# Patient Record
Sex: MALE | URBAN - METROPOLITAN AREA
[De-identification: names, ages, dates, MRNs, and addresses within clinical notes are randomized per-mention and may not be internally consistent; named-entity substitution may affect disease eponyms.]

---

## 2021-05-14 DIAGNOSIS — Z00.00 ROUTINE GENERAL MEDICAL EXAMINATION AT A HEALTH CARE FACILITY: Primary | ICD-10-CM

## 2021-05-17 ENCOUNTER — OFFICE VISIT (OUTPATIENT)
Dept: SPORTS MEDICINE | Facility: CLINIC | Age: 26
End: 2021-05-17
Payer: COMMERCIAL

## 2021-05-17 DIAGNOSIS — Z00.00 ROUTINE GENERAL MEDICAL EXAMINATION AT A HEALTH CARE FACILITY: ICD-10-CM

## 2021-05-17 PROCEDURE — 99499 NO LOS: ICD-10-PCS | Mod: ,,, | Performed by: ORTHOPAEDIC SURGERY

## 2021-05-17 PROCEDURE — 93000 ELECTROCARDIOGRAM COMPLETE: CPT | Mod: ,,, | Performed by: INTERNAL MEDICINE

## 2021-05-17 PROCEDURE — 93000 EKG 12-LEAD: ICD-10-PCS | Mod: ,,, | Performed by: INTERNAL MEDICINE

## 2021-05-17 PROCEDURE — 99499 UNLISTED E&M SERVICE: CPT | Mod: ,,, | Performed by: ORTHOPAEDIC SURGERY

## 2021-05-18 ENCOUNTER — HOSPITAL ENCOUNTER (OUTPATIENT)
Dept: RADIOLOGY | Facility: HOSPITAL | Age: 26
Discharge: HOME | End: 2021-05-18
Attending: ORTHOPAEDIC SURGERY
Payer: COMMERCIAL

## 2021-05-18 ENCOUNTER — TELEPHONE (OUTPATIENT)
Dept: SPORTS MEDICINE | Facility: CLINIC | Age: 26
End: 2021-05-18

## 2021-05-18 DIAGNOSIS — Z02.5 SPORTS PHYSICAL: ICD-10-CM

## 2021-05-18 DIAGNOSIS — Z02.5 SPORTS PHYSICAL: Primary | ICD-10-CM

## 2021-05-18 PROCEDURE — 72110 X-RAY EXAM L-2 SPINE 4/>VWS: CPT | Mod: TC

## 2021-05-18 PROCEDURE — 72148 MRI LUMBAR SPINE W/O DYE: CPT | Mod: TC

## 2021-05-18 PROCEDURE — 72148 MRI LUMBAR SPINE W/O DYE: CPT | Mod: 26,,, | Performed by: RADIOLOGY

## 2021-05-18 PROCEDURE — 72110 X-RAY EXAM L-2 SPINE 4/>VWS: CPT | Mod: 26,,, | Performed by: RADIOLOGY

## 2021-05-18 PROCEDURE — 72148 MRI LUMBAR SPINE WITHOUT CONTRAST: ICD-10-PCS | Mod: 26,,, | Performed by: RADIOLOGY

## 2021-05-18 PROCEDURE — 72110 XR LUMBAR SPINE 5 VIEW WITH FLEX AND EXT: ICD-10-PCS | Mod: 26,,, | Performed by: RADIOLOGY

## 2022-05-11 DIAGNOSIS — Z00.00 ANNUAL PHYSICAL EXAM: Primary | ICD-10-CM

## 2022-06-13 ENCOUNTER — CLINICAL SUPPORT (OUTPATIENT)
Dept: ADMINISTRATIVE | Facility: CLINIC | Age: 27
End: 2022-06-13
Payer: COMMERCIAL

## 2022-06-13 DIAGNOSIS — Z00.00 ROUTINE GENERAL MEDICAL EXAMINATION AT A HEALTH CARE FACILITY: Primary | ICD-10-CM

## 2022-06-13 DIAGNOSIS — Z00.00 ANNUAL PHYSICAL EXAM: ICD-10-CM

## 2022-06-13 LAB
25(OH)D3+25(OH)D2 SERPL-MCNC: 26 NG/ML (ref 30–96)
ALBUMIN SERPL BCP-MCNC: 3.8 G/DL (ref 3.5–5.2)
ALP SERPL-CCNC: 67 U/L (ref 55–135)
ALT SERPL W/O P-5'-P-CCNC: 25 U/L (ref 10–44)
ANION GAP SERPL CALC-SCNC: 11 MMOL/L (ref 8–16)
AST SERPL-CCNC: 33 U/L (ref 10–40)
BASOPHILS # BLD AUTO: 0.01 K/UL (ref 0–0.2)
BASOPHILS NFR BLD: 0.3 % (ref 0–1.9)
BILIRUB SERPL-MCNC: 0.6 MG/DL (ref 0.1–1)
BILIRUB UR QL STRIP: NEGATIVE
BILIRUB UR QL STRIP: NEGATIVE
BUN SERPL-MCNC: 11 MG/DL (ref 6–20)
CALCIUM SERPL-MCNC: 9.6 MG/DL (ref 8.7–10.5)
CHLORIDE SERPL-SCNC: 103 MMOL/L (ref 95–110)
CHOLEST SERPL-MCNC: 135 MG/DL (ref 120–199)
CHOLEST/HDLC SERPL: 2.8 {RATIO} (ref 2–5)
CLARITY UR REFRACT.AUTO: CLEAR
CLARITY UR REFRACT.AUTO: CLEAR
CO2 SERPL-SCNC: 25 MMOL/L (ref 23–29)
COLOR UR AUTO: YELLOW
COLOR UR AUTO: YELLOW
CREAT SERPL-MCNC: 1.1 MG/DL (ref 0.5–1.4)
DIFFERENTIAL METHOD: ABNORMAL
EOSINOPHIL # BLD AUTO: 0.1 K/UL (ref 0–0.5)
EOSINOPHIL NFR BLD: 4.1 % (ref 0–8)
ERYTHROCYTE [DISTWIDTH] IN BLOOD BY AUTOMATED COUNT: 12.1 % (ref 11.5–14.5)
EST. GFR  (AFRICAN AMERICAN): >60 ML/MIN/1.73 M^2
EST. GFR  (NON AFRICAN AMERICAN): >60 ML/MIN/1.73 M^2
ESTIMATED AVG GLUCOSE: 108 MG/DL (ref 68–131)
GLUCOSE SERPL-MCNC: 86 MG/DL (ref 70–110)
GLUCOSE UR QL STRIP: NEGATIVE
GLUCOSE UR QL STRIP: NEGATIVE
HBA1C MFR BLD: 5.4 % (ref 4–5.6)
HCT VFR BLD AUTO: 43.9 % (ref 40–54)
HDLC SERPL-MCNC: 49 MG/DL (ref 40–75)
HDLC SERPL: 36.3 % (ref 20–50)
HGB BLD-MCNC: 14.9 G/DL (ref 14–18)
HGB S BLD QL SOLY: NEGATIVE
HGB UR QL STRIP: NEGATIVE
HGB UR QL STRIP: NEGATIVE
IMM GRANULOCYTES # BLD AUTO: 0.01 K/UL (ref 0–0.04)
IMM GRANULOCYTES NFR BLD AUTO: 0.3 % (ref 0–0.5)
KETONES UR QL STRIP: NEGATIVE
KETONES UR QL STRIP: NEGATIVE
LDLC SERPL CALC-MCNC: 75 MG/DL (ref 63–159)
LEUKOCYTE ESTERASE UR QL STRIP: NEGATIVE
LEUKOCYTE ESTERASE UR QL STRIP: NEGATIVE
LYMPHOCYTES # BLD AUTO: 1.2 K/UL (ref 1–4.8)
LYMPHOCYTES NFR BLD: 36 % (ref 18–48)
MCH RBC QN AUTO: 30.2 PG (ref 27–31)
MCHC RBC AUTO-ENTMCNC: 33.9 G/DL (ref 32–36)
MCV RBC AUTO: 89 FL (ref 82–98)
MONOCYTES # BLD AUTO: 0.3 K/UL (ref 0.3–1)
MONOCYTES NFR BLD: 9.6 % (ref 4–15)
NEUTROPHILS # BLD AUTO: 1.7 K/UL (ref 1.8–7.7)
NEUTROPHILS NFR BLD: 49.7 % (ref 38–73)
NITRITE UR QL STRIP: NEGATIVE
NITRITE UR QL STRIP: NEGATIVE
NONHDLC SERPL-MCNC: 86 MG/DL
NRBC BLD-RTO: 0 /100 WBC
PH UR STRIP: 6 [PH] (ref 5–8)
PH UR STRIP: 6 [PH] (ref 5–8)
PLATELET # BLD AUTO: 222 K/UL (ref 150–450)
PMV BLD AUTO: 9.5 FL (ref 9.2–12.9)
POTASSIUM SERPL-SCNC: 4.1 MMOL/L (ref 3.5–5.1)
PROT SERPL-MCNC: 6.9 G/DL (ref 6–8.4)
PROT UR QL STRIP: NEGATIVE
PROT UR QL STRIP: NEGATIVE
RBC # BLD AUTO: 4.94 M/UL (ref 4.6–6.2)
SARS-COV-2 IGG SERPL IA-ACNC: NORMAL AU/ML
SARS-COV-2 IGG SERPL QL IA: POSITIVE
SODIUM SERPL-SCNC: 139 MMOL/L (ref 136–145)
SP GR UR STRIP: 1.02 (ref 1–1.03)
SP GR UR STRIP: 1.02 (ref 1–1.03)
TRIGL SERPL-MCNC: 55 MG/DL (ref 30–150)
TSH SERPL DL<=0.005 MIU/L-ACNC: 1.48 UIU/ML (ref 0.4–4)
URN SPEC COLLECT METH UR: NORMAL
URN SPEC COLLECT METH UR: NORMAL
WBC # BLD AUTO: 3.44 K/UL (ref 3.9–12.7)

## 2022-06-13 PROCEDURE — 86769 SARS-COV-2 COVID-19 ANTIBODY: CPT | Performed by: INTERNAL MEDICINE

## 2022-06-13 PROCEDURE — 83036 HEMOGLOBIN GLYCOSYLATED A1C: CPT | Performed by: INTERNAL MEDICINE

## 2022-06-13 PROCEDURE — 82955 ASSAY OF G6PD ENZYME: CPT | Performed by: INTERNAL MEDICINE

## 2022-06-13 PROCEDURE — 80053 COMPREHEN METABOLIC PANEL: CPT | Performed by: INTERNAL MEDICINE

## 2022-06-13 PROCEDURE — 85025 COMPLETE CBC W/AUTO DIFF WBC: CPT | Performed by: INTERNAL MEDICINE

## 2022-06-13 PROCEDURE — 84443 ASSAY THYROID STIM HORMONE: CPT | Performed by: INTERNAL MEDICINE

## 2022-06-13 PROCEDURE — 80061 LIPID PANEL: CPT | Performed by: INTERNAL MEDICINE

## 2022-06-13 PROCEDURE — 85660 RBC SICKLE CELL TEST: CPT | Performed by: INTERNAL MEDICINE

## 2022-06-13 PROCEDURE — 81003 URINALYSIS AUTO W/O SCOPE: CPT | Performed by: INTERNAL MEDICINE

## 2022-06-13 PROCEDURE — 93010 EKG 12-LEAD: ICD-10-PCS | Mod: S$GLB,,, | Performed by: INTERNAL MEDICINE

## 2022-06-13 PROCEDURE — 93010 ELECTROCARDIOGRAM REPORT: CPT | Mod: S$GLB,,, | Performed by: INTERNAL MEDICINE

## 2022-06-13 PROCEDURE — 82306 VITAMIN D 25 HYDROXY: CPT | Performed by: INTERNAL MEDICINE

## 2022-06-14 LAB — G6PD RBC-CCNT: 10 U/G HB (ref 8–11.9)

## 2022-08-26 ENCOUNTER — TELEPHONE (OUTPATIENT)
Dept: SPORTS MEDICINE | Facility: CLINIC | Age: 27
End: 2022-08-26
Payer: COMMERCIAL

## 2022-08-26 DIAGNOSIS — M25.562 ACUTE PAIN OF LEFT KNEE: Primary | ICD-10-CM

## 2022-08-27 ENCOUNTER — HOSPITAL ENCOUNTER (OUTPATIENT)
Dept: RADIOLOGY | Facility: HOSPITAL | Age: 27
Discharge: HOME | End: 2022-08-27
Attending: ORTHOPAEDIC SURGERY
Payer: COMMERCIAL

## 2022-08-27 DIAGNOSIS — M25.562 ACUTE PAIN OF LEFT KNEE: ICD-10-CM

## 2022-08-27 PROCEDURE — 73721 MRI JNT OF LWR EXTRE W/O DYE: CPT | Mod: TC,LT

## 2022-08-27 PROCEDURE — 73721 MRI JNT OF LWR EXTRE W/O DYE: CPT | Mod: 26,LT,, | Performed by: RADIOLOGY

## 2022-08-27 PROCEDURE — 73721 MRI KNEE WITHOUT CONTRAST LEFT: ICD-10-PCS | Mod: 26,LT,, | Performed by: RADIOLOGY

## 2022-08-27 RX ORDER — HYDROCODONE BITARTRATE AND ACETAMINOPHEN 7.5; 325 MG/1; MG/1
TABLET ORAL
Qty: 20 TABLET | Refills: 0 | Status: SHIPPED | OUTPATIENT
Start: 2022-08-27 | End: 2022-09-16

## 2022-09-06 ENCOUNTER — TELEPHONE (OUTPATIENT)
Dept: SPORTS MEDICINE | Facility: CLINIC | Age: 27
End: 2022-09-06
Payer: COMMERCIAL

## 2022-09-06 DIAGNOSIS — M25.562 ACUTE PAIN OF LEFT KNEE: Primary | ICD-10-CM

## 2022-09-07 ENCOUNTER — CLINICAL SUPPORT (OUTPATIENT)
Dept: REHABILITATION | Facility: HOSPITAL | Age: 27
End: 2022-09-07
Attending: ORTHOPAEDIC SURGERY
Payer: COMMERCIAL

## 2022-09-07 DIAGNOSIS — M62.81 QUADRICEPS WEAKNESS: ICD-10-CM

## 2022-09-07 DIAGNOSIS — M25.662 DECREASED RANGE OF MOTION OF LEFT KNEE: ICD-10-CM

## 2022-09-07 DIAGNOSIS — M25.562 ACUTE PAIN OF LEFT KNEE: ICD-10-CM

## 2022-09-07 DIAGNOSIS — R26.2 DIFFICULTY WALKING: ICD-10-CM

## 2022-09-07 PROCEDURE — 97161 PT EVAL LOW COMPLEX 20 MIN: CPT

## 2022-09-07 PROCEDURE — 97140 MANUAL THERAPY 1/> REGIONS: CPT

## 2022-09-07 PROCEDURE — 97110 THERAPEUTIC EXERCISES: CPT

## 2022-09-07 PROCEDURE — 97112 NEUROMUSCULAR REEDUCATION: CPT

## 2022-09-08 ENCOUNTER — CLINICAL SUPPORT (OUTPATIENT)
Dept: REHABILITATION | Facility: HOSPITAL | Age: 27
End: 2022-09-08
Attending: ORTHOPAEDIC SURGERY
Payer: COMMERCIAL

## 2022-09-08 DIAGNOSIS — M25.562 ACUTE PAIN OF LEFT KNEE: Primary | ICD-10-CM

## 2022-09-08 DIAGNOSIS — R26.2 DIFFICULTY WALKING: ICD-10-CM

## 2022-09-08 DIAGNOSIS — M25.662 DECREASED RANGE OF MOTION OF LEFT KNEE: ICD-10-CM

## 2022-09-08 DIAGNOSIS — M62.81 QUADRICEPS WEAKNESS: ICD-10-CM

## 2022-09-08 PROCEDURE — 97140 MANUAL THERAPY 1/> REGIONS: CPT

## 2022-09-08 PROCEDURE — 97110 THERAPEUTIC EXERCISES: CPT

## 2022-09-08 PROCEDURE — 97112 NEUROMUSCULAR REEDUCATION: CPT

## 2022-09-08 NOTE — PROGRESS NOTES
[facility]   Physical Therapy Treatment Note     Name: Billy Holbrook  [facility] Number: [id number]    Therapy Diagnosis:   Encounter Diagnoses   Name Primary?    Acute pain of left knee Yes    Decreased range of motion of left knee     Quadriceps weakness     Difficulty walking      Physician: PEACE Dempsey MD    Visit Date: 9/8/2022    Physician Orders: PT Eval and Treat   Medical Diagnosis from Referral: M25.562 (ICD-10-CM) - Acute pain of left knee  Evaluation Date: 9/7/2022  Authorization Period Expiration: 9/6/2023  Plan of Care Expiration: 12/31/2022  Visit # / Visits authorized: 2/ 20      Precautions: Standard     Time In: 10:55 am  Time Out: 11:58  Total Billable Time: 60 minutes    SUBJECTIVE     Pt reports: his knee is feeling better after last session. Does not like the brace or crutches, but is using them.     He was compliant with home exercise program.  Response to previous treatment: less pain and stiffness  Functional change: better gait on crutches    Pain: 0/10  Location: left knee      OBJECTIVE     Observation: no obvious effusion noted     Range of Motion (extension/neutral/flexion):     Right Left   Knee PROM: 2-0-130 deg 1-0-128* deg      Strength:     Right Left Comment   Knee Extension: 5/5 3-/5*        Knee Flexion: 5/5 3-/5*        Hip Abduction: 4+/5 4-/5       Functional tests(*=pain):   Gait: on linus crutches with hinged unlocked knee brace: reciprocal  Quad activation: fair     Joint Mobility: hypomobile PFJ and tibiofemoral    Treatment     Billy received the treatments listed below:      therapeutic exercises to develop strength, endurance, ROM, flexibility, posture, and core stabilization for 40 minutes including:  Heel slides x 5 min 2 bouts  SL hip abd and extension in prone next session  After being screened for any contraindications, assessed for precautions, and educated in the benefits/risks associated with the use of blood flow restriction  training. The BFR device calculated a personalized pressure of 250 mmHg. Exercises performed at an 60% occlusion pressure of 150 mmHg via autoregulation feature for the following exercises:   -LAQ 5 lbs  -TKE standing black sport band   Education/assessment    manual therapy techniques: Joint mobilizations and Soft tissue Mobilization were applied for 8 minutes, including:  PFJ gr 3 glides all planes  Knee hyperextension gr 3  Tibial AP gr 3    neuromuscular re-education activities to improve: Coordination, Kinesthetic, Sense, and Proprioception for 10 minutes. The following activities were included:  Quad sets with biofeedback comparison to uninvolved with channel 2  SLR from quad set 3 x 10 in supine    therapeutic activities to improve functional performance for 5  minutes, including:  DL squat 0-60 deg 3 x 10: cuing on weight shift/mechanics    gait training to improve functional mobility and safety for 0  minutes, including:  NP    Patient Education and Home Exercises     Home Exercises Provided and Patient Education Provided     Education provided:   - HEP update, use of crutches/brace- re fit with new velcro, compression sleeve, timeframes, expectations, prognosis    Written Home Exercises Provided: yes. Exercises were reviewed and Billy was able to demonstrate them prior to the end of the session.  Billy demonstrated good  understanding of the education provided. See EMR under Patient Instructions for exercises provided during therapy sessions    ASSESSMENT     Billy demonstrates less irritability, better ROM, joint mobility improvements, and improved quad activation. Good tolerance to session without complaints other than quad fatigue as expected. He moves well off crutches/brace in clinic short distances. Pt reported/demonstrated no adverse response or exacerbation of symptoms during today's session.      Billy Is progressing well towards his goals.   Pt prognosis is Good.     Pt will continue to  benefit from skilled outpatient physical therapy to address the deficits listed in the problem list box on initial evaluation, provide pt/family education and to maximize pt's level of independence in the home and community environment.     Pt's spiritual, cultural and educational needs considered and pt agreeable to plan of care and goals.     Anticipated barriers to physical therapy: none    GOALS: Short Term Goals:  4 weeks (progressing, not met)   1.Report decreased L knee pain  < / =  2/10  to increase tolerance for ambulating on crutches. MET  2. Increase knee ROM to 2-0-130 deg in order to go into surgery with full ROM.   3. Increase strength by 1/3 MMT grade in L quad to improve activation prior to surgery.   4. Pt to tolerate HEP to improve ROM and improve quad activation for immediate post-op.   5. Pt will have minimal to none joint effusion noted prior to surgery.     PLAN     Progress ROM to full as able. Maintain good quad activation and joint mobility.     Manjit Lazcano, PT

## 2022-09-08 NOTE — PLAN OF CARE
[facility]   Physical Therapy Initial Evaluation     Date: 9/7/2022   Name: Billy Holbrook  [facility] Number: [id number]    Therapy Diagnosis:   Encounter Diagnoses   Name Primary?    Acute pain of left knee     Decreased range of motion of left knee     Quadriceps weakness     Difficulty walking      Physician: PEACE Dempsey MD    Physician Orders: PT Eval and Treat   Medical Diagnosis from Referral: M25.562 (ICD-10-CM) - Acute pain of left knee  Evaluation Date: 9/7/2022  Authorization Period Expiration: 9/6/2023  Plan of Care Expiration: 12/31/2022  Visit # / Visits authorized: 1/ 20     Precautions: Standard     Time In: 9:00 am  Time Out: 10:05  Total Appointment Time (timed & untimed codes): 65 minutes      SUBJECTIVE     Date of onset: 2-3 wks ago    History of current condition - Billy reports: he was injured in practice when someone rolled up on his leg. He reports no prior injures other than some disc issues that have been asymptomatic for some time. He feels his swelling is down since injury. He is going to have Dr. Lopez take a look as his knee for 2nd option prior to likely surgery in next 2-3 wks with Dr. Castaneda. He does not enjoy the brace/crutches, but is using them for most part. Wearing compression sleeve as well. Does note instability/not feeling right when loading his knee/with testing.     Falls: none    Imaging, MRI studies:   1. Mild signal heterogeneity of the proximal fibers of the ACL suggesting a low-grade sprain.  No high-grade partial or full-thickness tear.  Clinical correlation recommended.  2. High-grade partial-thickness tear of the proximal superficial MCL superimposed on a chronic sprain.  3. Complete tear of the meniscofemoral component of the deep MCL.  4. Small complex tear inner 1/3 of the posterior horn of the lateral meniscus, favored to be chronic.  5. Chondral fissuring at the central and posterior weight-bearing lateral tibial plateau with  mild associated subchondral edema.  6. Fragmented tibial tubercle in keeping with sequela chronic Osgood-Schlatter.  7. Small joint effusion.    Dr. Castaneda feels knee is unstable with testing and per pt report.     Prior Therapy: yes with [facility]  Social History:  lives with their family  Occupation: [employer]  Prior Level of Function: unrestricted function  Current Level of Function: unable to fully bend knee, walk without limp.     Pain:  Current 0/10, worst 6/10, best 0/10   Location: left knee  medial and posterior  Description: Aching, Dull, and Tight  Aggravating Factors: Walking, Extension, Flexing, and Getting out of bed/chair  Easing Factors: ice, lying down, rest, and elevation    Patients goals: get back to football activities     Medical History:   No past medical history on file.    Surgical History:   Billy Holbrook  has no past surgical history on file.    Medications:   Billy has a current medication list which includes the following prescription(s): hydrocodone-acetaminophen.    Allergies:   Review of patient's allergies indicates:   Allergen Reactions    Citrus and derivatives     Peanut     Wasp venom     Yellow jacket venom       OBJECTIVE     Observation: no obvious effusion noted     Range of Motion (extension/neutral/flexion):     Right Left   Knee PROM: 2-0-130 deg 0-124* deg      Strength:     Right Left Comment   Knee Extension: 5/5 3-/5*        Knee Flexion: 5/5 3-/5*        Hip Abduction: 4+/5 4-/5        Special Tests: NT      Palpation: TTP along MCL    Neurovascularly intact    Functional tests(*=pain):   Gait: on linus crutches with hinged unlocked knee brace: reciprocal  Quad activation: fair    Joint Mobility: hypomobile PFJ and tibiofemoral     Proximal/distal screen: decreased active/passive ankle mobility    Flexibility:        Generally not flexible    No girth differences noted.     TREATMENT     Total Treatment time (time-based codes) separate from Evaluation: 46 minutes       Billy received the treatments listed below:      therapeutic exercises to develop strength, endurance, ROM, and flexibility for 25 minutes including:  Heel slides 2 x 5 min  After being screened for any contraindications, assessed for precautions, and educated in the benefits/risks associated with the use of blood flow restriction training. The BFR device calculated a personalized pressure of 227 mmHg. Exercises performed at an 60% occlusion pressure of 136 mmHg via autoregulation feature for the following exercises:   -LAQ 7.5 lbs  -standing TKE black sport band   Education and fit of brace    manual therapy techniques: Joint mobilizations and Soft tissue Mobilization were applied for 8 minutes, including:  PFJ gr 2-3 all planes  Tibial gr 3 AP  Knee hyperextension gr 3    neuromuscular re-education activities to improve: Coordination, Kinesthetic, Sense, and Proprioception for 8 minutes. The following activities were included:  Quad sets with biofeedback device; compared linus with iPad visual feedback    therapeutic activities to improve functional performance for 3 minutes, including:  DL squat 0-60 deg 3 x 15; cuing and feedback    gait training to improve functional mobility and safety for 2  minutes, including:  Use of crutches and mechanics    cold pack for 8 minutes to L knee.    PATIENT EDUCATION AND HOME EXERCISES     Education provided:   - Pt was educated and demonstrated good understanding of post-op precautions, timeframe for recovery, prognosis, objective findings and progress/goals, Tx rationale/options, HEP review/discussion on modification and or progression/regression, expectations for rehab, normal and expected soreness, activity modification/avoidance/pacing, use of ICE/elevation of extremity, activity pacing, anatomy/physiology of pathology, benefits of exercise and physical therapy, use/fit of brace and crutches, wound care, s/s infection and DVT.      Written Home Exercises Provided: yes.  Exercises were reviewed and Billy was able to demonstrate them prior to the end of the session.  Billy demonstrated good  understanding of the education provided. See EMR under Patient Instructions for exercises provided during therapy sessions.    ASSESSMENT     Billy is a 27 y.o. male referred to outpatient Physical Therapy with a medical diagnosis of L knee pain and weakness secondary to MCL and ACL pathology. Patient presents with pain, instability, swelling, quad inhibition, ROM deficit, joint stiffness, and overall functional mobility deficits and intolerance. Plan is for him to have surgery in next 2-3 wks.     Patient prognosis is Good.   Patient will benefit from skilled outpatient Physical Therapy to address the deficits stated above and in the chart below, provide patient /family education, and to maximize patientt's level of independence.     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    Personal Factors:   coping style  lifestyle  character     low   Examination  Body Structures and Functions, activity limitations and participation restrictions that may impact the plan of care Body Regions:   back  lower extremities    Body Systems:    gross symmetry  ROM  strength  gross coordinated movement  balance  gait  transfers  transitions  motor control  motor learning  edema    Participation Restrictions:   Football activities    Activity limitations:   Learning and applying knowledge  no deficits    General Tasks and Commands  no deficits    Communication  no deficits    Mobility  lifting and carrying objects  walking  Squatting, running, lunging, jumping,  cutting    Self care  no deficits    Domestic Life  assisting others    Interactions/Relationships  no deficits    Life Areas  no deficits    Community and Social Life  community life  recreation and leisure         low   Clinical Presentation stable and uncomplicated low   Decision Making/ Complexity Score: low     GOALS: Short Term Goals:  4 weeks  1.Report decreased L knee pain  < / =  2/10  to increase tolerance for ambulating on crutches.   2. Increase knee ROM to 2-0-130 deg in order to go into surgery with full ROM.   3. Increase strength by 1/3 MMT grade in L quad to improve activation prior to surgery.   4. Pt to tolerate HEP to improve ROM and improve quad activation for immediate post-op.   5. Pt will have minimal to none joint effusion noted prior to surgery.     PLAN   Plan of care Certification: 9/7/2022 to 12/31/2022.    Outpatient Physical Therapy 5 times weekly for 4 weeks to include the following interventions: Electrical Stimulation TENs/IFC/NMES, Manual Therapy, Moist Heat/ Ice, Neuromuscular Re-ed, Patient Education, Self Care, Therapeutic Activities, Therapeutic Exercise, Ultrasound and IASTM, dry needling, taping, BFR, and gr 5 mobilization as needed.      Manjit Lazcano, PT

## 2022-09-09 ENCOUNTER — CLINICAL SUPPORT (OUTPATIENT)
Dept: REHABILITATION | Facility: HOSPITAL | Age: 27
End: 2022-09-09
Attending: ORTHOPAEDIC SURGERY
Payer: COMMERCIAL

## 2022-09-09 DIAGNOSIS — M25.562 ACUTE PAIN OF LEFT KNEE: Primary | ICD-10-CM

## 2022-09-09 DIAGNOSIS — M62.81 QUADRICEPS WEAKNESS: ICD-10-CM

## 2022-09-09 DIAGNOSIS — R26.2 DIFFICULTY WALKING: ICD-10-CM

## 2022-09-09 DIAGNOSIS — M25.662 DECREASED RANGE OF MOTION OF LEFT KNEE: ICD-10-CM

## 2022-09-09 PROCEDURE — 97140 MANUAL THERAPY 1/> REGIONS: CPT

## 2022-09-09 PROCEDURE — 97110 THERAPEUTIC EXERCISES: CPT

## 2022-09-09 PROCEDURE — 97112 NEUROMUSCULAR REEDUCATION: CPT

## 2022-09-11 NOTE — PROGRESS NOTES
[facility]   Physical Therapy Treatment Note     Name: Billy Holbrook  [facility] Number: [id number]    Therapy Diagnosis:   Encounter Diagnoses   Name Primary?    Acute pain of left knee Yes    Decreased range of motion of left knee     Quadriceps weakness     Difficulty walking      Physician: PEACE Dempsey MD    Visit Date: 9/9/2022    Physician Orders: PT Eval and Treat   Medical Diagnosis from Referral: M25.562 (ICD-10-CM) - Acute pain of left knee  Evaluation Date: 9/7/2022  Authorization Period Expiration: 9/6/2023  Plan of Care Expiration: 12/31/2022  Visit # / Visits authorized: 3/ 20      Precautions: Standard     Time In: 10:58 am  Time Out: 11:58  Total Billable Time: 60 minutes    SUBJECTIVE     Pt reports: he feels good. Shone took him of crutches yesterday. Pain is minimal to none. Notes no swelling. Ready to have surgery. Going to gun range this weekend he thinks.     He was compliant with home exercise program.  Response to previous treatment: less pain and stiffness  Functional change: better gait on crutches    Pain: 0/10  Location: left knee      OBJECTIVE     Observation: no obvious effusion noted     Range of Motion (extension/neutral/flexion):     Right Left   Knee PROM: 2-0-130 deg 2-0-130* deg      Strength:     Right Left Comment   Knee Extension: 5/5 3-/5*        Knee Flexion: 5/5 3-/5*        Hip Abduction: 4+/5 4-/5       Functional tests(*=pain):   Gait: on linus crutches with hinged unlocked knee brace: reciprocal  Quad activation: fair     Joint Mobility: hypomobile PFJ and tibiofemoral    Treatment     Billy received the treatments listed below:      therapeutic exercises to develop strength, endurance, ROM, flexibility, posture, and core stabilization for 37 minutes including:  Heel slides x 5 min 2 bouts  SL hip abd 3 x 10  Prone hip ext with quad set 3 x 10  After being screened for any contraindications, assessed for precautions, and educated in the  benefits/risks associated with the use of blood flow restriction training. The BFR device calculated a personalized pressure of 250 mmHg. Exercises performed at an 60% occlusion pressure of 150 mmHg via autoregulation feature for the following exercises:   -LAQ 5 lbs  -SL press on shuttle 25 lbs  Education/assessment    manual therapy techniques: Joint mobilizations and Soft tissue Mobilization were applied for 8 minutes, including:  PFJ gr 3 glides all planes  Knee hyperextension gr 3  Tibial AP gr 3    neuromuscular re-education activities to improve: Coordination, Kinesthetic, Sense, and Proprioception for 10 minutes. The following activities were included:  Quad sets with biofeedback comparison to uninvolved with channel 2  SLR from quad set 3 x 10 in supine    therapeutic activities to improve functional performance for 5 minutes, including:  DL squat 0-60 deg 3 x 10: cuing on weight shift/mechanics    gait training to improve functional mobility and safety for 0  minutes, including:  NP    Patient Education and Home Exercises     Home Exercises Provided and Patient Education Provided     Education provided:   - HEP update, use of crutches/brace- re fit with new velcro, compression sleeve, timeframes, expectations, prognosis    Written Home Exercises Provided: yes. Exercises were reviewed and Billy was able to demonstrate them prior to the end of the session.  Billy demonstrated good  understanding of the education provided. See EMR under Patient Instructions for exercises provided during therapy sessions    ASSESSMENT     Billy is doing well at this time. His ROM is much improved with less MCL irritation reported. Quad activation is much better. Walking well without external support short distances. Dr. Castaneda spoke to him in clinic today about next weeks plans and check in wed/Thursday with surgery possible that day v Tuesday the week after.  Pt reported/demonstrated no adverse response or exacerbation of  symptoms during today's session.      Billy Is progressing well towards his goals.   Pt prognosis is Good.     Pt will continue to benefit from skilled outpatient physical therapy to address the deficits listed in the problem list box on initial evaluation, provide pt/family education and to maximize pt's level of independence in the home and community environment.     Pt's spiritual, cultural and educational needs considered and pt agreeable to plan of care and goals.     Anticipated barriers to physical therapy: none    GOALS: Short Term Goals:  4 weeks (progressing, not met)   1.Report decreased L knee pain  < / =  2/10  to increase tolerance for ambulating on crutches. MET  2. Increase knee ROM to 2-0-130 deg in order to go into surgery with full ROM. MET  3. Increase strength by 1/3 MMT grade in L quad to improve activation prior to surgery. MET  4. Pt to tolerate HEP to improve ROM and improve quad activation for immediate post-op.   5. Pt will have minimal to none joint effusion noted prior to surgery. MET    PLAN     Progress ROM to full as able. Maintain good quad activation, low irritability, minimal to no effusion, and joint mobility.     Seeing 5x/wk pre and post-op.     Manjit Lazcano, PT

## 2022-09-12 ENCOUNTER — CLINICAL SUPPORT (OUTPATIENT)
Dept: REHABILITATION | Facility: HOSPITAL | Age: 27
End: 2022-09-12
Attending: ORTHOPAEDIC SURGERY
Payer: COMMERCIAL

## 2022-09-12 DIAGNOSIS — R26.2 DIFFICULTY WALKING: ICD-10-CM

## 2022-09-12 DIAGNOSIS — M62.81 QUADRICEPS WEAKNESS: ICD-10-CM

## 2022-09-12 DIAGNOSIS — M25.562 ACUTE PAIN OF LEFT KNEE: Primary | ICD-10-CM

## 2022-09-12 DIAGNOSIS — M25.662 DECREASED RANGE OF MOTION OF LEFT KNEE: ICD-10-CM

## 2022-09-12 PROCEDURE — 97140 MANUAL THERAPY 1/> REGIONS: CPT

## 2022-09-12 PROCEDURE — 97112 NEUROMUSCULAR REEDUCATION: CPT

## 2022-09-12 PROCEDURE — 97110 THERAPEUTIC EXERCISES: CPT

## 2022-09-12 NOTE — PROGRESS NOTES
[facility]   Physical Therapy Treatment Note     Name: Billy Holbrook  [facility] Number: [id number]    Therapy Diagnosis:   Encounter Diagnoses   Name Primary?    Acute pain of left knee Yes    Decreased range of motion of left knee     Quadriceps weakness     Difficulty walking        Physician: PEACE Dempsey MD    Visit Date: 9/12/2022    Physician Orders: PT Eval and Treat   Medical Diagnosis from Referral: M25.562 (ICD-10-CM) - Acute pain of left knee  Evaluation Date: 9/7/2022  Authorization Period Expiration: 9/6/2023  Plan of Care Expiration: 12/31/2022  Visit # / Visits authorized: 3/ 20      Precautions: Standard     Time In: 11:00 am  Time Out: 12:06  Total Billable Time: 66 minutes    SUBJECTIVE     Pt reports: his knee is feeling good. No complaints of pain or instability. Remains mildly tender along MCL. Using brace with compression sleeve, good off crutches.     He was compliant with home exercise program.  Response to previous treatment: less pain and stiffness  Functional change: better gait on crutches    Pain: 0/10  Location: left knee      OBJECTIVE     Observation: no obvious effusion noted     Range of Motion (extension/neutral/flexion):     Right Left   Knee PROM: 2-0-130 deg 2-0-130 deg      Strength:     Right Left Comment   Knee Extension: 5/5 3-/5*        Knee Flexion: 5/5 3-/5*        Hip Abduction: 4+/5 4-/5       Functional tests(*=pain):   Gait/stairs: normal  Dl squat 0-60 deg good  Quad activation: good     Joint Mobility: hypomobile PFJ and tibiofemoral    TTP along MCL- mild    Treatment     Billy received the treatments listed below:      therapeutic exercises to develop strength, endurance, ROM, flexibility, posture, and core stabilization for 47 minutes including:  Heel slides x 5 min 2 bouts  SL hip abd 3 x 15 3 lbs  Prone hip ext with quad set 3 x 15 3 lbs  Up DL down SL calf raises standing 3 x 10  After being screened for any contraindications,  assessed for precautions, and educated in the benefits/risks associated with the use of blood flow restriction training. The BFR device calculated a personalized pressure of 210 mmHg. Exercises performed at an 60% occlusion pressure of 132 mmHg via autoregulation feature for the following exercises:   -LAQ 10->5 lbs  -step up 6 inch with black band TKE  -standing HS curls 5 lbs  Education/assessment    manual therapy techniques: Joint mobilizations and Soft tissue Mobilization were applied for 8 minutes, including:  PFJ gr 3 glides all planes  Knee hyperextension gr 3  Tibial AP gr 3    neuromuscular re-education activities to improve: Coordination, Kinesthetic, Sense, and Proprioception for 8 minutes. The following activities were included:  Quad sets x 10 x 5 sec  SLR from quad set 3 x 15 in supine with 3 lbs    therapeutic activities to improve functional performance for 3 minutes, including:  DL squat 0-60 deg 3 x 10: cuing on weight shift/mechanics    gait training to improve functional mobility and safety for 0  minutes, including:  NP    Patient Education and Home Exercises     Home Exercises Provided and Patient Education Provided     Education provided:   - HEP update, use of crutches/brace- re fit with new velcro, compression sleeve, timeframes, expectations, prognosis    Written Home Exercises Provided: yes. Exercises were reviewed and Billy was able to demonstrate them prior to the end of the session.  Billy demonstrated good  understanding of the education provided. See EMR under Patient Instructions for exercises provided during therapy sessions    ASSESSMENT     Billy arrived with some stiffness limiting flexion and extension, but able to gain back full motion. Quad activation is going well with fair tolerance to BFR- verbalizes how difficulty/uncomfortable compression is; knee feels fine. No MCL pain with ROM reported today, mild tenderness ongoing. Squat, step up, and gait mechanics look great  out of brace in clinic with no signs of instability.  Pt reported/demonstrated no adverse response or exacerbation of symptoms during today's session.      Billy Is progressing well towards his goals.   Pt prognosis is Good.     Pt will continue to benefit from skilled outpatient physical therapy to address the deficits listed in the problem list box on initial evaluation, provide pt/family education and to maximize pt's level of independence in the home and community environment.     Pt's spiritual, cultural and educational needs considered and pt agreeable to plan of care and goals.     Anticipated barriers to physical therapy: none    GOALS: Short Term Goals:  4 weeks (progressing, not met)   1.Report decreased L knee pain  < / =  2/10  to increase tolerance for ambulating on crutches. MET  2. Increase knee ROM to 2-0-130 deg in order to go into surgery with full ROM. MET  3. Increase strength by 1/3 MMT grade in L quad to improve activation prior to surgery. MET  4. Pt to tolerate HEP to improve ROM and improve quad activation for immediate post-op.   5. Pt will have minimal to none joint effusion noted prior to surgery. MET    PLAN     Progress ROM to full as able. Maintain good quad activation, low irritability, minimal to no effusion, and joint mobility.     Seeing 5x/wk pre and post-op.     Manjit Lazcano, PT

## 2022-09-13 ENCOUNTER — CLINICAL SUPPORT (OUTPATIENT)
Dept: REHABILITATION | Facility: HOSPITAL | Age: 27
End: 2022-09-13
Attending: ORTHOPAEDIC SURGERY
Payer: COMMERCIAL

## 2022-09-13 DIAGNOSIS — M62.81 QUADRICEPS WEAKNESS: ICD-10-CM

## 2022-09-13 DIAGNOSIS — R26.2 DIFFICULTY WALKING: ICD-10-CM

## 2022-09-13 DIAGNOSIS — M25.662 DECREASED RANGE OF MOTION OF LEFT KNEE: ICD-10-CM

## 2022-09-13 DIAGNOSIS — M25.562 ACUTE PAIN OF LEFT KNEE: Primary | ICD-10-CM

## 2022-09-13 PROCEDURE — 97140 MANUAL THERAPY 1/> REGIONS: CPT

## 2022-09-13 PROCEDURE — 97110 THERAPEUTIC EXERCISES: CPT

## 2022-09-13 PROCEDURE — 97112 NEUROMUSCULAR REEDUCATION: CPT

## 2022-09-13 NOTE — PROGRESS NOTES
[facility]   Physical Therapy Treatment Note     Name: Billy Holbrook  [facility] Number: [id number]    Therapy Diagnosis:   Encounter Diagnoses   Name Primary?    Acute pain of left knee Yes    Decreased range of motion of left knee     Quadriceps weakness     Difficulty walking        Physician: PEACE Dempsey MD    Visit Date: 9/13/2022    Physician Orders: PT Eval and Treat   Medical Diagnosis from Referral: M25.562 (ICD-10-CM) - Acute pain of left knee  Evaluation Date: 9/7/2022  Authorization Period Expiration: 9/6/2023  Plan of Care Expiration: 12/31/2022  Visit # / Visits authorized: 5/ 20      Precautions: Standard     Time In: 11:00 am  Time Out: 12:10  Total Billable Time: 60 minutes    SUBJECTIVE     Pt reports: he is feeling good, ready for surgery. MCL remains mildly tender but does not bother him through ROM.     He was compliant with home exercise program.  Response to previous treatment: less pain and stiffness  Functional change: better gait no crutches    Pain: 0/10  Location: left knee      OBJECTIVE     Observation: no obvious effusion noted     Range of Motion (extension/neutral/flexion):     Right Left   Knee PROM: 2-0-130 deg 2-0-130 deg      Strength:     Right Left Comment   Knee Extension: 5/5 4-/5*        Knee Flexion: 5/5 4-/5*        Hip Abduction: 4+/5 4-/5       Functional tests(*=pain):   Gait/stairs: normal  DL squat 0-60 deg good  Quad activation: good     Joint Mobility: hypomobile PFJ and tibiofemoral    TTP along MCL- mild    Treatment     Billy received the treatments listed below:      therapeutic exercises to develop strength, endurance, ROM, flexibility, posture, and core stabilization for 50 minutes including:  Heel slides x 5 min 2 bouts  SL hip abd 4 x 10 5 lbs  Prone hip ext with quad set 4 x 10 5 lbs  DL calf raise on slantboard 3 x 20  Upright bike 10 min lv 3-5-light  After being screened for any contraindications, assessed for precautions,  and educated in the benefits/risks associated with the use of blood flow restriction training. The BFR device calculated a personalized pressure of 210 mmHg. Exercises performed at an 60% occlusion pressure of 132 mmHg via autoregulation feature for the following exercises:   -DL squat to 20 inch box  -standing HS curls 5 lbs  Manual and self stretching guidance stretch to L hip  Education/assessment    manual therapy techniques: Joint mobilizations and Soft tissue Mobilization were applied for 10 minutes, including:  PFJ gr 3 glides all planes  Knee hyperextension gr 3  Tibial AP gr 3    neuromuscular re-education activities to improve: Coordination, Kinesthetic, Sense, and Proprioception for 10 minutes. The following activities were included:  Quad sets into hyperextension 2x 10 x 5 sec  SLR from quad set 4 x 10 in supine with 5 lbs    therapeutic activities to improve functional performance for 0 minutes, including:  NP    gait training to improve functional mobility and safety for 0  minutes, including:  NP    Patient Education and Home Exercises     Home Exercises Provided and Patient Education Provided     Education provided:   - HEP update, use of brace, compression sleeve, timeframes, expectations, prognosis    Written Home Exercises Provided: yes. Exercises were reviewed and Billy was able to demonstrate them prior to the end of the session.  Billy demonstrated good  understanding of the education provided. See EMR under Patient Instructions for exercises provided during therapy sessions    ASSESSMENT     Billy was able to obtain full ROM much easier today. Effusion is managed. MCL remains slightly tender to touch, but no pain through ROM or in session with exercises. Pt reported/demonstrated no adverse response or exacerbation of symptoms during today's session.      Billy Is progressing well towards his goals.   Pt prognosis is Good.     Pt will continue to benefit from skilled outpatient physical  therapy to address the deficits listed in the problem list box on initial evaluation, provide pt/family education and to maximize pt's level of independence in the home and community environment.     Pt's spiritual, cultural and educational needs considered and pt agreeable to plan of care and goals.     Anticipated barriers to physical therapy: none    GOALS: Short Term Goals:  4 weeks (progressing, not met)   1.Report decreased L knee pain  < / =  2/10  to increase tolerance for ambulating on crutches. MET  2. Increase knee ROM to 2-0-130 deg in order to go into surgery with full ROM. MET  3. Increase strength by 1/3 MMT grade in L quad to improve activation prior to surgery. MET  4. Pt to tolerate HEP to improve ROM and improve quad activation for immediate post-op.   5. Pt will have minimal to none joint effusion noted prior to surgery. MET    PLAN     Progress ROM to full as able. Maintain good quad activation, low irritability, minimal to no effusion, and joint mobility.     Seeing 5x/wk pre and post-op.     Manjit Lazcano, PT

## 2022-09-14 ENCOUNTER — CLINICAL SUPPORT (OUTPATIENT)
Dept: REHABILITATION | Facility: HOSPITAL | Age: 27
End: 2022-09-14
Payer: COMMERCIAL

## 2022-09-14 DIAGNOSIS — R26.2 DIFFICULTY WALKING: ICD-10-CM

## 2022-09-14 DIAGNOSIS — M25.662 DECREASED RANGE OF MOTION OF LEFT KNEE: ICD-10-CM

## 2022-09-14 DIAGNOSIS — M25.562 ACUTE PAIN OF LEFT KNEE: Primary | ICD-10-CM

## 2022-09-14 DIAGNOSIS — M62.81 QUADRICEPS WEAKNESS: ICD-10-CM

## 2022-09-14 PROCEDURE — 97140 MANUAL THERAPY 1/> REGIONS: CPT

## 2022-09-14 PROCEDURE — 97110 THERAPEUTIC EXERCISES: CPT

## 2022-09-14 PROCEDURE — 97112 NEUROMUSCULAR REEDUCATION: CPT

## 2022-09-14 NOTE — PROGRESS NOTES
[facility]   Physical Therapy Treatment Note     Name: Billy Holbrook  [facility] Number: [id number]    Therapy Diagnosis:   Encounter Diagnoses   Name Primary?    Acute pain of left knee Yes    Decreased range of motion of left knee     Quadriceps weakness     Difficulty walking        Physician: PEACE Dempsey MD    Visit Date: 9/14/2022    Physician Orders: PT Eval and Treat   Medical Diagnosis from Referral: M25.562 (ICD-10-CM) - Acute pain of left knee  Evaluation Date: 9/7/2022  Authorization Period Expiration: 9/6/2023  Plan of Care Expiration: 12/31/2022  Visit # / Visits authorized: 6/ 20      Precautions: Standard     Time In: 11:00 am  Time Out: 12:15  Total Billable Time: 65 minutes    SUBJECTIVE     Pt reports: he is feeling good. Ready for surgery. Mild tenderness along MCL, but no irritation with functional tasks or thorough full ROM.      He was compliant with home exercise program.  Response to previous treatment: less pain and stiffness  Functional change: better gait no crutches    Pain: 0/10  Location: left knee      OBJECTIVE     Observation: no obvious effusion noted     Range of Motion (extension/neutral/flexion):     Right Left   Knee PROM: 2-0-130 deg 2-0-130 deg      Strength:     Right Left Comment   Knee Extension: 5/5 4-/5*        Knee Flexion: 5/5 4-/5*        Hip Abduction: 4+/5 4-/5       Functional tests(*=pain):   Gait/stairs: normal  DL squat 0-60 deg good  Quad activation: good     Joint Mobility: hypomobile PFJ and tibiofemoral    TTP along MCL- mild    Treatment     Billy received the treatments listed below:      therapeutic exercises to develop strength, endurance, ROM, flexibility, posture, and core stabilization for 55 minutes including:  Heel slides x 5 min 2 bouts  SL hip abd 4 x 10 5 lbs  Prone hip ext with quad set 4 x 10 5 lbs  DL calf raise on slantboard 3 x 20  Upright bike 10 min lv 3-7-light  After being screened for any  contraindications, assessed for precautions, and educated in the benefits/risks associated with the use of blood flow restriction training. The BFR device calculated a personalized pressure of 210 mmHg. Exercises performed at an 60% occlusion pressure of 132 mmHg via autoregulation feature for the following exercises:   -DL squat to 20 inch box  -prone HS curls 5 lbs  -LAQ 5 lbs  Education/assessment    manual therapy techniques: Joint mobilizations and Soft tissue Mobilization were applied for 10 minutes, including:  PFJ gr 3 glides all planes  Knee hyperextension gr 3  Tibial AP gr 3    neuromuscular re-education activities to improve: Coordination, Kinesthetic, Sense, and Proprioception for 10 minutes. The following activities were included:  Quad sets into hyperextension 2x 10 x 5 sec  SLR from quad set 4 x 10 in supine with 5 lbs    therapeutic activities to improve functional performance for 0 minutes, including:  NP    gait training to improve functional mobility and safety for 0  minutes, including:  NP    Patient Education and Home Exercises     Home Exercises Provided and Patient Education Provided     Education provided:   - HEP update, use of brace, compression sleeve, timeframes, expectations, prognosis    Written Home Exercises Provided: yes. Exercises were reviewed and Billy was able to demonstrate them prior to the end of the session.  Billy demonstrated good  understanding of the education provided. See EMR under Patient Instructions for exercises provided during therapy sessions    ASSESSMENT     Billy is doing well. Has full ROM, good quad, quiet knee other than mild tenderness along MCL. No irritation other than expected muscle fatigue/discomfort from BFR. Pt reported/demonstrated no adverse response or exacerbation of symptoms during today's session.      Billy Is progressing well towards his goals.   Pt prognosis is Good.     Pt will continue to benefit from skilled outpatient physical  therapy to address the deficits listed in the problem list box on initial evaluation, provide pt/family education and to maximize pt's level of independence in the home and community environment.     Pt's spiritual, cultural and educational needs considered and pt agreeable to plan of care and goals.     Anticipated barriers to physical therapy: none    GOALS: Short Term Goals:  4 weeks (progressing, not met)   1.Report decreased L knee pain  < / =  2/10  to increase tolerance for ambulating on crutches. MET  2. Increase knee ROM to 2-0-130 deg in order to go into surgery with full ROM. MET  3. Increase strength by 1/3 MMT grade in L quad to improve activation prior to surgery. MET  4. Pt to tolerate HEP to improve ROM and improve quad activation for immediate post-op.   5. Pt will have minimal to none joint effusion noted prior to surgery. MET    PLAN     Progress ROM to full as able. Maintain good quad activation, low irritability, minimal to no effusion, and joint mobility.     Seeing 5x/wk pre and post-op.     Manjit Lazcano, PT

## 2022-09-15 ENCOUNTER — OFFICE VISIT (OUTPATIENT)
Dept: SPORTS MEDICINE | Facility: CLINIC | Age: 27
End: 2022-09-15
Payer: COMMERCIAL

## 2022-09-15 ENCOUNTER — CLINICAL SUPPORT (OUTPATIENT)
Dept: REHABILITATION | Facility: HOSPITAL | Age: 27
End: 2022-09-15
Attending: ORTHOPAEDIC SURGERY
Payer: COMMERCIAL

## 2022-09-15 DIAGNOSIS — S83.207A TEARS OF MENISCUS AND ACL OF LEFT KNEE, INITIAL ENCOUNTER: Primary | ICD-10-CM

## 2022-09-15 DIAGNOSIS — M25.562 ACUTE PAIN OF LEFT KNEE: Primary | ICD-10-CM

## 2022-09-15 DIAGNOSIS — S83.512A TEARS OF MENISCUS AND ACL OF LEFT KNEE, INITIAL ENCOUNTER: Primary | ICD-10-CM

## 2022-09-15 DIAGNOSIS — M25.662 DECREASED RANGE OF MOTION OF LEFT KNEE: ICD-10-CM

## 2022-09-15 DIAGNOSIS — R26.2 DIFFICULTY WALKING: ICD-10-CM

## 2022-09-15 DIAGNOSIS — S83.412A: ICD-10-CM

## 2022-09-15 DIAGNOSIS — M62.81 QUADRICEPS WEAKNESS: ICD-10-CM

## 2022-09-15 PROCEDURE — 99204 OFFICE O/P NEW MOD 45 MIN: CPT | Mod: S$GLB,,, | Performed by: ORTHOPAEDIC SURGERY

## 2022-09-15 PROCEDURE — 97140 MANUAL THERAPY 1/> REGIONS: CPT

## 2022-09-15 PROCEDURE — 99204 PR OFFICE/OUTPT VISIT, NEW, LEVL IV, 45-59 MIN: ICD-10-PCS | Mod: S$GLB,,, | Performed by: ORTHOPAEDIC SURGERY

## 2022-09-15 PROCEDURE — 97112 NEUROMUSCULAR REEDUCATION: CPT

## 2022-09-15 PROCEDURE — 97110 THERAPEUTIC EXERCISES: CPT

## 2022-09-15 NOTE — PROGRESS NOTES
NEW PATIENT    HISTORY OF PRESENT ILLNESS   27 y.o. Male with a history of left knee pain who sustained an injury to his left knee during the last preseason game.  He was evaluated by me on the sideline.  Follow-up imaging was consistent with what appeared to be a partial ACL tear in addition to a grade 2 MCL injury with a possible lateral meniscus tear that was possibly acute versus chronic.  He has been in physical therapy at this time but still feels symptoms of instability and wants to proceed with surgery.        - mechanical symptoms, + instability    PAST MEDICAL HISTORY    No past medical history on file.    PAST SURGICAL HISTORY     No past surgical history on file.    FAMILY HISTORY    No family history on file.    SOCIAL HISTORY    Social History     Socioeconomic History    Marital status: Unknown       MEDICATIONS      Current Outpatient Medications:     HYDROcodone-acetaminophen (NORCO) 7.5-325 mg per tablet, Take 1-2 tabs every 4-6 hours prn pain, Disp: 20 tablet, Rfl: 0    ALLERGIES     Review of patient's allergies indicates:   Allergen Reactions    Citrus and derivatives     Peanut     Wasp venom     Yellow jacket venom          REVIEW OF SYSTEMS   Constitution: Negative. Negative for chills, fever and night sweats.   HENT: Negative for congestion and headaches.    Eyes: Negative for blurred vision, left vision loss and right vision loss.   Cardiovascular: Negative for chest pain and syncope.   Respiratory: Negative for cough and shortness of breath.    Endocrine: Negative for polydipsia, polyphagia and polyuria.   Hematologic/Lymphatic: Negative for bleeding problem. Does not bruise/bleed easily.   Skin: Negative for dry skin, itching and rash.   Musculoskeletal: Negative for falls. Positive for left knee pain and instability  Gastrointestinal: Negative for abdominal pain and bowel incontinence.   Genitourinary: Negative for bladder incontinence and nocturia.   Neurological: Negative for  disturbances in coordination, loss of balance and seizures.   Psychiatric/Behavioral: Negative for depression. The patient does not have insomnia.    Allergic/Immunologic: Negative for hives and persistent infections.     PHYSICAL EXAMINATION    Vitals: There were no vitals taken for this visit.    General: The patient appears active and healthy with no apparent physical problems.  No disturbance of mood or affect is demonstrated. Alert and Oriented.    HEENT: Eyes normal, pupils equally round, nose normal.    Resp: Equal and symmetrical chest rises. No wheezing    CV: Regular rate    Neck: Supple; nonpainful range of motion.    Extremities: no cyanosis, clubbing, edema, or diffuse swelling.  Palpable pulses, good capillary refill of the digits.  No coolness, discoloration, edema or obvious varicosities.    Skin: no lesions noted.    Lymphatic: no detected adenopathy in the upper or lower extremities.    Neurologic: normal mental status, normal reflexes, normal gait and balance.  Patient is alert and oriented to person, place and time.  No flaccidity or spasticity is noted.  No motor or sensory deficits are noted.  Light touch is intact    Orthopaedic:     KNEE EXAM - LEFT    Inspection:   Normal skin color and appearance with no scars, no ecchymosis and no effusion.      ROM:   0° - 125°.      Palpation:   There is no tenderness along medial joint line, medial plica, medial collateral ligament, pes bursa, lateral joint line, iliotibial band, lateral collateral ligament, popliteal fossa, patellar tendon, or quadriceps tendon.    Stability: - anterior drawer, +Lachman, 1+ pivot shift and - posterior drawer.      Mild valgus instability at 30°.  None at 0°.  No varus instability at 0 or at 30°.  Negative dial  test at 30° and 90°.    Tests:   - Davs test.  - patellar compression - grind test, - crepitus.  There is no patellar apprehension.     - J Sign. - Loraine's. - patellar tilt. - Steve. Lateral patella  translation  2 quadrants. Medial patella translation 2 quadrants    Motor:   Quadriceps strength is 5/5 and hamstrings strength is 5/5. Hamstrings show no tightness.      Neuro:   Distal neurovascular status is normal    Vascular: Negative Homans and no palpable popliteal cords. 2+ pedal pulse with brisk cap refill    Gait Normal      IMAGING    MRI Knee Without Contrast Left  Narrative: EXAMINATION:  MRI KNEE WITHOUT CONTRAST LEFT    CLINICAL HISTORY:  Hx. ACL tear suspected;    TECHNIQUE:  Routine MRI evaluation of the left knee without contrast using the following sequences: Coronal PDFS, PD; sagittal T2FS, T1; axial PDFS.    COMPARISON:  Same date radiographs.    FINDINGS:  Menisci: There is irregular morphology and signal intensity at the undersurface of the inner 1/3 of the posterior horn of the lateral meniscus.  Medial meniscus is intact.  Anterior and posterior root ligaments are normal.    Ligaments: There is irregular morphology and edema of the proximal 1/3 of the superficial MCL consistent with a high-grade partial-thickness tear.  Mid to distal superficial MCL demonstrates thickening in keeping with sequela of a remote sprain.  There is a complete tear of the meniscofemoral component of the deep MCL.  ACL, PCL and posterolateral corner structures are intact.    Extensor Mechanism: Mild linear increased signal of the distal quadriceps tendon.  Patellar tendon is intact.  Patellofemoral alignment is preserved.  MPFL and medial and lateral retinacula are intact.    Cartilage:    *Patellofemoral: Intact without partial or full-thickness defects.  No subchondral edema.  *Medial tibiofemoral: Intact without partial or full-thickness defects.  No subchondral edema.  *Lateral tibiofemoral: Partial to full-thickness chondral fissuring at the central and posterior weight-bearing tibial plateau with mild associated subchondral edema.  Femoral cartilage is preserved.  Bones: There is a fragmented tibial tubercle.   No fracture.  No avascular necrosis.  No marrow infiltrative process.    Miscellaneous: There is a small joint effusion.  There is trace fluid in between the medial gastrocnemius and distal semimembranosus.  Medial gastrocnemius, lateral gastrocnemius, distal semimembranosus and visualized pes anserine tendons are intact.  Distal iliotibial band is intact.  Impression: 1. Mild signal heterogeneity of the proximal fibers of the ACL suggesting a low-grade sprain.  No high-grade partial or full-thickness tear.  Clinical correlation recommended.  2. High-grade partial-thickness tear of the proximal superficial MCL superimposed on a chronic sprain.  3. Complete tear of the meniscofemoral component of the deep MCL.  4. Small complex tear inner 1/3 of the posterior horn of the lateral meniscus, favored to be chronic.  5. Chondral fissuring at the central and posterior weight-bearing lateral tibial plateau with mild associated subchondral edema.  6. Fragmented tibial tubercle in keeping with sequela chronic Osgood-Schlatter.  7. Small joint effusion.    Electronically signed by: Benji Montalvo MD  Date:    08/27/2022  Time:    09:20    IMPRESSION       ICD-10-CM ICD-9-CM   1. Rupture of anterior cruciate ligament of right knee, initial encounter  S83.511A 844.2   2. Tear of medial collateral ligament of right knee, initial encounter  S83.411A 844.1       MEDICATIONS PRESCRIBED      None    RECOMMENDATIONS     Left knee ACL reconstruction with bone-patellar tendon-bone autograft, lateral meniscal repair versus debridement and PRP injection including any other necessary indicated procedure.  The patient elected to proceed with operative intervention after all risks, benefits, and alternatives were discussed with the patient.  The risks of surgery include bleeding, scarring, injuries to nerves, arteries and veins, need for additional surgeries, blood clots, infections, and the risk of anesthesia.  I personally obtained  informed consent from the patient and documented full history and physical, which has been placed on the chart.      All questions were answered, pt will contact us for questions or concerns in the interim.

## 2022-09-15 NOTE — PROGRESS NOTES
[facility]   Physical Therapy Treatment Note     Name: Billy Holbrook  [facility] Number: [id number]    Therapy Diagnosis:   Encounter Diagnoses   Name Primary?    Acute pain of left knee Yes    Decreased range of motion of left knee     Quadriceps weakness     Difficulty walking        Physician: PEACE Dempsey MD    Visit Date: 9/15/2022    Physician Orders: PT Eval and Treat   Medical Diagnosis from Referral: M25.562 (ICD-10-CM) - Acute pain of left knee  Evaluation Date: 9/7/2022  Authorization Period Expiration: 9/6/2023  Plan of Care Expiration: 12/31/2022  Visit # / Visits authorized: 7/ 20      Precautions: Standard     Time In: 10:45 am (11:40-11:55 with Dr. Lopez/Leonel for assessment)  Time Out: 12:15  Total Billable Time: 75 minutes    SUBJECTIVE     Pt reports: he is a little sore in calf, otherwise he feels good. No pain or swelling noted. Mild tenderness along MCL.     He was compliant with home exercise program.  Response to previous treatment: less pain and stiffness  Functional change: better gait no crutches    Pain: 0/10  Location: left knee      OBJECTIVE     Observation: no obvious effusion noted     Range of Motion (extension/neutral/flexion):     Right Left   Knee PROM: 2-0-130 deg 2-0-130 deg      Strength:     Right Left Comment   Knee Extension: 5/5 4-/5*        Knee Flexion: 5/5 4-/5*        Hip Abduction: 4+/5 4-/5       Functional tests(*=pain):   Gait/stairs: normal  DL squat 0-60 deg good  Quad activation: good     Joint Mobility: hypomobile PFJ and tibiofemoral    TTP along MCL- mild    Treatment     Billy received the treatments listed below:      therapeutic exercises to develop strength, endurance, ROM, flexibility, posture, and core stabilization for 55 minutes including:  Heel slides x 5 min 2 bouts  SL hip abd 4 x 10 5 lbs  Prone hip ext with quad set 4 x 10 5 lbs  DL calf raise on slantboard 3 x 20  Upright bike 10 min lv 3-7-light  After being  screened for any contraindications, assessed for precautions, and educated in the benefits/risks associated with the use of blood flow restriction training. The BFR device calculated a personalized pressure of 210 mmHg. Exercises performed at an 60% occlusion pressure of 132 mmHg via autoregulation feature for the following exercises:   -DL squat to 20 inch box  -standing HS curls 5 lbs  -LAQ 5 lbs  Education/assessment    manual therapy techniques: Joint mobilizations and Soft tissue Mobilization were applied for 10 minutes, including:  PFJ gr 3 glides all planes  Knee hyperextension gr 3  Tibial AP gr 3    neuromuscular re-education activities to improve: Coordination, Kinesthetic, Sense, and Proprioception for 10 minutes. The following activities were included:  Quad sets into hyperextension 2x 10 x 5 sec  SLR from quad set 4 x 10 in supine with 5 lbs    therapeutic activities to improve functional performance for 0 minutes, including:  NP    gait training to improve functional mobility and safety for 0  minutes, including:  NP    Patient Education and Home Exercises     Home Exercises Provided and Patient Education Provided     Education provided:   - HEP update, use of brace, compression sleeve, timeframes, expectations, prognosis    Written Home Exercises Provided: yes. Exercises were reviewed and Billy was able to demonstrate them prior to the end of the session.  Billy demonstrated good  understanding of the education provided. See EMR under Patient Instructions for exercises provided during therapy sessions    ASSESSMENT     Billy is ready for surgery. After consult with MD today, decided surgery for 9/20 and will start post-op rehab on 9/22. Full ROM, quiet knee, good quad activation noted at this time. Pt reported/demonstrated no adverse response or exacerbation of symptoms during today's session.      Billy Is progressing well towards his goals.   Pt prognosis is Good.     Pt will continue to benefit  from skilled outpatient physical therapy to address the deficits listed in the problem list box on initial evaluation, provide pt/family education and to maximize pt's level of independence in the home and community environment.     Pt's spiritual, cultural and educational needs considered and pt agreeable to plan of care and goals.     Anticipated barriers to physical therapy: none    GOALS: Short Term Goals:  4 weeks   1.Report decreased L knee pain  < / =  2/10  to increase tolerance for ambulating on crutches. MET  2. Increase knee ROM to 2-0-130 deg in order to go into surgery with full ROM. MET  3. Increase strength by 1/3 MMT grade in L quad to improve activation prior to surgery. MET  4. Pt to tolerate HEP to improve ROM and improve quad activation for immediate post-op. MET  5. Pt will have minimal to none joint effusion noted prior to surgery. MET    PLAN     Surgery 9/20; start post-op rehab 9/22 on POD2.     See 5x/wk post-op.     Manjit Lazcano, PT

## 2022-09-16 ENCOUNTER — CLINICAL SUPPORT (OUTPATIENT)
Dept: REHABILITATION | Facility: HOSPITAL | Age: 27
End: 2022-09-16
Attending: ORTHOPAEDIC SURGERY
Payer: COMMERCIAL

## 2022-09-16 DIAGNOSIS — M25.562 ACUTE PAIN OF LEFT KNEE: Primary | ICD-10-CM

## 2022-09-16 DIAGNOSIS — S83.512A TEARS OF MENISCUS AND ACL OF LEFT KNEE, INITIAL ENCOUNTER: Primary | ICD-10-CM

## 2022-09-16 DIAGNOSIS — S83.412A: ICD-10-CM

## 2022-09-16 DIAGNOSIS — R26.2 DIFFICULTY WALKING: ICD-10-CM

## 2022-09-16 DIAGNOSIS — M25.662 DECREASED RANGE OF MOTION OF LEFT KNEE: ICD-10-CM

## 2022-09-16 DIAGNOSIS — M62.81 QUADRICEPS WEAKNESS: ICD-10-CM

## 2022-09-16 DIAGNOSIS — S83.207A TEARS OF MENISCUS AND ACL OF LEFT KNEE, INITIAL ENCOUNTER: Primary | ICD-10-CM

## 2022-09-16 PROCEDURE — 97110 THERAPEUTIC EXERCISES: CPT

## 2022-09-16 PROCEDURE — 97112 NEUROMUSCULAR REEDUCATION: CPT

## 2022-09-16 PROCEDURE — 97140 MANUAL THERAPY 1/> REGIONS: CPT

## 2022-09-16 RX ORDER — HYDROCODONE BITARTRATE AND ACETAMINOPHEN 7.5; 325 MG/1; MG/1
1 TABLET ORAL EVERY 4 HOURS PRN
Qty: 20 TABLET | Refills: 0 | Status: SHIPPED | OUTPATIENT
Start: 2022-09-16

## 2022-09-16 RX ORDER — ASPIRIN 81 MG/1
81 TABLET ORAL DAILY
Qty: 28 TABLET | Refills: 0 | Status: SHIPPED | OUTPATIENT
Start: 2022-09-16

## 2022-09-16 RX ORDER — CEFAZOLIN SODIUM 2 G/50ML
2 SOLUTION INTRAVENOUS
Status: CANCELLED | OUTPATIENT
Start: 2022-09-16

## 2022-09-16 RX ORDER — MUPIROCIN 20 MG/G
OINTMENT TOPICAL
Status: CANCELLED | OUTPATIENT
Start: 2022-09-16

## 2022-09-19 ENCOUNTER — CLINICAL SUPPORT (OUTPATIENT)
Dept: REHABILITATION | Facility: HOSPITAL | Age: 27
End: 2022-09-19
Payer: COMMERCIAL

## 2022-09-19 ENCOUNTER — ANESTHESIA EVENT (OUTPATIENT)
Dept: SURGERY | Facility: HOSPITAL | Age: 27
End: 2022-09-19
Payer: COMMERCIAL

## 2022-09-19 DIAGNOSIS — M25.562 ACUTE PAIN OF LEFT KNEE: Primary | ICD-10-CM

## 2022-09-19 DIAGNOSIS — M25.662 DECREASED RANGE OF MOTION OF LEFT KNEE: ICD-10-CM

## 2022-09-19 DIAGNOSIS — M62.81 QUADRICEPS WEAKNESS: ICD-10-CM

## 2022-09-19 DIAGNOSIS — R26.2 DIFFICULTY WALKING: ICD-10-CM

## 2022-09-19 PROCEDURE — 97110 THERAPEUTIC EXERCISES: CPT

## 2022-09-19 PROCEDURE — 97140 MANUAL THERAPY 1/> REGIONS: CPT

## 2022-09-19 PROCEDURE — 97112 NEUROMUSCULAR REEDUCATION: CPT

## 2022-09-19 NOTE — PROGRESS NOTES
[facility]   Physical Therapy Treatment Note     Name: Billy Holbrook  [facility] Number: [id number]    Therapy Diagnosis:   Encounter Diagnoses   Name Primary?    Acute pain of left knee Yes    Decreased range of motion of left knee     Quadriceps weakness     Difficulty walking      Physician: PEACE Dempsey MD    Visit Date: 9/16/2022    Physician Orders: PT Eval and Treat   Medical Diagnosis from Referral: M25.562 (ICD-10-CM) - Acute pain of left knee  Evaluation Date: 9/7/2022  Authorization Period Expiration: 9/6/2023  Plan of Care Expiration: 12/31/2022  Visit # / Visits authorized: 8/ 20      Precautions: Standard     Time In: 11:00 am   Time Out: 12:15  Total Billable Time: 75 minutes    SUBJECTIVE     Pt reports: he is feeling good. Ready for surgery next week. Mother is in town to help him out for a couple weeks.      He was compliant with home exercise program.  Response to previous treatment: less pain and stiffness  Functional change: better gait no crutches    Pain: 0/10  Location: left knee      OBJECTIVE     Observation: no obvious effusion noted     Range of Motion (extension/neutral/flexion):     Right Left   Knee PROM: 2-0-130 deg 2-0-130 deg      Strength:     Right Left Comment   Knee Extension: 5/5 4+/5        Knee Flexion: 5/5 4+/5        Hip Abduction: 4+/5 4/5       Functional tests(*=pain):   Gait/stairs: normal  DL squat 0-75 deg good  Quad activation: good     Joint Mobility: hypomobile PFJ and tibiofemoral    TTP along MCL- mild    Treatment     Billy received the treatments listed below:      therapeutic exercises to develop strength, endurance, ROM, flexibility, posture, and core stabilization for 55 minutes including:  Heel slides x 5 min 2 bouts  SL hip abd 4 x 10 5 lbs  Prone hip ext with quad set 4 x 10 5 lbs  DL calf raise on slantboard 3 x 20  Upright bike 10 min lv 3-7-light  After being screened for any contraindications, assessed for precautions,  and educated in the benefits/risks associated with the use of blood flow restriction training. The BFR device calculated a personalized pressure of 268 mmHg. Exercises performed at an 60% occlusion pressure of 161 mmHg via autoregulation feature for the following exercises:   -DL squat to 20 inch box  -standing HS curls 5 lbs  -LAQ 5 lbs  Education/assessment    manual therapy techniques: Joint mobilizations and Soft tissue Mobilization were applied for 10 minutes, including:  PFJ gr 3 glides all planes  Knee hyperextension gr 3  Tibial AP gr 3    neuromuscular re-education activities to improve: Coordination, Kinesthetic, Sense, and Proprioception for 10 minutes. The following activities were included:  Quad sets into hyperextension 2x 10 x 5 sec  SLR from quad set 4 x 10 in supine with 5 lbs    therapeutic activities to improve functional performance for 0 minutes, including:  NP    gait training to improve functional mobility and safety for 0  minutes, including:  NP    Patient Education and Home Exercises     Home Exercises Provided and Patient Education Provided     Education provided:   - HEP update, use of brace, compression sleeve, timeframes, expectations, prognosis    Written Home Exercises Provided: yes. Exercises were reviewed and Billy was able to demonstrate them prior to the end of the session.  Billy demonstrated good  understanding of the education provided. See EMR under Patient Instructions for exercises provided during therapy sessions    ASSESSMENT     Billy continues to do very well. Mild MCL symptoms to tenderness and end range flx OP, otherwise he has a quiet knee.  Tolerance to session is good with no in session or residual symptoms reported. Pt reported/demonstrated no adverse response or exacerbation of symptoms during today's session.      Billy Is progressing well towards his goals.   Pt prognosis is Good.     Pt will continue to benefit from skilled outpatient physical therapy to  address the deficits listed in the problem list box on initial evaluation, provide pt/family education and to maximize pt's level of independence in the home and community environment.     Pt's spiritual, cultural and educational needs considered and pt agreeable to plan of care and goals.     Anticipated barriers to physical therapy: none    GOALS: Short Term Goals:  4 weeks   1.Report decreased L knee pain  < / =  2/10  to increase tolerance for ambulating on crutches. MET  2. Increase knee ROM to 2-0-130 deg in order to go into surgery with full ROM. MET  3. Increase strength by 1/3 MMT grade in L quad to improve activation prior to surgery. MET  4. Pt to tolerate HEP to improve ROM and improve quad activation for immediate post-op. MET  5. Pt will have minimal to none joint effusion noted prior to surgery. MET    PLAN     Surgery 9/20; start post-op rehab 9/22 on POD2.     See 5x/wk post-op.     Manjit Lazcano, PT

## 2022-09-19 NOTE — PROGRESS NOTES
[facility]   Physical Therapy Treatment Note     Name: Billy Holbrook  [facility] Number: [id number]    Therapy Diagnosis:   Encounter Diagnoses   Name Primary?    Acute pain of left knee Yes    Decreased range of motion of left knee     Quadriceps weakness     Difficulty walking        Physician: PEACE Dempsey MD    Visit Date: 9/19/2022    Physician Orders: PT Eval and Treat   Medical Diagnosis from Referral: M25.562 (ICD-10-CM) - Acute pain of left knee  Evaluation Date: 9/7/2022  Authorization Period Expiration: 9/6/2023  Plan of Care Expiration: 12/31/2022  Visit # / Visits authorized: 9/ 20      Precautions: Standard     Time In: 11:00 am   Time Out: 12:05  Total Billable Time: 65 minutes    SUBJECTIVE     Pt reports: he feels good. Not happy about early time for surgery or fasting, but verbalized understanding. Will have mother stay to help him for 1-2 wks post-op    He was compliant with home exercise program.  Response to previous treatment: no issues  Functional change: normal gait    Pain: 0/10  Location: left knee      OBJECTIVE     Observation: no obvious effusion noted     Range of Motion (extension/neutral/flexion):     Right Left   Knee PROM: 2-0-130 deg 2-0-130 deg      Strength:     Right Left Comment   Knee Extension: 5/5 4+/5        Knee Flexion: 5/5 4+/5        Hip Abduction: 4+/5 4/5       Functional tests(*=pain):   Gait/stairs: normal  DL squat 0-75 deg good  Quad activation: good     Joint Mobility: hypomobile PFJ and tibiofemoral    TTP along MCL- mild    Treatment     Billy received the treatments listed below:      therapeutic exercises to develop strength, endurance, ROM, flexibility, posture, and core stabilization for 49 minutes including:  Heel slides x 5 min 2 bouts  Heel prop x 3 min  Ankle pumps with band x 2 min  AAROM knee flx seated x 10 reps  SL hip abd 4 x 10 5 lbs  Prone hip ext with quad set 4 x 10 5 lbs  DL calf raise on slantboard 3 x  20  Upright bike 10 min lv 3-7-light  Education/assessment    manual therapy techniques: Joint mobilizations and Soft tissue Mobilization were applied for 8 minutes, including:  PFJ gr 3 glides all planes  Knee hyperextension gr 3  Tibial AP gr 3    neuromuscular re-education activities to improve: Coordination, Kinesthetic, Sense, and Proprioception for 8 minutes. The following activities were included:  Quad sets into hyperextension 2x 10 x 5 sec  SLR from quad set 4 x 10 in supine with 5 lbs    therapeutic activities to improve functional performance for 0 minutes, including:  NP    gait training to improve functional mobility and safety for 0  minutes, including:  NP    Patient Education and Home Exercises     Home Exercises Provided and Patient Education Provided     Education provided:   - HEP update, use of brace, compression sleeve, timeframes, expectations, prognosis    Written Home Exercises Provided: yes. Exercises were reviewed and Billy was able to demonstrate them prior to the end of the session.  Billy demonstrated good  understanding of the education provided. See EMR under Patient Instructions for exercises provided during therapy sessions    ASSESSMENT     Billy is doing well and is prepared for surgery tomorrow early morning. Discussed fasting and arrival time as well as expectations post-op and immediate rehab components. Given written handout of HEP post-op. Pt reported/demonstrated no adverse response or exacerbation of symptoms during today's session.      Billy Is progressing well towards his goals.   Pt prognosis is Good.     Pt will continue to benefit from skilled outpatient physical therapy to address the deficits listed in the problem list box on initial evaluation, provide pt/family education and to maximize pt's level of independence in the home and community environment.     Pt's spiritual, cultural and educational needs considered and pt agreeable to plan of care and goals.      Anticipated barriers to physical therapy: none    GOALS: Short Term Goals:  4 weeks   1.Report decreased L knee pain  < / =  2/10  to increase tolerance for ambulating on crutches. MET  2. Increase knee ROM to 2-0-130 deg in order to go into surgery with full ROM. MET  3. Increase strength by 1/3 MMT grade in L quad to improve activation prior to surgery. MET  4. Pt to tolerate HEP to improve ROM and improve quad activation for immediate post-op. MET  5. Pt will have minimal to none joint effusion noted prior to surgery. MET    PLAN     Surgery 9/20; start post-op rehab 9/22 on POD2.     See 5x/wk post-op.     Manjit Lazcano, PT

## 2022-09-20 ENCOUNTER — HOSPITAL ENCOUNTER (OUTPATIENT)
Facility: HOSPITAL | Age: 27
Discharge: HOME | End: 2022-09-20
Attending: ORTHOPAEDIC SURGERY | Admitting: ORTHOPAEDIC SURGERY
Payer: COMMERCIAL

## 2022-09-20 ENCOUNTER — ANESTHESIA (OUTPATIENT)
Dept: SURGERY | Facility: HOSPITAL | Age: 27
End: 2022-09-20
Payer: COMMERCIAL

## 2022-09-20 VITALS
OXYGEN SATURATION: 97 % | TEMPERATURE: 97 F | DIASTOLIC BLOOD PRESSURE: 65 MMHG | BODY MASS INDEX: 37.92 KG/M2 | HEIGHT: 75 IN | RESPIRATION RATE: 18 BRPM | HEART RATE: 83 BPM | WEIGHT: 305 LBS | SYSTOLIC BLOOD PRESSURE: 129 MMHG

## 2022-09-20 DIAGNOSIS — S83.412A: ICD-10-CM

## 2022-09-20 DIAGNOSIS — S83.207A TEARS OF MENISCUS AND ACL OF LEFT KNEE, INITIAL ENCOUNTER: Primary | ICD-10-CM

## 2022-09-20 DIAGNOSIS — S83.512A TEARS OF MENISCUS AND ACL OF LEFT KNEE, INITIAL ENCOUNTER: Primary | ICD-10-CM

## 2022-09-20 PROCEDURE — 29881 ARTHRS KNE SRG MNISECTMY M/L: CPT | Mod: LT,,, | Performed by: ORTHOPAEDIC SURGERY

## 2022-09-20 PROCEDURE — C1769 GUIDE WIRE: HCPCS | Performed by: ORTHOPAEDIC SURGERY

## 2022-09-20 PROCEDURE — 76942 ECHO GUIDE FOR BIOPSY: CPT | Performed by: ANESTHESIOLOGY

## 2022-09-20 PROCEDURE — 25000003 PHARM REV CODE 250: Performed by: STUDENT IN AN ORGANIZED HEALTH CARE EDUCATION/TRAINING PROGRAM

## 2022-09-20 PROCEDURE — 76942 PR U/S GUIDANCE FOR NEEDLE GUIDANCE: ICD-10-PCS | Mod: 26,,, | Performed by: ANESTHESIOLOGY

## 2022-09-20 PROCEDURE — 25000003 PHARM REV CODE 250: Performed by: ORTHOPAEDIC SURGERY

## 2022-09-20 PROCEDURE — 64999 UNLISTED PX NERVOUS SYSTEM: CPT | Mod: ,,, | Performed by: ANESTHESIOLOGY

## 2022-09-20 PROCEDURE — 37000009 HC ANESTHESIA EA ADD 15 MINS: Performed by: ORTHOPAEDIC SURGERY

## 2022-09-20 PROCEDURE — C1713 ANCHOR/SCREW BN/BN,TIS/BN: HCPCS | Performed by: ORTHOPAEDIC SURGERY

## 2022-09-20 PROCEDURE — 71000015 HC POSTOP RECOV 1ST HR: Performed by: ORTHOPAEDIC SURGERY

## 2022-09-20 PROCEDURE — 36000711: Performed by: ORTHOPAEDIC SURGERY

## 2022-09-20 PROCEDURE — 64999 PR BLOCK, IPACK: ICD-10-PCS | Mod: ,,, | Performed by: ANESTHESIOLOGY

## 2022-09-20 PROCEDURE — 29881 PR KNEE SCOPE SINGLE MENISECECTOMY: ICD-10-PCS | Mod: LT,,, | Performed by: ORTHOPAEDIC SURGERY

## 2022-09-20 PROCEDURE — 63600175 PHARM REV CODE 636 W HCPCS: Performed by: ORTHOPAEDIC SURGERY

## 2022-09-20 PROCEDURE — 63600175 PHARM REV CODE 636 W HCPCS: Performed by: ANESTHESIOLOGY

## 2022-09-20 PROCEDURE — 63600175 PHARM REV CODE 636 W HCPCS: Performed by: NURSE ANESTHETIST, CERTIFIED REGISTERED

## 2022-09-20 PROCEDURE — 27100025 HC TUBING, SET FLUID WARMER: Performed by: ANESTHESIOLOGY

## 2022-09-20 PROCEDURE — D9220A PRA ANESTHESIA: ICD-10-PCS | Mod: CRNA,,, | Performed by: NURSE ANESTHETIST, CERTIFIED REGISTERED

## 2022-09-20 PROCEDURE — 29888 PR KNEE SCOPE,AID ANT CRUCIATE REPAIR: ICD-10-PCS | Mod: LT,,, | Performed by: ORTHOPAEDIC SURGERY

## 2022-09-20 PROCEDURE — 76942 ECHO GUIDE FOR BIOPSY: CPT | Mod: 26,,, | Performed by: ANESTHESIOLOGY

## 2022-09-20 PROCEDURE — 25000003 PHARM REV CODE 250: Performed by: NURSE ANESTHETIST, CERTIFIED REGISTERED

## 2022-09-20 PROCEDURE — 64448 NJX AA&/STRD FEM NRV NFS IMG: CPT | Mod: 59,LT,, | Performed by: ANESTHESIOLOGY

## 2022-09-20 PROCEDURE — 71000033 HC RECOVERY, INTIAL HOUR: Performed by: ORTHOPAEDIC SURGERY

## 2022-09-20 PROCEDURE — 71000039 HC RECOVERY, EACH ADD'L HOUR: Performed by: ORTHOPAEDIC SURGERY

## 2022-09-20 PROCEDURE — 99499 UNLISTED E&M SERVICE: CPT | Mod: AS,LT,, | Performed by: PHYSICIAN ASSISTANT

## 2022-09-20 PROCEDURE — 64448 PR NERVE BLOCK INJ, ANES/STEROID, FEMORAL, CONT INFUSION, INCL IMAG GUIDANCE: ICD-10-PCS | Mod: 59,LT,, | Performed by: ANESTHESIOLOGY

## 2022-09-20 PROCEDURE — 63600175 PHARM REV CODE 636 W HCPCS: Performed by: STUDENT IN AN ORGANIZED HEALTH CARE EDUCATION/TRAINING PROGRAM

## 2022-09-20 PROCEDURE — 29888 ARTHRS AID ACL RPR/AGMNTJ: CPT | Mod: LT,,, | Performed by: ORTHOPAEDIC SURGERY

## 2022-09-20 PROCEDURE — D9220A PRA ANESTHESIA: Mod: ANES,,, | Performed by: ANESTHESIOLOGY

## 2022-09-20 PROCEDURE — D9220A PRA ANESTHESIA: ICD-10-PCS | Mod: ANES,,, | Performed by: ANESTHESIOLOGY

## 2022-09-20 PROCEDURE — 37000008 HC ANESTHESIA 1ST 15 MINUTES: Performed by: ORTHOPAEDIC SURGERY

## 2022-09-20 PROCEDURE — 36000710: Performed by: ORTHOPAEDIC SURGERY

## 2022-09-20 PROCEDURE — 94761 N-INVAS EAR/PLS OXIMETRY MLT: CPT

## 2022-09-20 PROCEDURE — 99900035 HC TECH TIME PER 15 MIN (STAT)

## 2022-09-20 PROCEDURE — 27201423 OPTIME MED/SURG SUP & DEVICES STERILE SUPPLY: Performed by: ORTHOPAEDIC SURGERY

## 2022-09-20 PROCEDURE — 25000003 PHARM REV CODE 250: Performed by: ANESTHESIOLOGY

## 2022-09-20 PROCEDURE — 99499 NO LOS: ICD-10-PCS | Mod: AS,LT,, | Performed by: PHYSICIAN ASSISTANT

## 2022-09-20 PROCEDURE — C1889 IMPLANT/INSERT DEVICE, NOC: HCPCS | Performed by: ORTHOPAEDIC SURGERY

## 2022-09-20 PROCEDURE — D9220A PRA ANESTHESIA: Mod: CRNA,,, | Performed by: NURSE ANESTHETIST, CERTIFIED REGISTERED

## 2022-09-20 DEVICE — IMPLANTABLE DEVICE: Type: IMPLANTABLE DEVICE | Site: KNEE | Status: FUNCTIONAL

## 2022-09-20 RX ORDER — LIDOCAINE HCL/PF 100 MG/5ML
SYRINGE (ML) INTRAVENOUS
Status: DISCONTINUED | OUTPATIENT
Start: 2022-09-20 | End: 2022-09-20

## 2022-09-20 RX ORDER — CEFAZOLIN SODIUM 1 G/3ML
2 INJECTION, POWDER, FOR SOLUTION INTRAMUSCULAR; INTRAVENOUS
Status: COMPLETED | OUTPATIENT
Start: 2022-09-20 | End: 2022-09-20

## 2022-09-20 RX ORDER — CELECOXIB 200 MG/1
400 CAPSULE ORAL ONCE
Status: COMPLETED | OUTPATIENT
Start: 2022-09-20 | End: 2022-09-20

## 2022-09-20 RX ORDER — DEXAMETHASONE SODIUM PHOSPHATE 4 MG/ML
INJECTION, SOLUTION INTRA-ARTICULAR; INTRALESIONAL; INTRAMUSCULAR; INTRAVENOUS; SOFT TISSUE
Status: DISCONTINUED | OUTPATIENT
Start: 2022-09-20 | End: 2022-09-20

## 2022-09-20 RX ORDER — SODIUM CHLORIDE 0.9 % (FLUSH) 0.9 %
10 SYRINGE (ML) INJECTION
Status: DISCONTINUED | OUTPATIENT
Start: 2022-09-20 | End: 2022-09-20 | Stop reason: HOSPADM

## 2022-09-20 RX ORDER — ACETAMINOPHEN 500 MG
1000 TABLET ORAL
Status: COMPLETED | OUTPATIENT
Start: 2022-09-20 | End: 2022-09-20

## 2022-09-20 RX ORDER — BACITRACIN ZINC 500 UNIT/G
OINTMENT (GRAM) TOPICAL
Status: DISCONTINUED | OUTPATIENT
Start: 2022-09-20 | End: 2022-09-20 | Stop reason: HOSPADM

## 2022-09-20 RX ORDER — ROCURONIUM BROMIDE 10 MG/ML
INJECTION, SOLUTION INTRAVENOUS
Status: DISCONTINUED | OUTPATIENT
Start: 2022-09-20 | End: 2022-09-20

## 2022-09-20 RX ORDER — KETAMINE HCL IN 0.9 % NACL 50 MG/5 ML
SYRINGE (ML) INTRAVENOUS
Status: DISCONTINUED | OUTPATIENT
Start: 2022-09-20 | End: 2022-09-20

## 2022-09-20 RX ORDER — PROPOFOL 10 MG/ML
VIAL (ML) INTRAVENOUS
Status: DISCONTINUED | OUTPATIENT
Start: 2022-09-20 | End: 2022-09-20

## 2022-09-20 RX ORDER — FAMOTIDINE 10 MG/ML
INJECTION INTRAVENOUS
Status: DISCONTINUED | OUTPATIENT
Start: 2022-09-20 | End: 2022-09-20

## 2022-09-20 RX ORDER — ROPIVACAINE HYDROCHLORIDE 5 MG/ML
INJECTION, SOLUTION EPIDURAL; INFILTRATION; PERINEURAL
Status: COMPLETED | OUTPATIENT
Start: 2022-09-20 | End: 2022-09-20

## 2022-09-20 RX ORDER — NEOSTIGMINE METHYLSULFATE 1 MG/ML
INJECTION, SOLUTION INTRAVENOUS
Status: DISCONTINUED | OUTPATIENT
Start: 2022-09-20 | End: 2022-09-20

## 2022-09-20 RX ORDER — ROPIVACAINE HYDROCHLORIDE 2 MG/ML
INJECTION, SOLUTION EPIDURAL; INFILTRATION; PERINEURAL CONTINUOUS
Status: DISCONTINUED | OUTPATIENT
Start: 2022-09-20 | End: 2022-09-20 | Stop reason: HOSPADM

## 2022-09-20 RX ORDER — HALOPERIDOL 5 MG/ML
0.5 INJECTION INTRAMUSCULAR EVERY 10 MIN PRN
Status: DISCONTINUED | OUTPATIENT
Start: 2022-09-20 | End: 2022-09-20 | Stop reason: HOSPADM

## 2022-09-20 RX ORDER — EPINEPHRINE 1 MG/ML
INJECTION, SOLUTION INTRACARDIAC; INTRAMUSCULAR; INTRAVENOUS; SUBCUTANEOUS
Status: DISCONTINUED | OUTPATIENT
Start: 2022-09-20 | End: 2022-09-20 | Stop reason: HOSPADM

## 2022-09-20 RX ORDER — ONDANSETRON 2 MG/ML
4 INJECTION INTRAMUSCULAR; INTRAVENOUS DAILY PRN
Status: DISCONTINUED | OUTPATIENT
Start: 2022-09-20 | End: 2022-09-20 | Stop reason: HOSPADM

## 2022-09-20 RX ORDER — FENTANYL CITRATE 50 UG/ML
25-200 INJECTION, SOLUTION INTRAMUSCULAR; INTRAVENOUS
Status: DISCONTINUED | OUTPATIENT
Start: 2022-09-20 | End: 2022-09-20 | Stop reason: HOSPADM

## 2022-09-20 RX ORDER — METHOCARBAMOL 500 MG/1
1000 TABLET, FILM COATED ORAL ONCE
Status: COMPLETED | OUTPATIENT
Start: 2022-09-20 | End: 2022-09-20

## 2022-09-20 RX ORDER — LABETALOL HYDROCHLORIDE 5 MG/ML
INJECTION, SOLUTION INTRAVENOUS
Status: DISCONTINUED | OUTPATIENT
Start: 2022-09-20 | End: 2022-09-20

## 2022-09-20 RX ORDER — OXYCODONE HYDROCHLORIDE 5 MG/1
5 TABLET ORAL
Status: DISCONTINUED | OUTPATIENT
Start: 2022-09-20 | End: 2022-09-20 | Stop reason: HOSPADM

## 2022-09-20 RX ORDER — FENTANYL CITRATE 50 UG/ML
25 INJECTION, SOLUTION INTRAMUSCULAR; INTRAVENOUS EVERY 5 MIN PRN
Status: DISCONTINUED | OUTPATIENT
Start: 2022-09-20 | End: 2022-09-20 | Stop reason: HOSPADM

## 2022-09-20 RX ORDER — CARBOXYMETHYLCELLULOSE SODIUM 10 MG/ML
GEL OPHTHALMIC
Status: DISCONTINUED | OUTPATIENT
Start: 2022-09-20 | End: 2022-09-20

## 2022-09-20 RX ORDER — MIDAZOLAM HYDROCHLORIDE 1 MG/ML
.5-4 INJECTION INTRAMUSCULAR; INTRAVENOUS
Status: DISCONTINUED | OUTPATIENT
Start: 2022-09-20 | End: 2022-09-20 | Stop reason: HOSPADM

## 2022-09-20 RX ORDER — MUPIROCIN 20 MG/G
OINTMENT TOPICAL
Status: DISCONTINUED | OUTPATIENT
Start: 2022-09-20 | End: 2022-09-20 | Stop reason: HOSPADM

## 2022-09-20 RX ORDER — FENTANYL CITRATE 50 UG/ML
INJECTION, SOLUTION INTRAMUSCULAR; INTRAVENOUS
Status: DISCONTINUED | OUTPATIENT
Start: 2022-09-20 | End: 2022-09-20

## 2022-09-20 RX ORDER — DEXMEDETOMIDINE HYDROCHLORIDE 100 UG/ML
INJECTION, SOLUTION INTRAVENOUS
Status: DISCONTINUED | OUTPATIENT
Start: 2022-09-20 | End: 2022-09-20

## 2022-09-20 RX ORDER — ONDANSETRON HYDROCHLORIDE 2 MG/ML
INJECTION, SOLUTION INTRAMUSCULAR; INTRAVENOUS
Status: DISCONTINUED | OUTPATIENT
Start: 2022-09-20 | End: 2022-09-20

## 2022-09-20 RX ADMIN — FAMOTIDINE 20 MG: 10 INJECTION, SOLUTION INTRAVENOUS at 07:09

## 2022-09-20 RX ADMIN — PROPOFOL 50 MG: 10 INJECTION, EMULSION INTRAVENOUS at 07:09

## 2022-09-20 RX ADMIN — OXYCODONE 5 MG: 5 TABLET ORAL at 11:09

## 2022-09-20 RX ADMIN — ROPIVACAINE HYDROCHLORIDE 10 ML: 5 INJECTION EPIDURAL; INFILTRATION; PERINEURAL at 06:09

## 2022-09-20 RX ADMIN — LIDOCAINE HYDROCHLORIDE 80 MG: 20 INJECTION, SOLUTION INTRAVENOUS at 07:09

## 2022-09-20 RX ADMIN — CARBOXYMETHYLCELLULOSE SODIUM 4 DROP: 10 GEL OPHTHALMIC at 07:09

## 2022-09-20 RX ADMIN — MIDAZOLAM 2 MG: 1 INJECTION INTRAMUSCULAR; INTRAVENOUS at 06:09

## 2022-09-20 RX ADMIN — CELECOXIB 400 MG: 200 CAPSULE ORAL at 06:09

## 2022-09-20 RX ADMIN — Medication 30 MG: at 07:09

## 2022-09-20 RX ADMIN — LABETALOL HYDROCHLORIDE 10 MG: 5 INJECTION, SOLUTION INTRAVENOUS at 07:09

## 2022-09-20 RX ADMIN — OXYCODONE 5 MG: 5 TABLET ORAL at 12:09

## 2022-09-20 RX ADMIN — NEOSTIGMINE METHYLSULFATE 5 MG: 1 INJECTION INTRAVENOUS at 10:09

## 2022-09-20 RX ADMIN — FENTANYL CITRATE 50 MCG: 50 INJECTION, SOLUTION INTRAMUSCULAR; INTRAVENOUS at 07:09

## 2022-09-20 RX ADMIN — ACETAMINOPHEN 1000 MG: 500 TABLET ORAL at 06:09

## 2022-09-20 RX ADMIN — DEXAMETHASONE SODIUM PHOSPHATE 12 MG: 4 INJECTION, SOLUTION INTRAMUSCULAR; INTRAVENOUS at 07:09

## 2022-09-20 RX ADMIN — CEFAZOLIN 3 G: 330 INJECTION, POWDER, FOR SOLUTION INTRAMUSCULAR; INTRAVENOUS at 07:09

## 2022-09-20 RX ADMIN — ROCURONIUM BROMIDE 50 MG: 10 INJECTION INTRAVENOUS at 07:09

## 2022-09-20 RX ADMIN — GLYCOPYRROLATE 0.6 MG: 0.2 INJECTION, SOLUTION INTRAMUSCULAR; INTRAVITREAL at 10:09

## 2022-09-20 RX ADMIN — FENTANYL CITRATE 100 MCG: 50 INJECTION, SOLUTION INTRAMUSCULAR; INTRAVENOUS at 06:09

## 2022-09-20 RX ADMIN — Medication: at 11:09

## 2022-09-20 RX ADMIN — METHOCARBAMOL 1000 MG: 500 TABLET ORAL at 12:09

## 2022-09-20 RX ADMIN — FENTANYL CITRATE 50 MCG: 50 INJECTION, SOLUTION INTRAMUSCULAR; INTRAVENOUS at 09:09

## 2022-09-20 RX ADMIN — SODIUM CHLORIDE, SODIUM GLUCONATE, SODIUM ACETATE, POTASSIUM CHLORIDE, MAGNESIUM CHLORIDE, SODIUM PHOSPHATE, DIBASIC, AND POTASSIUM PHOSPHATE: .53; .5; .37; .037; .03; .012; .00082 INJECTION, SOLUTION INTRAVENOUS at 09:09

## 2022-09-20 RX ADMIN — FENTANYL CITRATE 100 MCG: 50 INJECTION, SOLUTION INTRAMUSCULAR; INTRAVENOUS at 07:09

## 2022-09-20 RX ADMIN — SODIUM CHLORIDE, SODIUM GLUCONATE, SODIUM ACETATE, POTASSIUM CHLORIDE, MAGNESIUM CHLORIDE, SODIUM PHOSPHATE, DIBASIC, AND POTASSIUM PHOSPHATE: .53; .5; .37; .037; .03; .012; .00082 INJECTION, SOLUTION INTRAVENOUS at 07:09

## 2022-09-20 RX ADMIN — Medication 20 MG: at 07:09

## 2022-09-20 RX ADMIN — MIDAZOLAM 1 MG: 1 INJECTION INTRAMUSCULAR; INTRAVENOUS at 07:09

## 2022-09-20 RX ADMIN — SODIUM CHLORIDE: 0.9 INJECTION, SOLUTION INTRAVENOUS at 06:09

## 2022-09-20 RX ADMIN — ROPIVACAINE HYDROCHLORIDE 7.5 ML: 5 INJECTION, SOLUTION EPIDURAL; INFILTRATION; PERINEURAL at 06:09

## 2022-09-20 RX ADMIN — DEXMEDETOMIDINE HYDROCHLORIDE 20 MCG: 100 INJECTION, SOLUTION INTRAVENOUS at 07:09

## 2022-09-20 RX ADMIN — PROPOFOL 300 MG: 10 INJECTION, EMULSION INTRAVENOUS at 07:09

## 2022-09-20 RX ADMIN — MUPIROCIN: 20 OINTMENT TOPICAL at 06:09

## 2022-09-20 RX ADMIN — ONDANSETRON 8 MG: 2 INJECTION INTRAMUSCULAR; INTRAVENOUS at 09:09

## 2022-09-20 NOTE — ANESTHESIA PROCEDURE NOTES
Left IPACK    Patient location during procedure: pre-op   Block not for primary anesthetic.  Reason for block: at surgeon's request and post-op pain management   Post-op Pain Location: Left knee pain   Start time: 9/20/2022 6:42 AM  Timeout: 9/20/2022 6:41 AM   End time: 9/20/2022 6:55 AM    Staffing  Authorizing Provider: Rashmi Kim MD  Performing Provider: Rashmi Kim MD    Preanesthetic Checklist  Completed: patient identified, IV checked, site marked, risks and benefits discussed, surgical consent, monitors and equipment checked, pre-op evaluation and timeout performed  Peripheral Block  Patient position: supine  Prep: ChloraPrep  Patient monitoring: heart rate, cardiac monitor, continuous pulse ox, continuous capnometry and frequent blood pressure checks  Block type: I PACK  Laterality: left  Injection technique: single shot  Needle  Needle type: Stimuplex   Needle gauge: 21 G  Needle length: 4 in  Needle localization: anatomical landmarks and ultrasound guidance   -ultrasound image captured on disc.  Assessment  Injection assessment: negative aspiration, negative parasthesia and local visualized surrounding nerve  Paresthesia pain: none  Heart rate change: no  Slow fractionated injection: yes    Medications:    Medications: ropivacaine (NAROPIN) injection 0.5% - Perineural   7.5 mL - 9/20/2022 6:50:00 AM    Additional Notes  VSS.  [facility] RN monitoring vitals throughout procedure.  Patient tolerated procedure well.     With 7.5mL normal saline, 1:200,000 epi, 1mg PF decadron, 50mcg clonidine

## 2022-09-20 NOTE — ANESTHESIA PROCEDURE NOTES
Intubation    Date/Time: 9/20/2022 7:17 AM  Performed by: Eunice Chowdhury CRNA  Authorized by: Rashmi Kim MD     Intubation:     Induction:  Intravenous    Intubated:  Postinduction    Mask Ventilation:  Easy with oral airway    Attempts:  1    Attempted By:  CRNA    Method of Intubation:  Video laryngoscopy    Blade:  Cason 4    Laryngeal View Grade: Grade I - full view of cords      Difficult Airway Encountered?: No      Complications:  None    Airway Device:  Oral endotracheal tube    Airway Device Size:  8.0    Style/Cuff Inflation:  Cuffed    Inflation Amount (mL):  5    Tube secured:  24    Secured at:  The lips    Placement Verified By:  Capnometry    Complicating Factors:  None    Findings Post-Intubation:  BS equal bilateral

## 2022-09-20 NOTE — OP NOTE
[facility])  Surgery Department  Operative Note    SUMMARY     Date of Procedure: 9/20/2022     Pre-Operative Diagnosis:   Left Knee Anterior Cruciate Ligament Tear S83.510  Left Knee  grade 2 medial collateral ligament tear  Left Knee Tear, Lateral meniscus, old M23.202  Left Knee Chondromalacia, (excludes patella) M94.29    Post-Operative Diagnosis:   Left Knee Anterior Cruciate Ligament Tear S83.510  Left Knee grade 2 medial collateral ligament tear  Left Knee Tear, Lateral meniscus, old M23.202  Left Knee Chondromalacia, (excludes patella) M94.29      Procedure:  1. Left Knee Arthroscopy, anterior cruciate ligament reconstruction 78659  2.  Left Knee Arthroscopy, with meniscectomy (medial OR lateral) 16240  3.  Left Knee Arthroscopy, debridement/shaving of articular cartilage (chondroplasty) 34205  4.  Left Knee  platelet rich plasma injection to the bone-patellar tendon-bone harvest site      Surgeon(s) and Role:     * Priyanka Castaneda MD - Primary    Assistant:   Cesar Polanco PA-C    No resident or fellow was available throughout the entire procedure as a result it was medically necessary for Cesar Polanco PA-C to perform first assist duties.    Anesthesia: General/Regional    Complications: None    Tourniquet: None    Implants:   Implant Name Type Inv. Item Serial No.  Lot No. LRB No. Used Action   ENDOBUTTON DRILLTIP [id number] - [id number]  ENDOBUTTON DRILLTIP [id number]  [employer] [id number] Left 1 Implanted and Explanted   GUIDEWIRE 1.2MM X 18 STER - [id number]  GUIDEWIRE 1.2MM X 18 STER  [employer] [id number] Left 2 Implanted and Explanted   BIORCI-HA SCREW, 7MM X 20MM    [employer] [id number] Left 1 Implanted   BIORCI-HA SCREW, 9MM X 25MM    [employer] [id number] Left 1 Implanted       Arthroscopic Findings:   Patellofemoral Compartment  Medial Patella Cartilage: Intact  Central Patella Cartilage:Intact  Lateral Patella Cartilage: Intact  Trochlea  Cartilage:Intact  Plica: None  Medial Gutter: Clear of loose bodies or debris  Lateral Gutter:Clear of loose bodies or debris    Ligaments  ACL:  High-grade full-thickness tear involving the anterior medial bundle off of the femur with a bulbous free-floating proximal attachment  PCL:Intact    Medial Compartment:  Femoral Cartilage: Intact  Tibial Cartilage:Intact   Meniscus:Intact    Lateral Compartment:  Femoral Cartilage:  Intact  Tibial Cartilage:Area of grade 3 chondral fissure at the posterior horn of the lateral meniscus with only minimal unstable flaps along the fissures borders  Meniscus:  Chronic degenerative posterior horn lateral meniscus tear involving the white-white zone    Exam Under Anesthesia:  The patient had a 2A Lachman's on exam with only a very mild pivot glide that was only appreciable considering the contralateral extremity exam under anesthesia.  1+ anterior drawer as compared to the contralateral extremity.    The medial collateral ligament had only a mild opening at 30° with no opening at 0.  Stable to varus stress at 0 and at 30°.    Indication for Procedure: 27 y.o. Male with a history of left knee pain who sustained an injury to his left knee during the last preseason game.  He was evaluated by me on the sideline.  My initial concern was an ACL and MCL injury.  Follow-up imaging was consistent with what appeared to be a partial ACL tear in addition to a grade 2 MCL injury with a possible lateral meniscus tear that was possibly acute versus chronic.  He has been in physical therapy at this time but still feels symptoms of instability and wants to proceed with surgery.  For the player in the agents request a 2nd opinion was facilitated with Dr. Lopez, who agreed that the player needed an ACL reconstruction with a bone-patellar tendon-bone autograft and that conservative treatment for his MCL would be appropriate.  Risks, benefits and alternatives were discussed with the player.  He  elected to proceed with surgical intervention with a left knee arthroscopic assisted ACL reconstruction with a bone-patellar tendon-bone autograft, lateral meniscal debridement versus repair and a platelet rich plasma injection for his bone-patellar tendon-bone autograft harvest site.    Surgery in Detail:  The patient was marked identified in the holding room.  He was brought back to the operating room and placed in the supine position.  After general endotracheal anesthesia was administered the left lower extremity was prepped and draped in usual sterile fashion for a knee arthroscopy. The contralateral leg was secured and well padded. Preoperative antibiotics were given within 1 hour the procedure.  A time-out was taken prior starting. We used 0.25% Marcaine and epinephrine for local periportal anesthetic.  We created an anterior lateral portal and under direct visualization an anterior medial portal was created.  A diagnostic arthroscopy was performed to evaluate the ACL prior to proceeding with a bone-patellar tendon-bone harvest secondary to his physical exam findings.  There appeared to be some laxity involving the anterior medial bundle.  The posterior lateral bundle appeared to be intact.  As we peeled some of the tissue on the anterior aspect of the ACL around the synovial area near the femoral notch we did encounter the bulbous end of the proximal femoral attachment of the ACL.  This appeared to be completely loose and did not have what appeared to be any intrinsic ability to heal.  This was clearly the acute injury site. At this time I elected to proceed with our ACL reconstruction.    Using a midline incision from the inferior pole of the patella down to tibial tubercle incision was carried down through the subcutaneous tissue.  The paratenon was then split and the mid 3rd of the patellar tendon was then harvested with a 11 mm graft knife.  The graft was taken back table and prepared in usual fashion.   We took approximately 20 mm from the patella and approximately 20 mm bone block from the tibia.  Excess bone graft from our bone plugs was used to backfill the patellar defect.  We closed the patellar tendon with 0 Vicryl sutures.  The paratenon was closed with 2-0 Vicryl sutures.  We then made an anteromedial periosteal window for our tibial tunnel.   We then brought in our Amy and debrided the remnant of the ACL.  A notchplasty was completed in usual fashion.  We moved to the lateral compartment and identified the lateral meniscal tear as well as a small chondral fissure in the posterior lateral aspect of the tibia.  This was debrided with our shaver as well as our straight biters to a vertical border.  Gentle chondroplasty was then done of just the loose chondral flap on the edge of the posterior lateral tibia.    We then placed our tibial guide and reamed our tibial tunnel with a 12 mm Reamer.  We then freehanded femoral tunnel with a 11 mm Reamer in a curved fashion.  The graft was then passed with assistance of a Beath pin.  It was then secured on the femoral side with a 7 x 20 mm BioComposite interference screw.  At this point we had a good tug test with great stability of the graft.  The knee was cycled multiple times to make sure there was no impingement on the lateral wall or the roof.  The knee was able to be brought to full passive extension without any impingement of the graft.  We then secured the graft on the tibial side with an 9 x 25 mm BioComposite interference screw with reverse Lachman.  This was done in approximately 20-30 degrees of knee flexion.  The deep layers were then closed with 0 Vicryl suture.  The subcutaneous layer was closed with 2-0 Vicryl suture and the skin was closed with a running 3-0 Prolene suture with a rescue stitch.  Prior to the final closure of the Prolene and Angiocath was then placed along the patellar tendon harvest site and approximately 4 mL of leukocyte poor  platelet rich plasma was injected.  Adaptic bacitracin 4 x 4 Steri-Strips were then placed to dress the wound.  He was then placed in a knee brace locked in extension and extubated taken recovery.    Post-Op Plan:  ACL reconstruction with a bone-patellar tendon-bone autograft    Attestation: I was present and scrubbed for the entire procedure.

## 2022-09-20 NOTE — PLAN OF CARE
VSS.  Patient tolerating oral liquids without difficulty.   No apparent s&s of distress noted at this time, no complaints voiced at this time.   Discharge instructions reviewed with patient/mom with good verbal feedback received.   Post op medications given at BS, crutches in car. Nimbus instructions given  Patient ready for discharge.

## 2022-09-20 NOTE — PLAN OF CARE
Patient has a permanent bracelet on right hand. Per Dr. Castaneda, will discuss with surgical team to see what can be done to protect his arm, or will remove in surgery. Patient agreed.

## 2022-09-20 NOTE — PATIENT INSTRUCTIONS
POST-OPERATIVE DISCHARGE INSTRUCTIONS    Diet: Ice chips, clear liquids, and then diet as tolerated.  Drink plenty of liquids after surgery.  Ice the area at least three times a day (20 minutes per session) if possible.    Elevate the extremity above the level of the heart to help reduce swelling.  Pain medication can be taken every four to six hours as needed.  It is helpful to take pain medication prior to physical therapy. If pain is not controlled, please take 800 mg ibuprofen every 8 hours as needed unless contraindicated (NSAID allergy, kidney disease, heart disease, currently taking blood thinners, etc.).  Any activity that requires precise thinking or accuracy should be avoided for a minimum of 72 hours after surgery and while on narcotic pain medication.  This includes operating machinery and/or driving a vehicle.  All sutures will be removed approximately 10-14 days from the time of surgery. Leave steri-strips (skin tapes) in place until sutures are removed.  If skin glue is used instead of stitches, do not apply any ointments or solutions to the incision.  Keep the incision dry.  The skin glue will peel off in 3-4 weeks.  Dressing change directions, unless otherwise instructed:   ?  Anterior Cruciate Ligament: Change dressing on post op day #3 and then daily thereafter. Use gauze with an ACE wrap, and then the brace.      All casts, splints, braces, slings, crutches, abduction pillows, etc. are to be worn as instructed.  Gonzalo Hose or Sequential Compression Devices are often used following surgery to help with swelling and prevent blood clots.  They can be removed for 2-3 hours daily as needed.  If the hose becomes uncomfortable after a few days, switch to an Ace Wrap if desired.  Keep the incision dry for 10-14 days.  A waterproof dressing ([facility] or [facility]) can be used to shower.  No bath, pool, or hot tub until instructed.  You should notify our office if you notice any of the following:  A temperature  greater than 101 F  Severe or increasing pain  Excessive drainage or redness of the incision  Calf swelling and pain  Chest pain or shortness of breath

## 2022-09-20 NOTE — ANESTHESIA PREPROCEDURE EVALUATION
09/20/2022  Billy Holbrook is a 27 y.o., male.    Procedure Summary    Case: [id number] Date/Time: 09/20/22 0700   Procedures:        RECONSTRUCTION, KNEE, ACL, USING GRAFT (Left)       REPAIR, MENISCUS, KNEE (Left: Knee)       ARTHROSCOPY, KNEE (Left: Knee)   Anesthesia type: Gen ETT, Regional   Diagnosis:        Tears of meniscus and ACL of left knee, initial encounter [S83.207A, S83.512A]       Traumatic rupture of medial collateral ligament of left knee, initial encounter [S83.412A]     Patient Active Problem List   Diagnosis    Acute pain of left knee    Decreased range of motion of left knee    Quadriceps weakness    Difficulty walking     History reviewed. No pertinent surgical history.  Current Discharge Medication List      CONTINUE these medications which have NOT CHANGED    Details   aspirin (ECOTRIN) 81 MG EC tablet Take 1 tablet (81 mg total) by mouth once daily.  Qty: 28 tablet, Refills: 0      HYDROcodone-acetaminophen (NORCO) 7.5-325 mg per tablet Take 1 tablet by mouth every 4 (four) hours as needed for Pain.  Qty: 20 tablet, Refills: 0    Comments: Quantity prescribed more than 7 day supply? No             Pre-op Assessment    I have reviewed the Patient Summary Reports.     I have reviewed the Nursing Notes. I have reviewed the NPO Status.   I have reviewed the Medications.     Review of Systems  Social:  Non-Smoker    Cardiovascular:  Cardiovascular Normal Exercise tolerance: good     Pulmonary:  Pulmonary Normal        Physical Exam    Airway:  Mallampati: II   Mouth Opening: Normal  TM Distance: Normal  Tongue: Normal  Neck ROM: Normal ROM    Dental:  Intact        Anesthesia Plan  Type of Anesthesia, risks & benefits discussed:    Anesthesia Type: Gen ETT, Regional  Intra-op Monitoring Plan: Standard ASA Monitors  Post Op Pain Control Plan: multimodal analgesia, peripheral nerve block  and IV/PO Opioids PRN  Induction:  IV  Airway Plan: Direct  Informed Consent: Informed consent signed with the Patient and all parties understand the risks and agree with anesthesia plan.  All questions answered.   ASA Score: 1    Ready For Surgery From Anesthesia Perspective.     .

## 2022-09-20 NOTE — ANESTHESIA PROCEDURE NOTES
Left knee pain    Patient location during procedure: pre-op   Block not for primary anesthetic.  Reason for block: at surgeon's request and post-op pain management   Post-op Pain Location: Left adductor canal catheter   Start time: 9/20/2022 6:41 AM  Timeout: 9/20/2022 6:40 AM   End time: 9/20/2022 6:50 AM    Staffing  Authorizing Provider: Rashmi Kim MD  Performing Provider: Rashmi Kim MD    Preanesthetic Checklist  Completed: patient identified, IV checked, site marked, risks and benefits discussed, surgical consent, monitors and equipment checked, pre-op evaluation and timeout performed  Peripheral Block  Patient position: supine  Prep: ChloraPrep and site prepped and draped  Patient monitoring: heart rate, cardiac monitor, continuous pulse ox, continuous capnometry and frequent blood pressure checks  Block type: adductor canal  Laterality: left  Injection technique: continuous  Needle  Needle type: Tuohy   Needle gauge: 17 G  Needle length: 3.5 in  Needle localization: anatomical landmarks and ultrasound guidance  Catheter type: spring wound  Catheter size: 19 G  Test dose: lidocaine 1.5% with Epi 1-to-200,000 and negative   -ultrasound image captured on disc.  Assessment  Injection assessment: negative aspiration, negative parasthesia and local visualized surrounding nerve  Paresthesia pain: none  Heart rate change: no  Slow fractionated injection: yes  Pain Tolerance: comfortable throughout block and no complaints  Medications:    Medications: ropivacaine (NAROPIN) injection 0.5% - Perineural   10 mL - 9/20/2022 6:45:00 AM    Additional Notes  VSS.  [facility] RN monitoring vitals throughout procedure.  Patient tolerated procedure well.     With 10mL normal saline, 1:200,000 epi

## 2022-09-20 NOTE — BRIEF OP NOTE
[facility]  Brief Operative Note    Surgery Date: 9/20/2022     Surgeon(s) and Role:     * Priyanka Castaneda MD - Primary    Pre-Operative Diagnosis: Tears of meniscus and ACL of left knee, initial encounter [S83.207A, S83.512A]  Traumatic rupture of medial collateral ligament of left knee, initial encounter [S83.412A]    Post-Operative Diagnosis: Post-Op Diagnosis Codes:     * Tears of meniscus and ACL of left knee, initial encounter [S83.207A, S83.512A]     * Traumatic rupture of medial collateral ligament of left knee, initial encounter [S83.412A]    Procedure(s) (LRB):  RECONSTRUCTION, KNEE, ACL, USING GRAFT (Left)  ARTHROSCOPY, KNEE (Left)  INJECTION,PLATELET RICH PLASMA (Left)  ARTHROSCOPY, KNEE, WITH MENISCECTOMY (Left)    Anesthesia: General    Operative Findings: See Op note.    Estimated Blood Loss: * No values recorded between 9/20/2022  7:46 AM and 9/20/2022 10:34 AM *         Specimens:   Specimen (24h ago, onward)      None              Discharge Note    OUTCOME: Patient tolerated treatment/procedure well without complication and is now ready for discharge.    DISPOSITION: Home or Self Care    FINAL DIAGNOSIS:  Post-Op Diagnosis Codes:     * Tears of meniscus and ACL of left knee, initial encounter [S83.207A, S83.512A]     * Traumatic rupture of medial collateral ligament of left knee, initial encounter [S83.412A]    FOLLOWUP: In clinic    DISCHARGE INSTRUCTIONS: See attached.

## 2022-09-20 NOTE — TRANSFER OF CARE
"Anesthesia Transfer of Care Note    Patient: Billy Holbrook    Procedure(s) Performed: Procedure(s) (LRB):  RECONSTRUCTION, KNEE, ACL, USING GRAFT (Left)  ARTHROSCOPY, KNEE (Left)  INJECTION,PLATELET RICH PLASMA (Left)  ARTHROSCOPY, KNEE, WITH MENISCECTOMY (Left)    Patient location: PACU    Anesthesia Type: general    Transport from OR: Transported from OR on 6-10 L/min O2 by face mask with adequate spontaneous ventilation    Post pain: adequate analgesia    Post assessment: no apparent anesthetic complications    Post vital signs: stable    Level of consciousness: awake    Nausea/Vomiting: no nausea/vomiting    Complications: none    Transfer of care protocol was followed      Last vitals:   Visit Vitals  /68   Pulse 84   Temp 36.6 °C (97.9 °F) (Skin)   Resp 19   Ht 6' 3" (1.905 m)   Wt (!) 138.3 kg (305 lb)   SpO2 95%   BMI 38.12 kg/m²     "

## 2022-09-20 NOTE — PLAN OF CARE
Pre-procedure complete. Need site jose maria. Pt's mother at bedside; she will hold on to pt's belongings. Pt resting comfortably; all questions addressed.

## 2022-09-21 NOTE — ANESTHESIA POST-OP PAIN MANAGEMENT
Attempted to call Billy Holbrook at 2:45 PM on 09/21/2022 regarding the PNC and Nimbus pump.  No one answered and a voicemail messaging was not setup. Team will follow up.    Liban Alfaro MD   Fellow  Regional Anesthesiology and Acute Pain Medicine  [facility]

## 2022-09-22 ENCOUNTER — CLINICAL SUPPORT (OUTPATIENT)
Dept: REHABILITATION | Facility: HOSPITAL | Age: 27
End: 2022-09-22
Payer: COMMERCIAL

## 2022-09-22 DIAGNOSIS — M25.662 DECREASED RANGE OF MOTION OF LEFT KNEE: ICD-10-CM

## 2022-09-22 DIAGNOSIS — M62.81 QUADRICEPS WEAKNESS: ICD-10-CM

## 2022-09-22 DIAGNOSIS — R26.2 DIFFICULTY WALKING: ICD-10-CM

## 2022-09-22 DIAGNOSIS — M25.562 ACUTE PAIN OF LEFT KNEE: Primary | ICD-10-CM

## 2022-09-22 PROCEDURE — 97161 PT EVAL LOW COMPLEX 20 MIN: CPT

## 2022-09-22 NOTE — PROGRESS NOTES
[facility]  Physical Therapy Initial Evaluation    Name: Billy Holrbook  Clinic Number: [id number]    Therapy Diagnosis: No diagnosis found.  Physician: PEACE Dempsey MD    Physician Orders: PT Eval and Treat  Medical Diagnosis from Referral:     Diagnosis   S83.207A,S83.512A (ICD-10-CM) - Tears of meniscus and ACL of left knee, initial encounter   S83.412A (ICD-10-CM) - Traumatic rupture of medial collateral ligament of left knee, initial encounter     Evaluation Date: 9/22/2022  Authorization Period Expiration: 9/6/2022 - 9/6/2023  Plan of Care Expiration: 9/6/23  Progress Note Due: 11/3/22  Visit # / Visits authorized: 1/ 1 (9/20 pre-op)     Time In: 1515  Time Out: 1615  Total Appointment Time (timed & untimed codes): 60 minutes    DOS: 9/20/2022  Post-Op Precautions: Standard ACLR Precautions     Precautions: Standard    Subjective     Date of onset: 9/20/22  History of current condition - Billy reports: pain in L knee after an opponent fell on him. Was diagnosed with a L ACL tear, grade 2 MCL tear, and torn lateral meniscus. Patient with 2/10 pain from surgery, has been icing at home with game ready and keeping th mark gaytan s instructed. He has some difficulty with getting around on the crutches.  He can do HEP as instructed and has mother at home to help.       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    Surgical History:   Billy Holbrook  has a past surgical history that includes Reconstruction of anterior cruciate ligament  using graft (Left, 9/20/2022); Arthroscopy of knee (Left, 9/20/2022); and Knee arthroscopy w/ meniscectomy (Left, 9/20/2022).    Medications:   Billy has a current medication list which includes the following prescription(s): aspirin and hydrocodone-acetaminophen.    Allergies:   Review of patient's allergies indicates:   Allergen Reactions    Citrus and derivatives     Wasp venom     Yellow jacket venom         Objective     Observation: Pt presents on B/L crutches with knee in post-op dressing and brace.     Gait: B/L Crutches    Knee Active Range of Motion:   Right  Left    Flexion NT 80   Extension NT 0     Knee Passive Range of Motion:   Right  Left    Flexion NT 80   Extension NT 0       Ankle Active Range of Motion: WNL      Quad Set: 3+/10, limited quad activity       Joint Mobility: Limited mobility       Sensation: Intact       Pulses: Present       Edema: NT   Girth Measurement Joint line 15 cm below 10 cm above   Right NT cm NT cm NT cm   Left 49 cm 46.5 cm 61 cm       Special Tests: Not performed today due to post-op status   Strength: Not performed today due to post-op status.        CMS Impairment/Limitation/Restriction for FOTO Survey    Therapist reviewed FOTO scores for Billy Holbrook on 9/22/2022.   FOTO documents entered into [employer] - see Media section.       Treatment     Treatment Time In: 1530  Treatment Time Out: 1616  Total Treatment time separate from Evaluation: 45 minutes     Billy received the treatments listed below:      Neuromuscular Re-Education to develop strength, endurance, ROM, flexibility, posture, and core stabilization for 27 minutes including:  NMES with quad sets :10 on/:10 off x 20:00  Passive flexion to 80 degrees w/therapist assist/support  Gait training with crutches and crutch and brace adjustment     Manual therapy techniques: Joint mobilizations, Manual traction, Myofacial release, and Soft tissue Mobilization were applied to the: L Leg for 18 minutes,  including:  Manual knee extension mob   Patellar mobs in all directions   Light manual distraction       Home Exercises and Patient Education Provided     Education provided:   - WB precautions, heel propping for ROM, quad sets for quad activation   - Prognosis, activity modification, goals for therapy, role of therapy for care, exercises/HEP    Written Home Exercises Provided: Quad set, heel propping, heel slides.  Exercises were reviewed and Billy was able to demonstrate them prior to the end of the session.   Pt received a written copy of exercises to perform at home. Billy demonstrated good  understanding of the education provided.     See EMR under patient instructions for exercises given.     Assessment     Billy is a 27 y.o. male referred to outpatient Physical Therapy 2 days s/p ACLR, lateral menisectomy with Grade 2 MCL sprain. He presents today with an adductor nerve block.       Pt will benefit from skilled outpatient Physical Therapy to address the deficits stated above and in the chart below, provide pt/family education, and to maximize pt's level of independence. Pt prognosis is Excellent.     Plan of care discussed with patient: Yes  Pt's spiritual, cultural and educational needs considered and patient is agreeable to the plan of care and goals as stated below:       Anticipated Barriers for therapy: None      Medical Necessity is demonstrated by the following  History  Co-morbidities and personal factors that may impact the plan of care Co-morbidities:   None    Personal Factors:   no deficits     low   Examination  Body Structures and Functions, activity limitations and participation restrictions that may impact the plan of care Body Regions:   lower extremities    Body Systems:    ROM    Participation Restrictions:   WBAT    Activity limitations:   Learning and applying knowledge  no deficits    General Tasks and Commands  no deficits    Communication  no deficits    Mobility  no  deficits    Self care  no deficits    Domestic Life  no deficits    Interactions/Relationships  no deficits    Life Areas  no deficits    Community and Social Life  no deficits       low   Clinical Presentation stable and uncomplicated low   Decision Making/ Complexity Score: low     Goals:  Short Term Goals: 8-10 weeks  1. Pt will be compliant with HEP 50% of prescribed amount.   2. The pt to demo improvement in left knee ROM to equal right knee ROM pain free   3.  The pt to demo good quad set with proper hyperextension moment of the Left knee   4. The pt to demo ambualting with elast restricitve AD witout major compensatory pattern left knee for at least 20 feet      Long Term Goals: 56 weeks   Pt will be compliant with % of prescribed amount.   The pt to demo pain free and uncompensated running mechanics x10 min on an indoor treadmill  The pt to demo tolerance to a squat at or bellow parallel with uncompensated mechanics x10   The pt to demo strength of L LE within 10% of R LE as demo'd on the biodex machine   The pt to demo a deficit of 10% or less on a triple hop, single leg broad jump and crossover hop compared to non operative LE.   The pt will report full participation in ADLs and IADLs without restrictions related to L  knee.       Plan   Plan of care Certification: 9/22/2022 to 9/6/23.    Outpatient Physical Therapy 3 times weekly for 56 weeks to include the following interventions: Electrical Stimulation NMES, Gait Training, Manual Therapy, Neuromuscular Re-ed, Patient Education, Self Care, Therapeutic Activities, and Therapeutic Exercise.     Nishant Quach, PT , DPT

## 2022-09-22 NOTE — ANESTHESIA POSTPROCEDURE EVALUATION
Anesthesia Post Evaluation    Patient: Billy Holbrook    Procedure(s) Performed: Procedure(s) (LRB):  RECONSTRUCTION, KNEE, ACL, USING GRAFT (Left)  ARTHROSCOPY, KNEE (Left)  INJECTION,PLATELET RICH PLASMA (Left)  ARTHROSCOPY, KNEE, WITH MENISCECTOMY (Left)    Final Anesthesia Type: general      Patient location during evaluation: PACU  Patient participation: Yes- Able to Participate  Level of consciousness: awake and alert and oriented  Post-procedure vital signs: reviewed and stable  Pain management: adequate  Airway patency: patent    PONV status at discharge: No PONV  Anesthetic complications: no      Cardiovascular status: hemodynamically stable  Respiratory status: nasal cannula  Hydration status: euvolemic  Follow-up not needed.          Vitals Value Taken Time   /65 09/20/22 1217   Temp 36.2 °C (97.1 °F) 09/20/22 1059   Pulse 93 09/20/22 1219   Resp 18 09/20/22 1249   SpO2 96 % 09/20/22 1219   Vitals shown include unvalidated device data.      Event Time   Out of Recovery 13:42:00         Pain/Bhupinder Score: No data recorded

## 2022-09-23 ENCOUNTER — CLINICAL SUPPORT (OUTPATIENT)
Dept: REHABILITATION | Facility: HOSPITAL | Age: 27
End: 2022-09-23
Payer: COMMERCIAL

## 2022-09-23 DIAGNOSIS — M62.81 QUADRICEPS WEAKNESS: ICD-10-CM

## 2022-09-23 DIAGNOSIS — R26.2 DIFFICULTY WALKING: ICD-10-CM

## 2022-09-23 DIAGNOSIS — M25.662 DECREASED RANGE OF MOTION OF LEFT KNEE: ICD-10-CM

## 2022-09-23 DIAGNOSIS — M25.562 ACUTE PAIN OF LEFT KNEE: Primary | ICD-10-CM

## 2022-09-23 PROCEDURE — 97140 MANUAL THERAPY 1/> REGIONS: CPT

## 2022-09-23 PROCEDURE — 97112 NEUROMUSCULAR REEDUCATION: CPT

## 2022-09-23 NOTE — PROGRESS NOTES
Subjective: Patient doing much better today and he is feeling like th eknee moves better and the quad works more.    OBJ - 0 - 90 degrees flexion    TX: -     Patella mobs, man ROM 0-90    Quad set with Biphasic 20 min 10 sec on/10 sec off   - towel under knee, 1/2 roll under ankle, 1/2 under knee  Heel slides with strap  Assisted SLR  Gait training  Wound care    Assessment - patient progressing well.  Wound is much better today and no bleeding present.  Covered with sterile bandages and wrap and brace put back on.  Patient is progressing as expected and will do HEP of quad sets and heel slides this weekend.    Plan - continue per protocol.      [facility]  Physical Therapy Initial Evaluation    Name: Billy [facility] Number: [id number]    Therapy Diagnosis:   Encounter Diagnoses   Name Primary?    Acute pain of left knee Yes    Decreased range of motion of left knee     Quadriceps weakness     Difficulty walking      Physician: PEACE Dempsey MD    Physician Orders: PT Eval and Treat  Medical Diagnosis from Referral:     Diagnosis   S83.207A,S83.512A (ICD-10-CM) - Tears of meniscus and ACL of left knee, initial encounter   S83.412A (ICD-10-CM) - Traumatic rupture of medial collateral ligament of left knee, initial encounter     Evaluation Date: 9/23/2022  Authorization Period Expiration: 9/6/2022 - 9/6/2023  Plan of Care Expiration: 9/6/23  Progress Note Due: 11/3/22  Visit # / Visits authorized: 1/ 1 (9/20 pre-op)     Time In: 1500  Time Out: 1630  Total Appointment Time (timed & untimed codes): 60 minutes    DOS: 9/20/2022  Post-Op Precautions: Standard ACLR Precautions     Precautions: Standard    Subjective     Date of onset: 9/20/22  History of current condition -Subjective: Patient doing much better today and he is feeling like the knee moves better and the quad works more.       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     Gait: B/L Crutches    Knee Active Range of Motion:   Right  Left    Flexion NT 90   Extension NT 0     Knee Passive Range of Motion:   Right  Left    Flexion NT 90   Extension NT 0       Ankle Active Range of Motion: WNL      Quad Set: 3+/10, limited quad activity       Joint Mobility: Limited mobility       Sensation: Intact       Pulses: Present       Edema: NT   Girth Measurement Joint line 15 cm below 10 cm above   Right NT cm NT cm NT cm   Left 49 cm 46.5 cm 61 cm       Special Tests: Not performed today due to post-op status   Strength: Not performed today due to post-op status.        CMS Impairment/Limitation/Restriction for FOTO Survey    Therapist reviewed FOTO scores for Billy Holbrook on 9/23/2022.   FOTO documents entered into [employer] - see Media section.       Treatment      Billy received the treatments listed below:      Neuromuscular Re-Education to develop strength, endurance, ROM, flexibility, posture, and core stabilization for 30 minutes including:  NMES with quad sets :10 on/:10 off x 20:00  - towel under knee, 1/2 roll under ankle, 1/2 under knee  Passive flexion to 80 degrees w/therapist assist/support  Gait training with crutches and crutch and brace adjustment   Heel slides with strap  Assisted SLR  Gait training, 2 crutches, brace locked out, heel to toe pattern      Manual therapy techniques: Joint mobilizations, Manual traction, Myofacial release, and Soft tissue Mobilization were applied to the: L Leg for 30 minutes, including:  Manual knee  extension mob  Patellar mobs in all directions   Light manual distraction   Wound care dressing change      Home Exercises and Patient Education Provided     Education provided:   - WB precautions, heel propping for ROM, quad sets for quad activation   - Prognosis, activity modification, goals for therapy, role of therapy for care, exercises/HEP    Written Home Exercises Provided: Quad set, heel propping, heel slides.  Exercises were reviewed and Billy was able to demonstrate them prior to the end of the session.   Pt received a written copy of exercises to perform at home. Billy demonstrated good  understanding of the education provided.     See EMR under patient instructions for exercises given.     Assessment     Patient progressing well.  Wound is much better today and no bleeding present.  Covered with sterile bandages and wrap and brace put back on.  Patient is progressing as expected and will do HEP of quad sets and heel slides this weekend.    Goals:  Short Term Goals: 8-10 weeks  1. Pt will be compliant with HEP 50% of prescribed amount.   2. The pt to demo improvement in left knee ROM to equal right knee ROM pain free   3.  The pt to demo good quad set with proper hyperextension moment of the Left knee   4. The pt to demo ambualting with elast restricitve AD witout major compensatory pattern left knee for at least 20 feet      Long Term Goals: 56 weeks   Pt will be compliant with % of prescribed amount.   The pt to demo pain free and uncompensated running mechanics x10 min on an indoor treadmill  The pt to demo tolerance to a squat at or bellow parallel with uncompensated mechanics x10   The pt to demo strength of L LE within 10% of R LE as demo'd on the biodex machine   The pt to demo a deficit of 10% or less on a triple hop, single leg broad jump and crossover hop compared to non operative LE.   The pt will report full participation in ADLs and IADLs without restrictions related to L  knee.        Plan   Plan of care Certification: 9/23/2022 to 9/6/23.    Continue per protocol    Outpatient Physical Therapy 3 times weekly for 56 weeks to include the following interventions: Electrical Stimulation NMES, Gait Training, Manual Therapy, Neuromuscular Re-ed, Patient Education, Self Care, Therapeutic Activities, and Therapeutic Exercise.     Andres Jeffrey, PT

## 2022-09-25 NOTE — ANESTHESIA POST-OP PAIN MANAGEMENT
Attempted to call Billy Holbrook at 3:51 PM on 09/25/2022 regarding the PNC and Nimbus pump and the remind him that the PNC should be removed today.  The number went to the [facility] ticket office and I was unable to leave a message.      Liban Alfaro MD   Fellow  Regional Anesthesiology and Acute Pain Medicine  [facility]

## 2022-09-26 ENCOUNTER — CLINICAL SUPPORT (OUTPATIENT)
Dept: REHABILITATION | Facility: HOSPITAL | Age: 27
End: 2022-09-26
Payer: COMMERCIAL

## 2022-09-26 DIAGNOSIS — M25.562 ACUTE PAIN OF LEFT KNEE: Primary | ICD-10-CM

## 2022-09-26 DIAGNOSIS — M62.81 QUADRICEPS WEAKNESS: ICD-10-CM

## 2022-09-26 DIAGNOSIS — R26.2 DIFFICULTY WALKING: ICD-10-CM

## 2022-09-26 DIAGNOSIS — M25.662 DECREASED RANGE OF MOTION OF LEFT KNEE: ICD-10-CM

## 2022-09-26 PROCEDURE — 97140 MANUAL THERAPY 1/> REGIONS: CPT

## 2022-09-26 PROCEDURE — 97112 NEUROMUSCULAR REEDUCATION: CPT

## 2022-09-26 PROCEDURE — 97110 THERAPEUTIC EXERCISES: CPT

## 2022-09-26 NOTE — PROGRESS NOTES
[facility]   Physical Therapy Treatment Note     Name: Billy Holbrook  [facility] Number: [id number]    Therapy Diagnosis:   Encounter Diagnoses   Name Primary?    Acute pain of left knee Yes    Decreased range of motion of left knee     Quadriceps weakness     Difficulty walking        Physician: PEACE Dempsey MD    Visit Date: 9/26/2022    Physician Orders: PT Eval and Treat   Medical Diagnosis from Referral: M25.562 (ICD-10-CM) - Acute pain of left knee  Evaluation Date: 9/7/2022  Authorization Period Expiration: 9/6/2023  Plan of Care Expiration: 12/31/2022  Visit # / Visits authorized: 12/ 20      Precautions: Standard     Time In: 9:00 am   Time Out: 10:30  Total Billable Time: 45 minutes    Procedure: 9/20/2022  1. Left Knee Arthroscopy, anterior cruciate ligament reconstruction 27616  2.  Left Knee Arthroscopy, with meniscectomy (medial OR lateral) 44399  3.  Left Knee Arthroscopy, debridement/shaving of articular cartilage (chondroplasty) 96106  4.  Left Knee  platelet rich plasma injection to the bone-patellar tendon-bone harvest site    Post-Op Plan:  ACL reconstruction with a bone-patellar tendon-bone autograft    SUBJECTIVE     Pt reports: he feels really good. Driving today for first time without issue. Knee just feel tight. Taking non-narcotics for pain management. Moving on crutches in locked brace well.     He was compliant with home exercise program.  Response to previous treatment: no issues  Functional change: WBAT crutches in locked brace    Pain: 3/10  Location: left knee      OBJECTIVE     6 days post-op    Observation: effusion noted L knee     Range of Motion (extension/neutral/flexion):     Right Left   Knee PROM: 2-0-130 deg 2-0-95* deg      Strength:     Right Left Comment   Knee Extension: 5/5 2+/5        Knee Flexion: 5/5 3-/5        Hip Abduction: 4+/5 3/5       Functional tests(*=pain):   Gait/stairs: WBAT linus crutches in locked brace  Quad activation: fait  to good     Joint Mobility: hypomobile PFJ and tibiofemoral    TTP along MCL- mild    Treatment     Billy received the treatments listed below:      therapeutic exercises to develop strength, endurance, ROM, flexibility, posture, and core stabilization for 50 minutes including:  Heel slides x 5 min 2 bouts  Heel prop x 5 min  Ankle pumps with black sport band x 5 min  AAROM knee flx seated EOT 2 x 10 x 10 sec with self OP  Education/assessment/fit brace/wound care-dressing    manual therapy techniques: Joint mobilizations and Soft tissue Mobilization were applied for 15 minutes, including:  PFJ gr 3 glides all planes  Knee hyperextension gr 3    neuromuscular re-education activities to improve: Coordination, Kinesthetic, Sense, and Proprioception for 25 minutes. The following activities were included:  NMES 15 min with quad set over towel x 5 min; SAQ 5 min; heel prop quad set 5 min  LAQ 2 x 8 x 5 sec hold    therapeutic activities to improve functional performance for 0 minutes, including:  NP    gait training to improve functional mobility and safety for 0  minutes, including:  NP    Patient Education and Home Exercises     Home Exercises Provided and Patient Education Provided     Education provided:   - HEP update, use of brace, compression sleeve applied; wound care, precautions, activity pacing, goals    Written Home Exercises Provided: yes. Exercises were reviewed and Billy was able to demonstrate them prior to the end of the session.  Billy demonstrated good  understanding of the education provided. See EMR under Patient Instructions for exercises provided during therapy sessions    ASSESSMENT     Billy is doing very well. Progressing ROM and quad activation without issue. His incisions and protal sites are closed and healing well. Pain seems to be well controled at this time. He is using game ready often to help with pain and swelling. Pt reported/demonstrated no adverse response or exacerbation of  symptoms during today's session.      Billy Is progressing well towards his goals.   Pt prognosis is Good.     Pt will continue to benefit from skilled outpatient physical therapy to address the deficits listed in the problem list box on initial evaluation, provide pt/family education and to maximize pt's level of independence in the home and community environment.     Pt's spiritual, cultural and educational needs considered and pt agreeable to plan of care and goals.     Anticipated barriers to physical therapy: none    Goals:  Short Term Goals: 8-10 weeks (progressing, not met)   1. Pt will be compliant with HEP 50% of prescribed amount.   2. The pt to demo improvement in left knee ROM to equal right knee ROM pain free   3.  The pt to demo good quad set with proper hyperextension moment of the Left knee   4. The pt to demo ambualting with elast restricitve AD witout major compensatory pattern left knee for at least 20 feet      Long Term Goals: 56 weeks (progressing, not met)   Pt will be compliant with % of prescribed amount.   The pt to demo pain free and uncompensated running mechanics x10 min on an indoor treadmill  The pt to demo tolerance to a squat at or bellow parallel with uncompensated mechanics x10   The pt to demo strength of L LE within 10% of R LE as demo'd on the biodex machine   The pt to demo a deficit of 10% or less on a triple hop, single leg broad jump and crossover hop compared to non operative LE.   The pt will report full participation in ADLs and IADLs without restrictions related to L  knee.     PLAN     See 5x/week for post-op rehab. Focus quad activation and knee ROM/joint mobility. Manage swelling and provide wound care.     Manjit Lazcano, PT

## 2022-09-27 ENCOUNTER — CLINICAL SUPPORT (OUTPATIENT)
Dept: REHABILITATION | Facility: HOSPITAL | Age: 27
End: 2022-09-27
Attending: ORTHOPAEDIC SURGERY
Payer: COMMERCIAL

## 2022-09-27 DIAGNOSIS — R26.2 DIFFICULTY WALKING: ICD-10-CM

## 2022-09-27 DIAGNOSIS — M25.662 DECREASED RANGE OF MOTION OF LEFT KNEE: ICD-10-CM

## 2022-09-27 DIAGNOSIS — M62.81 QUADRICEPS WEAKNESS: ICD-10-CM

## 2022-09-27 DIAGNOSIS — M25.562 ACUTE PAIN OF LEFT KNEE: Primary | ICD-10-CM

## 2022-09-27 PROCEDURE — 97112 NEUROMUSCULAR REEDUCATION: CPT

## 2022-09-27 PROCEDURE — 97140 MANUAL THERAPY 1/> REGIONS: CPT

## 2022-09-27 PROCEDURE — 97110 THERAPEUTIC EXERCISES: CPT

## 2022-09-27 PROCEDURE — 97116 GAIT TRAINING THERAPY: CPT

## 2022-09-27 NOTE — PROGRESS NOTES
[facility]   Physical Therapy Treatment Note     Name: Billy Holbrook  [facility] Number: [id number]    Therapy Diagnosis:   Encounter Diagnoses   Name Primary?    Acute pain of left knee Yes    Decreased range of motion of left knee     Quadriceps weakness     Difficulty walking        Physician: PEACE Dempsey MD    Visit Date: 9/27/2022    Physician Orders: PT Eval and Treat   Medical Diagnosis from Referral: M25.562 (ICD-10-CM) - Acute pain of left knee  Evaluation Date: 9/7/2022  Authorization Period Expiration: 9/6/2023  Plan of Care Expiration: 12/31/2022  Visit # / Visits authorized: 13/ 20      Precautions: Standard     Time In: 11:00 am   Time Out: 12:10  Total Billable Time: 60 minutes    Procedure: 9/20/2022  1. Left Knee Arthroscopy, anterior cruciate ligament reconstruction 18032  2.  Left Knee Arthroscopy, with meniscectomy (medial OR lateral) 69095  3.  Left Knee Arthroscopy, debridement/shaving of articular cartilage (chondroplasty) 68416  4.  Left Knee  platelet rich plasma injection to the bone-patellar tendon-bone harvest site    Post-Op Plan:  ACL reconstruction with a bone-patellar tendon-bone autograft    SUBJECTIVE     Pt reports: he feels great. Seeing good progress in ROM. Knee feels tight/full with bending. Walking well in brace and with crutches. Tried sleeping no brace, but was woken with discomfort so he put back on with relief.     He was compliant with home exercise program.  Response to previous treatment: no issues  Functional change: WBAT crutches in locked brace    Pain: 3/10  Location: left knee      OBJECTIVE     7 days post-op    Observation: effusion noted L knee     Range of Motion (extension/neutral/flexion):     Right Left   Knee PROM: 6-0-130 deg 3-0-100* deg      Strength:     Right Left Comment   Knee Extension: 5/5 2+/5        Knee Flexion: 5/5 3-/5        Hip Abduction: 4+/5 3/5       Functional tests(*=pain):   Gait/stairs: WBAT linus crutches  in locked brace  Quad activation: fait to good     Joint Mobility: hypomobile PFJ and tibiofemoral    TTP along MCL- mild    Treatment     Billy received the treatments listed below:      therapeutic exercises to develop strength, endurance, ROM, flexibility, posture, and core stabilization for 32 minutes including:  Heel slides x 5 min 2 bouts  Heel prop x 5 min  Ankle pumps with black sport band x 5 min  Standing calf raise unlocked brace DL 3 x 10  knee flx seated EOT with manual OP  SL hip abd/prone hip ext from TKE in locked brace next session  Education/assessment/fit brace/wound care-dressing    manual therapy techniques: Joint mobilizations and Soft tissue Mobilization were applied for 10 minutes, including:  PFJ gr 3 glides all planes  Knee hyperextension gr 3  Fat pad mobility    neuromuscular re-education activities to improve: Coordination, Kinesthetic, Sense, and Proprioception for 20 minutes. The following activities were included:  NMES 15 min with quad set over towel x 5 min; heel prop QS 5 min; SLR 5 min  LAQ 2 x 10 x 5 sec hold    therapeutic activities to improve functional performance for 0 minutes, including:  NP    gait training to improve functional mobility and safety for 8 minutes, including:  In locked and unlocked brace with linus crutches PWB; working quad activation, TKE, and knee flx at toe off    Patient Education and Home Exercises     Home Exercises Provided and Patient Education Provided     Education provided:   - HEP update, use of brace, compression sleeve applied; wound care, precautions, activity pacing, goals    Written Home Exercises Provided: yes. Exercises were reviewed and Billy was able to demonstrate them prior to the end of the session.  Billy demonstrated good  understanding of the education provided. See EMR under Patient Instructions for exercises provided during therapy sessions    ASSESSMENT     Billy continues to do very well. Gaining some hyperextension and  quad activation. Knee flexion is progressing. Quad activation at end range extension remains limited. Gait progressed well today in unlocked brace; advised to only practice this short distances at home, keep brace locked in community and always use crutches. Pt reported/demonstrated no adverse response or exacerbation of symptoms during today's session.      Billy Is progressing well towards his goals.   Pt prognosis is Good.     Pt will continue to benefit from skilled outpatient physical therapy to address the deficits listed in the problem list box on initial evaluation, provide pt/family education and to maximize pt's level of independence in the home and community environment.     Pt's spiritual, cultural and educational needs considered and pt agreeable to plan of care and goals.     Anticipated barriers to physical therapy: none    Goals:  Short Term Goals: 8-10 weeks (progressing, not met)   1. Pt will be compliant with HEP 50% of prescribed amount.   2. The pt to demo improvement in left knee ROM to equal right knee ROM pain free   3.  The pt to demo good quad set with proper hyperextension moment of the Left knee   4. The pt to demo ambualting with elast restricitve AD witout major compensatory pattern left knee for at least 20 feet      Long Term Goals: 56 weeks (progressing, not met)   Pt will be compliant with % of prescribed amount.   The pt to demo pain free and uncompensated running mechanics x10 min on an indoor treadmill  The pt to demo tolerance to a squat at or bellow parallel with uncompensated mechanics x10   The pt to demo strength of L LE within 10% of R LE as demo'd on the biodex machine   The pt to demo a deficit of 10% or less on a triple hop, single leg broad jump and crossover hop compared to non operative LE.   The pt will report full participation in ADLs and IADLs without restrictions related to L  knee.     PLAN     See 5x/week for post-op rehab. Focus quad activation and  knee ROM/joint mobility. Manage swelling and provide wound care.     Manjit Lazcano, PT

## 2022-09-28 ENCOUNTER — CLINICAL SUPPORT (OUTPATIENT)
Dept: REHABILITATION | Facility: HOSPITAL | Age: 27
End: 2022-09-28
Payer: COMMERCIAL

## 2022-09-28 DIAGNOSIS — M25.562 ACUTE PAIN OF LEFT KNEE: Primary | ICD-10-CM

## 2022-09-28 DIAGNOSIS — R26.2 DIFFICULTY WALKING: ICD-10-CM

## 2022-09-28 DIAGNOSIS — M62.81 QUADRICEPS WEAKNESS: ICD-10-CM

## 2022-09-28 DIAGNOSIS — M25.662 DECREASED RANGE OF MOTION OF LEFT KNEE: ICD-10-CM

## 2022-09-28 PROCEDURE — 97112 NEUROMUSCULAR REEDUCATION: CPT

## 2022-09-28 PROCEDURE — 97140 MANUAL THERAPY 1/> REGIONS: CPT

## 2022-09-28 PROCEDURE — 97110 THERAPEUTIC EXERCISES: CPT

## 2022-09-28 NOTE — PROGRESS NOTES
[facility]   Physical Therapy Treatment Note     Name: Billy Holbrook  [facility] Number: [id number]    Therapy Diagnosis:   Encounter Diagnoses   Name Primary?    Acute pain of left knee Yes    Decreased range of motion of left knee     Quadriceps weakness     Difficulty walking      Physician: PEACE Dempsey MD    Visit Date: 9/28/2022    Physician Orders: PT Eval and Treat   Medical Diagnosis from Referral: M25.562 (ICD-10-CM) - Acute pain of left knee  Evaluation Date: 9/7/2022  Authorization Period Expiration: 9/6/2023  Plan of Care Expiration: 12/31/2022  Visit # / Visits authorized: 14/ 20      Precautions: Standard     Time In: 11:00 am   Time Out: 12:08  Total Billable Time: 68 minutes    Procedure: 9/20/2022  1. Left Knee Arthroscopy, anterior cruciate ligament reconstruction 05467  2.  Left Knee Arthroscopy, with meniscectomy (medial OR lateral) 62435  3.  Left Knee Arthroscopy, debridement/shaving of articular cartilage (chondroplasty) 66788  4.  Left Knee  platelet rich plasma injection to the bone-patellar tendon-bone harvest site    Post-Op Plan:  ACL reconstruction with a bone-patellar tendon-bone autograft    SUBJECTIVE     Pt reports: he continues to feel good and impressed at how he is doing. Walking around home in locked brace off crutches.     He was compliant with home exercise program.  Response to previous treatment: no issues  Functional change: WBAT crutches in locked brace    Pain: 2/10  Location: left knee      OBJECTIVE     8 days post-op    Observation: effusion noted L knee     Range of Motion (extension/neutral/flexion):     Right Left   Knee PROM: 6-0-130 deg 3-0-100* deg      Strength:     Right Left Comment   Knee Extension: 5/5 2+/5        Knee Flexion: 5/5 3-/5        Hip Abduction: 4+/5 3/5       Functional tests(*=pain):   Gait/stairs: WBAT linus crutches in locked brace  Quad activation: fait to good     Joint Mobility: hypomobile PFJ and  tibiofemoral    TTP along MCL- mild    Treatment     Billy received the treatments listed below:      therapeutic exercises to develop strength, endurance, ROM, flexibility, posture, and core stabilization for 42 minutes including:  Heel slides x 5 min 2 bouts  Heel prop x 5 min x 2 bouts  Standing calf raise unlocked brace DL 2 x 20  knee flx seated EOT with manual OP  Hip 3 way with quad set in locked brace 2 x 10 each  Education/assessment/fit brace/wound care-dressing    manual therapy techniques: Joint mobilizations and Soft tissue Mobilization were applied for 10 minutes, including:  PFJ gr 3 glides all planes  Knee hyperextension gr 3  Fat pad mobility    neuromuscular re-education activities to improve: Coordination, Kinesthetic, Sense, and Proprioception for 18 minutes. The following activities were included:  NMES 15 min with quad set over towel x 5 min; heel prop QS 5 min; SLR 5 min  LAQ 2 x 10 x 5 sec hold    therapeutic activities to improve functional performance for 0 minutes, including:  NP    gait training to improve functional mobility and safety for 8 minutes, including:  unlocked brace with linus crutches PWB; working quad activation, TKE, and knee flx at toe off  Locked brace no crutches    Patient Education and Home Exercises     Home Exercises Provided and Patient Education Provided     Education provided:   - HEP update, use of brace, compression sleeve applied; wound care, precautions, activity pacing, goals    Written Home Exercises Provided: yes. Exercises were reviewed and Billy was able to demonstrate them prior to the end of the session.  Billy demonstrated good  understanding of the education provided. See EMR under Patient Instructions for exercises provided during therapy sessions    ASSESSMENT     Billy continues to do very well. He is advancing himself off crutches a bit quick at home, but doing well with it. Quad activation progressing; AROM limited to lacking 2-3 deg from 0.  PROM is doing well into hyperextension. Flexion progressing steadily. Pain seems well controlled, incision looking good.  Pt reported/demonstrated no adverse response or exacerbation of symptoms during today's session.      Billy Is progressing well towards his goals.   Pt prognosis is Good.     Pt will continue to benefit from skilled outpatient physical therapy to address the deficits listed in the problem list box on initial evaluation, provide pt/family education and to maximize pt's level of independence in the home and community environment.     Pt's spiritual, cultural and educational needs considered and pt agreeable to plan of care and goals.     Anticipated barriers to physical therapy: none    Goals:  Short Term Goals: 8-10 weeks (progressing, not met)   1. Pt will be compliant with HEP 50% of prescribed amount.   2. The pt to demo improvement in left knee ROM to equal right knee ROM pain free   3.  The pt to demo good quad set with proper hyperextension moment of the Left knee   4. The pt to demo ambualting with elast restricitve AD witout major compensatory pattern left knee for at least 20 feet      Long Term Goals: 56 weeks (progressing, not met)   Pt will be compliant with % of prescribed amount.   The pt to demo pain free and uncompensated running mechanics x10 min on an indoor treadmill  The pt to demo tolerance to a squat at or bellow parallel with uncompensated mechanics x10   The pt to demo strength of L LE within 10% of R LE as demo'd on the biodex machine   The pt to demo a deficit of 10% or less on a triple hop, single leg broad jump and crossover hop compared to non operative LE.   The pt will report full participation in ADLs and IADLs without restrictions related to L  knee.     PLAN     See 5x/week for post-op rehab. Focus quad activation and knee ROM/joint mobility. Manage swelling and provide wound care.     Manjit Lazcano, PT

## 2022-09-29 ENCOUNTER — CLINICAL SUPPORT (OUTPATIENT)
Dept: REHABILITATION | Facility: HOSPITAL | Age: 27
End: 2022-09-29
Payer: COMMERCIAL

## 2022-09-29 DIAGNOSIS — M25.562 ACUTE PAIN OF LEFT KNEE: Primary | ICD-10-CM

## 2022-09-29 DIAGNOSIS — M62.81 QUADRICEPS WEAKNESS: ICD-10-CM

## 2022-09-29 DIAGNOSIS — M25.662 DECREASED RANGE OF MOTION OF LEFT KNEE: ICD-10-CM

## 2022-09-29 DIAGNOSIS — R26.2 DIFFICULTY WALKING: ICD-10-CM

## 2022-09-29 PROCEDURE — 97140 MANUAL THERAPY 1/> REGIONS: CPT

## 2022-09-29 PROCEDURE — 97112 NEUROMUSCULAR REEDUCATION: CPT

## 2022-09-29 PROCEDURE — 97110 THERAPEUTIC EXERCISES: CPT

## 2022-09-29 NOTE — PROGRESS NOTES
[facility]   Physical Therapy Treatment Note     Name: Billy Holbrook  [facility] Number: [id number]    Therapy Diagnosis:   Encounter Diagnoses   Name Primary?    Acute pain of left knee Yes    Decreased range of motion of left knee     Quadriceps weakness     Difficulty walking      Physician: PEACE Dempsey MD    Visit Date: 9/29/2022    Physician Orders: PT Eval and Treat   Medical Diagnosis from Referral: M25.562 (ICD-10-CM) - Acute pain of left knee  Evaluation Date: 9/7/2022  Authorization Period Expiration: 9/6/2023  Plan of Care Expiration: 12/31/2022  Visit # / Visits authorized: 15/ 20      Precautions: Standard     Time In: 11:00 am   Time Out: 12:00p  Total Billable Time: 56 minutes    Procedure: 9/20/2022  1. Left Knee Arthroscopy, anterior cruciate ligament reconstruction [id number]  2.  Left Knee Arthroscopy, with meniscectomy (medial OR lateral) 24680  3.  Left Knee Arthroscopy, debridement/shaving of articular cartilage (chondroplasty) 64109  4.  Left Knee  platelet rich plasma injection to the bone-patellar tendon-bone harvest site    Post-Op Plan:  ACL reconstruction with a bone-patellar tendon-bone autograft    SUBJECTIVE     Pt reports: no new complaints.     He was compliant with home exercise program.  Response to previous treatment: no issues  Functional change: WBAT crutches in locked brace    Pain: 2/10  Location: left knee      OBJECTIVE     8 days post-op    Observation: effusion noted L knee     9/29/22  Range of Motion (extension/neutral/flexion):     Right Left   Knee PROM: 6-0-130 deg 3-0-100* deg      Strength:     Right Left Comment   Knee Extension: 5/5 2+/5        Knee Flexion: 5/5 3-/5        Hip Abduction: 4+/5 3/5       Functional tests(*=pain):   Gait/stairs: WBAT linus crutches in locked brace  Quad activation: fait to good     Joint Mobility: hypomobile PFJ and tibiofemoral    TTP along MCL- mild    Treatment     Billy received the treatments listed  below:      therapeutic exercises to develop strength, endurance, ROM, flexibility, posture, and core stabilization for 17 minutes including:  Heel slides x 5 min 2 bouts  Heel prop x 5 min x 2 bouts  knee flx seated EOT with manual OP    Not today  Standing calf raise unlocked brace DL 2 x 20  Hip 3 way with quad set in locked brace 2 x 10 each  Education/assessment/fit brace/wound care-dressing    manual therapy techniques: Joint mobilizations and Soft tissue Mobilization were applied for 11 minutes, including:  PFJ gr 3 glides all planes  Tibial lateral /ER glide  Knee hyperextension gr 3  Fat pad mobility    neuromuscular re-education activities to improve: Coordination, Kinesthetic, Sense, and Proprioception for 28 minutes. The following activities were included:  NMES 15 min with quad set over towel x 5 min; heel prop QS 5 min; SLR 5 min  LAQ 3 x 10 x 5 sec hold    therapeutic activities to improve functional performance for 00 minutes, including:  NP    gait training to improve functional mobility and safety for 00 minutes, including:    Not today  unlocked brace with linus crutches PWB; working quad activation, TKE, and knee flx at toe off  Locked brace no crutches    Patient Education and Home Exercises     Home Exercises Provided and Patient Education Provided     Education provided:   - HEP update, use of brace, compression sleeve applied; wound care, precautions, activity pacing, goals    Written Home Exercises Provided: yes. Exercises were reviewed and Billy was able to demonstrate them prior to the end of the session.  Billy demonstrated good  understanding of the education provided. See EMR under Patient Instructions for exercises provided during therapy sessions    ASSESSMENT   Pt continues with lacking 2-3 deg from 0 AROM and has 0 deg PROM at knee ext. 100 deg of flexion today with strap and some over-pressure. Pt demonstrated great quad function carryover after NMES during LAQ. Continue to  progress as tolerated.      Billy Is progressing well towards his goals.   Pt prognosis is Good.     Pt will continue to benefit from skilled outpatient physical therapy to address the deficits listed in the problem list box on initial evaluation, provide pt/family education and to maximize pt's level of independence in the home and community environment.     Pt's spiritual, cultural and educational needs considered and pt agreeable to plan of care and goals.     Anticipated barriers to physical therapy: none    Goals:  Short Term Goals: 8-10 weeks (progressing, not met)   1. Pt will be compliant with HEP 50% of prescribed amount.   2. The pt to demo improvement in left knee ROM to equal right knee ROM pain free   3.  The pt to demo good quad set with proper hyperextension moment of the Left knee   4. The pt to demo ambualting with elast restricitve AD witout major compensatory pattern left knee for at least 20 feet      Long Term Goals: 56 weeks (progressing, not met)   Pt will be compliant with % of prescribed amount.   The pt to demo pain free and uncompensated running mechanics x10 min on an indoor treadmill  The pt to demo tolerance to a squat at or bellow parallel with uncompensated mechanics x10   The pt to demo strength of L LE within 10% of R LE as demo'd on the biodex machine   The pt to demo a deficit of 10% or less on a triple hop, single leg broad jump and crossover hop compared to non operative LE.   The pt will report full participation in ADLs and IADLs without restrictions related to L  knee.     PLAN     See 5x/week for post-op rehab. Focus quad activation and knee ROM/joint mobility. Manage swelling and provide wound care.     Olive Montes De Oca, PT

## 2022-09-30 ENCOUNTER — CLINICAL SUPPORT (OUTPATIENT)
Dept: REHABILITATION | Facility: HOSPITAL | Age: 27
End: 2022-09-30
Payer: COMMERCIAL

## 2022-09-30 DIAGNOSIS — M62.81 QUADRICEPS WEAKNESS: ICD-10-CM

## 2022-09-30 DIAGNOSIS — M25.662 DECREASED RANGE OF MOTION OF LEFT KNEE: ICD-10-CM

## 2022-09-30 DIAGNOSIS — M25.562 ACUTE PAIN OF LEFT KNEE: Primary | ICD-10-CM

## 2022-09-30 DIAGNOSIS — R26.2 DIFFICULTY WALKING: ICD-10-CM

## 2022-09-30 PROCEDURE — 97140 MANUAL THERAPY 1/> REGIONS: CPT

## 2022-09-30 PROCEDURE — 97112 NEUROMUSCULAR REEDUCATION: CPT

## 2022-09-30 PROCEDURE — 97110 THERAPEUTIC EXERCISES: CPT

## 2022-09-30 NOTE — PROGRESS NOTES
[facility]   Physical Therapy Treatment Note     Name: Billy Holbrook  [facility] Number: [id number]    Therapy Diagnosis:   Encounter Diagnoses   Name Primary?    Acute pain of left knee Yes    Decreased range of motion of left knee     Quadriceps weakness     Difficulty walking      Physician: PEACE Dempsey MD    Visit Date: 9/30/2022    Physician Orders: PT Eval and Treat   Medical Diagnosis from Referral: M25.562 (ICD-10-CM) - Acute pain of left knee  Evaluation Date: 9/7/2022  Authorization Period Expiration: 9/6/2023  Plan of Care Expiration: 12/31/2022  Visit # / Visits authorized: 16/ 20      Precautions: Standard     Time In: 11:00 am   Time Out: 12:05  Total Billable Time: 65 minutes    Procedure: 9/20/2022  1. Left Knee Arthroscopy, anterior cruciate ligament reconstruction 03518  2.  Left Knee Arthroscopy, with meniscectomy (medial OR lateral) 63615  3.  Left Knee Arthroscopy, debridement/shaving of articular cartilage (chondroplasty) 94745  4.  Left Knee  platelet rich plasma injection to the bone-patellar tendon-bone harvest site    Post-Op Plan:  ACL reconstruction with a bone-patellar tendon-bone autograft    SUBJECTIVE     Pt reports: knee is feeling good.     He was compliant with home exercise program.  Response to previous treatment: no issues  Functional change: WBAT crutches in locked brace    Pain: 0->2/10  Location: left knee      OBJECTIVE     10 days post-op    Observation: effusion noted L knee     Range of Motion (extension/neutral/flexion):     Right Left   Knee PROM: 6-0-130 deg P:5-0-107* deg  A:0-2-85 deg      Strength:     Right Left Comment   Knee Extension: 5/5 2+/5        Knee Flexion: 5/5 3-/5        Hip Abduction: 4+/5 3/5       Functional tests(*=pain):   Gait/stairs: WBAT linus crutches in locked brace  Quad activation: good     Joint Mobility: hypomobile PFJ and tibiofemoral    TTP along MCL- mild    Treatment     Billy received the treatments  SUBJECTIVE:                                                      Sapphire Mckinney is a 11 year old female, here for a routine health maintenance visit.    Patient was roomed by: Theo Sorto    Well Child     Social History  Forms to complete? No  Child lives with::  Mother, father, sister and paternal grandfather  Who takes care of your child?:  Home with family member, school and [facility]  Languages spoken in the home:  English  Recent family changes/ special stressors?:  Death in the family    Safety / Health Risk  Is your child around anyone who smokes?  No    TB Exposure:     No TB exposure    Child always wear seatbelt?  Yes  Helmet worn for bicycle/roller blades/skateboard?  Yes    Home Safety Survey:      Firearms in the home?: YES          Are trigger locks present?  Yes        Is ammunition stored separately? Yes     Child ever home alone?  No     Parents monitor screen use?  Yes    Daily Activities      Diet and Exercise     Child gets at least 4 servings fruit or vegetables daily: NO    Consumes beverages other than lowfat white milk or water: No    Dairy/calcium sources: 1% milk, yogurt and other calcium source    Calcium servings per day: 2    Child gets at least 60 minutes per day of active play: Yes    TV in child's room: YES    Sleep       Sleep concerns: no concerns- sleeps well through night     Bedtime: 20:00     Wake time on school day: 07:00     Sleep duration (hours): 10    Elimination  Normal urination and normal bowel movements    Media     Types of media used: computer and video/dvd/tv    Daily use of media (hours): 1.5    Activities    Activities: age appropriate activities, playground, rides bike (helmet advised) and other    Organized/ Team sports: other    School    Name of school: [employer]    Grade level: 3rd    School performance: above grade level    Grades: at grade level    Schooling concerns? no    Days missed current/ last year: 4    Academic problems: no problems  in reading, no problems in mathematics, no problems in writing and no learning disabilities     Behavior concerns: no current behavioral concerns in school and no current behavioral concerns with adults or other children    Dental     Water source:  City water and bottled water    Dental provider: patient has a dental home    Dental exam in last 6 months: Yes     No dental risks    Sports physical needed: No  Sports Physical Questionnaire      Dental visit recommended: Yes  Has had dental varnish applied in past 30 days    Cardiac risk assessment:     Family history (males <55, females <65) of angina (chest pain), heart attack, heart surgery for clogged arteries, or stroke: YES, paternal grandfather 55    Biological parent(s) with a total cholesterol over 240:  Family history not known       VISION    Corrective lenses: No corrective lenses (H Plus Lens Screening required)  Tool used: Pelaez  Right eye: 10/10 (20/20)  Left eye: 10/12.5 (20/25)  Two Line Difference: No  Visual Acuity: Pass  H Plus Lens Screening: Pass    Vision Assessment: normal      HEARING   Right Ear:      1000 Hz RESPONSE- on Level: 40 db (Conditioning sound)   1000 Hz: RESPONSE- on Level:   20 db    2000 Hz: RESPONSE- on Level:   20 db    4000 Hz: RESPONSE- on Level:   20 db     Left Ear:      4000 Hz: RESPONSE- on Level:   20 db    2000 Hz: RESPONSE- on Level:   20 db    1000 Hz: RESPONSE- on Level:   20 db     500 Hz: RESPONSE- on Level: 25 db    Right Ear:    500 Hz: RESPONSE- on Level: 25 db    Hearing Acuity: Pass    Hearing Assessment: normal    MENTAL HEALTH  Screening:    Electronic PSC   PSC SCORES 2/6/2019   Inattentive / Hyperactive Symptoms Subtotal 3   Externalizing Symptoms Subtotal 3   Internalizing Symptoms Subtotal 1   PSC - 17 Total Score 7      no followup necessary  No concerns        PROBLEM LIST  Patient Active Problem List   Diagnosis     NO ACTIVE PROBLEMS     Chronic streptococcal tonsillitis     Tonsillar and adenoid  listed below:      therapeutic exercises to develop strength, endurance, ROM, flexibility, posture, and core stabilization for 23 minutes including:  Heel slides x 5 min 2 bouts  Heel prop x 5 min x 2 bouts: manual OP  knee flx seated EOT with manual OP  Standing calf raise unlocked brace DL 2 x 20  Hip abd, ext way with quad set no brace 2 x 10 each  Education/assessment/fit brace/wound care-dressing    manual therapy techniques: Joint mobilizations and Soft tissue Mobilization were applied for 12 minutes, including:  PFJ gr 3 glides all planes  Tibial lateral /ER glide  Knee hyperextension gr 3  Fat pad mobility    neuromuscular re-education activities to improve: Coordination, Kinesthetic, Sense, and Proprioception for 25 minutes. The following activities were included:  Quad set over towel x 30 x 5sec; heel prop QS 30 x 3 sec; SLR 3 x 10  LAQ 3 x 10 x 5 sec hold  Knee ext isometric at 80->90 deg flx 10 x 30 sec  SLR from quad set 2 x 10 no brace    therapeutic activities to improve functional performance for 00 minutes, including:  NP    gait training to improve functional mobility and safety for 5 minutes, including:  Locked brace no crutches to unlocked with some instability noted    Patient Education and Home Exercises     Home Exercises Provided and Patient Education Provided     Education provided:   - HEP update, use of brace, compression sleeve applied; wound care, precautions, activity pacing, goals    Written Home Exercises Provided: yes. Exercises were reviewed and Billy was able to demonstrate them prior to the end of the session.  Billy demonstrated good  understanding of the education provided. See EMR under Patient Instructions for exercises provided during therapy sessions    ASSESSMENT     Pt lacking knee extension AROM compared to PROM due to reduced quad strength. Flexion progressing well. No NMES today as quad activation was good, but did falter after many reps; likely return to [facility] next  "hypertrophy     MEDICATIONS  No current outpatient medications on file.      ALLERGY  Allergies   Allergen Reactions     No Known Allergies        IMMUNIZATIONS  Immunization History   Administered Date(s) Administered     DTAP (<7y) 02/25/2011     DTAP-IPV, <7Y 01/21/2014     DTAP-IPV/HIB (PENTACEL) 01/19/2010, 03/22/2010, 05/21/2010     HEPA 12/01/2010, 10/10/2011     HepB 2009, 01/19/2010, 05/21/2010     Hib (PRP-T) 02/25/2011     Influenza (IIV3) PF 09/24/2010, 10/22/2010, 10/10/2011, 09/11/2012     Influenza Intranasal Vaccine 4 valent 11/24/2014     Influenza Vaccine IM 3yrs+ 4 Valent IIV4 10/25/2013, 11/30/2015, 11/10/2017     MMR 12/01/2010, 01/21/2014     Pneumo Conj 13-V (2010&after) 05/21/2010, 02/25/2011     Pneumococcal (PCV 7) 01/19/2010, 03/22/2010     Rotavirus, pentavalent 01/19/2010, 03/22/2010, 05/21/2010     Varicella 12/01/2010, 01/21/2014       HEALTH HISTORY SINCE LAST VISIT  No surgery, major illness or injury since last physical exam    ROS  Constitutional, eye, ENT, skin, respiratory, cardiac, GI, MSK, neuro, and allergy are normal except as otherwise noted.    OBJECTIVE:   EXAM  /75   Pulse 121   Temp 97.9  F (36.6  C) (Oral)   Resp 20   Ht 1.416 m (4' 7.75\")   Wt 32.4 kg (71 lb 6.4 oz)   SpO2 99%   BMI 16.15 kg/m    88 %ile based on CDC (Girls, 2-20 Years) Stature-for-age data based on Stature recorded on 2/6/2019.  67 %ile based on CDC (Girls, 2-20 Years) weight-for-age data based on Weight recorded on 2/6/2019.  45 %ile based on CDC (Girls, 2-20 Years) BMI-for-age based on body measurements available as of 2/6/2019.  Blood pressure percentiles are 96 % systolic and 92 % diastolic based on the August 2017 AAP Clinical Practice Guideline. This reading is in the Stage 1 hypertension range (BP >= 95th percentile).  GENERAL: Active, alert, in no acute distress.  SKIN: Clear. No significant rash, abnormal pigmentation or lesions  HEAD: Normocephalic  EYES: Pupils equal, " week. Gait looks good with no AD in locked brace and short distances unlocked when quad not fatigued. Small buckling moments noted with unlocked brace, advised to keep brace locked in community and can unlock at home when not fatigued. Pt reported/demonstrated no adverse response or exacerbation of symptoms during today's session.      Billy Is progressing well towards his goals.   Pt prognosis is Good.     Pt will continue to benefit from skilled outpatient physical therapy to address the deficits listed in the problem list box on initial evaluation, provide pt/family education and to maximize pt's level of independence in the home and community environment.     Pt's spiritual, cultural and educational needs considered and pt agreeable to plan of care and goals.     Anticipated barriers to physical therapy: none    Goals:  Short Term Goals: 8-10 weeks (progressing, not met)   1. Pt will be compliant with HEP 50% of prescribed amount.   2. The pt to demo improvement in left knee ROM to equal right knee ROM pain free   3.  The pt to demo good quad set with proper hyperextension moment of the Left knee   4. The pt to demo ambualting with elast restricitve AD witout major compensatory pattern left knee for at least 20 feet      Long Term Goals: 56 weeks (progressing, not met)   Pt will be compliant with % of prescribed amount.   The pt to demo pain free and uncompensated running mechanics x10 min on an indoor treadmill  The pt to demo tolerance to a squat at or bellow parallel with uncompensated mechanics x10   The pt to demo strength of L LE within 10% of R LE as demo'd on the biodex machine   The pt to demo a deficit of 10% or less on a triple hop, single leg broad jump and crossover hop compared to non operative LE.   The pt will report full participation in ADLs and IADLs without restrictions related to L  knee.     PLAN     See 5x/week for post-op rehab. Focus quad activation and knee ROM/joint mobility.  Manage swelling and provide wound care.     Manjit Lazcano, PT                                round, reactive, Extraocular muscles intact. Normal conjunctivae.  EARS: Normal canals. Tympanic membranes are normal; gray and translucent.  NOSE: Normal without discharge.  MOUTH/THROAT: Clear. No oral lesions. Teeth without obvious abnormalities.  NECK: Supple, no masses.  No thyromegaly.  LYMPH NODES: No adenopathy  LUNGS: Clear. No rales, rhonchi, wheezing or retractions  HEART: Regular rhythm. Normal S1/S2. No murmurs. Normal pulses.  ABDOMEN: Soft, non-tender, not distended, no masses or hepatosplenomegaly. Bowel sounds normal.   NEUROLOGIC: No focal findings. Cranial nerves grossly intact: DTR's normal. Normal gait, strength and tone  BACK: Spine is straight, no scoliosis.  EXTREMITIES: Full range of motion, no deformities  : Exam deferred.    ASSESSMENT/PLAN:   (Z00.129) Encounter for routine child health examination w/o abnormal findings  (primary encounter diagnosis)  Comment: see below  Plan: PURE TONE HEARING TEST, AIR, SCREENING, VISUAL         ACUITY, QUANTITATIVE, BILAT, BEHAVIORAL /         EMOTIONAL ASSESSMENT [[id number]]              Anticipatory Guidance  The following topics were discussed:  SOCIAL/ FAMILY:    Praise for positive activities    Encourage reading    Friends  NUTRITION:    Healthy snacks    Balanced diet  HEALTH/ SAFETY:    Physical activity    Regular dental care    Sunscreen/ insect repellent    Preventive Care Plan  Immunizations    See orders in EpicCare.  I reviewed the signs and symptoms of adverse effects and when to seek medical care if they should arise.  Referrals/Ongoing Specialty care: No   See other orders in EpicCare.  Cleared for sports:  Not addressed  BMI at 45 %ile based on CDC (Girls, 2-20 Years) BMI-for-age based on body measurements available as of 2/6/2019.  No weight concerns.  Dyslipidemia risk:    None    FOLLOW-UP:    in 1 year for a Preventive Care visit    Resources  HPV and Cancer Prevention:  What Parents Should Know  What Kids Should Know About HPV and  Cancer  Goal Tracker: Be More Active  Goal Tracker: Less Screen Time  Goal Tracker: Drink More Water  Goal Tracker: Eat More Fruits and Veggies  [place] Child and Teen Checkups (C&TC) Schedule of Age-Related Screening Standards    Luna Reynaga MD  [facility]

## 2022-10-03 ENCOUNTER — CLINICAL SUPPORT (OUTPATIENT)
Dept: REHABILITATION | Facility: HOSPITAL | Age: 27
End: 2022-10-03
Payer: COMMERCIAL

## 2022-10-03 DIAGNOSIS — M25.562 ACUTE PAIN OF LEFT KNEE: Primary | ICD-10-CM

## 2022-10-03 DIAGNOSIS — M25.662 DECREASED RANGE OF MOTION OF LEFT KNEE: ICD-10-CM

## 2022-10-03 DIAGNOSIS — R26.2 DIFFICULTY WALKING: ICD-10-CM

## 2022-10-03 DIAGNOSIS — M62.81 QUADRICEPS WEAKNESS: ICD-10-CM

## 2022-10-03 PROCEDURE — 97116 GAIT TRAINING THERAPY: CPT

## 2022-10-03 PROCEDURE — 97140 MANUAL THERAPY 1/> REGIONS: CPT

## 2022-10-03 PROCEDURE — 97110 THERAPEUTIC EXERCISES: CPT

## 2022-10-03 PROCEDURE — 97112 NEUROMUSCULAR REEDUCATION: CPT

## 2022-10-03 NOTE — PROGRESS NOTES
[facility]   Physical Therapy Treatment Note     Name: Billy Holbrook  [facility] Number: [id number]    Therapy Diagnosis:   Encounter Diagnoses   Name Primary?    Acute pain of left knee Yes    Decreased range of motion of left knee     Quadriceps weakness     Difficulty walking      Physician: PEACE Dempsey MD    Visit Date: 10/3/2022    Physician Orders: PT Eval and Treat   Medical Diagnosis from Referral: M25.562 (ICD-10-CM) - Acute pain of left knee  Evaluation Date: 9/7/2022  Authorization Period Expiration: 9/6/2023  Plan of Care Expiration: 12/31/2022  Visit # / Visits authorized: 17/ 20      Precautions: Standard     Time In: 11:00 am   Time Out: 12:18  Total Billable Time: 78 minutes    Procedure: 9/20/2022  1. Left Knee Arthroscopy, anterior cruciate ligament reconstruction 86298  2.  Left Knee Arthroscopy, with meniscectomy (medial OR lateral) 81264  3.  Left Knee Arthroscopy, debridement/shaving of articular cartilage (chondroplasty) 01223  4.  Left Knee  platelet rich plasma injection to the bone-patellar tendon-bone harvest site    Post-Op Plan:  ACL reconstruction with a bone-patellar tendon-bone autograft    SUBJECTIVE     Pt reports: knee is feeling good. Low back is feeling tight. Fearful of walking without brace/unlocked in home. Does not go out of home much right now. Felt better walking in unlocked brace after session.     He was compliant with home exercise program.  Response to previous treatment: no issues  Functional change: WBAT no crutches in unlocked brace    Pain: 0->2/10  Location: left knee      OBJECTIVE     13 days post-op    Observation: effusion noted L knee     Range of Motion (extension/neutral/flexion):     Right Left   Knee PROM: 6-0-130 deg P:5-0-116* deg  A:0-95 deg      Strength:     Right Left Comment   Knee Extension: 5/5 2+/5        Knee Flexion: 5/5 3-/5        Hip Abduction: 4+/5 3/5       Functional tests(*=pain):   Gait/stairs: WBAT linus  crutches in locked brace  Quad activation: good     Joint Mobility: hypomobile PFJ and tibiofemoral    TTP along MCL- mild    Treatment     Billy received the treatments listed below:      therapeutic exercises to develop strength, endurance, ROM, flexibility, posture, and core stabilization for 23 minutes including:  Heel slides x 5 min 2 bouts  Heel prop x 5 min x 2 bouts: manual OP  knee flx seated EOT with manual OP  Standing calf raise unlocked brace DL 2 x 20  Hip abd, ext way with quad set no brace 2 x 10 each  Education/assessment/fit brace/wound care-dressing    manual therapy techniques: Joint mobilizations and Soft tissue Mobilization were applied for 12 minutes, including:  PFJ gr 3 glides all planes  Tibial lateral /ER glide  Knee hyperextension gr 3  Fat pad mobility    neuromuscular re-education activities to improve: Coordination, Kinesthetic, Sense, and Proprioception for 35 minutes. The following activities were included:  Quad set over towel x 30 x 5sec; heel prop QS 30 x 3 sec; SLR 3 x 10  LAQ 3 x 10 x 5 sec hold  Knee ext isometric at 80->90 deg flx 10 x 30 sec  SLR from quad set 2 x 10 no brace  NMES quad sets over towel, heel prop, SLR from quad set, TKE black sport band 10/10 sec    therapeutic activities to improve functional performance for 00 minutes, including:  NP    gait training to improve functional mobility and safety for 8 minutes, including:  Locked ->unlocked brace no crutches    Patient Education and Home Exercises     Home Exercises Provided and Patient Education Provided     Education provided:   - HEP update, use of brace/crutches and weaning progression over next 2 weeks, compression sleeve applied; wound care, precautions, activity pacing, goals    Written Home Exercises Provided: yes. Exercises were reviewed and Billy was able to demonstrate them prior to the end of the session.  Billy demonstrated good  understanding of the education provided. See EMR under Patient  Instructions for exercises provided during therapy sessions    ASSESSMENT     Pt demonstrates improved knee ROM both AROM and PROM. Quad function improving. Gait improving and fear reducing with trials in clinic in unlocked brace. Pain well controlled as well as swelling. Incision/portals look good.  Pt reported/demonstrated no adverse response or exacerbation of symptoms during today's session.      Billy Is progressing well towards his goals.   Pt prognosis is Good.     Pt will continue to benefit from skilled outpatient physical therapy to address the deficits listed in the problem list box on initial evaluation, provide pt/family education and to maximize pt's level of independence in the home and community environment.     Pt's spiritual, cultural and educational needs considered and pt agreeable to plan of care and goals.     Anticipated barriers to physical therapy: none    Goals:  Short Term Goals: 8-10 weeks (progressing, not met)   1. Pt will be compliant with HEP 50% of prescribed amount.   2. The pt to demo improvement in left knee ROM to equal right knee ROM pain free   3.  The pt to demo good quad set with proper hyperextension moment of the Left knee   4. The pt to demo ambualting with elast restricitve AD witout major compensatory pattern left knee for at least 20 feet      Long Term Goals: 56 weeks (progressing, not met)   Pt will be compliant with % of prescribed amount.   The pt to demo pain free and uncompensated running mechanics x10 min on an indoor treadmill  The pt to demo tolerance to a squat at or bellow parallel with uncompensated mechanics x10   The pt to demo strength of L LE within 10% of R LE as demo'd on the biodex machine   The pt to demo a deficit of 10% or less on a triple hop, single leg broad jump and crossover hop compared to non operative LE.   The pt will report full participation in ADLs and IADLs without restrictions related to L  knee.     PLAN     See 5x/week for  post-op rehab. Focus quad activation and knee ROM/joint mobility. Manage swelling and provide wound care.     Manjit Lazcano, PT

## 2022-10-04 ENCOUNTER — CLINICAL SUPPORT (OUTPATIENT)
Dept: REHABILITATION | Facility: HOSPITAL | Age: 27
End: 2022-10-04
Payer: COMMERCIAL

## 2022-10-04 DIAGNOSIS — M25.562 ACUTE PAIN OF LEFT KNEE: Primary | ICD-10-CM

## 2022-10-04 DIAGNOSIS — M25.662 DECREASED RANGE OF MOTION OF LEFT KNEE: ICD-10-CM

## 2022-10-04 DIAGNOSIS — M62.81 QUADRICEPS WEAKNESS: ICD-10-CM

## 2022-10-04 DIAGNOSIS — R26.2 DIFFICULTY WALKING: ICD-10-CM

## 2022-10-04 PROCEDURE — 97110 THERAPEUTIC EXERCISES: CPT

## 2022-10-04 PROCEDURE — 97116 GAIT TRAINING THERAPY: CPT

## 2022-10-04 PROCEDURE — 97140 MANUAL THERAPY 1/> REGIONS: CPT

## 2022-10-04 PROCEDURE — 97112 NEUROMUSCULAR REEDUCATION: CPT

## 2022-10-05 ENCOUNTER — CLINICAL SUPPORT (OUTPATIENT)
Dept: REHABILITATION | Facility: HOSPITAL | Age: 27
End: 2022-10-05
Payer: COMMERCIAL

## 2022-10-05 DIAGNOSIS — M25.662 DECREASED RANGE OF MOTION OF LEFT KNEE: ICD-10-CM

## 2022-10-05 DIAGNOSIS — R26.2 DIFFICULTY WALKING: ICD-10-CM

## 2022-10-05 DIAGNOSIS — M62.81 QUADRICEPS WEAKNESS: ICD-10-CM

## 2022-10-05 DIAGNOSIS — M25.562 ACUTE PAIN OF LEFT KNEE: Primary | ICD-10-CM

## 2022-10-05 PROCEDURE — 97116 GAIT TRAINING THERAPY: CPT

## 2022-10-05 PROCEDURE — 97112 NEUROMUSCULAR REEDUCATION: CPT

## 2022-10-05 PROCEDURE — 97140 MANUAL THERAPY 1/> REGIONS: CPT

## 2022-10-05 PROCEDURE — 97110 THERAPEUTIC EXERCISES: CPT

## 2022-10-05 NOTE — PROGRESS NOTES
[facility]   Physical Therapy Treatment Note     Name: Billy Holbrook  [facility] Number: [id number]    Therapy Diagnosis:   Encounter Diagnoses   Name Primary?    Acute pain of left knee Yes    Decreased range of motion of left knee     Quadriceps weakness     Difficulty walking        Physician: PEACE Dempsey MD    Visit Date: 10/5/2022    Physician Orders: PT Eval and Treat   Medical Diagnosis from Referral: M25.562 (ICD-10-CM) - Acute pain of left knee  Evaluation Date: 9/7/2022  Authorization Period Expiration: 9/6/2023  Plan of Care Expiration: 12/31/2022  Visit # / Visits authorized: 19/ 20      Precautions: Standard     Time In: 11:00 am   Time Out: 12:15  Total Billable Time: 75 minutes    Procedure: 9/20/2022  1. Left Knee Arthroscopy, anterior cruciate ligament reconstruction 24241  2.  Left Knee Arthroscopy, with meniscectomy (medial OR lateral) 23096  3.  Left Knee Arthroscopy, debridement/shaving of articular cartilage (chondroplasty) 75136  4.  Left Knee  platelet rich plasma injection to the bone-patellar tendon-bone harvest site    Post-Op Plan:  ACL reconstruction with a bone-patellar tendon-bone autograft    SUBJECTIVE     Pt reports: some stiffness noted this morning. Feeling tired today; went to bed at 1:30am, waking around 9 am. Feeling good with progress and being off crutches, resistant to coming out of brace fully right now, wants to keep on unlocked.     He was compliant with home exercise program.  Response to previous treatment: no issues  Functional change: WBAT no crutches in unlocked brace    Pain: 0->2/10  Location: left knee      OBJECTIVE     14 days post-op    Observation: mild to mod effusion noted L knee  Incision healing well; covered steri-strips; no signs of infection or drainage     Range of Motion (extension/neutral/flexion):     Right Left   Knee PROM: 6-0-130 deg P:5-0-120* deg  A:0-95 deg      Strength:     Right Left Comment   Knee Extension:  5/5 2+/5        Knee Flexion: 5/5 3-/5        Hip Abduction: 4+/5 3/5       Functional tests(*=pain):   Gait/stairs: WBAT no crutches in unlocked brace  Quad activation: good     Joint Mobility: hypomobile PFJ and tibiofemoral    TTP along MCL- mild    Treatment     Billy received the treatments listed below:      therapeutic exercises to develop strength, endurance, ROM, flexibility, posture, and core stabilization for 30 minutes including:  Heel slides x 5 min 2 bouts  Heel prop x 5 min x 2 bouts 5 lbs added  knee flx seated EOT with manual OP  Standing calf raise 2 x 30  Standing hip abduction 2 x 15 GTB linus  Education/assessment    manual therapy techniques: Joint mobilizations and Soft tissue Mobilization were applied for 12 minutes, including:  PFJ gr 3 glides all planes  Tibial lateral /ER glide  Knee hyperextension gr 3  Fat pad mobility    neuromuscular re-education activities to improve: Coordination, Kinesthetic, Sense, and Proprioception for 25 minutes. The following activities were included:  Knee ext isometric at 80->90 deg flx 8 x 30 sec-NP  NMES quad sets over towel, heel prop, SLR from quad set, LAQ, TKE black sport band 5/5 sec    therapeutic activities to improve functional performance for 0 minutes, including:  DL sit to stand unlocked brace from 24 inch box 2 x 15 1st set with TRX, then no UE support-NP    gait training to improve functional mobility and safety for 8 minutes, including:  No brace or AD  6 inch step ups unlocked brace next session    Patient Education and Home Exercises     Home Exercises Provided and Patient Education Provided     Education provided:   - HEP update, use of brace/crutches and weaning progression over next 2 weeks, compression sleeve applied; wound care, precautions, activity pacing, goals    Written Home Exercises Provided: yes. Exercises were reviewed and Billy was able to demonstrate them prior to the end of the session.  Billy demonstrated good   understanding of the education provided. See EMR under Patient Instructions for exercises provided during therapy sessions    ASSESSMENT     Pt arrived reporting some stiffness, but this did not limit progress today objectively. Quad activation improving, weaning brace/AD use without issue. Observing a couple bouts of knee buckling mildly, without fear of falling. Pt reported/demonstrated no adverse response or exacerbation of symptoms during today's session.      Billy Is progressing well towards his goals.   Pt prognosis is Good.     Pt will continue to benefit from skilled outpatient physical therapy to address the deficits listed in the problem list box on initial evaluation, provide pt/family education and to maximize pt's level of independence in the home and community environment.     Pt's spiritual, cultural and educational needs considered and pt agreeable to plan of care and goals.     Anticipated barriers to physical therapy: none    Goals:  Short Term Goals: 8-10 weeks (progressing, not met)   1. Pt will be compliant with HEP 50% of prescribed amount.   2. The pt to demo improvement in left knee ROM to equal right knee ROM pain free   3.  The pt to demo good quad set with proper hyperextension moment of the Left knee   4. The pt to demo ambualting with elast restricitve AD witout major compensatory pattern left knee for at least 20 feet      Long Term Goals: 56 weeks (progressing, not met)   Pt will be compliant with % of prescribed amount.   The pt to demo pain free and uncompensated running mechanics x10 min on an indoor treadmill  The pt to demo tolerance to a squat at or bellow parallel with uncompensated mechanics x10   The pt to demo strength of L LE within 10% of R LE as demo'd on the biodex machine   The pt to demo a deficit of 10% or less on a triple hop, single leg broad jump and crossover hop compared to non operative LE.   The pt will report full participation in ADLs and IADLs  without restrictions related to L  knee.     PLAN     Wean brace as able, maintain full knee hyperextension and gain quad activation in ROM. Move into more functional movements without support as able in precautions.     Manjit Lazcano, PT

## 2022-10-05 NOTE — PROGRESS NOTES
[facility]   Physical Therapy Treatment Note     Name: Billy Holbrook  [facility] Number: [id number]    Therapy Diagnosis:   Encounter Diagnoses   Name Primary?    Acute pain of left knee Yes    Decreased range of motion of left knee     Quadriceps weakness     Difficulty walking        Physician: PEACE Dempsey MD    Visit Date: 10/4/2022    Physician Orders: PT Eval and Treat   Medical Diagnosis from Referral: M25.562 (ICD-10-CM) - Acute pain of left knee  Evaluation Date: 9/7/2022  Authorization Period Expiration: 9/6/2023  Plan of Care Expiration: 12/31/2022  Visit # / Visits authorized: 18/ 20      Precautions: Standard     Time In: 11:00 am   Time Out: 12:10  Total Billable Time: 70 minutes    Procedure: 9/20/2022  1. Left Knee Arthroscopy, anterior cruciate ligament reconstruction 66351  2.  Left Knee Arthroscopy, with meniscectomy (medial OR lateral) 07100  3.  Left Knee Arthroscopy, debridement/shaving of articular cartilage (chondroplasty) 21394  4.  Left Knee  platelet rich plasma injection to the bone-patellar tendon-bone harvest site    Post-Op Plan:  ACL reconstruction with a bone-patellar tendon-bone autograft    SUBJECTIVE     Pt reports: quad is a little sore, anterior knee feels more swollen after stiches removed yesterday. Much more comfortable off crutches and in unlocked brace at exit today.     He was compliant with home exercise program.  Response to previous treatment: no issues  Functional change: WBAT no crutches in unlocked brace    Pain: 0->2/10  Location: left knee      OBJECTIVE     14 days post-op    Observation: mild to mod effusion noted L knee  Incision healing well; covered steri-strips; no signs of infection or drainage     Range of Motion (extension/neutral/flexion):     Right Left   Knee PROM: 6-0-130 deg P:5-0-118* deg  A:0-95 deg      Strength:     Right Left Comment   Knee Extension: 5/5 2+/5        Knee Flexion: 5/5 3-/5        Hip Abduction: 4+/5  3/5       Functional tests(*=pain):   Gait/stairs: WBAT no crutches in unlocked brace  Quad activation: good     Joint Mobility: hypomobile PFJ and tibiofemoral    TTP along MCL- mild    Treatment     Billy received the treatments listed below:      therapeutic exercises to develop strength, endurance, ROM, flexibility, posture, and core stabilization for 20 minutes including:  Heel slides x 5 min 2 bouts  Heel prop x 5 min x 2 bouts: manual OP  knee flx seated EOT with manual OP  Standing calf raise unlocked brace DL 2 x 20  Standing hip abduction in unlocked brace 2 x 15 GTB linus  Education/assessment/fit brace/wound care-dressing    manual therapy techniques: Joint mobilizations and Soft tissue Mobilization were applied for 12 minutes, including:  PFJ gr 3 glides all planes  Tibial lateral /ER glide  Knee hyperextension gr 3  Fat pad mobility    neuromuscular re-education activities to improve: Coordination, Kinesthetic, Sense, and Proprioception for 28 minutes. The following activities were included:  Quad set over towel x 30 x 5sec; heel prop QS 30 x 3 sec; SLR 3 x 10  LAQ 3 x 10 x 5 sec hold  Knee ext isometric at 80->90 deg flx 8 x 30 sec  SLR from quad set 3 x 10 no brace  NMES quad sets over towel, heel prop, SLR from quad set, TKE black sport band 10/10->4/12 sec    therapeutic activities to improve functional performance for 2 minutes, including:  DL sit to stand unlocked brace from 24 inch box 2 x 15 1st set with TRX, then no UE support    gait training to improve functional mobility and safety for 8 minutes, including:  Locked ->unlocked brace no crutches  6 inch step ups unlocked brace    Patient Education and Home Exercises     Home Exercises Provided and Patient Education Provided     Education provided:   - HEP update, use of brace/crutches and weaning progression over next 2 weeks, compression sleeve applied; wound care, precautions, activity pacing, goals    Written Home Exercises Provided: yes.  Exercises were reviewed and Billy was able to demonstrate them prior to the end of the session.  Billy demonstrated good  understanding of the education provided. See EMR under Patient Instructions for exercises provided during therapy sessions    ASSESSMENT     Pt had sutures removed yesterday, covered with steri-strips today. No signs of drainage in session with bending. Quad is firing well. ROM progressing nicely. Gait improving with weaning AD. Irritability minimal to none. Pt reported/demonstrated no adverse response or exacerbation of symptoms during today's session.      Billy Is progressing well towards his goals.   Pt prognosis is Good.     Pt will continue to benefit from skilled outpatient physical therapy to address the deficits listed in the problem list box on initial evaluation, provide pt/family education and to maximize pt's level of independence in the home and community environment.     Pt's spiritual, cultural and educational needs considered and pt agreeable to plan of care and goals.     Anticipated barriers to physical therapy: none    Goals:  Short Term Goals: 8-10 weeks (progressing, not met)   1. Pt will be compliant with HEP 50% of prescribed amount.   2. The pt to demo improvement in left knee ROM to equal right knee ROM pain free   3.  The pt to demo good quad set with proper hyperextension moment of the Left knee   4. The pt to demo ambualting with elast restricitve AD witout major compensatory pattern left knee for at least 20 feet      Long Term Goals: 56 weeks (progressing, not met)   Pt will be compliant with % of prescribed amount.   The pt to demo pain free and uncompensated running mechanics x10 min on an indoor treadmill  The pt to demo tolerance to a squat at or bellow parallel with uncompensated mechanics x10   The pt to demo strength of L LE within 10% of R LE as demo'd on the biodex machine   The pt to demo a deficit of 10% or less on a triple hop, single leg broad  jump and crossover hop compared to non operative LE.   The pt will report full participation in ADLs and IADLs without restrictions related to L  knee.     PLAN     See 5x/week for post-op rehab. Focus quad activation and knee ROM/joint mobility. Manage swelling and provide wound care.     Manjit Lazcano, PT

## 2022-10-06 ENCOUNTER — CLINICAL SUPPORT (OUTPATIENT)
Dept: REHABILITATION | Facility: HOSPITAL | Age: 27
End: 2022-10-06
Payer: COMMERCIAL

## 2022-10-06 DIAGNOSIS — M62.81 QUADRICEPS WEAKNESS: ICD-10-CM

## 2022-10-06 DIAGNOSIS — M25.662 DECREASED RANGE OF MOTION OF LEFT KNEE: ICD-10-CM

## 2022-10-06 DIAGNOSIS — R26.2 DIFFICULTY WALKING: ICD-10-CM

## 2022-10-06 DIAGNOSIS — M25.562 ACUTE PAIN OF LEFT KNEE: Primary | ICD-10-CM

## 2022-10-06 PROCEDURE — 97110 THERAPEUTIC EXERCISES: CPT

## 2022-10-06 PROCEDURE — 97116 GAIT TRAINING THERAPY: CPT

## 2022-10-06 PROCEDURE — 97140 MANUAL THERAPY 1/> REGIONS: CPT

## 2022-10-06 NOTE — PROGRESS NOTES
[facility]   Physical Therapy Treatment Note     Name: Billy Holbrook  [facility] Number: [id number]    Therapy Diagnosis:   Encounter Diagnoses   Name Primary?    Acute pain of left knee Yes    Decreased range of motion of left knee     Quadriceps weakness     Difficulty walking      Physician: PEACE Dempsey MD    Visit Date: 10/6/2022    Physician Orders: PT Eval and Treat   Medical Diagnosis from Referral: M25.562 (ICD-10-CM) - Acute pain of left knee  Evaluation Date: 9/7/2022  Authorization Period Expiration: 9/6/2023  Plan of Care Expiration: 12/31/2022  Visit # / Visits authorized: 20/ 20      Precautions: Standard     Time In: 12:00 pm   Time Out: 1:15  Total Billable Time: 75 minutes    Procedure: 9/20/2022  1. Left Knee Arthroscopy, anterior cruciate ligament reconstruction 88648  2.  Left Knee Arthroscopy, with meniscectomy (medial OR lateral) 60810  3.  Left Knee Arthroscopy, debridement/shaving of articular cartilage (chondroplasty) 73740  4.  Left Knee  platelet rich plasma injection to the bone-patellar tendon-bone harvest site    Post-Op Plan:  ACL reconstruction with a bone-patellar tendon-bone autograft    SUBJECTIVE     Pt reports: late 1 hr due to sleeping in. Knee feels good. Notes no swelling, pain, or stiffness today. Felt good at exit. He is asking about doing some rehab at home in off-season possibly; advised that is between him and [facility].     He was compliant with home exercise program.  Response to previous treatment: no issues  Functional change: WBAT no crutches in unlocked brace    Pain: 0->2/10  Location: left knee      OBJECTIVE     16 days post-op    Observation: mild to mod effusion noted L knee  Incision healing well; covered steri-strips; no signs of infection or drainage  Dry skin noted karen-incsion  Atrophy in calf/quad on L     Range of Motion (extension/neutral/flexion):     Right Left   Knee PROM: 6-0-130 deg P:3-0-118* deg  A:0-95 deg       Strength:     Right Left Comment   Knee Extension: 5/5 2+/5        Knee Flexion: 5/5 3-/5        Hip Abduction: 4+/5 3/5       Functional tests(*=pain):   Gait/stairs: WBAT no crutches in unlocked brace  Quad activation: good     Joint Mobility: hypomobile PFJ and tibiofemoral    TTP along MCL- mild    Treatment     Billy received the treatments listed below:      therapeutic exercises to develop strength, endurance, ROM, flexibility, posture, and core stabilization for 40 minutes including:  PROM L knee and ankle  Heel slides x 5 min  Heel prop x 5 min 5 lbs added  knee flx seated EOT with manual OP  Standing calf raise 2 x 30 on slantboard  Standing hip abduction 2 x 15 GTB linus  Manual HS and GS stretching  Standing HS curls 2 x 20 7.5 lbs  Education/assessment    manual therapy techniques: Joint mobilizations and Soft tissue Mobilization were applied for 15 minutes, including:  PFJ gr 3 glides all planes  Tibial lateral /ER glide  Knee hyperextension gr 3  Gr 3 ankle distraction and AP on L  Fat pad mobility    neuromuscular re-education activities to improve: Coordination, Kinesthetic, Sense, and Proprioception for 0 minutes. The following activities were included:  Knee ext isometric at 80->90 deg flx 8 x 30 sec-NP  NMES quad sets over towel, heel prop, SLR from quad set, LAQ, TKE black sport band 5/5 sec-NP    therapeutic activities to improve functional performance for 5 minutes, includin->20 inch box squat DL 2 x 15     gait training to improve functional mobility and safety for 15 minutes, including:  No brace or AD  6 inch step ups  2 x 15 linus  Retro walking sled pull x 2 laps 30 yds    Patient Education and Home Exercises     Home Exercises Provided and Patient Education Provided     Education provided:   - HEP update, use of compression sleeve; wound care, precautions, activity pacing, goals    Written Home Exercises Provided: yes. Exercises were reviewed and Billy was able to demonstrate them  prior to the end of the session.  Billy demonstrated good  understanding of the education provided. See EMR under Patient Instructions for exercises provided during therapy sessions    ASSESSMENT     Pt arrived about an hour late today due to sleeping in. He tolerated functional movements today out of brace well with only a couple mild buckling episodes with self corrections. ROM was slightly less today, but he maintained the motion through the session without loss of knee extension. Pt reported/demonstrated no adverse response or exacerbation of symptoms during today's session.      Billy Is progressing well towards his goals.   Pt prognosis is Good.     Pt will continue to benefit from skilled outpatient physical therapy to address the deficits listed in the problem list box on initial evaluation, provide pt/family education and to maximize pt's level of independence in the home and community environment.     Pt's spiritual, cultural and educational needs considered and pt agreeable to plan of care and goals.     Anticipated barriers to physical therapy: none    Goals:  Short Term Goals: 8-10 weeks (progressing, not met)   1. Pt will be compliant with HEP 50% of prescribed amount.   2. The pt to demo improvement in left knee ROM to equal right knee ROM pain free   3.  The pt to demo good quad set with proper hyperextension moment of the Left knee   4. The pt to demo ambualting with elast restricitve AD witout major compensatory pattern left knee for at least 20 feet      Long Term Goals: 56 weeks (progressing, not met)   Pt will be compliant with % of prescribed amount.   The pt to demo pain free and uncompensated running mechanics x10 min on an indoor treadmill  The pt to demo tolerance to a squat at or bellow parallel with uncompensated mechanics x10   The pt to demo strength of L LE within 10% of R LE as demo'd on the biodex machine   The pt to demo a deficit of 10% or less on a triple hop, single leg  broad jump and crossover hop compared to non operative LE.   The pt will report full participation in ADLs and IADLs without restrictions related to L  knee.     PLAN     Wean brace as able, maintain full knee hyperextension and gain quad activation in ROM. Move into more functional movements without support as able in precautions.     Manjit Lazcano, PT

## 2022-10-07 ENCOUNTER — CLINICAL SUPPORT (OUTPATIENT)
Dept: REHABILITATION | Facility: HOSPITAL | Age: 27
End: 2022-10-07
Payer: COMMERCIAL

## 2022-10-07 DIAGNOSIS — M62.81 QUADRICEPS WEAKNESS: ICD-10-CM

## 2022-10-07 DIAGNOSIS — M25.662 DECREASED RANGE OF MOTION OF LEFT KNEE: ICD-10-CM

## 2022-10-07 DIAGNOSIS — R26.2 DIFFICULTY WALKING: ICD-10-CM

## 2022-10-07 DIAGNOSIS — M25.562 ACUTE PAIN OF LEFT KNEE: Primary | ICD-10-CM

## 2022-10-07 PROCEDURE — 97110 THERAPEUTIC EXERCISES: CPT

## 2022-10-07 PROCEDURE — 97116 GAIT TRAINING THERAPY: CPT

## 2022-10-07 PROCEDURE — 97140 MANUAL THERAPY 1/> REGIONS: CPT

## 2022-10-10 ENCOUNTER — CLINICAL SUPPORT (OUTPATIENT)
Dept: REHABILITATION | Facility: HOSPITAL | Age: 27
End: 2022-10-10
Payer: COMMERCIAL

## 2022-10-10 ENCOUNTER — TELEPHONE (OUTPATIENT)
Dept: SPORTS MEDICINE | Facility: CLINIC | Age: 27
End: 2022-10-10
Payer: COMMERCIAL

## 2022-10-10 DIAGNOSIS — S83.512A TEARS OF MENISCUS AND ACL OF LEFT KNEE, INITIAL ENCOUNTER: ICD-10-CM

## 2022-10-10 DIAGNOSIS — S83.412A: ICD-10-CM

## 2022-10-10 DIAGNOSIS — M62.81 QUADRICEPS WEAKNESS: ICD-10-CM

## 2022-10-10 DIAGNOSIS — S83.207A TEARS OF MENISCUS AND ACL OF LEFT KNEE, INITIAL ENCOUNTER: Primary | ICD-10-CM

## 2022-10-10 DIAGNOSIS — M25.662 DECREASED RANGE OF MOTION OF LEFT KNEE: ICD-10-CM

## 2022-10-10 DIAGNOSIS — R26.2 DIFFICULTY WALKING: ICD-10-CM

## 2022-10-10 DIAGNOSIS — S83.512A TEARS OF MENISCUS AND ACL OF LEFT KNEE, INITIAL ENCOUNTER: Primary | ICD-10-CM

## 2022-10-10 DIAGNOSIS — M25.562 ACUTE PAIN OF LEFT KNEE: Primary | ICD-10-CM

## 2022-10-10 DIAGNOSIS — S83.207A TEARS OF MENISCUS AND ACL OF LEFT KNEE, INITIAL ENCOUNTER: ICD-10-CM

## 2022-10-10 PROCEDURE — 97116 GAIT TRAINING THERAPY: CPT

## 2022-10-10 PROCEDURE — 97110 THERAPEUTIC EXERCISES: CPT

## 2022-10-10 PROCEDURE — 97140 MANUAL THERAPY 1/> REGIONS: CPT

## 2022-10-10 PROCEDURE — 97112 NEUROMUSCULAR REEDUCATION: CPT

## 2022-10-10 NOTE — PROGRESS NOTES
[facility]   Physical Therapy Treatment Note     Name: Billy Holbrook  [facility] Number: [id number]    Therapy Diagnosis:   Encounter Diagnoses   Name Primary?    Acute pain of left knee Yes    Decreased range of motion of left knee     Quadriceps weakness     Difficulty walking      Physician: No ref. provider found    Visit Date: 10/10/2022    Physician Orders: PT Eval and Treat   Medical Diagnosis from Referral: M25.562 (ICD-10-CM) - Acute pain of left knee  Evaluation Date: 9/7/2022  Authorization Period Expiration: 9/6/2023  Plan of Care Expiration: 12/31/2022  Visit # / Visits authorized: 22/20      Precautions: Standard     Time In: 11:00 am   Time Out: 12:15  Total Billable Time: 75 minutes    Procedure: 9/20/2022  1. Left Knee Arthroscopy, anterior cruciate ligament reconstruction 42163  2.  Left Knee Arthroscopy, with meniscectomy (medial OR lateral) 87518  3.  Left Knee Arthroscopy, debridement/shaving of articular cartilage (chondroplasty) 84263  4.  Left Knee  platelet rich plasma injection to the bone-patellar tendon-bone harvest site    Post-Op Plan:  ACL reconstruction with a bone-patellar tendon-bone autograft    SUBJECTIVE     Pt reports: noted some swelling in knee Saturday after doing some filming of his truck. Had to partially squat/hold to get low and move around. Managed his symptoms well though and decided not to go on a long car ride.     He was compliant with home exercise program.  Response to previous treatment: no issues  Functional change: WBAT no crutches in unlocked brace    Pain: 0->2/10  Location: left knee      OBJECTIVE     20 days post-op    Observation: mild effusion noted L knee  Incision healing well; no signs of infection or drainage  Dry skin noted karen-incsion  Atrophy in calf/quad on L     Range of Motion (extension/neutral/flexion):     Right Left   Knee PROM: 6-0-130 deg P:6-0-122* deg  A:3-0-95 deg      Strength:     Right Left Comment   Knee  Extension: 5/5 3/5        Knee Flexion: 5/5 3/5        Hip Abduction: 4+/5 3/5       Functional tests(*=pain):   Gait/stairs: trace limp at times with quad fatigue; otherwise normal  Quad activation: good     Joint Mobility: hypomobile PFJ and tibiofemoral    TTP along MCL- mild    Treatment     Billy received the treatments listed below:      therapeutic exercises to develop strength, endurance, ROM, flexibility, posture, and core stabilization for 38 minutes including:  PROM L knee and ankle  Heel slides x 5 min x 2 bouts  Heel prop x 5 min 5/5 lbs added 2 bouts  knee flx seated EOT with manual OP  Standing calf raise 2 x 30 on slantboard  Side stepping GTB at ankles x 3 laps 10 yds  Manual HS and GS stretching  Standing HS curls 3 x 15 7.5 lbs  DL bridge BTB 20 x 5 sec hold  Bike next session; BFR soon  Education/assessment/wound care    manual therapy techniques: Joint mobilizations and Soft tissue Mobilization were applied for 15 minutes, including:  PFJ gr 3 glides all planes  Tibial lateral /ER glide  Knee hyperextension gr 3  Gr 3 ankle distraction and AP on L  Fat pad mobility  Scar mobilization    neuromuscular re-education activities to improve: Coordination, Kinesthetic, Sense, and Proprioception for 10 minutes. The following activities were included:  SLR with quad set from heel prop 3 x 15  LAQ 3 x 15 5 lbs  Standing TKE 2 x 15 x 3 sec black sport band    therapeutic activities to improve functional performance for 0 minutes, includin->20 inch box squat DL 3 x 15 -NP    gait training to improve functional mobility and safety for 12 minutes, including:  No brace or AD  12 inch step ups  2 x 15 linus-NP  Retro walking sled pull x 2 laps 30 yds with 45 lbs    Patient Education and Home Exercises     Home Exercises Provided and Patient Education Provided     Education provided:   - HEP update, use of compression sleeve; wound care, precautions, activity pacing, goals    Written Home Exercises  Provided: yes. Exercises were reviewed and Billy was able to demonstrate them prior to the end of the session.  Billy demonstrated good  understanding of the education provided. See EMR under Patient Instructions for exercises provided during therapy sessions    ASSESSMENT     Billy is doing well. We were able to gain full passive hyperextension today with good maintenance in session AAROM to AROM. Still requires some manual and stretching to get there; advised to do more indep stretching daily. Quad function is great, increasing intensity of session as able in precautions. His scar is healing well; dry skin surrounding, otherwise no issues. Pt reported/demonstrated no adverse response or exacerbation of symptoms during today's session.      Billy Is progressing well towards his goals.   Pt prognosis is Good.     Pt will continue to benefit from skilled outpatient physical therapy to address the deficits listed in the problem list box on initial evaluation, provide pt/family education and to maximize pt's level of independence in the home and community environment.     Pt's spiritual, cultural and educational needs considered and pt agreeable to plan of care and goals.     Anticipated barriers to physical therapy: none    Goals:  Short Term Goals: 8-10 weeks (progressing, not met)   1. Pt will be compliant with HEP 50% of prescribed amount.   2. The pt to demo improvement in left knee ROM to equal right knee ROM pain free   3.  The pt to demo good quad set with proper hyperextension moment of the Left knee   4. The pt to demo ambualting with elast restricitve AD witout major compensatory pattern left knee for at least 20 feet      Long Term Goals: 56 weeks (progressing, not met)   Pt will be compliant with % of prescribed amount.   The pt to demo pain free and uncompensated running mechanics x10 min on an indoor treadmill  The pt to demo tolerance to a squat at or bellow parallel with uncompensated  mechanics x10   The pt to demo strength of L LE within 10% of R LE as demo'd on the biodex machine   The pt to demo a deficit of 10% or less on a triple hop, single leg broad jump and crossover hop compared to non operative LE.   The pt will report full participation in ADLs and IADLs without restrictions related to L  knee.     PLAN     Progress to full ROM as able. Progress quad/hip strength as able in precautions. Initiate BFR this week as well as bike.     Manjit Lazcano, PT

## 2022-10-10 NOTE — PROGRESS NOTES
[facility] OUTPATIENT THERAPY AND WELLNESS   Physical Therapy Treatment Note     Name: Billy Holbrook  [facility] Number: [id number]    Therapy Diagnosis:   Encounter Diagnoses   Name Primary?    Acute pain of left knee Yes    Decreased range of motion of left knee     Quadriceps weakness     Difficulty walking      Physician: PEACE Dempsey MD    Visit Date: 10/7/2022    Physician Orders: PT Eval and Treat   Medical Diagnosis from Referral: M25.562 (ICD-10-CM) - Acute pain of left knee  Evaluation Date: 9/7/2022  Authorization Period Expiration: 9/6/2023  Plan of Care Expiration: 12/31/2022  Visit # / Visits authorized: 21/20      Precautions: Standard     Time In: 11:00 am   Time Out: 12:15  Total Billable Time: 75 minutes    Procedure: 9/20/2022  1. Left Knee Arthroscopy, anterior cruciate ligament reconstruction 86808  2.  Left Knee Arthroscopy, with meniscectomy (medial OR lateral) 95941  3.  Left Knee Arthroscopy, debridement/shaving of articular cartilage (chondroplasty) 68590  4.  Left Knee  platelet rich plasma injection to the bone-patellar tendon-bone harvest site    Post-Op Plan:  ACL reconstruction with a bone-patellar tendon-bone autograft    SUBJECTIVE     Pt reports: he is feeling good. Walking out of brace more and building confidence. Denies any pain and or buckling moments.      He was compliant with home exercise program.  Response to previous treatment: no issues  Functional change: WBAT no crutches in unlocked brace    Pain: 0->2/10  Location: left knee      OBJECTIVE     17 days post-op    Observation: mild to mod effusion noted L knee  Incision healing well; covered steri-strips; no signs of infection or drainage  Dry skin noted karen-incsion  Atrophy in calf/quad on L     Range of Motion (extension/neutral/flexion):     Right Left   Knee PROM: 6-0-130 deg P:5-0-122* deg  A:0-95 deg      Strength:     Right Left Comment   Knee Extension: 5/5 3/5        Knee Flexion: 5/5 3/5        Hip Abduction:  4+/5 3/5       Functional tests(*=pain):   Gait/stairs: WBAT no crutches in unlocked brace  Quad activation: good     Joint Mobility: hypomobile PFJ and tibiofemoral    TTP along MCL- mild    Treatment     Billy received the treatments listed below:      therapeutic exercises to develop strength, endurance, ROM, flexibility, posture, and core stabilization for 40 minutes including:  PROM L knee and ankle  Heel slides x 5 min  Heel prop x 5 min 5/5 lbs added  knee flx seated EOT with manual OP  Standing calf raise 2 x 30 on slantboard  Side stepping GTB at ankles x 2 laps 10 yds  Manual HS and GS stretching  Prone manual HS curls 3 x 10  Education/assessment/wound care    manual therapy techniques: Joint mobilizations and Soft tissue Mobilization were applied for 15 minutes, including:  PFJ gr 3 glides all planes  Tibial lateral /ER glide  Knee hyperextension gr 3  Gr 3 ankle distraction and AP on L  Fat pad mobility    neuromuscular re-education activities to improve: Coordination, Kinesthetic, Sense, and Proprioception for 0 minutes. The following activities were included:  NP    therapeutic activities to improve functional performance for 6 minutes, includin->20 inch box squat DL 3 x 15     gait training to improve functional mobility and safety for 14 minutes, including:  No brace or AD  12 inch step ups  2 x 15 linus  Retro walking sled pull x 2 laps 30 yds    Patient Education and Home Exercises     Home Exercises Provided and Patient Education Provided     Education provided:   - HEP update, use of compression sleeve; wound care, precautions, activity pacing, goals    Written Home Exercises Provided: yes. Exercises were reviewed and Billy was able to demonstrate them prior to the end of the session.  Billy demonstrated good  understanding of the education provided. See EMR under Patient Instructions for exercises provided during therapy sessions    ASSESSMENT     Billy continues to do very well early  post-op. ROM is progressing nicely, easy to gain extension at arrival, flexion progressing steadily. Functional squat and step up doing well out of brace. Some eccentric control issues with the higher step up as expected, but able to improve with cuing. Changed out steri-strips due to them being dirty; incision closed and dry, no drainage noted. Pt reported/demonstrated no adverse response or exacerbation of symptoms during today's session.      Billy Is progressing well towards his goals.   Pt prognosis is Good.     Pt will continue to benefit from skilled outpatient physical therapy to address the deficits listed in the problem list box on initial evaluation, provide pt/family education and to maximize pt's level of independence in the home and community environment.     Pt's spiritual, cultural and educational needs considered and pt agreeable to plan of care and goals.     Anticipated barriers to physical therapy: none    Goals:  Short Term Goals: 8-10 weeks (progressing, not met)   1. Pt will be compliant with HEP 50% of prescribed amount.   2. The pt to demo improvement in left knee ROM to equal right knee ROM pain free   3.  The pt to demo good quad set with proper hyperextension moment of the Left knee   4. The pt to demo ambualting with elast restricitve AD witout major compensatory pattern left knee for at least 20 feet      Long Term Goals: 56 weeks (progressing, not met)   Pt will be compliant with % of prescribed amount.   The pt to demo pain free and uncompensated running mechanics x10 min on an indoor treadmill  The pt to demo tolerance to a squat at or bellow parallel with uncompensated mechanics x10   The pt to demo strength of L LE within 10% of R LE as demo'd on the biodex machine   The pt to demo a deficit of 10% or less on a triple hop, single leg broad jump and crossover hop compared to non operative LE.   The pt will report full participation in ADLs and IADLs without restrictions  related to L  knee.     PLAN     Wean brace as able, maintain full knee hyperextension and gain quad activation in ROM. Move into more functional movements without support as able in precautions.     Manjit Lazcano, PT

## 2022-10-11 ENCOUNTER — CLINICAL SUPPORT (OUTPATIENT)
Dept: REHABILITATION | Facility: HOSPITAL | Age: 27
End: 2022-10-11
Payer: COMMERCIAL

## 2022-10-11 DIAGNOSIS — M25.562 ACUTE PAIN OF LEFT KNEE: Primary | ICD-10-CM

## 2022-10-11 DIAGNOSIS — M25.662 DECREASED RANGE OF MOTION OF LEFT KNEE: ICD-10-CM

## 2022-10-11 DIAGNOSIS — M62.81 QUADRICEPS WEAKNESS: ICD-10-CM

## 2022-10-11 DIAGNOSIS — R26.2 DIFFICULTY WALKING: ICD-10-CM

## 2022-10-11 PROCEDURE — 97110 THERAPEUTIC EXERCISES: CPT

## 2022-10-11 PROCEDURE — 97140 MANUAL THERAPY 1/> REGIONS: CPT

## 2022-10-11 PROCEDURE — 97112 NEUROMUSCULAR REEDUCATION: CPT

## 2022-10-12 ENCOUNTER — CLINICAL SUPPORT (OUTPATIENT)
Dept: REHABILITATION | Facility: HOSPITAL | Age: 27
End: 2022-10-12
Payer: COMMERCIAL

## 2022-10-12 DIAGNOSIS — M62.81 QUADRICEPS WEAKNESS: ICD-10-CM

## 2022-10-12 DIAGNOSIS — M25.562 ACUTE PAIN OF LEFT KNEE: Primary | ICD-10-CM

## 2022-10-12 DIAGNOSIS — R26.2 DIFFICULTY WALKING: ICD-10-CM

## 2022-10-12 DIAGNOSIS — M25.662 DECREASED RANGE OF MOTION OF LEFT KNEE: ICD-10-CM

## 2022-10-12 PROCEDURE — 97140 MANUAL THERAPY 1/> REGIONS: CPT

## 2022-10-12 PROCEDURE — 97112 NEUROMUSCULAR REEDUCATION: CPT

## 2022-10-12 PROCEDURE — 97530 THERAPEUTIC ACTIVITIES: CPT

## 2022-10-12 PROCEDURE — 97110 THERAPEUTIC EXERCISES: CPT

## 2022-10-12 NOTE — PROGRESS NOTES
[facility]PAT[facility] THERAPY AND [facility]   Physical Therapy Treatment Note     Name: Billy Holbrook  [facility] Number: [id number]    Therapy Diagnosis:   Encounter Diagnoses   Name Primary?    Acute pain of left knee Yes    Decreased range of motion of left knee     Quadriceps weakness     Difficulty walking      Physician: PEACE Dempsey MD    Visit Date: 10/12/2022    Physician Orders: PT Eval and Treat   Medical Diagnosis from Referral: M25.562 (ICD-10-CM) - Acute pain of left knee  Evaluation Date: 9/7/2022  Authorization Period Expiration: 9/6/2023  Plan of Care Expiration: 12/31/2022  Visit # / Visits authorized: 24/40      Precautions: Standard     Time In: 11:01 am   Time Out: 12:15  Total Billable Time: 74 minutes    Procedure: 9/20/2022  1. Left Knee Arthroscopy, anterior cruciate ligament reconstruction 39855  2.  Left Knee Arthroscopy, with meniscectomy (medial OR lateral) 09758  3.  Left Knee Arthroscopy, debridement/shaving of articular cartilage (chondroplasty) 10699  4.  Left Knee  platelet rich plasma injection to the bone-patellar tendon-bone harvest site    Post-Op Plan:  ACL reconstruction with a bone-patellar tendon-bone autograft    SUBJECTIVE     Pt reports: no issues. Ready to progress as able today.     He was compliant with home exercise program.  Response to previous treatment: no issues  Functional change: WBAT no crutches in unlocked brace    Pain: 0/10 to tension anterior knee with bending  Location: left knee      OBJECTIVE     22 days post-op    Observation: mild effusion noted L knee  Incision healing well; no signs of infection or drainage  Dry skin noted karen-incsion  Atrophy in calf/quad on L     Range of Motion (extension/neutral/flexion):     Right Left   Knee PROM: 6-0-130 deg P:6-0-124* deg  A:3-0-100 deg      Strength:     Right Left Comment   Knee Extension: 5/5 3/5        Knee Flexion: 5/5 3/5        Hip Abduction: 4+/5 3/5       Functional tests(*=pain):   Gait/stairs:  normalized  Quad activation: good     Joint Mobility: hypomobile PFJ and tibiofemoral    TTP along MCL- minimal to none    Treatment     Billy received the treatments listed below:      therapeutic exercises to develop strength, endurance, ROM, flexibility, posture, and core stabilization for 26 minutes including:  PROM L knee and ankle  Heel slides x 5 min   Heel prop x 5 min 5/5 lbs added 2 bouts  Standing calf raise up DL down SL 3 x 15-NP  Side stepping GTB at ankles 3 x 10 yds  Manual HS and GS stretching  Standing HS curls 3 x 15 7.5 lbs-NP  DL bridge BTB 20 x 5 sec hold-NP  Evan stretching- manual  Bike next session; BFR, prone quad stretching soon  Education/assessment/wound care    manual therapy techniques: Joint mobilizations and Soft tissue Mobilization were applied for 15 minutes, including:  PFJ gr 3 glides all planes  Tibial lateral /ER glide  Knee hyperextension gr 3  Gr 3 ankle distraction and AP on L  Fat pad mobility  Scar mobilization    neuromuscular re-education activities to improve: Coordination, Kinesthetic, Sense, and Proprioception for 15 minutes. The following activities were included:  Quad set into hyperextension x 3 min 3 sec holds   SAQ x 3 min 3 sec holds  SLR with quad set from heel prop 3 x 10  LAQ 3 x 15 5 lbs  Standing TKE 2 x 15 x 3 sec black sport band-NP    therapeutic activities to improve functional performance for 20 minutes, includin->20 inch box squat DL 3 x 15 -NP  SL DL 20 lbs 3 x 10-NP  12 inch step ups  2 x 15 linus  Retro walking sled pull x 2 laps 30 yds with 45 lbs  Lateral heel taps 4 inch 3 x 10 linus      Patient Education and Home Exercises     Home Exercises Provided and Patient Education Provided     Education provided:   - HEP update, use of compression sleeve; wound care, precautions, activity pacing, goals    Written Home Exercises Provided: yes. Exercises were reviewed and Billy was able to demonstrate them prior to the end of the session.  Billy  demonstrated good  understanding of the education provided. See EMR under Patient Instructions for exercises provided during therapy sessions    ASSESSMENT     Billy arrived with slight limitations in PROM and AROM compared to last session. Manual therapy restored PROM hyperextension to full then exercises and motor control helped with AROM into hyperextension lacking 3 deg from PROM. Flexion looks good. Functional movements are good. Circuit used today to limit rest break with increased fatigue as expected. Eccentric heel taps were difficult requiring some UE support.  Pt reported/demonstrated no adverse response or exacerbation of symptoms during today's session.      Billy Is progressing well towards his goals.   Pt prognosis is Good.     Pt will continue to benefit from skilled outpatient physical therapy to address the deficits listed in the problem list box on initial evaluation, provide pt/family education and to maximize pt's level of independence in the home and community environment.     Pt's spiritual, cultural and educational needs considered and pt agreeable to plan of care and goals.     Anticipated barriers to physical therapy: none    Goals:  Short Term Goals: 8-10 weeks (progressing, not met)   1. Pt will be compliant with HEP 50% of prescribed amount.   2. The pt to demo improvement in left knee ROM to equal right knee ROM pain free   3.  The pt to demo good quad set with proper hyperextension moment of the Left knee   4. The pt to demo ambualting with elast restricitve AD witout major compensatory pattern left knee for at least 20 feet      Long Term Goals: 56 weeks (progressing, not met)   Pt will be compliant with % of prescribed amount.   The pt to demo pain free and uncompensated running mechanics x10 min on an indoor treadmill  The pt to demo tolerance to a squat at or bellow parallel with uncompensated mechanics x10   The pt to demo strength of L LE within 10% of R LE as demo'd on  the biodex machine   The pt to demo a deficit of 10% or less on a triple hop, single leg broad jump and crossover hop compared to non operative LE.   The pt will report full participation in ADLs and IADLs without restrictions related to L  knee.     PLAN     Progress to full ROM as able. Progress quad/hip strength as able in precautions. Initiate BFR this week as well as bike.     Manjit Lazcano, PT

## 2022-10-12 NOTE — PROGRESS NOTES
[facility]   Physical Therapy Treatment Note     Name: Billy Holbrook  [facility] Number: [id number]    Therapy Diagnosis:   Encounter Diagnoses   Name Primary?    Acute pain of left knee Yes    Decreased range of motion of left knee     Quadriceps weakness     Difficulty walking      Physician: PEACE Dempsey MD    Visit Date: 10/11/2022    Physician Orders: PT Eval and Treat   Medical Diagnosis from Referral: M25.562 (ICD-10-CM) - Acute pain of left knee  Evaluation Date: 9/7/2022  Authorization Period Expiration: 9/6/2023  Plan of Care Expiration: 12/31/2022  Visit # / Visits authorized: 23/40      Precautions: Standard     Time In: 12:02 am   Time Out: 1:10  Total Billable Time: 68 minutes    Procedure: 9/20/2022  1. Left Knee Arthroscopy, anterior cruciate ligament reconstruction 94999  2.  Left Knee Arthroscopy, with meniscectomy (medial OR lateral) 84714  3.  Left Knee Arthroscopy, debridement/shaving of articular cartilage (chondroplasty) 28316  4.  Left Knee  platelet rich plasma injection to the bone-patellar tendon-bone harvest site    Post-Op Plan:  ACL reconstruction with a bone-patellar tendon-bone autograft    SUBJECTIVE     Pt reports: he continues to feel good. Using some lotion around his scar with benefit- has dry scaly skin. Knee remains mostly non-painful with minimal swelling. Tension along front of knee with bending. Quad feels much stronger.     He was compliant with home exercise program.  Response to previous treatment: no issues  Functional change: WBAT no crutches in unlocked brace    Pain: 0/10 to tension anterior knee with bending  Location: left knee      OBJECTIVE     21 days post-op    Observation: mild effusion noted L knee  Incision healing well; no signs of infection or drainage  Dry skin noted karen-incsion  Atrophy in calf/quad on L     Range of Motion (extension/neutral/flexion):     Right Left   Knee PROM: 6-0-130 deg P:6-0-124* deg  A:3-0-95 deg       Strength:     Right Left Comment   Knee Extension: 5/5 3/5        Knee Flexion: 5/5 3/5        Hip Abduction: 4+/5 3/5       Functional tests(*=pain):   Gait/stairs: trace limp at times with quad fatigue; otherwise normal  Quad activation: good     Joint Mobility: hypomobile PFJ and tibiofemoral    TTP along MCL- mild    Treatment     Billy received the treatments listed below:      therapeutic exercises to develop strength, endurance, ROM, flexibility, posture, and core stabilization for 28 minutes including:  PROM L knee and ankle  Heel slides x 5 min x 2 bouts  Heel prop x 5 min 5/5 lbs added 2 bouts  Standing calf raise up DL down SL 3 x 15-NP  Side stepping GTB at ankles with 25 lb plate press x 3 laps 10 yds  Manual HS and GS stretching  Standing HS curls 3 x 15 7.5 lbs-NP  DL bridge BTB 20 x 5 sec hold-NP  Bike next session; BFR soon  Education/assessment/wound care    manual therapy techniques: Joint mobilizations and Soft tissue Mobilization were applied for 15 minutes, including:  PFJ gr 3 glides all planes  Tibial lateral /ER glide  Knee hyperextension gr 3  Gr 3 ankle distraction and AP on L  Fat pad mobility  Scar mobilization    neuromuscular re-education activities to improve: Coordination, Kinesthetic, Sense, and Proprioception for 20 minutes. The following activities were included:  NMES for quad set on heel prop and quad set into hyperextension 10 min  SLR with quad set from heel prop 3 x 15  LAQ 3 x 15 5 lbs  Standing TKE 2 x 15 x 3 sec black sport band-NP    therapeutic activities to improve functional performance for 2 minutes, includin->20 inch box squat DL 3 x 15 -NP  SL DL 20 lbs 3 x 10    gait training to improve functional mobility and safety for 3 minutes, including:  No brace or AD  12 inch step ups  2 x 15 linus  Retro walking sled pull x 2 laps 30 yds with 45 lbs-NP      Patient Education and Home Exercises     Home Exercises Provided and Patient Education Provided     Education  provided:   - HEP update, use of compression sleeve; wound care, precautions, activity pacing, goals    Written Home Exercises Provided: yes. Exercises were reviewed and Billy was able to demonstrate them prior to the end of the session.  Billy demonstrated good  understanding of the education provided. See EMR under Patient Instructions for exercises provided during therapy sessions    ASSESSMENT     Billy demonstrates full passive hyperextension, but lacking some active due to quad activation and weakness by approx 2-3 deg. Hip/core and functional movement patterns are improving. Advised to heel prop often at home with heel slides to ensure we gain full ROM in next week and make it easy to maintain. Pt reported/demonstrated no adverse response or exacerbation of symptoms during today's session.      Billy Is progressing well towards his goals.   Pt prognosis is Good.     Pt will continue to benefit from skilled outpatient physical therapy to address the deficits listed in the problem list box on initial evaluation, provide pt/family education and to maximize pt's level of independence in the home and community environment.     Pt's spiritual, cultural and educational needs considered and pt agreeable to plan of care and goals.     Anticipated barriers to physical therapy: none    Goals:  Short Term Goals: 8-10 weeks (progressing, not met)   1. Pt will be compliant with HEP 50% of prescribed amount.   2. The pt to demo improvement in left knee ROM to equal right knee ROM pain free   3.  The pt to demo good quad set with proper hyperextension moment of the Left knee   4. The pt to demo ambualting with elast restricitve AD witout major compensatory pattern left knee for at least 20 feet      Long Term Goals: 56 weeks (progressing, not met)   Pt will be compliant with % of prescribed amount.   The pt to demo pain free and uncompensated running mechanics x10 min on an indoor treadmill  The pt to demo  tolerance to a squat at or bellow parallel with uncompensated mechanics x10   The pt to demo strength of L LE within 10% of R LE as demo'd on the biodex machine   The pt to demo a deficit of 10% or less on a triple hop, single leg broad jump and crossover hop compared to non operative LE.   The pt will report full participation in ADLs and IADLs without restrictions related to L  knee.     PLAN     Progress to full ROM as able. Progress quad/hip strength as able in precautions. Initiate BFR this week as well as bike.     Manjit Lazcano, PT

## 2022-10-13 ENCOUNTER — CLINICAL SUPPORT (OUTPATIENT)
Dept: REHABILITATION | Facility: HOSPITAL | Age: 27
End: 2022-10-13
Payer: COMMERCIAL

## 2022-10-13 DIAGNOSIS — M25.562 ACUTE PAIN OF LEFT KNEE: Primary | ICD-10-CM

## 2022-10-13 DIAGNOSIS — M62.81 QUADRICEPS WEAKNESS: ICD-10-CM

## 2022-10-13 DIAGNOSIS — R26.2 DIFFICULTY WALKING: ICD-10-CM

## 2022-10-13 DIAGNOSIS — M25.662 DECREASED RANGE OF MOTION OF LEFT KNEE: ICD-10-CM

## 2022-10-13 PROCEDURE — 97140 MANUAL THERAPY 1/> REGIONS: CPT

## 2022-10-13 PROCEDURE — 97110 THERAPEUTIC EXERCISES: CPT

## 2022-10-13 PROCEDURE — 97112 NEUROMUSCULAR REEDUCATION: CPT

## 2022-10-13 NOTE — PROGRESS NOTES
[facility]   Physical Therapy Treatment Note     Name: Billy Holbrook  [facility] Number: [id number]    Therapy Diagnosis:   Encounter Diagnoses   Name Primary?    Acute pain of left knee Yes    Decreased range of motion of left knee     Quadriceps weakness     Difficulty walking        Physician: PEACE Dempsey MD    Visit Date: 10/13/2022    Physician Orders: PT Eval and Treat   Medical Diagnosis from Referral: M25.562 (ICD-10-CM) - Acute pain of left knee  Evaluation Date: 9/7/2022  Authorization Period Expiration: 9/6/2023  Plan of Care Expiration: 12/31/2022  Visit # / Visits authorized: 25/40      Precautions: Standard     Time In: 11:06 am   Time Out: 12:10  Total Billable Time: 64 minutes    Procedure: 9/20/2022  1. Left Knee Arthroscopy, anterior cruciate ligament reconstruction 94244  2.  Left Knee Arthroscopy, with meniscectomy (medial OR lateral) 31203  3.  Left Knee Arthroscopy, debridement/shaving of articular cartilage (chondroplasty) 73379  4.  Left Knee  platelet rich plasma injection to the bone-patellar tendon-bone harvest site    Post-Op Plan:  ACL reconstruction with a bone-patellar tendon-bone autograft    SUBJECTIVE     Pt reports: knee is feeling good, was sore for a bit after yesterday but feels it was a good soreness. Scar looks much better. Agrees to BFR today.     He was compliant with home exercise program.  Response to previous treatment: no issues  Functional change: WBAT no crutches in unlocked brace    Pain: 0/10 to tension anterior knee with bending  Location: left knee      OBJECTIVE     23 days post-op    Observation: mild effusion noted L knee  Incision healing well; no signs of infection or drainage  Dry skin noted karen-incsion  Atrophy in calf/quad on L     Range of Motion (extension/neutral/flexion):     Right Left   Knee PROM: 8-0-128/135 deg P:8-0-126* deg  A:6-0-108 deg      Strength:     Right Left Comment   Knee Extension: 5/5 3/5        Knee  Flexion: 5/5 3/5        Hip Abduction: 4+/5 3+/5       Functional tests(*=pain):   Gait/stairs: normalized  Quad activation: good     Joint Mobility: hypomobile PFJ and tibiofemoral    TTP along MCL- minimal to none    Treatment     Billy received the treatments listed below:      therapeutic exercises to develop strength, endurance, ROM, flexibility, posture, and core stabilization for 37 minutes including:  PROM L knee and ankle  Heel slides x 5 min   Heel prop x 5 min   Standing calf raise up DL down SL 3 x 15  Side stepping BTB at ankles 3 x 10 yds  Manual HS and GS stretching  Evan stretching- manual  BFR: 60% LOP 30/15/15/15  -TKE black sport band  -LAQ 5 lbs  -Standing HS curl 5 lbs  Bike upright-NP  Education/assessment    manual therapy techniques: Joint mobilizations and Soft tissue Mobilization were applied for 15 minutes, including:  PFJ gr 3 glides all planes  Tibial AP glide gr 3 with and without rotation bias on and off heel prop  Knee hyperextension gr 3  Fat pad mobility  Scar mobilization    neuromuscular re-education activities to improve: Coordination, Kinesthetic, Sense, and Proprioception for 12 minutes. The following activities were included:  Quad set into hyperextension x 3 min 3 sec holds   SAQ x 3 min 3 sec holds  SLR with quad set from heel prop 3 x 10  LAQ 3 x 15 5 lbs-NP  Standing TKE 2 x 15 x 3 sec black sport band-NP    therapeutic activities to improve functional performance for 0 minutes, includin->20 inch box squat DL 3 x 15 -NP  SL DL 20 lbs 3 x 10-NP  12 inch step ups  2 x 15 linus-NP  Retro walking sled pull x 2 laps 30 yds with 45 lbs-NP  Lateral heel taps 4 inch 3 x 10 linus-NP      Patient Education and Home Exercises     Home Exercises Provided and Patient Education Provided     Education provided:   - HEP update, use of compression sleeve; wound care, precautions, activity pacing, goals    Written Home Exercises Provided: yes. Exercises were reviewed and Billy was  able to demonstrate them prior to the end of the session.  Billy demonstrated good  understanding of the education provided. See EMR under Patient Instructions for exercises provided during therapy sessions    ASSESSMENT     Billy had improved knee ROM and joint mobility at entry today. Gaining AROM knee hyperextension closer to PROM mobility. Quad did well with BFR loading today. Minimal to no irritability. Progressing very well. Pt reported/demonstrated no adverse response or exacerbation of symptoms during today's session.      Billy Is progressing well towards his goals.   Pt prognosis is Good.     Pt will continue to benefit from skilled outpatient physical therapy to address the deficits listed in the problem list box on initial evaluation, provide pt/family education and to maximize pt's level of independence in the home and community environment.     Pt's spiritual, cultural and educational needs considered and pt agreeable to plan of care and goals.     Anticipated barriers to physical therapy: none    Goals:  Short Term Goals: 8-10 weeks (progressing, not met)   1. Pt will be compliant with HEP 50% of prescribed amount.   2. The pt to demo improvement in left knee ROM to equal right knee ROM pain free   3.  The pt to demo good quad set with proper hyperextension moment of the Left knee   4. The pt to demo ambualting with elast restricitve AD witout major compensatory pattern left knee for at least 20 feet      Long Term Goals: 56 weeks (progressing, not met)   Pt will be compliant with % of prescribed amount.   The pt to demo pain free and uncompensated running mechanics x10 min on an indoor treadmill  The pt to demo tolerance to a squat at or bellow parallel with uncompensated mechanics x10   The pt to demo strength of L LE within 10% of R LE as demo'd on the biodex machine   The pt to demo a deficit of 10% or less on a triple hop, single leg broad jump and crossover hop compared to non  operative LE.   The pt will report full participation in ADLs and IADLs without restrictions related to L  knee.     PLAN     Progress ROM to full and good indep maintenance of joint mobility. Progress loading as able using BFR. Move into more CKC exercises.      Manjit Lazcano, PT

## 2022-10-14 ENCOUNTER — CLINICAL SUPPORT (OUTPATIENT)
Dept: REHABILITATION | Facility: HOSPITAL | Age: 27
End: 2022-10-14
Payer: COMMERCIAL

## 2022-10-14 DIAGNOSIS — M25.562 ACUTE PAIN OF LEFT KNEE: Primary | ICD-10-CM

## 2022-10-14 DIAGNOSIS — R26.2 DIFFICULTY WALKING: ICD-10-CM

## 2022-10-14 DIAGNOSIS — M25.662 DECREASED RANGE OF MOTION OF LEFT KNEE: ICD-10-CM

## 2022-10-14 DIAGNOSIS — M62.81 QUADRICEPS WEAKNESS: ICD-10-CM

## 2022-10-14 PROCEDURE — 97112 NEUROMUSCULAR REEDUCATION: CPT

## 2022-10-14 PROCEDURE — 97110 THERAPEUTIC EXERCISES: CPT

## 2022-10-14 PROCEDURE — 97140 MANUAL THERAPY 1/> REGIONS: CPT

## 2022-10-14 NOTE — PROGRESS NOTES
[facility]   Physical Therapy Treatment Note     Name: Billy Holbrook  [facility] Number: [id number]    Therapy Diagnosis:   No diagnosis found.      Physician: PEACE Dempsey MD    Visit Date: 10/14/2022    Physician Orders: PT Eval and Treat   Medical Diagnosis from Referral: M25.562 (ICD-10-CM) - Acute pain of left knee  Evaluation Date: 9/7/2022  Authorization Period Expiration: 9/6/2023  Plan of Care Expiration: 12/31/2022  Visit # / Visits authorized: 26/40      Precautions: Standard     Time In: 8:00 am   Time Out: 9:10  Total Billable Time: 64 minutes    Procedure: 9/20/2022  1. Left Knee Arthroscopy, anterior cruciate ligament reconstruction 56957  2.  Left Knee Arthroscopy, with meniscectomy (medial OR lateral) 85957  3.  Left Knee Arthroscopy, debridement/shaving of articular cartilage (chondroplasty) 25574  4.  Left Knee  platelet rich plasma injection to the bone-patellar tendon-bone harvest site    Post-Op Plan:  ACL reconstruction with a bone-patellar tendon-bone autograft    SUBJECTIVE     Pt reports: no problems with the knee.  Feels like it is getting stronger.  Able to get full extension and walk normal.    He was compliant with home exercise program.  Response to previous treatment: no issues  Functional change: WBAT no crutches in unlocked brace    Pain: 0/10 to tension anterior knee with bending  Location: left knee      OBJECTIVE     24 days post-op    Observation: mild effusion noted L knee  Incision healing well; no signs of infection or drainage  Dry skin noted karen-incsion  Atrophy in calf/quad on L     Range of Motion (extension/neutral/flexion):     Right Left   Knee PROM: 8-0-128/135 deg P:8-0-126* deg  A:6-0-108 deg      Strength:     Right Left Comment   Knee Extension: 5/5 3/5        Knee Flexion: 5/5 3/5        Hip Abduction: 4+/5 3+/5       Functional tests(*=pain):   Gait/stairs: normalized  Quad activation: good     Joint Mobility: hypomobile PFJ and  "tibiofemoral    TTP along MCL- minimal to none    Treatment     Billy received the treatments listed below:      therapeutic exercises to develop strength, endurance, ROM, flexibility, posture, and core stabilization for 37 minutes including:  PROM L knee and ankle  Heel slides x 5 min   Heel prop x 5 min   Bike upright-10  Education/assessment  BFR: 60% LOP 30/15/15/15  -SAQ 5lbs x 30  -SLR 5lbs x 30    --  Standing calf raise up DL down SL 3 x 15  Side stepping BTB at ankles 3 x 10 yds  Manual HS and GS stretching  Evan stretching- manual    manual therapy techniques: Joint mobilizations and Soft tissue Mobilization were applied for 15 minutes, including:  PFJ gr 3 glides all planes  Tibial AP glide gr 3 with and without rotation bias on and off heel prop  Knee hyperextension gr 3  Fat pad mobility  Scar mobilization    neuromuscular re-education activities to improve: Coordination, Kinesthetic, Sense, and Proprioception for 12 minutes. The following activities were included:    BFR: 60% LOP 30/15/15/15  -BW squats x 30 to 26" box - cues to eliminate weight shift  - TKE black band 30x - cues for weight shift on both phases  - step ups - 4" - hand hold assist for balance, 2 second hold at top to reinforce TKE  Backwards walking over low hurdles - cues for knee extension with weight shift    --  Quad set into hyperextension x 3 min 3 sec holds   SAQ x 3 min 3 sec holds  SLR with quad set from heel prop 3 x 10  LAQ 3 x 15 5 lbs-NP  Standing TKE 2 x 15 x 3 sec black sport band-NP    therapeutic activities to improve functional performance for 0 minutes, includin->20 inch box squat DL 3 x 15 -NP  SL DL 20 lbs 3 x 10-NP  12 inch step ups  2 x 15 linus-NP  Retro walking sled pull x 2 laps 30 yds with 45 lbs-NP  Lateral heel taps 4 inch 3 x 10 linus-NP      Patient Education and Home Exercises     Home Exercises Provided and Patient Education Provided     Education provided:   - HEP update, use of compression sleeve; " wound care, precautions, activity pacing, goals    Written Home Exercises Provided: yes. Exercises were reviewed and Billy was able to demonstrate them prior to the end of the session.  Billy demonstrated good  understanding of the education provided. See EMR under Patient Instructions for exercises provided during therapy sessions    ASSESSMENT     Minimal swelling and improved motor control.  Cues for knee extension and transitions from flexion to ext.  Fatigue in the quad witih BFR and exercises.  Needed cues for weight shift for squats.      Billy Is progressing well towards his goals.   Pt prognosis is Good.     Pt will continue to benefit from skilled outpatient physical therapy to address the deficits listed in the problem list box on initial evaluation, provide pt/family education and to maximize pt's level of independence in the home and community environment.     Pt's spiritual, cultural and educational needs considered and pt agreeable to plan of care and goals.     Anticipated barriers to physical therapy: none    Goals:  Short Term Goals: 8-10 weeks (progressing, not met)   1. Pt will be compliant with HEP 50% of prescribed amount.   2. The pt to demo improvement in left knee ROM to equal right knee ROM pain free   3.  The pt to demo good quad set with proper hyperextension moment of the Left knee   4. The pt to demo ambualting with elast restricitve AD witout major compensatory pattern left knee for at least 20 feet      Long Term Goals: 56 weeks (progressing, not met)   Pt will be compliant with % of prescribed amount.   The pt to demo pain free and uncompensated running mechanics x10 min on an indoor treadmill  The pt to demo tolerance to a squat at or bellow parallel with uncompensated mechanics x10   The pt to demo strength of L LE within 10% of R LE as demo'd on the biodex machine   The pt to demo a deficit of 10% or less on a triple hop, single leg broad jump and crossover hop compared  to non operative LE.   The pt will report full participation in ADLs and IADLs without restrictions related to L  knee.     PLAN     Progress ROM to full and good indep maintenance of joint mobility. Progress loading as able using BFR. Move into more CKC exercises.  Continue to progress.    Andres Jeffrey, PT

## 2022-10-17 ENCOUNTER — CLINICAL SUPPORT (OUTPATIENT)
Dept: REHABILITATION | Facility: HOSPITAL | Age: 27
End: 2022-10-17
Attending: ORTHOPAEDIC SURGERY
Payer: COMMERCIAL

## 2022-10-17 DIAGNOSIS — M62.81 QUADRICEPS WEAKNESS: ICD-10-CM

## 2022-10-17 DIAGNOSIS — M25.662 DECREASED RANGE OF MOTION OF LEFT KNEE: ICD-10-CM

## 2022-10-17 DIAGNOSIS — M25.562 ACUTE PAIN OF LEFT KNEE: Primary | ICD-10-CM

## 2022-10-17 DIAGNOSIS — R26.2 DIFFICULTY WALKING: ICD-10-CM

## 2022-10-17 PROCEDURE — 97530 THERAPEUTIC ACTIVITIES: CPT

## 2022-10-17 PROCEDURE — 97140 MANUAL THERAPY 1/> REGIONS: CPT

## 2022-10-17 PROCEDURE — 97110 THERAPEUTIC EXERCISES: CPT

## 2022-10-17 PROCEDURE — 97112 NEUROMUSCULAR REEDUCATION: CPT

## 2022-10-17 NOTE — PROGRESS NOTES
[facility]   Physical Therapy Treatment Note     Name: Billy Holbrook  [facility] Number: [id number]    Therapy Diagnosis:   Encounter Diagnoses   Name Primary?    Acute pain of left knee Yes    Decreased range of motion of left knee     Quadriceps weakness     Difficulty walking        Physician: PEACE Dempsey MD    Visit Date: 10/17/2022    Physician Orders: PT Eval and Treat   Medical Diagnosis from Referral: M25.562 (ICD-10-CM) - Acute pain of left knee  Evaluation Date: 9/7/2022  Authorization Period Expiration: 9/6/2023  Plan of Care Expiration: 12/31/2022  Visit # / Visits authorized: 27/40      Precautions: Standard     Time In: 11:06 am   Time Out: 12:30  Total Billable Time: 60 minutes    Procedure: 9/20/2022  1. Left Knee Arthroscopy, anterior cruciate ligament reconstruction 72386  2.  Left Knee Arthroscopy, with meniscectomy (medial OR lateral) 48953  3.  Left Knee Arthroscopy, debridement/shaving of articular cartilage (chondroplasty) 22065  4.  Left Knee  platelet rich plasma injection to the bone-patellar tendon-bone harvest site    Post-Op Plan:  ACL reconstruction with a bone-patellar tendon-bone autograft    SUBJECTIVE     Pt reports: feeling great. Scar finally looks closed/healed. Fatigued from session as expected.     He was compliant with home exercise program.  Response to previous treatment: no issues  Functional change: normal gait    Pain: 0/10 to tension anterior knee with bending  Location: left knee      OBJECTIVE     27 days post-op    Observation: mild effusion noted L knee  Incision healing well; no signs of infection or drainage  Dry skin noted karen-incsion  Atrophy in calf/quad on L     Range of Motion (extension/neutral/flexion):     Right Left   Knee PROM: 8-0-128/135 deg P:8-0-126* deg  A:6-0-108 deg      Strength:     Right Left Comment   Knee Extension: 5/5 3/5        Knee Flexion: 5/5 3/5        Hip Abduction: 4+/5 3+/5       Functional  tests(*=pain):   Gait/stairs: normalized  Quad activation: good     Joint Mobility: hypomobile PFJ and tibiofemoral    TTP along MCL- minimal to none    Treatment     Billy received the treatments listed below:      therapeutic exercises to develop strength, endurance, ROM, flexibility, posture, and core stabilization for 39 minutes including:  PROM L knee and ankle  Heel slides x 5 min   Heel prop x 5 min   SL heel raise 3 x 15  Side stepping BTB at ankles 3 x 10 yds  Manual HS and GS stretching  Evan stretching- manual  BFR: 60% LOP 30/15/15/15  -LAQ 7.5 lbs  -Standing HS curl 7.5lbs  Bike upright 10 min lv 3-8 intervals  Education/assessment    manual therapy techniques: Joint mobilizations and Soft tissue Mobilization were applied for 15 minutes, including:  PFJ gr 3 glides all planes  Tibial AP glide gr 3 with and without rotation bias on and off heel prop  Knee hyperextension gr 3  Fat pad mobility  Scar mobilization with Vitamin E    neuromuscular re-education activities to improve: Coordination, Kinesthetic, Sense, and Proprioception for 10 minutes. The following activities were included:  Quad set into hyperextension x 3 min 3 sec holds   SAQ x 3 min 3 sec holds  SLR with quad set from heel prop 3 x 15    therapeutic activities to improve functional performance for 20 minutes, including:  SL DL 20 lbs 3 x 10-NP  Retro walking sled pull x 2 laps 30 yds with 45 lbs-NP  Lateral heel taps 6 inch 3 x 10   BFR:  -DL BW squat to 24 inch box  -TKE black sport band with weight shift  Retro walking over hurdles- NP  12 inch step up 2 x 10      Patient Education and Home Exercises     Home Exercises Provided and Patient Education Provided     Education provided:   - HEP update, use of compression sleeve; wound care, precautions, activity pacing, goals    Written Home Exercises Provided: yes. Exercises were reviewed and Billy was able to demonstrate them prior to the end of the session.  Billy demonstrated good   understanding of the education provided. See EMR under Patient Instructions for exercises provided during therapy sessions    ASSESSMENT     Billy demonstrates good maintenance of knee ROM and joint mobility. Still some struggle with passive hyperextension at entry; active hyperextension improves with session and better quad activation. . Scar looks great; fully closed now. Quad is doing well and good tolerance for both BFR and traditional exercises. Cuing required on squat for weight shift. More Wbing on TKE today with quad fatigue noted. Pt reported/demonstrated no adverse response or exacerbation of symptoms during today's session.      Billy Is progressing well towards his goals.   Pt prognosis is Good.     Pt will continue to benefit from skilled outpatient physical therapy to address the deficits listed in the problem list box on initial evaluation, provide pt/family education and to maximize pt's level of independence in the home and community environment.     Pt's spiritual, cultural and educational needs considered and pt agreeable to plan of care and goals.     Anticipated barriers to physical therapy: none    Goals:  Short Term Goals: 8-10 weeks (progressing, not met)   1. Pt will be compliant with HEP 50% of prescribed amount.   2. The pt to demo improvement in left knee ROM to equal right knee ROM pain free   3.  The pt to demo good quad set with proper hyperextension moment of the Left knee   4. The pt to demo ambualting with elast restricitve AD witout major compensatory pattern left knee for at least 20 feet      Long Term Goals: 56 weeks (progressing, not met)   Pt will be compliant with % of prescribed amount.   The pt to demo pain free and uncompensated running mechanics x10 min on an indoor treadmill  The pt to demo tolerance to a squat at or bellow parallel with uncompensated mechanics x10   The pt to demo strength of L LE within 10% of R LE as demo'd on the [employer] machine   The pt to  demo a deficit of 10% or less on a triple hop, single leg broad jump and crossover hop compared to non operative LE.   The pt will report full participation in ADLs and IADLs without restrictions related to L  knee.     PLAN     Progress ROM to full and good indep maintenance of joint mobility. Progress loading as able using BFR. Move into more CKC exercises.      Manjit Lazcano, PT

## 2022-10-18 ENCOUNTER — CLINICAL SUPPORT (OUTPATIENT)
Dept: REHABILITATION | Facility: HOSPITAL | Age: 27
End: 2022-10-18
Payer: COMMERCIAL

## 2022-10-18 DIAGNOSIS — M62.81 QUADRICEPS WEAKNESS: ICD-10-CM

## 2022-10-18 DIAGNOSIS — R26.2 DIFFICULTY WALKING: ICD-10-CM

## 2022-10-18 DIAGNOSIS — M25.662 DECREASED RANGE OF MOTION OF LEFT KNEE: ICD-10-CM

## 2022-10-18 DIAGNOSIS — M25.562 ACUTE PAIN OF LEFT KNEE: Primary | ICD-10-CM

## 2022-10-18 PROCEDURE — 97140 MANUAL THERAPY 1/> REGIONS: CPT

## 2022-10-18 PROCEDURE — 97530 THERAPEUTIC ACTIVITIES: CPT

## 2022-10-18 PROCEDURE — 97110 THERAPEUTIC EXERCISES: CPT

## 2022-10-18 PROCEDURE — 97112 NEUROMUSCULAR REEDUCATION: CPT

## 2022-10-18 NOTE — PROGRESS NOTES
[facility]   Physical Therapy Treatment Note     Name: Billy Holbrook  [facility] Number: [id number]    Therapy Diagnosis:   Encounter Diagnoses   Name Primary?    Acute pain of left knee Yes    Decreased range of motion of left knee     Quadriceps weakness     Difficulty walking        Physician: PEACE Dempsey MD    Visit Date: 10/18/2022    Physician Orders: PT Eval and Treat   Medical Diagnosis from Referral: M25.562 (ICD-10-CM) - Acute pain of left knee  Evaluation Date: 9/7/2022  Authorization Period Expiration: 9/6/2023  Plan of Care Expiration: 12/31/2022  Visit # / Visits authorized: 28/40      Precautions: Standard     Time In: 10:40 am   Time Out: 12:10  Total Billable Time: 90 minutes    Procedure: 9/20/2022  1. Left Knee Arthroscopy, anterior cruciate ligament reconstruction 41002  2.  Left Knee Arthroscopy, with meniscectomy (medial OR lateral) 56789  3.  Left Knee Arthroscopy, debridement/shaving of articular cartilage (chondroplasty) 27322  4.  Left Knee  platelet rich plasma injection to the bone-patellar tendon-bone harvest site    Post-Op Plan:  ACL reconstruction with a bone-patellar tendon-bone autograft    SUBJECTIVE     Pt reports: his knee continues to feel good. Scar looks better with topical moisturizer. Still tight with end range flexion, but improving.     He was compliant with home exercise program.  Response to previous treatment: no issues  Functional change: normal gait    Pain: 0/10 to tension anterior knee with bending  Location: left knee      OBJECTIVE     27 days post-op    Observation: trace to none effusion noted L knee  Well healed scar; no keloid noted.   Atrophy in calf/quad on L     Range of Motion (extension/neutral/flexion):     Right Left   Knee PROM: 8-0-128/135 deg P:8-0-126* deg  A:6-0-110 deg      Strength:     Right Left Comment   Knee Extension: 5/5 3+/5        Knee Flexion: 5/5 3+/5        Hip Abduction: 4+/5 4-/5       Functional  tests(*=pain):   Gait/stairs: normalized  Quad activation: good     Joint Mobility: hypomobile PFJ and tibiofemoral    TTP along MCL- minimal to none    Treatment     Billy received the treatments listed below:      therapeutic exercises to develop strength, endurance, ROM, flexibility, posture, and core stabilization for 45 minutes including:  PROM L knee and ankle  Heel slides x 5 min   Heel prop x 5 min   SL heel raise 3 x 15  Side stepping BTB at feet 3 x 10 yds  Manual HS and GS stretching  Evan stretching- manual  BFR: 60% LOP 30/15/15/15  -LAQ 7.5 lbs  -Single leg press shuttle 3 bands  Bike upright 10 min lv 3-8 intervals  Education/assessment    manual therapy techniques: Joint mobilizations and Soft tissue Mobilization were applied for 15 minutes, including:  PFJ gr 3 glides all planes  Tibial AP glide gr 3 with and without rotation bias on and off heel prop  Knee hyperextension gr 3  Fat pad mobility  Scar mobilization with Vitamin E    neuromuscular re-education activities to improve: Coordination, Kinesthetic, Sense, and Proprioception for 15 minutes. The following activities were included:  Quad set into hyperextension x 3 min 3 sec holds   SAQ x 3 min 3 sec holds  SLR with quad set from heel prop 3 x 15  SLS on foam with 10 lb DB passes 3 rds 10 passes    therapeutic activities to improve functional performance for 15 minutes, including:  Lateral heel taps 6 inch 3 x 15  Retro walking over hurdles 3 rds 10 yds  12 inch step up 2 x 10-NP      Patient Education and Home Exercises     Home Exercises Provided and Patient Education Provided     Education provided:   - HEP update, use of compression sleeve; wound care, precautions, activity pacing, goals    Written Home Exercises Provided: yes. Exercises were reviewed and Billy was able to demonstrate them prior to the end of the session.  Billy demonstrated good  understanding of the education provided. See EMR under Patient Instructions for exercises  provided during therapy sessions    ASSESSMENT     Billy continues to have some stiffness limiting end range extension and flexion, both of which improve with manual therapy and exercise. His movement mechanics and intensity of exercise is improving with good tolerance. Pt reported/demonstrated no adverse response or exacerbation of symptoms during today's session.      Billy Is progressing well towards his goals.   Pt prognosis is Good.     Pt will continue to benefit from skilled outpatient physical therapy to address the deficits listed in the problem list box on initial evaluation, provide pt/family education and to maximize pt's level of independence in the home and community environment.     Pt's spiritual, cultural and educational needs considered and pt agreeable to plan of care and goals.     Anticipated barriers to physical therapy: none    Goals:  Short Term Goals: 8-10 weeks (progressing, not met)   1. Pt will be compliant with HEP 50% of prescribed amount.   2. The pt to demo improvement in left knee ROM to equal right knee ROM pain free   3.  The pt to demo good quad set with proper hyperextension moment of the Left knee   4. The pt to demo ambualting with elast restricitve AD witout major compensatory pattern left knee for at least 20 feet      Long Term Goals: 56 weeks (progressing, not met)   Pt will be compliant with % of prescribed amount.   The pt to demo pain free and uncompensated running mechanics x10 min on an indoor treadmill  The pt to demo tolerance to a squat at or bellow parallel with uncompensated mechanics x10   The pt to demo strength of L LE within 10% of R LE as demo'd on the biodex machine   The pt to demo a deficit of 10% or less on a triple hop, single leg broad jump and crossover hop compared to non operative LE.   The pt will report full participation in ADLs and IADLs without restrictions related to L  knee.     PLAN     Progress ROM to full at entry. Progress motor  control.exercise as tolerated in precautions.     Manjit Lazcano, PT

## 2022-10-19 ENCOUNTER — CLINICAL SUPPORT (OUTPATIENT)
Dept: REHABILITATION | Facility: HOSPITAL | Age: 27
End: 2022-10-19
Payer: COMMERCIAL

## 2022-10-19 DIAGNOSIS — M62.81 QUADRICEPS WEAKNESS: ICD-10-CM

## 2022-10-19 DIAGNOSIS — R26.2 DIFFICULTY WALKING: ICD-10-CM

## 2022-10-19 DIAGNOSIS — M25.662 DECREASED RANGE OF MOTION OF LEFT KNEE: ICD-10-CM

## 2022-10-19 DIAGNOSIS — M25.562 ACUTE PAIN OF LEFT KNEE: Primary | ICD-10-CM

## 2022-10-19 PROCEDURE — 97112 NEUROMUSCULAR REEDUCATION: CPT

## 2022-10-19 PROCEDURE — 97110 THERAPEUTIC EXERCISES: CPT

## 2022-10-19 PROCEDURE — 97140 MANUAL THERAPY 1/> REGIONS: CPT

## 2022-10-19 PROCEDURE — 97530 THERAPEUTIC ACTIVITIES: CPT

## 2022-10-19 NOTE — PROGRESS NOTES
[facility]   Physical Therapy Treatment Note     Name: Billy Holbrook  [facility] Number: [id number]    Therapy Diagnosis:   Encounter Diagnoses   Name Primary?    Acute pain of left knee Yes    Decreased range of motion of left knee     Quadriceps weakness     Difficulty walking      Physician: PEACE Dempsey MD    Visit Date: 10/19/2022    Physician Orders: PT Eval and Treat   Medical Diagnosis from Referral: M25.562 (ICD-10-CM) - Acute pain of left knee  Evaluation Date: 9/7/2022  Authorization Period Expiration: 9/6/2023  Plan of Care Expiration: 12/31/2022  Visit # / Visits authorized: 29/40      Precautions: Standard     Time In: 11:05 am   Time Out: 12:15  Total Billable Time: 70 minutes    Procedure: 9/20/2022  1. Left Knee Arthroscopy, anterior cruciate ligament reconstruction 43450  2.  Left Knee Arthroscopy, with meniscectomy (medial OR lateral) 44151  3.  Left Knee Arthroscopy, debridement/shaving of articular cartilage (chondroplasty) 72920  4.  Left Knee  platelet rich plasma injection to the bone-patellar tendon-bone harvest site    Post-Op Plan:  ACL reconstruction with a bone-patellar tendon-bone autograft    SUBJECTIVE     Pt reports: quad is sore today, but feeling good.     He was compliant with home exercise program.  Response to previous treatment: no issues  Functional change: normal gait    Pain: 0/10 to tension anterior knee with bending  Location: left knee      OBJECTIVE     28 days post-op    Observation: trace to none effusion noted L knee  Well healed scar; no keloid noted. Medial portal tender   Atrophy in calf/quad on L     Range of Motion (extension/neutral/flexion):     Right Left   Knee PROM: 8-0-128/135 deg P:8-0-128* deg  A:6-0-110 deg      Strength:     Right Left Comment   Knee Extension: 5/5 3+/5        Knee Flexion: 5/5 3+/5        Hip Abduction: 4+/5 4-/5       Functional tests(*=pain):   Gait/stairs: normalized  Quad activation: good     Joint  Mobility: hypomobile PFJ and tibiofemoral    TTP along MCL- minimal to none    Treatment     Billy received the treatments listed below:      therapeutic exercises to develop strength, endurance, ROM, flexibility, posture, and core stabilization for 33 minutes including:  PROM L knee and ankle  Heel slides x 3 min   Heel prop x 2 min   SL heel raise 3 x 15  Side plank clamshells 6 x 8 x 2 sec linus  HS stretching  GS stretching  Evan stretching- manual  Prone knee flexion manual OP  BFR: 60% LOP 30/15/15/15  -LAQ 7.5 lbs  Bike upright 10 min lv 4-8 intervals  Education/assessment    manual therapy techniques: Joint mobilizations and Soft tissue Mobilization were applied for 15 minutes, including:  PFJ gr 3 glides all planes  Tibial AP glide gr 3 with and without rotation bias on and off heel prop  Knee hyperextension gr 3  Fat pad mobility  Scar mobilization: advised on HEP  Knee flx->ext with tibial AP gr 2-3; quick motion    neuromuscular re-education activities to improve: Coordination, Kinesthetic, Sense, and Proprioception for 12 minutes. The following activities were included:  Quad set into hyperextension x 3 min 3 sec holds   SAQ x 3 min 3 sec holds  SLR with quad set from heel prop 3 x 15  SLS on foam with OH to floor slam 8 lb med ball 3 x 12    therapeutic activities to improve functional performance for 10 minutes, including:  Lateral heel taps 6 inch 3 x 15-NP  12 inch step up 3 x 12 to hip drive  BFR 60% DL box squat to 20 inch      Patient Education and Home Exercises     Home Exercises Provided and Patient Education Provided     Education provided:   - HEP update, use of compression sleeve, precautions, activity pacing, goals    Written Home Exercises Provided: yes. Exercises were reviewed and Billy was able to demonstrate them prior to the end of the session.  Billy demonstrated good  understanding of the education provided. See EMR under Patient Instructions for exercises provided during therapy  sessions    ASSESSMENT     Billy is progressing his balance, strength, motor activation, and functional mobility well. ROM and joint mobility is good, but still some stiffness at entry, but musch easier to gain back. Pt reported/demonstrated no adverse response or exacerbation of symptoms during today's session.      Billy Is progressing well towards his goals.   Pt prognosis is Good.     Pt will continue to benefit from skilled outpatient physical therapy to address the deficits listed in the problem list box on initial evaluation, provide pt/family education and to maximize pt's level of independence in the home and community environment.     Pt's spiritual, cultural and educational needs considered and pt agreeable to plan of care and goals.     Anticipated barriers to physical therapy: none    Goals:  Short Term Goals: 8-10 weeks (progressing, not met)   1. Pt will be compliant with HEP 50% of prescribed amount.   2. The pt to demo improvement in left knee ROM to equal right knee ROM pain free   3.  The pt to demo good quad set with proper hyperextension moment of the Left knee   4. The pt to demo ambualting with elast restricitve AD witout major compensatory pattern left knee for at least 20 feet      Long Term Goals: 56 weeks (progressing, not met)   Pt will be compliant with % of prescribed amount.   The pt to demo pain free and uncompensated running mechanics x10 min on an indoor treadmill  The pt to demo tolerance to a squat at or bellow parallel with uncompensated mechanics x10   The pt to demo strength of L LE within 10% of R LE as demo'd on the biodex machine   The pt to demo a deficit of 10% or less on a triple hop, single leg broad jump and crossover hop compared to non operative LE.   The pt will report full participation in ADLs and IADLs without restrictions related to L  knee.     PLAN     Progress ROM to full at entry. Progress motor control.into strength exercise as tolerated in  precautions.     Manjit Lazcano, PT

## 2022-10-20 ENCOUNTER — CLINICAL SUPPORT (OUTPATIENT)
Dept: REHABILITATION | Facility: HOSPITAL | Age: 27
End: 2022-10-20
Payer: COMMERCIAL

## 2022-10-20 DIAGNOSIS — R26.2 DIFFICULTY WALKING: ICD-10-CM

## 2022-10-20 DIAGNOSIS — M25.562 ACUTE PAIN OF LEFT KNEE: Primary | ICD-10-CM

## 2022-10-20 DIAGNOSIS — M25.662 DECREASED RANGE OF MOTION OF LEFT KNEE: ICD-10-CM

## 2022-10-20 DIAGNOSIS — M62.81 QUADRICEPS WEAKNESS: ICD-10-CM

## 2022-10-20 PROCEDURE — 97530 THERAPEUTIC ACTIVITIES: CPT

## 2022-10-20 PROCEDURE — 97110 THERAPEUTIC EXERCISES: CPT

## 2022-10-20 PROCEDURE — 97112 NEUROMUSCULAR REEDUCATION: CPT

## 2022-10-20 PROCEDURE — 97140 MANUAL THERAPY 1/> REGIONS: CPT

## 2022-10-20 NOTE — PROGRESS NOTES
[facility]   Physical Therapy Treatment Note     Name: Billy Holbrook  [facility] Number: [id number]    Therapy Diagnosis:   Encounter Diagnoses   Name Primary?    Acute pain of left knee Yes    Decreased range of motion of left knee     Quadriceps weakness     Difficulty walking        Physician: PEACE Dempsey MD    Visit Date: 10/20/2022    Physician Orders: PT Eval and Treat   Medical Diagnosis from Referral: M25.562 (ICD-10-CM) - Acute pain of left knee  Evaluation Date: 9/7/2022  Authorization Period Expiration: 9/6/2023  Plan of Care Expiration: 12/31/2022  Visit # / Visits authorized: 30/40      Precautions: Standard     Time In: 12:00 pm   Time Out: 1:40  Total Billable Time: 60 minutes    Procedure: 9/20/2022  1. Left Knee Arthroscopy, anterior cruciate ligament reconstruction 93462  2.  Left Knee Arthroscopy, with meniscectomy (medial OR lateral) 43585  3.  Left Knee Arthroscopy, debridement/shaving of articular cartilage (chondroplasty) 93551  4.  Left Knee  platelet rich plasma injection to the bone-patellar tendon-bone harvest site    Post-Op Plan:  ACL reconstruction with a bone-patellar tendon-bone autograft    SUBJECTIVE     Pt reports: he is feeling good. Seeing progress. Pain and swelling controlled. Medial portal , but he is rubbing it daily.     He was compliant with home exercise program.  Response to previous treatment: no issues  Functional change: normal gait    Pain: 0/10 to tension anterior knee with bending  Location: left knee      OBJECTIVE     29 days post-op    Observation: trace to none effusion noted L knee  Well healed scar; no keloid noted. Medial portal tender   Atrophy in calf/quad on L     Range of Motion (extension/neutral/flexion):     Right Left   Knee PROM: 8-0-128/135 deg P:8-0-128* deg  A:6-0-110 deg      Strength:     Right Left Comment   Knee Extension: 5/5 3+/5        Knee Flexion: 5/5 3+/5        Hip Abduction: 4+/5 4-/5        Functional tests(*=pain):   Gait/stairs: normalized  Quad activation: good     Joint Mobility: hypomobile PFJ and tibiofemoral    TTP along MCL- minimal to none    Treatment     Billy received the treatments listed below:      therapeutic exercises to develop strength, endurance, ROM, flexibility, posture, and core stabilization for 43 minutes including:  PROM L knee and ankle  Heel slides x 3 min   Heel prop x 2 min   SL heel raise 3 x 15  Side plank clamshells 2 x 15 x 3 sec linus BTB  DL bridge BTB 2 x 15 x 5 sec  HS stretching  GS stretching  Evan stretching- manual  Prone knee flexion manual OP  BFR: 70% LOP 30/15/15/15  -LAQ 7.5 lbs  -SL shuttle 3 bands  -HS curl standing 7.5 lbs  Bike upright 10 min lv 4-8 intervals  Education/assessment    manual therapy techniques: Joint mobilizations and Soft tissue Mobilization were applied for 15 minutes, including:  PFJ gr 3 glides all planes  Tibial AP glide gr 3 with and without rotation bias on and off heel prop  Knee hyperextension gr 3  Fat pad mobility  Scar mobilization: advised on HEP  Knee flx->ext with tibial AP gr 2-3; quick motion    neuromuscular re-education activities to improve: Coordination, Kinesthetic, Sense, and Proprioception for 12 minutes. The following activities were included:  Quad set into hyperextension x 3 min 3 sec holds   SAQ x 3 min 3 sec holds  SLR with quad set from heel prop 3 x 15  SLS on foam with OH to floor slam 8 lb med ball 3 x 12-NP    therapeutic activities to improve functional performance for 30 minutes, including:  Lateral heel taps 6->8 inch 3 x 12 linus  12 inch step up 3 x 12 to hip drive-NP  DL RDL 35-43 lbs 3 x 15  TKE 3 x 15 standing black sport band  Retro sled pull 45 lbs added 3 laps; focus TKE      Patient Education and Home Exercises     Home Exercises Provided and Patient Education Provided     Education provided:   - HEP update, use of compression sleeve, precautions, activity pacing, goals    Written Home  Exercises Provided: yes. Exercises were reviewed and Billy was able to demonstrate them prior to the end of the session.  Billy demonstrated good  understanding of the education provided. See EMR under Patient Instructions for exercises provided during therapy sessions    ASSESSMENT     Billy demonstrated improved muscle control with eccentric heel taps today at larger depth. Knee extension is full after a few minutes of stretching and manual therapy. Flexion is almost full. Good tolerance to session, increased LOP to 70% today with some increased non-residual discomfort noted. Knee remains quiet in regards to swelling and pain. Pt reported/demonstrated no adverse response or exacerbation of symptoms during today's session.      Billy Is progressing well towards his goals.   Pt prognosis is Good.     Pt will continue to benefit from skilled outpatient physical therapy to address the deficits listed in the problem list box on initial evaluation, provide pt/family education and to maximize pt's level of independence in the home and community environment.     Pt's spiritual, cultural and educational needs considered and pt agreeable to plan of care and goals.     Anticipated barriers to physical therapy: none    Goals:  Short Term Goals: 8-10 weeks (progressing, not met)   1. Pt will be compliant with HEP 50% of prescribed amount.   2. The pt to demo improvement in left knee ROM to equal right knee ROM pain free   3.  The pt to demo good quad set with proper hyperextension moment of the Left knee   4. The pt to demo ambualting with elast restricitve AD witout major compensatory pattern left knee for at least 20 feet      Long Term Goals: 56 weeks (progressing, not met)   Pt will be compliant with % of prescribed amount.   The pt to demo pain free and uncompensated running mechanics x10 min on an indoor treadmill  The pt to demo tolerance to a squat at or bellow parallel with uncompensated mechanics x10   The  pt to demo strength of L LE within 10% of R LE as demo'd on the biodex machine   The pt to demo a deficit of 10% or less on a triple hop, single leg broad jump and crossover hop compared to non operative LE.   The pt will report full participation in ADLs and IADLs without restrictions related to L  knee.     PLAN     Progress ROM to full at entry. Progress motor control.into strength exercise as tolerated in precautions.     Manjit Lazcano, PT

## 2022-10-21 ENCOUNTER — CLINICAL SUPPORT (OUTPATIENT)
Dept: REHABILITATION | Facility: HOSPITAL | Age: 27
End: 2022-10-21
Payer: COMMERCIAL

## 2022-10-21 DIAGNOSIS — R26.2 DIFFICULTY WALKING: ICD-10-CM

## 2022-10-21 DIAGNOSIS — M25.562 ACUTE PAIN OF LEFT KNEE: Primary | ICD-10-CM

## 2022-10-21 DIAGNOSIS — M25.662 DECREASED RANGE OF MOTION OF LEFT KNEE: ICD-10-CM

## 2022-10-21 DIAGNOSIS — M62.81 QUADRICEPS WEAKNESS: ICD-10-CM

## 2022-10-21 PROCEDURE — 97530 THERAPEUTIC ACTIVITIES: CPT

## 2022-10-21 PROCEDURE — 97140 MANUAL THERAPY 1/> REGIONS: CPT

## 2022-10-21 PROCEDURE — 97112 NEUROMUSCULAR REEDUCATION: CPT

## 2022-10-21 PROCEDURE — 97110 THERAPEUTIC EXERCISES: CPT

## 2022-10-21 NOTE — PROGRESS NOTES
[facility]   Physical Therapy Treatment Note     Name: Billy Holbrook  [facility] Number: [id number]    Therapy Diagnosis:   Encounter Diagnoses   Name Primary?    Acute pain of left knee Yes    Decreased range of motion of left knee     Quadriceps weakness     Difficulty walking      Physician: PEACE Dempsey MD    Visit Date: 10/21/2022    Physician Orders: PT Eval and Treat   Medical Diagnosis from Referral: M25.562 (ICD-10-CM) - Acute pain of left knee  Evaluation Date: 9/7/2022  Authorization Period Expiration: 9/6/2023  Plan of Care Expiration: 12/31/2022  Visit # / Visits authorized: 31/40      Precautions: Standard     Time In: 11:00 am   Time Out: 12:20  Total Billable Time: 80 minutes    Procedure: 9/20/2022  1. Left Knee Arthroscopy, anterior cruciate ligament reconstruction 15740  2.  Left Knee Arthroscopy, with meniscectomy (medial OR lateral) 62030  3.  Left Knee Arthroscopy, debridement/shaving of articular cartilage (chondroplasty) 69338  4.  Left Knee  platelet rich plasma injection to the bone-patellar tendon-bone harvest site    Post-Op Plan:  ACL reconstruction with a bone-patellar tendon-bone autograft    SUBJECTIVE     Pt reports: knee is feeling good. Fatigued after session. Using his hyperICE for heat and cold at home. Gameready for cold as well.     He was compliant with home exercise program.  Response to previous treatment: no issues  Functional change: normal gait    Pain: 0/10 to tension anterior knee with bending  Location: left knee      OBJECTIVE     30 days post-op    Observation: trace to none effusion noted L knee  Well healed scar; no keloid noted. Medial portal tender   Atrophy in calf/quad on L     Range of Motion (extension/neutral/flexion):     Right Left   Knee PROM: 8-0-128/135 deg P:8-0-128* deg  A:6-0-110 deg      Strength:     Right Left Comment   Knee Extension: 5/5 3+/5        Knee Flexion: 5/5 3+/5        Hip Abduction: 4+/5 4-/5        Functional tests(*=pain):   Gait/stairs: normalized  Quad activation: good     Joint Mobility: hypomobile PFJ and tibiofemoral    TTP along MCL- minimal to none    Treatment     Billy received the treatments listed below:      therapeutic exercises to develop strength, endurance, ROM, flexibility, posture, and core stabilization for 25 minutes including:  PROM L knee and ankle; manual stretching LE/hips  Heel slides x 3 min   Heel prop x 2 min   SL heel raise 3 x 15  Side step BTB at ft 3 laps with UE press black sport band  Bike upright 10 min lv 4-8 intervals-NP  Education/assessment    manual therapy techniques: Joint mobilizations and Soft tissue Mobilization were applied for 12 minutes, including:  PFJ gr 3 glides all planes  Tibial AP glide gr 3 with and without rotation bias on and off heel prop  Knee hyperextension gr 3  Fat pad mobility  Scar mobilization: advised on HEP  Knee flx->ext with tibial AP gr 2-3; quick motion    neuromuscular re-education activities to improve: Coordination, Kinesthetic, Sense, and Proprioception for 13 minutes. The following activities were included:  Quad set into hyperextension x 3 min 3 sec holds   SAQ x 3 min 3 sec holds  SLR with quad set from heel prop 3 x 15  SLS on foam with 10 lb PNF movements 3 x 10 reps each    therapeutic activities to improve functional performance for 30 minutes, including:  Lateral heel taps 8->9 inch 3 x 12 linus  12 inch step up 3 x 12 to hip drive-NP  DL squat to 20 inch box 38 lbs 3 x 15  TKE 3 x 15 standing black sport band  High knee marching, but kicks, HS scoop, HS march x 3 laps each  Devries carry 40 lbs each UE 3 x 30 yds    Patient Education and Home Exercises     Home Exercises Provided and Patient Education Provided     Education provided:   - HEP update, use of compression sleeve, precautions, activity pacing, goals    Written Home Exercises Provided: yes. Exercises were reviewed and Billy was able to demonstrate them prior to  the end of the session.  Billy demonstrated good  understanding of the education provided. See EMR under Patient Instructions for exercises provided during therapy sessions    ASSESSMENT     Billy did well with 2 circuits today focusing on LE strength, endurance, neuromuscular control, and ROM. No issues noted in session. ROM and joint mobility continue to require some work to gain full mobility; advised him on HEP to maintain especially over the weekend. Pt reported/demonstrated no adverse response or exacerbation of symptoms during today's session.      Billy Is progressing well towards his goals.   Pt prognosis is Good.     Pt will continue to benefit from skilled outpatient physical therapy to address the deficits listed in the problem list box on initial evaluation, provide pt/family education and to maximize pt's level of independence in the home and community environment.     Pt's spiritual, cultural and educational needs considered and pt agreeable to plan of care and goals.     Anticipated barriers to physical therapy: none    Goals:  Short Term Goals: 8-10 weeks (progressing, not met)   1. Pt will be compliant with HEP 50% of prescribed amount.   2. The pt to demo improvement in left knee ROM to equal right knee ROM pain free   3.  The pt to demo good quad set with proper hyperextension moment of the Left knee   4. The pt to demo ambualting with elast restricitve AD witout major compensatory pattern left knee for at least 20 feet      Long Term Goals: 56 weeks (progressing, not met)   Pt will be compliant with % of prescribed amount.   The pt to demo pain free and uncompensated running mechanics x10 min on an indoor treadmill  The pt to demo tolerance to a squat at or bellow parallel with uncompensated mechanics x10   The pt to demo strength of L LE within 10% of R LE as demo'd on the biodex machine   The pt to demo a deficit of 10% or less on a triple hop, single leg broad jump and crossover hop  compared to non operative LE.   The pt will report full participation in ADLs and IADLs without restrictions related to L  knee.     PLAN     Progress ROM to full at entry. Progress motor control.into strength exercise as tolerated in precautions.     Manjit Lazcano, PT

## 2022-10-24 ENCOUNTER — CLINICAL SUPPORT (OUTPATIENT)
Dept: REHABILITATION | Facility: HOSPITAL | Age: 27
End: 2022-10-24
Payer: COMMERCIAL

## 2022-10-24 DIAGNOSIS — M25.562 ACUTE PAIN OF LEFT KNEE: Primary | ICD-10-CM

## 2022-10-24 DIAGNOSIS — M62.81 QUADRICEPS WEAKNESS: ICD-10-CM

## 2022-10-24 DIAGNOSIS — M25.662 DECREASED RANGE OF MOTION OF LEFT KNEE: ICD-10-CM

## 2022-10-24 DIAGNOSIS — R26.2 DIFFICULTY WALKING: ICD-10-CM

## 2022-10-24 PROCEDURE — 97140 MANUAL THERAPY 1/> REGIONS: CPT

## 2022-10-24 PROCEDURE — 97110 THERAPEUTIC EXERCISES: CPT

## 2022-10-24 PROCEDURE — 97112 NEUROMUSCULAR REEDUCATION: CPT

## 2022-10-24 NOTE — PROGRESS NOTES
[facility]   Physical Therapy Treatment Note     Name: Billy Holbrook  [facility] Number: [id number]    Therapy Diagnosis:   Encounter Diagnoses   Name Primary?    Acute pain of left knee Yes    Decreased range of motion of left knee     Quadriceps weakness     Difficulty walking      Physician: PEACE Dempsey MD    Visit Date: 10/24/2022    Physician Orders: PT Eval and Treat   Medical Diagnosis from Referral: M25.562 (ICD-10-CM) - Acute pain of left knee  Evaluation Date: 9/7/2022  Authorization Period Expiration: 9/6/2023  Plan of Care Expiration: 12/31/2022  Visit # / Visits authorized: 31/40      Precautions: Standard     Time In: 11:35 am   Time Out: 12:45  Total Billable Time: 60 minutes    Procedure: 9/20/2022  1. Left Knee Arthroscopy, anterior cruciate ligament reconstruction 65902  2.  Left Knee Arthroscopy, with meniscectomy (medial OR lateral) 21979  3.  Left Knee Arthroscopy, debridement/shaving of articular cartilage (chondroplasty) 45991  4.  Left Knee  platelet rich plasma injection to the bone-patellar tendon-bone harvest site    Post-Op Plan:  ACL reconstruction with a bone-patellar tendon-bone autograft    SUBJECTIVE     Pt reports: he feels good. Agreeable to BFR today, but remains uncomfortable with 70% squeeze; non residual. Knee feels great.     He was compliant with home exercise program.  Response to previous treatment: no issues  Functional change: normal gait    Pain: 0/10 to tension anterior knee with bending  Location: left knee      OBJECTIVE     34 days post-op    Observation: trace to none effusion noted L knee  Well healed scar; no keloid noted. Medial portal tender   Atrophy in calf/quad on L     Range of Motion (extension/neutral/flexion):     Right Left   Knee PROM: 8-0-128/135 deg P:8-0-128 deg  A:6-0-112 deg      Strength:     Right Left Comment   Knee Extension: 5/5 3+/5        Knee Flexion: 5/5 3+/5        Hip Abduction: 4+/5 4-/5       Functional  tests(*=pain):   Gait/stairs: normalized  Quad activation: good     Joint Mobility: hypomobile PFJ and tibiofemoral    TTP along MCL- minimal to none    Treatment     Billy received the treatments listed below:      therapeutic exercises to develop strength, endurance, ROM, flexibility, posture, and core stabilization for 50 minutes including:  PROM L knee and ankle; manual stretching LE/hips  Prone and drew position knee flexion  Heel prop x 2 min   SL heel raise 3 x 15-NP  Side step BTB at ft 3 laps with UE press black sport band-NP  Bike upright 10 min lv 4-8 intervals-NP  BFR 70% LOP  -LAQ 7.5 lbs  -prone HS curl 7.5 lbs  -step up 6 inch with 40 lb vest  -squat to 20 inch box with 40 lb vest  Education/assessment    manual therapy techniques: Joint mobilizations and Soft tissue Mobilization were applied for 12 minutes, including:  PFJ gr 3 glides all planes  Tibial AP glide gr 3 with and without rotation bias on and off heel prop  Knee hyperextension gr 3  Fat pad mobility  Scar mobilization: advised on HEP  Knee flx->ext with tibial AP gr 2-3; quick motion    neuromuscular re-education activities to improve: Coordination, Kinesthetic, Sense, and Proprioception for 8 minutes. The following activities were included:  Quad set into hyperextension x 5 min 3 sec holds AAROM to AROM  SLR from quad set x 15  SLS on foam with 10 lb PNF movements 3 x 10 reps each-NP    therapeutic activities to improve functional performance for 0 minutes, including:  Lateral heel taps 8->9 inch 3 x 12 linus-NP  12 inch step up 3 x 12 to hip drive-NP  DL squat to 20 inch box 38 lbs 3 x 15-NP  TKE 3 x 15 standing black sport band-NP  High knee marching, but kicks, HS scoop, HS march x 3 laps each-NP  Devries carry 40 lbs each UE 3 x 30 yds-NP    Patient Education and Home Exercises     Home Exercises Provided and Patient Education Provided     Education provided:   - HEP update, use of compression sleeve, precautions, activity pacing,  goals    Written Home Exercises Provided: yes. Exercises were reviewed and Billy was able to demonstrate them prior to the end of the session.  Billy demonstrated good  understanding of the education provided. See EMR under Patient Instructions for exercises provided during therapy sessions    ASSESSMENT     Billy continues to do very well. Has full PROM after a couple minutes of manual therapy/stretching, improving AROM extension to within 2-3 deg of PROM. Best his motion has been upon entry so far.  Increasing BFR LOP and to 4 exercises today. Tolerance was fair to compression with BFR. Pt reported/demonstrated no adverse response or exacerbation of symptoms during today's session.      Billy Is progressing well towards his goals.   Pt prognosis is Good.     Pt will continue to benefit from skilled outpatient physical therapy to address the deficits listed in the problem list box on initial evaluation, provide pt/family education and to maximize pt's level of independence in the home and community environment.     Pt's spiritual, cultural and educational needs considered and pt agreeable to plan of care and goals.     Anticipated barriers to physical therapy: none    Goals:  Short Term Goals: 8-10 weeks (progressing, not met)   1. Pt will be compliant with HEP 50% of prescribed amount.   2. The pt to demo improvement in left knee ROM to equal right knee ROM pain free   3.  The pt to demo good quad set with proper hyperextension moment of the Left knee   4. The pt to demo ambualting with elast restricitve AD witout major compensatory pattern left knee for at least 20 feet      Long Term Goals: 56 weeks (progressing, not met)   Pt will be compliant with % of prescribed amount.   The pt to demo pain free and uncompensated running mechanics x10 min on an indoor treadmill  The pt to demo tolerance to a squat at or bellow parallel with uncompensated mechanics x10   The pt to demo strength of L LE within 10%  of R LE as demo'd on the biodex machine   The pt to demo a deficit of 10% or less on a triple hop, single leg broad jump and crossover hop compared to non operative LE.   The pt will report full participation in ADLs and IADLs without restrictions related to L  knee.     PLAN     Progress ROM to full at entry not requiring any manual/stretching. Progress motor control.into strength exercise as tolerated in precautions.     Manjit Lazcano, PT

## 2022-10-25 ENCOUNTER — CLINICAL SUPPORT (OUTPATIENT)
Dept: REHABILITATION | Facility: HOSPITAL | Age: 27
End: 2022-10-25
Payer: COMMERCIAL

## 2022-10-25 DIAGNOSIS — M62.81 QUADRICEPS WEAKNESS: ICD-10-CM

## 2022-10-25 DIAGNOSIS — M25.562 ACUTE PAIN OF LEFT KNEE: Primary | ICD-10-CM

## 2022-10-25 DIAGNOSIS — M25.662 DECREASED RANGE OF MOTION OF LEFT KNEE: ICD-10-CM

## 2022-10-25 DIAGNOSIS — R26.2 DIFFICULTY WALKING: ICD-10-CM

## 2022-10-25 PROCEDURE — 97530 THERAPEUTIC ACTIVITIES: CPT

## 2022-10-25 PROCEDURE — 97140 MANUAL THERAPY 1/> REGIONS: CPT

## 2022-10-25 PROCEDURE — 97110 THERAPEUTIC EXERCISES: CPT

## 2022-10-25 PROCEDURE — 97112 NEUROMUSCULAR REEDUCATION: CPT

## 2022-10-25 NOTE — PROGRESS NOTES
[facility]   Physical Therapy Treatment Note     Name: Billy Holbrook  [facility] Number: [id number]    Therapy Diagnosis:   Encounter Diagnoses   Name Primary?    Acute pain of left knee Yes    Decreased range of motion of left knee     Quadriceps weakness     Difficulty walking        Physician: PEACE Dempsey MD    Visit Date: 10/25/2022    Physician Orders: PT Eval and Treat   Medical Diagnosis from Referral: M25.562 (ICD-10-CM) - Acute pain of left knee  Evaluation Date: 9/7/2022  Authorization Period Expiration: 9/6/2023  Plan of Care Expiration: 12/31/2022  Visit # / Visits authorized: 32/40      Precautions: Standard     Time In: 11:00 am   Time Out: 12:15  Total Billable Time: 75 minutes    Procedure: 9/20/2022  1. Left Knee Arthroscopy, anterior cruciate ligament reconstruction 75718  2.  Left Knee Arthroscopy, with meniscectomy (medial OR lateral) 59693  3.  Left Knee Arthroscopy, debridement/shaving of articular cartilage (chondroplasty) 59977  4.  Left Knee  platelet rich plasma injection to the bone-patellar tendon-bone harvest site    Post-Op Plan:  ACL reconstruction with a bone-patellar tendon-bone autograft    SUBJECTIVE     Pt reports: his knee is feeling great. Tired after today.      He was compliant with home exercise program.  Response to previous treatment: no issues  Functional change: normal gait    Pain: 0/10 to tension anterior knee with bending  Location: left knee      OBJECTIVE     35 days post-op    Observation: trace to none effusion noted L knee  Well healed scar; no keloid noted. Medial portal tender   Atrophy in calf/quad on L     Range of Motion (extension/neutral/flexion):     Right Left   Knee PROM: 8-0-128/135 deg P:8-0-128 deg  A:6-0-112 deg      Strength:     Right Left Comment   Knee Extension: 5/5 4/5        Knee Flexion: 5/5 4/5        Hip Abduction: 4+/5 4-/5       Functional tests(*=pain):   Gait/stairs: normalized  Quad activation: good      Joint Mobility: hypomobile PFJ and tibiofemoral    TTP along MCL- minimal to none    Treatment     Billy received the treatments listed below:      therapeutic exercises to develop strength, endurance, ROM, flexibility, posture, and core stabilization for 20 minutes including:  PROM L knee and ankle; manual stretching LE/hips  Prone and drew position knee flexion  Heel prop x 2 min   SL heel raise 3 x 15 off step  Side step BTB at ft 3 laps with UE press black sport band-NP  Bike upright 10 min lv 4-9 intervals  Education/assessment    manual therapy techniques: Joint mobilizations and Soft tissue Mobilization were applied for 10 minutes, including:  PFJ gr 3 glides all planes  Tibial AP glide gr 3 with and without rotation bias on and off heel prop  Knee hyperextension gr 3  Fat pad mobility  Scar mobilization: advised on HEP  Knee flx->ext with tibial AP gr 2-3; quick motion    neuromuscular re-education activities to improve: Coordination, Kinesthetic, Sense, and Proprioception for 15 minutes. The following activities were included:  Quad set into hyperextension x 5 min 3 sec holds AAROM to AROM  SLR from quad set x 15 off heel prop  SLS on foam with 10 lb med ball passes at rebounder- anterior    therapeutic activities to improve functional performance for 30 minutes, including:  Lateral heel taps 8 inch 3 x 12 linus  High knee marching, but kicks, HS scoop,  quad pulls x 3 laps each  Sled push with 180 lbs added x 4 laps  RDL 53 lb KB 4 x 15    Patient Education and Home Exercises     Home Exercises Provided and Patient Education Provided     Education provided:   - HEP update, use of compression sleeve, precautions, activity pacing, goals    Written Home Exercises Provided: yes. Exercises were reviewed and Billy was able to demonstrate them prior to the end of the session.  Billy demonstrated good  understanding of the education provided. See EMR under Patient Instructions for exercises provided during  therapy sessions    ASSESSMENT     Billy is doing very well. His ROM is full after some manual therapy/exercises and he maintained that well in session. Ramping up circuits for strength, functional tasks/mobility, and cardio training today without issue. Pt reported/demonstrated no adverse response or exacerbation of symptoms during today's session.      Billy Is progressing well towards his goals.   Pt prognosis is Good.     Pt will continue to benefit from skilled outpatient physical therapy to address the deficits listed in the problem list box on initial evaluation, provide pt/family education and to maximize pt's level of independence in the home and community environment.     Pt's spiritual, cultural and educational needs considered and pt agreeable to plan of care and goals.     Anticipated barriers to physical therapy: none    Goals:  Short Term Goals: 8-10 weeks (progressing, not met)   1. Pt will be compliant with HEP 50% of prescribed amount. MET  2. The pt to demo improvement in left knee ROM to equal right knee ROM pain free MET post session  3.  The pt to demo good quad set with proper hyperextension moment of the Left knee   4. The pt to demo ambualting with elast restricitve AD witout major compensatory pattern left knee for at least 20 feet      Long Term Goals: 56 weeks (progressing, not met)   Pt will be compliant with % of prescribed amount.   The pt to demo pain free and uncompensated running mechanics x10 min on an indoor treadmill  The pt to demo tolerance to a squat at or bellow parallel with uncompensated mechanics x10   The pt to demo strength of L LE within 10% of R LE as demo'd on the biodex machine   The pt to demo a deficit of 10% or less on a triple hop, single leg broad jump and crossover hop compared to non operative LE.   The pt will report full participation in ADLs and IADLs without restrictions related to L  knee.     PLAN     Progress ROM to full at entry not requiring  any manual/stretching. Progress motor control.into strength exercise as tolerated in precautions.     Manjit Lazcano, PT

## 2022-10-26 ENCOUNTER — CLINICAL SUPPORT (OUTPATIENT)
Dept: REHABILITATION | Facility: HOSPITAL | Age: 27
End: 2022-10-26
Payer: COMMERCIAL

## 2022-10-26 DIAGNOSIS — M25.662 DECREASED RANGE OF MOTION OF LEFT KNEE: ICD-10-CM

## 2022-10-26 DIAGNOSIS — M25.562 ACUTE PAIN OF LEFT KNEE: Primary | ICD-10-CM

## 2022-10-26 DIAGNOSIS — R26.2 DIFFICULTY WALKING: ICD-10-CM

## 2022-10-26 DIAGNOSIS — M62.81 QUADRICEPS WEAKNESS: ICD-10-CM

## 2022-10-26 PROCEDURE — 97112 NEUROMUSCULAR REEDUCATION: CPT

## 2022-10-26 PROCEDURE — 97140 MANUAL THERAPY 1/> REGIONS: CPT

## 2022-10-26 PROCEDURE — 97110 THERAPEUTIC EXERCISES: CPT

## 2022-10-26 NOTE — PROGRESS NOTES
[facility]   Physical Therapy Treatment Note     Name: Billy Holbrook  [facility] Number: [id number]    Therapy Diagnosis:   Encounter Diagnoses   Name Primary?    Acute pain of left knee Yes    Decreased range of motion of left knee     Quadriceps weakness     Difficulty walking        Physician: PEACE Dempsey MD    Visit Date: 10/26/2022    Physician Orders: PT Eval and Treat   Medical Diagnosis from Referral: M25.562 (ICD-10-CM) - Acute pain of left knee  Evaluation Date: 9/7/2022  Authorization Period Expiration: 9/6/2023  Plan of Care Expiration: 12/31/2022  Visit # / Visits authorized: 33/40      Precautions: Standard     Time In: 11:08 am   Time Out: 12:30  Total Billable Time: 82 minutes    Procedure: 9/20/2022  1. Left Knee Arthroscopy, anterior cruciate ligament reconstruction 74467  2.  Left Knee Arthroscopy, with meniscectomy (medial OR lateral) 91135  3.  Left Knee Arthroscopy, debridement/shaving of articular cartilage (chondroplasty) 88477  4.  Left Knee  platelet rich plasma injection to the bone-patellar tendon-bone harvest site    Post-Op Plan:  ACL reconstruction with a bone-patellar tendon-bone autograft    SUBJECTIVE     Pt reports: he continues to feel good. Painless pops occur in knee at times; feels similar to when his ankle pops- feels better after. Denies any instability. Seeing good progress. Sore from yesterday.     He was compliant with home exercise program.  Response to previous treatment: no issues  Functional change: normal gait    Pain: 0/10 to tension anterior knee with bending  Location: left knee      OBJECTIVE     36 days post-op    Observation: trace to none effusion noted L knee  Well healed scar; no keloid noted. Medial portal tender   Atrophy in calf/quad on L     Range of Motion (extension/neutral/flexion):     Right Left   Knee PROM: 8-0-128/135 deg P:8-0-130 deg  A:6-0-112 deg      Strength:     Right Left Comment   Knee Extension: 5/5 4/5         Knee Flexion: 5/5 4/5        Hip Abduction: 4+/5 4-/5       Functional tests(*=pain):   Gait/stairs: normalized  Quad activation: good     Joint Mobility: hypomobile PFJ and tibiofemoral    TTP along MCL- minimal to none    Treatment     Billy received the treatments listed below:      therapeutic exercises to develop strength, endurance, ROM, flexibility, posture, and core stabilization for 62 minutes including:  PROM L knee and ankle; manual stretching LE/hips  Prone and drew position knee flexion  Heel prop x 2 min   SL heel raise 3 x 15 off step-NP  Side step BTB at ft 3 laps with UE press black sport band-NP  Bike upright 10 min lv 4-9 intervals-NP  BFR 70%  -SL shuttle 4 bands  -TKE green sport band  - HS curl machine 17.5 lbs  -LAQ 7.5 lbs  Education/assessment    manual therapy techniques: Joint mobilizations and Soft tissue Mobilization were applied for 10 minutes, including:  PFJ gr 3 glides all planes  Tibial AP glide gr 3 with and without rotation bias on and off heel prop  Knee hyperextension gr 3  Fat pad mobility  Scar mobilization: advised on HEP  Knee flx->ext with tibial AP gr 2-3; quick motion    neuromuscular re-education activities to improve: Coordination, Kinesthetic, Sense, and Proprioception for 10 minutes. The following activities were included:  Quad set into hyperextension x 5 min 3 sec holds AAROM to AROM  SLR from quad set x 15 off heel prop  SLS on foam with 10 lb med ball passes at rebounder- anterior-NP    therapeutic activities to improve functional performance for 0 minutes, including:  Lateral heel taps 8 inch 3 x 12 linus-NP  High knee marching, but kicks, HS scoop,  quad pulls x 3 laps each-NP  Sled push with 180 lbs added x 4 laps-NP  RDL 53 lb KB 4 x 15-NP    Patient Education and Home Exercises     Home Exercises Provided and Patient Education Provided     Education provided:   - HEP update, use of compression sleeve, precautions, activity pacing, goals    Written Home  Exercises Provided: yes. Exercises were reviewed and Billy was able to demonstrate them prior to the end of the session.  Billy demonstrated good  understanding of the education provided. See EMR under Patient Instructions for exercises provided during therapy sessions    ASSESSMENT     Billy did well with BFR session today. Still having to work joint mobility some prior to gaining full ROM and working end range extension quad activation. Able to achieve full PROM and AROM after session. Pt reported/demonstrated no adverse response or exacerbation of symptoms during today's session.      Billy Is progressing well towards his goals.   Pt prognosis is Good.     Pt will continue to benefit from skilled outpatient physical therapy to address the deficits listed in the problem list box on initial evaluation, provide pt/family education and to maximize pt's level of independence in the home and community environment.     Pt's spiritual, cultural and educational needs considered and pt agreeable to plan of care and goals.     Anticipated barriers to physical therapy: none    Goals:  Short Term Goals: 8-10 weeks (progressing, not met)   1. Pt will be compliant with HEP 50% of prescribed amount. MET  2. The pt to demo improvement in left knee ROM to equal right knee ROM pain free MET post session  3.  The pt to demo good quad set with proper hyperextension moment of the Left knee   4. The pt to demo ambualting with elast restricitve AD witout major compensatory pattern left knee for at least 20 feet      Long Term Goals: 56 weeks (progressing, not met)   Pt will be compliant with % of prescribed amount.   The pt to demo pain free and uncompensated running mechanics x10 min on an indoor treadmill  The pt to demo tolerance to a squat at or bellow parallel with uncompensated mechanics x10   The pt to demo strength of L LE within 10% of R LE as demo'd on the biodex machine   The pt to demo a deficit of 10% or less on  a triple hop, single leg broad jump and crossover hop compared to non operative LE.   The pt will report full participation in ADLs and IADLs without restrictions related to L  knee.     PLAN     Progress ROM to full at entry not requiring any manual/stretching. Progress motor control.into strength exercise as tolerated in precautions.     Manjit Lazcano, PT

## 2022-10-27 ENCOUNTER — CLINICAL SUPPORT (OUTPATIENT)
Dept: REHABILITATION | Facility: HOSPITAL | Age: 27
End: 2022-10-27
Payer: COMMERCIAL

## 2022-10-27 DIAGNOSIS — M25.562 ACUTE PAIN OF LEFT KNEE: Primary | ICD-10-CM

## 2022-10-27 DIAGNOSIS — R26.2 DIFFICULTY WALKING: ICD-10-CM

## 2022-10-27 DIAGNOSIS — M25.662 DECREASED RANGE OF MOTION OF LEFT KNEE: ICD-10-CM

## 2022-10-27 DIAGNOSIS — M62.81 QUADRICEPS WEAKNESS: ICD-10-CM

## 2022-10-27 PROCEDURE — 97140 MANUAL THERAPY 1/> REGIONS: CPT

## 2022-10-27 PROCEDURE — 97112 NEUROMUSCULAR REEDUCATION: CPT

## 2022-10-27 PROCEDURE — 97110 THERAPEUTIC EXERCISES: CPT

## 2022-10-27 PROCEDURE — 97530 THERAPEUTIC ACTIVITIES: CPT

## 2022-10-27 NOTE — PROGRESS NOTES
[facility]   Physical Therapy Treatment Note     Name: Billy Holbrook  [facility] Number: [id number]    Therapy Diagnosis:   Encounter Diagnoses   Name Primary?    Acute pain of left knee Yes    Decreased range of motion of left knee     Quadriceps weakness     Difficulty walking        Physician: PEACE Dempsey MD    Visit Date: 10/27/2022    Physician Orders: PT Eval and Treat   Medical Diagnosis from Referral: M25.562 (ICD-10-CM) - Acute pain of left knee  Evaluation Date: 9/7/2022  Authorization Period Expiration: 9/6/2023  Plan of Care Expiration: 12/31/2022  Visit # / Visits authorized: 34/40      Precautions: Standard     Time In: 11:00 am   Time Out: 12:30  Total Billable Time: 90 minutes    Procedure: 9/20/2022  1. Left Knee Arthroscopy, anterior cruciate ligament reconstruction 55491  2.  Left Knee Arthroscopy, with meniscectomy (medial OR lateral) 83896  3.  Left Knee Arthroscopy, debridement/shaving of articular cartilage (chondroplasty) 84612  4.  Left Knee  platelet rich plasma injection to the bone-patellar tendon-bone harvest site    Post-Op Plan:  ACL reconstruction with a bone-patellar tendon-bone autograft    SUBJECTIVE     Pt reports: He is feeling good today, slept well last night and ready to exercise as able today. Still has some painless pops at times that feels good.     He was compliant with home exercise program.  Response to previous treatment: no issues  Functional change: normal gait    Pain: 0/10 to tension anterior knee with bending  Location: left knee      OBJECTIVE     37 days post-op    Observation: trace to none effusion noted L knee  Well healed scar; no keloid noted. Medial portal tender as is proximal midline incision   Atrophy in calf/quad on L     Range of Motion (extension/neutral/flexion):     Right Left   Knee PROM: 8-0-128/135 deg P:8-0-130 deg  A:6-0-115 deg      Strength:     Right Left Comment   Knee Extension: 5/5 4/5        Knee Flexion:  5/5 4/5        Hip Abduction: 4+/5 4-/5       Functional tests(*=pain):   Gait/stairs: normalized  Quad activation: good     Joint Mobility: hypomobile PFJ and tibiofemoral    TTP along MCL- minimal to none    Treatment     Billy received the treatments listed below:      therapeutic exercises to develop strength, endurance, ROM, flexibility, posture, and core stabilization for 25 minutes including:  PROM L knee and ankle; manual stretching LE/hips  Prone and drew position knee flexion  Heel prop x 2 min   SL heel raise 3 x 15 off step  Bike upright 10 min lv 4-5  Education/assessment    manual therapy techniques: Joint mobilizations and Soft tissue Mobilization were applied for 10 minutes, including:  PFJ gr 3 glides all planes  Tibial AP glide gr 3 with and without rotation bias on and off heel prop  Knee hyperextension gr 3  Fat pad mobility  Scar mobilization: advised on HEP  Knee flx->ext with tibial AP gr 2-3; quick motion    neuromuscular re-education activities to improve: Coordination, Kinesthetic, Sense, and Proprioception for 10 minutes. The following activities were included:  Quad set into hyperextension x 5 min 3 sec holds AAROM to AROM  SLR from quad set x 15 off heel prop  Standing clamshells GTB 3 x 15 linus    therapeutic activities to improve functional performance for 45 minutes, including:  Lateral heel taps 9 inch 3 x 15 linus  RDL 3 x 20, 15, 10 30-50 lbs each UE  Lunge in squat rack 3 x 10 linus  Trap bar squat 3 x 15 135 lbs  SKTC, quad pulls, HS scoop, SL RDL x 3 laps each    Patient Education and Home Exercises     Home Exercises Provided and Patient Education Provided     Education provided:   - HEP update, use of compression sleeve, precautions, activity pacing, goals    Written Home Exercises Provided: yes. Exercises were reviewed and Billy was able to demonstrate them prior to the end of the session.  Billy demonstrated good  understanding of the education provided. See EMR under  Patient Instructions for exercises provided during therapy sessions    ASSESSMENT     Billy continues to do very well. Moving into lunge today with UE support in squat rack. Cuing required for form with some limited depth of movement pattern. Knee ROM and joint mobility doing well, but does require some manual therapy ands stretching with exercise to have full PROM/AROM hyperextension. Pt reported/demonstrated no adverse response or exacerbation of symptoms during today's session.      Billy Is progressing well towards his goals.   Pt prognosis is Good.     Pt will continue to benefit from skilled outpatient physical therapy to address the deficits listed in the problem list box on initial evaluation, provide pt/family education and to maximize pt's level of independence in the home and community environment.     Pt's spiritual, cultural and educational needs considered and pt agreeable to plan of care and goals.     Anticipated barriers to physical therapy: none    Goals:  Short Term Goals: 8-10 weeks (progressing, not met)   1. Pt will be compliant with HEP 50% of prescribed amount. MET  2. The pt to demo improvement in left knee ROM to equal right knee ROM pain free MET post session  3.  The pt to demo good quad set with proper hyperextension moment of the Left knee   4. The pt to demo ambualting with elast restricitve AD witout major compensatory pattern left knee for at least 20 feet      Long Term Goals: 56 weeks (progressing, not met)   Pt will be compliant with % of prescribed amount.   The pt to demo pain free and uncompensated running mechanics x10 min on an indoor treadmill  The pt to demo tolerance to a squat at or bellow parallel with uncompensated mechanics x10   The pt to demo strength of L LE within 10% of R LE as demo'd on the biodex machine   The pt to demo a deficit of 10% or less on a triple hop, single leg broad jump and crossover hop compared to non operative LE.   The pt will report  full participation in ADLs and IADLs without restrictions related to L  knee.     PLAN     Progress ROM to full at entry not requiring any manual/stretching. Progress motor control.into strength exercise as tolerated in precautions.     Manjit Lazcano, PT

## 2022-10-28 ENCOUNTER — CLINICAL SUPPORT (OUTPATIENT)
Dept: REHABILITATION | Facility: HOSPITAL | Age: 27
End: 2022-10-28
Payer: COMMERCIAL

## 2022-10-28 DIAGNOSIS — R26.2 DIFFICULTY WALKING: ICD-10-CM

## 2022-10-28 DIAGNOSIS — M25.562 ACUTE PAIN OF LEFT KNEE: Primary | ICD-10-CM

## 2022-10-28 DIAGNOSIS — M25.662 DECREASED RANGE OF MOTION OF LEFT KNEE: ICD-10-CM

## 2022-10-28 DIAGNOSIS — M62.81 QUADRICEPS WEAKNESS: ICD-10-CM

## 2022-10-28 PROCEDURE — 97530 THERAPEUTIC ACTIVITIES: CPT

## 2022-10-28 PROCEDURE — 97110 THERAPEUTIC EXERCISES: CPT

## 2022-10-28 PROCEDURE — 97140 MANUAL THERAPY 1/> REGIONS: CPT

## 2022-10-28 PROCEDURE — 97112 NEUROMUSCULAR REEDUCATION: CPT

## 2022-10-28 NOTE — PROGRESS NOTES
[facility]   Physical Therapy Treatment Note     Name: Billy Holbrook  [facility] Number: [id number]    Therapy Diagnosis:   Encounter Diagnoses   Name Primary?    Acute pain of left knee Yes    Decreased range of motion of left knee     Quadriceps weakness     Difficulty walking      Physician: PEACE Dempsey MD    Visit Date: 10/28/2022    Physician Orders: PT Eval and Treat   Medical Diagnosis from Referral: M25.562 (ICD-10-CM) - Acute pain of left knee  Evaluation Date: 9/7/2022  Authorization Period Expiration: 9/6/2023  Plan of Care Expiration: 12/31/2022  Visit # / Visits authorized: 35/40      Precautions: Standard     Time In: 1:00 pm  Time Out: 2:15  Total Billable Time: 60 minutes    Procedure: 9/20/2022  1. Left Knee Arthroscopy, anterior cruciate ligament reconstruction 18785  2.  Left Knee Arthroscopy, with meniscectomy (medial OR lateral) 58044  3.  Left Knee Arthroscopy, debridement/shaving of articular cartilage (chondroplasty) 42824  4.  Left Knee  platelet rich plasma injection to the bone-patellar tendon-bone harvest site    Post-Op Plan:  ACL reconstruction with a bone-patellar tendon-bone autograft    SUBJECTIVE     Pt reports: rescheduled to 1 pm today as he was hungover this morning. Knee feels good. Scar proximally is tender.     He was compliant with home exercise program.  Response to previous treatment: no issues  Functional change: normal gait    Pain: 0/10 to tension anterior knee with bending  Location: left knee      OBJECTIVE     5 wks 3 days post-op    Observation: trace to none effusion noted L knee  Well healed scar; no keloid noted. Medial portal tender as is proximal midline incision   Atrophy in calf/quad on L     Range of Motion (extension/neutral/flexion):     Right Left   Knee PROM: 8-0-128/135 deg P:8-0-130 deg  A:6-0-115 deg      Strength:     Right Left Comment   Knee Extension: 5/5 4/5        Knee Flexion: 5/5 4/5        Hip Abduction: 4+/5  4-/5       Functional tests(*=pain):   Gait/stairs: normalized  Quad activation: good     Joint Mobility: hypomobile PFJ and tibiofemoral    TTP along MCL- minimal to none    Treatment     Billy received the treatments listed below:      therapeutic exercises to develop strength, endurance, ROM, flexibility, posture, and core stabilization for 47 minutes including:  PROM L knee and ankle; manual stretching LE/hips  Prone and drew position knee flexion  Heel prop x 2 min   SL heel raise 3 x 15 off step-NP  Bike upright 10 min lv 4-5-NP  BFR: 60%  -LAQ 7.5 lbs  -HS curl 20 lbs  -SL shuttle 4 bands  Education/assessment    manual therapy techniques: Joint mobilizations and Soft tissue Mobilization were applied for 10 minutes, including:  PFJ gr 3 glides all planes  Tibial AP glide gr 3 with and without rotation bias on and off heel prop  Knee hyperextension gr 3  Fat pad mobility  Scar mobilization: advised on HEP  Knee flx->ext with tibial AP gr 2-3; quick motion    neuromuscular re-education activities to improve: Coordination, Kinesthetic, Sense, and Proprioception for 8 minutes. The following activities were included:  Quad set into hyperextension x 5 min 3 sec holds AAROM to AROM  SLR from quad set x 15 off heel prop  Standing clamshells GTB 3 x 15 linus-NP    therapeutic activities to improve functional performance for 10 minutes, including:  Lateral heel taps 9 inch 3 x 15 linus-NP  RDL 3 x 20, 15, 10 30-50 lbs each UE-NP  Lunge in squat rack 3 x 10 linus-NP  Trap bar squat 3 x 15 135 lbs-NP  SKTC, quad pulls, HS scoop, SL RDL x 3 laps each-NP  BFR 60% DL squat with 40 lb weight vest to 20 inch box    Patient Education and Home Exercises     Home Exercises Provided and Patient Education Provided     Education provided:   - HEP update, use of compression sleeve, precautions, activity pacing, goals    Written Home Exercises Provided: yes. Exercises were reviewed and Billy was able to demonstrate them prior to the  end of the session.  Billy demonstrated good  understanding of the education provided. See EMR under Patient Instructions for exercises provided during therapy sessions    ASSESSMENT     Billy did well with session. Maintaining knee ROM and mobility better between sessions. Active hyperextension was good upon entry. Tolerance to BFR improving, will increase to 70% occlusion next session for all exercises. Pt reported/demonstrated no adverse response or exacerbation of symptoms during today's session.      Billy Is progressing well towards his goals.   Pt prognosis is Good.     Pt will continue to benefit from skilled outpatient physical therapy to address the deficits listed in the problem list box on initial evaluation, provide pt/family education and to maximize pt's level of independence in the home and community environment.     Pt's spiritual, cultural and educational needs considered and pt agreeable to plan of care and goals.     Anticipated barriers to physical therapy: none    Goals:  Short Term Goals: 8-10 weeks (progressing, not met)   1. Pt will be compliant with HEP 50% of prescribed amount. MET  2. The pt to demo improvement in left knee ROM to equal right knee ROM pain free MET post session  3.  The pt to demo good quad set with proper hyperextension moment of the Left knee   4. The pt to demo ambualting with elast restricitve AD witout major compensatory pattern left knee for at least 20 feet      Long Term Goals: 56 weeks (progressing, not met)   Pt will be compliant with % of prescribed amount.   The pt to demo pain free and uncompensated running mechanics x10 min on an indoor treadmill  The pt to demo tolerance to a squat at or bellow parallel with uncompensated mechanics x10   The pt to demo strength of L LE within 10% of R LE as demo'd on the biodex machine   The pt to demo a deficit of 10% or less on a triple hop, single leg broad jump and crossover hop compared to non operative LE.    The pt will report full participation in ADLs and IADLs without restrictions related to L  knee.     PLAN     Progress ROM to full at entry not requiring any manual/stretching. Progress motor control.into strength exercise as tolerated in precautions.     Manjit Lazcano, PT

## 2022-10-31 ENCOUNTER — CLINICAL SUPPORT (OUTPATIENT)
Dept: REHABILITATION | Facility: HOSPITAL | Age: 27
End: 2022-10-31
Payer: COMMERCIAL

## 2022-10-31 DIAGNOSIS — M25.662 DECREASED RANGE OF MOTION OF LEFT KNEE: ICD-10-CM

## 2022-10-31 DIAGNOSIS — R26.2 DIFFICULTY WALKING: ICD-10-CM

## 2022-10-31 DIAGNOSIS — M62.81 QUADRICEPS WEAKNESS: ICD-10-CM

## 2022-10-31 DIAGNOSIS — M25.562 ACUTE PAIN OF LEFT KNEE: Primary | ICD-10-CM

## 2022-10-31 PROCEDURE — 97110 THERAPEUTIC EXERCISES: CPT

## 2022-10-31 PROCEDURE — 97530 THERAPEUTIC ACTIVITIES: CPT

## 2022-10-31 PROCEDURE — 97112 NEUROMUSCULAR REEDUCATION: CPT

## 2022-10-31 PROCEDURE — 97140 MANUAL THERAPY 1/> REGIONS: CPT

## 2022-10-31 NOTE — PROGRESS NOTES
[facility]   Physical Therapy Treatment Note     Name: Billy Holbrook  [facility] Number: [id number]    Therapy Diagnosis:   Encounter Diagnoses   Name Primary?    Acute pain of left knee Yes    Decreased range of motion of left knee     Quadriceps weakness     Difficulty walking        Physician: PEACE Dempsey MD    Visit Date: 10/31/2022    Physician Orders: PT Eval and Treat   Medical Diagnosis from Referral: M25.562 (ICD-10-CM) - Acute pain of left knee  Evaluation Date: 9/7/2022  Authorization Period Expiration: 9/6/2023  Plan of Care Expiration: 12/31/2022  Visit # / Visits authorized: 37/40      Precautions: Standard     Time In: 11:03 am  Time Out: 12:44  Total Billable Time: 70 minutes    Procedure: 9/20/2022  1. Left Knee Arthroscopy, anterior cruciate ligament reconstruction 27884  2.  Left Knee Arthroscopy, with meniscectomy (medial OR lateral) 53520  3.  Left Knee Arthroscopy, debridement/shaving of articular cartilage (chondroplasty) 05179  4.  Left Knee  platelet rich plasma injection to the bone-patellar tendon-bone harvest site    Post-Op Plan:  ACL reconstruction with a bone-patellar tendon-bone autograft    SUBJECTIVE     Pt reports: feeling good. Using some CBD oil on his knee with benefit- uses on his back as well. Felt good with session.     He was compliant with home exercise program.  Response to previous treatment: no issues  Functional change: normal gait    Pain: 0/10 to tension anterior knee with bending  Location: left knee      OBJECTIVE     5 wks 6 days post-op    Observation: trace to none effusion noted L knee  Well healed scar; no keloid noted. Medial portal tender as is proximal midline incision   Atrophy in calf/quad on L     Range of Motion (extension/neutral/flexion):     Right Left   Knee PROM: 8-0-128/135 deg P:8-0-130 deg  A:6-0-115 deg      Strength:     Right Left Comment   Knee Extension: 5/5 4/5        Knee Flexion: 5/5 4/5        Hip  Abduction: 4+/5 4-/5       Functional tests(*=pain):   Gait/stairs: normalized  Quad activation: good     Joint Mobility: hypomobile PFJ and tibiofemoral    TTP along MCL- minimal to none    Treatment     Billy received the treatments listed below:      therapeutic exercises to develop strength, endurance, ROM, flexibility, posture, and core stabilization for 40 minutes including:  PROM L knee and ankle; manual stretching LE/hips  Prone and drew position knee flexion  Heel prop x 2 min   SL heel raise 3 x 15 off step  Bike upright 10 min lv 4-6  BTB at feet side steps with 25 lb plate press 3 laps 10 yds  Education/assessment    manual therapy techniques: Joint mobilizations and Soft tissue Mobilization were applied for 10 minutes, including:  PFJ gr 3 glides all planes  Tibial AP glide gr 3 with and without rotation bias on and off heel prop  Knee hyperextension gr 3  Fat pad mobility  Scar mobilization: advised on HEP  Knee flx->ext with tibial AP gr 2-3; quick motion    neuromuscular re-education activities to improve: Coordination, Kinesthetic, Sense, and Proprioception for 15 minutes. The following activities were included:  Quad set into hyperextension x 5 min 3 sec holds AAROM to AROM  SLR from quad set x 15 off heel prop  SLS on foam with PNF 10 lb KB pattern 3 x 10 each    therapeutic activities to improve functional performance for 39 minutes, including:  Lateral heel taps 9 inch 3 x 15 linus  RDL 3 x 20, 15, 10 30-50 lbs each UE  Lunge in squat rack 3 x 15 linus  Trap bar squat 3 x 15 135->185 lbs  SKTC, quad pulls, HS scoop, SL RDL x 3 laps each    Patient Education and Home Exercises     Home Exercises Provided and Patient Education Provided     Education provided:   - HEP update, use of compression sleeve, precautions, activity pacing, goals    Written Home Exercises Provided: yes. Exercises were reviewed and Billy was able to demonstrate them prior to the end of the session.  Billy demonstrated good   understanding of the education provided. See EMR under Patient Instructions for exercises provided during therapy sessions    ASSESSMENT     Billy is doing very well with good tolerance to heavier loading. His ROM is doing very well, but does still require a little work to get full. Advised again on timeframes and milestones for running as he wants to run and feels ready. Pt reported/demonstrated no adverse response or exacerbation of symptoms during today's session.      Billy Is progressing well towards his goals.   Pt prognosis is Good.     Pt will continue to benefit from skilled outpatient physical therapy to address the deficits listed in the problem list box on initial evaluation, provide pt/family education and to maximize pt's level of independence in the home and community environment.     Pt's spiritual, cultural and educational needs considered and pt agreeable to plan of care and goals.     Anticipated barriers to physical therapy: none    Goals:  Short Term Goals: 8-10 weeks (progressing, not met)   1. Pt will be compliant with HEP 50% of prescribed amount. MET  2. The pt to demo improvement in left knee ROM to equal right knee ROM pain free MET post session  3.  The pt to demo good quad set with proper hyperextension moment of the Left knee   4. The pt to demo ambualting with elast restricitve AD witout major compensatory pattern left knee for at least 20 feet      Long Term Goals: 56 weeks (progressing, not met)   Pt will be compliant with % of prescribed amount.   The pt to demo pain free and uncompensated running mechanics x10 min on an indoor treadmill  The pt to demo tolerance to a squat at or bellow parallel with uncompensated mechanics x10   The pt to demo strength of L LE within 10% of R LE as demo'd on the biodex machine   The pt to demo a deficit of 10% or less on a triple hop, single leg broad jump and crossover hop compared to non operative LE.   The pt will report full  participation in ADLs and IADLs without restrictions related to L  knee.     PLAN     Progress ROM to full at entry not requiring any manual/stretching. Progress motor control.into strength exercise as tolerated in precautions.     Manjit Lazcano, PT

## 2022-11-01 ENCOUNTER — CLINICAL SUPPORT (OUTPATIENT)
Dept: REHABILITATION | Facility: HOSPITAL | Age: 27
End: 2022-11-01
Payer: COMMERCIAL

## 2022-11-01 DIAGNOSIS — M25.662 DECREASED RANGE OF MOTION OF LEFT KNEE: ICD-10-CM

## 2022-11-01 DIAGNOSIS — M25.562 ACUTE PAIN OF LEFT KNEE: Primary | ICD-10-CM

## 2022-11-01 DIAGNOSIS — M62.81 QUADRICEPS WEAKNESS: ICD-10-CM

## 2022-11-01 DIAGNOSIS — R26.2 DIFFICULTY WALKING: ICD-10-CM

## 2022-11-01 PROCEDURE — 97110 THERAPEUTIC EXERCISES: CPT

## 2022-11-01 PROCEDURE — 97140 MANUAL THERAPY 1/> REGIONS: CPT

## 2022-11-01 PROCEDURE — 97112 NEUROMUSCULAR REEDUCATION: CPT

## 2022-11-01 PROCEDURE — 97530 THERAPEUTIC ACTIVITIES: CPT

## 2022-11-01 NOTE — PROGRESS NOTES
[facility]   Physical Therapy Treatment Note     Name: Billy Holbrook  [facility] Number: [id number]    Therapy Diagnosis:   Encounter Diagnoses   Name Primary?    Acute pain of left knee Yes    Decreased range of motion of left knee     Quadriceps weakness     Difficulty walking        Physician: PEACE Dempsey MD    Visit Date: 11/1/2022    Physician Orders: PT Eval and Treat   Medical Diagnosis from Referral: M25.562 (ICD-10-CM) - Acute pain of left knee  Evaluation Date: 9/7/2022  Authorization Period Expiration: 9/6/2023  Plan of Care Expiration: 12/31/2022  Visit # / Visits authorized: 37/40      Precautions: Standard     Time In: 11:13 am  Time Out: 12:45  Total Billable Time: 92 minutes    Procedure: 9/20/2022  1. Left Knee Arthroscopy, anterior cruciate ligament reconstruction 76390  2.  Left Knee Arthroscopy, with meniscectomy (medial OR lateral) 73333  3.  Left Knee Arthroscopy, debridement/shaving of articular cartilage (chondroplasty) 36617  4.  Left Knee  platelet rich plasma injection to the bone-patellar tendon-bone harvest site    Post-Op Plan:  ACL reconstruction with a bone-patellar tendon-bone autograft    SUBJECTIVE     Pt reports: his knee feels good. No issues at all after yesterday. Challenging day with BFR, denies knee pain. Still has an occasional painless pop that feels good.     He was compliant with home exercise program.  Response to previous treatment: no issues  Functional change: normal gait    Pain: 0/10 to tension anterior knee with bending  Location: left knee      OBJECTIVE     6 wks post-op    Observation: trace to none effusion noted L knee  Well healed scar; no keloid noted. Medial portal tender as is proximal midline incision   Atrophy in calf/quad on L     Range of Motion (extension/neutral/flexion):     Right Left   Knee PROM: 8-0-128/135 deg P:8-0-130 deg  A:6-0-115 deg      Strength:     Right Left Comment   Knee Extension: 5/5 4/5        Knee  Flexion: 5/5 4/5        Hip Abduction: 4+/5 4-/5       Functional tests(*=pain):   Gait/stairs: normalized  Quad activation: good     Joint Mobility: hypomobile PFJ and tibiofemoral    TTP along MCL- minimal to none    Treatment     Billy received the treatments listed below:      therapeutic exercises to develop strength, endurance, ROM, flexibility, posture, and core stabilization for 28 minutes including:  PROM L knee and ankle; manual stretching LE/hips  Prone and drew position knee flexion  Heel prop x 2 min   SL heel raise 3 x 20 off step  Bike upright 10 min lv 4-6-NP  BTB at feet side steps with 25 lb plate press 3 laps 10 yds-NP  BFR LAQ 70% 7.5 lbs  Education/assessment    manual therapy techniques: Joint mobilizations and Soft tissue Mobilization were applied for 10 minutes, including:  PFJ gr 3 glides all planes  Tibial AP glide gr 3 with and without rotation bias on and off heel prop  Knee hyperextension gr 3  Fat pad mobility  Scar mobilization  Knee flx->ext with tibial AP gr 2-3; quick motion    neuromuscular re-education activities to improve: Coordination, Kinesthetic, Sense, and Proprioception for 14 minutes. The following activities were included:  Quad set into hyperextension x 5 min 3 sec holds AAROM to AROM  SLR from quad set x 15 off heel prop  SLS on foam with cone taps anterior, lateral, and posterior 3 rds 60 sec    therapeutic activities to improve functional performance for 40 minutes, including:  Lateral heel taps 9 inch 3 x 15 linus-NP  Lunge in squat rack 3 x 15 linus-NP  Trap bar squat 3 x 15 135->185 lbs-NP  SKTC, quad pulls, HS scoop, SL RDL x 3 laps each  BFR 70%  -RDL DL 30 lbs each UE  -10 inch step up; stopped at 30/15/15 due to 10 min time  -TKE large black sport band    Patient Education and Home Exercises     Home Exercises Provided and Patient Education Provided     Education provided:   - HEP update, use of compression sleeve, precautions, activity pacing, goals    Written  Home Exercises Provided: yes. Exercises were reviewed and Billy was able to demonstrate them prior to the end of the session.  Billy demonstrated good  understanding of the education provided. See EMR under Patient Instructions for exercises provided during therapy sessions    ASSESSMENT     Billy did well with BFR focused session today. Progressing LOP to 70% as well as exercises with fair tolerance to compression/lactate build up. Felt good at exit; just normal challenging exercises. Unable to finish step ups due to time cut off. Still needs some manual work prior to full ROM. Pt reported/demonstrated no adverse response or exacerbation of symptoms during today's session.      Billy Is progressing well towards his goals.   Pt prognosis is Good.     Pt will continue to benefit from skilled outpatient physical therapy to address the deficits listed in the problem list box on initial evaluation, provide pt/family education and to maximize pt's level of independence in the home and community environment.     Pt's spiritual, cultural and educational needs considered and pt agreeable to plan of care and goals.     Anticipated barriers to physical therapy: none    Goals:  Short Term Goals: 8-10 weeks (progressing, not met)   1. Pt will be compliant with HEP 50% of prescribed amount. MET  2. The pt to demo improvement in left knee ROM to equal right knee ROM pain free MET  3.  The pt to demo good quad set with proper hyperextension moment of the Left knee   4. The pt to demo ambualting with elast restricitve AD witout major compensatory pattern left knee for at least 20 feet      Long Term Goals: 56 weeks (progressing, not met)   Pt will be compliant with % of prescribed amount.   The pt to demo pain free and uncompensated running mechanics x10 min on an indoor treadmill  The pt to demo tolerance to a squat at or bellow parallel with uncompensated mechanics x10   The pt to demo strength of L LE within 10% of R  LE as demo'd on the biodex machine   The pt to demo a deficit of 10% or less on a triple hop, single leg broad jump and crossover hop compared to non operative LE.   The pt will report full participation in ADLs and IADLs without restrictions related to L  knee.     PLAN     Progress ROM to full at entry not requiring any manual/stretching. Progress motor control.into strength exercise as tolerated in precautions.     Manjit Lazcano, PT

## 2022-11-02 ENCOUNTER — CLINICAL SUPPORT (OUTPATIENT)
Dept: REHABILITATION | Facility: HOSPITAL | Age: 27
End: 2022-11-02
Payer: COMMERCIAL

## 2022-11-02 DIAGNOSIS — R26.2 DIFFICULTY WALKING: ICD-10-CM

## 2022-11-02 DIAGNOSIS — M25.662 DECREASED RANGE OF MOTION OF LEFT KNEE: ICD-10-CM

## 2022-11-02 DIAGNOSIS — M25.562 ACUTE PAIN OF LEFT KNEE: Primary | ICD-10-CM

## 2022-11-02 DIAGNOSIS — M62.81 QUADRICEPS WEAKNESS: ICD-10-CM

## 2022-11-02 PROCEDURE — 97530 THERAPEUTIC ACTIVITIES: CPT

## 2022-11-02 PROCEDURE — 97140 MANUAL THERAPY 1/> REGIONS: CPT

## 2022-11-02 PROCEDURE — 97110 THERAPEUTIC EXERCISES: CPT

## 2022-11-02 PROCEDURE — 97112 NEUROMUSCULAR REEDUCATION: CPT

## 2022-11-02 NOTE — PROGRESS NOTES
[facility]   Physical Therapy Treatment Note     Name: Billy Holbrook  [facility] Number: [id number]    Therapy Diagnosis:   Encounter Diagnoses   Name Primary?    Acute pain of left knee Yes    Decreased range of motion of left knee     Quadriceps weakness     Difficulty walking        Physician: PEACE Dempsey MD    Visit Date: 11/2/2022    Physician Orders: PT Eval and Treat   Medical Diagnosis from Referral: M25.562 (ICD-10-CM) - Acute pain of left knee  Evaluation Date: 9/7/2022  Authorization Period Expiration: 9/6/2023  Plan of Care Expiration: 12/31/2022  Visit # / Visits authorized: 39/40      Precautions: Standard     Time In: 11:15 am  Time Out: 12:50  Total Billable Time: 95 minutes    Procedure: 9/20/2022  1. Left Knee Arthroscopy, anterior cruciate ligament reconstruction 67417  2.  Left Knee Arthroscopy, with meniscectomy (medial OR lateral) 52851  3.  Left Knee Arthroscopy, debridement/shaving of articular cartilage (chondroplasty) 84752  4.  Left Knee  platelet rich plasma injection to the bone-patellar tendon-bone harvest site    Post-Op Plan:  ACL reconstruction with a bone-patellar tendon-bone autograft    SUBJECTIVE     Pt reports: he is feeling good still. Some stiffness noted right at PT region medially, but very mild and short lived. He is using CBD oil with benefit on knee. Scar is not tender anymore.     He was compliant with home exercise program.  Response to previous treatment: no issues  Functional change: normal gait    Pain: 0/10 to tension anterior knee with bending  Location: left knee      OBJECTIVE     6 wks  1 day post-op    Observation: trace to none effusion noted L knee  Well healed scar  Atrophy in calf/quad on L     Range of Motion (extension/neutral/flexion):     Right Left   Knee PROM: 8-0-128/135 deg P:8-0-130 deg  A:6-0-115 deg      Strength:     Right Left Comment   Knee Extension: 5/5 4/5        Knee Flexion: 5/5 4/5        Hip Abduction:  4+/5 4-/5       Functional tests(*=pain):   Gait/stairs: normalized  Quad activation: good     Joint Mobility: hypomobile PFJ and tibiofemoral    TTP along MCL- minimal to none    Treatment     Billy received the treatments listed below:      therapeutic exercises to develop strength, endurance, ROM, flexibility, posture, and core stabilization for 28 minutes including:  PROM L knee and ankle; manual stretching LE/hips  Prone and drew position knee flexion  Heel prop x 2 min   SL heel raise 3 x 20 off step  Bike upright 8 min lv 4-6  Education/assessment    manual therapy techniques: Joint mobilizations and Soft tissue Mobilization were applied for 10 minutes, including:  PFJ gr 3 glides all planes  Tibial AP glide gr 3 with and without rotation bias on and off heel prop  Knee hyperextension gr 3  Fat pad mobility  Scar mobilization  Knee flx->ext with tibial AP gr 2-3; quick motion    neuromuscular re-education activities to improve: Coordination, Kinesthetic, Sense, and Proprioception for 14 minutes. The following activities were included:  Quad set into hyperextension x 5 min 3 sec holds AAROM to AROM  SLR from quad set x 15 off heel prop  Standing clamshell BTB 3 x 15 linus    therapeutic activities to improve functional performance for 45 minutes, including:  Lateral heel taps 9 inch 3 x 15 linus-NP  Lunge in squat rack 3 x 15 linus-NP  Trap bar squat 3 x 10 135->185 lbs  Trap bar farmer walk 3 x 30 yds 135->185 lbs  SKTC, quad pulls, HS scoop, HS march x 3 laps each  Lateral heel tap 9->10 inch 3 x 15 linus  SL RDL 10 lbs 3 x 10 linus: cuing on hip hinge/pelvic control      Patient Education and Home Exercises     Home Exercises Provided and Patient Education Provided     Education provided:   - HEP update, use of compression sleeve, precautions, activity pacing, goals    Written Home Exercises Provided: yes. Exercises were reviewed and Billy was able to demonstrate them prior to the end of the session.  Billy  demonstrated good  understanding of the education provided. See EMR under Patient Instructions for exercises provided during therapy sessions    ASSESSMENT     Billy did well with non-BFR session with heavier loading. Cuing needed for form, but good tolerance for knee; reported some non-residual L side low back discomfort resolved with improved mechanics.  Needed 2-3 min of manual to achieve full passive hyperextension, much better active hyperextension without assist/cuing now. Pt reported/demonstrated no adverse response or exacerbation of symptoms during today's session.      Billy Is progressing well towards his goals.   Pt prognosis is Good.     Pt will continue to benefit from skilled outpatient physical therapy to address the deficits listed in the problem list box on initial evaluation, provide pt/family education and to maximize pt's level of independence in the home and community environment.     Pt's spiritual, cultural and educational needs considered and pt agreeable to plan of care and goals.     Anticipated barriers to physical therapy: none    Goals:  Short Term Goals: 8-10 weeks (progressing, not met)   1. Pt will be compliant with HEP 50% of prescribed amount. MET  2. The pt to demo improvement in left knee ROM to equal right knee ROM pain free MET  3.  The pt to demo good quad set with proper hyperextension moment of the Left knee   4. The pt to demo ambualting with elast restricitve AD witout major compensatory pattern left knee for at least 20 feet      Long Term Goals: 56 weeks (progressing, not met)   Pt will be compliant with % of prescribed amount.   The pt to demo pain free and uncompensated running mechanics x10 min on an indoor treadmill  The pt to demo tolerance to a squat at or bellow parallel with uncompensated mechanics x10   The pt to demo strength of L LE within 10% of R LE as demo'd on the biodex machine   The pt to demo a deficit of 10% or less on a triple hop, single leg  broad jump and crossover hop compared to non operative LE.   The pt will report full participation in ADLs and IADLs without restrictions related to L  knee.     PLAN     Progress ROM to full at entry not requiring any manual/stretching. Progress motor control.into strength exercise as tolerated in precautions.     Manjit Lazcano, PT

## 2022-11-03 ENCOUNTER — CLINICAL SUPPORT (OUTPATIENT)
Dept: REHABILITATION | Facility: HOSPITAL | Age: 27
End: 2022-11-03
Payer: COMMERCIAL

## 2022-11-03 DIAGNOSIS — M25.562 ACUTE PAIN OF LEFT KNEE: Primary | ICD-10-CM

## 2022-11-03 DIAGNOSIS — M25.662 DECREASED RANGE OF MOTION OF LEFT KNEE: ICD-10-CM

## 2022-11-03 DIAGNOSIS — R26.2 DIFFICULTY WALKING: ICD-10-CM

## 2022-11-03 DIAGNOSIS — M62.81 QUADRICEPS WEAKNESS: ICD-10-CM

## 2022-11-03 PROCEDURE — 97110 THERAPEUTIC EXERCISES: CPT

## 2022-11-03 PROCEDURE — 97112 NEUROMUSCULAR REEDUCATION: CPT

## 2022-11-03 PROCEDURE — 97140 MANUAL THERAPY 1/> REGIONS: CPT

## 2022-11-03 PROCEDURE — 97530 THERAPEUTIC ACTIVITIES: CPT

## 2022-11-03 NOTE — PROGRESS NOTES
[facility]   Physical Therapy Treatment Note     Name: Billy Holbrook  [facility] Number: [id number]    Therapy Diagnosis:   Encounter Diagnoses   Name Primary?    Acute pain of left knee Yes    Decreased range of motion of left knee     Quadriceps weakness     Difficulty walking        Physician: PEACE Dempsey MD    Visit Date: 11/3/2022    Physician Orders: PT Eval and Treat   Medical Diagnosis from Referral: M25.562 (ICD-10-CM) - Acute pain of left knee  Evaluation Date: 9/7/2022  Authorization Period Expiration: 9/6/2023  Plan of Care Expiration: 12/31/2022  Visit # / Visits authorized: 40/40      Precautions: Standard     Time In: 11:00 am  Time Out: 12:30  Total Billable Time: 60 minutes    Procedure: 9/20/2022  1. Left Knee Arthroscopy, anterior cruciate ligament reconstruction 62873  2.  Left Knee Arthroscopy, with meniscectomy (medial OR lateral) 10760  3.  Left Knee Arthroscopy, debridement/shaving of articular cartilage (chondroplasty) 32189  4.  Left Knee  platelet rich plasma injection to the bone-patellar tendon-bone harvest site    Post-Op Plan:  ACL reconstruction with a bone-patellar tendon-bone autograft    SUBJECTIVE     Pt reports: he feels good. BFR did not feel good today at 70%, needed breaks due to tightness from tourniquet.     He was compliant with home exercise program.  Response to previous treatment: no issues  Functional change: normal gait    Pain: 0/10 to tension anterior knee with bending  Location: left knee      OBJECTIVE     6 wks 2 day post-op    Observation: trace to none effusion noted L knee  Well healed scar  Atrophy in calf/quad on L     Range of Motion (extension/neutral/flexion):     Right Left   Knee PROM: 8-0-125/130 deg P:8-0-130 deg  A:6-0-120 deg      Strength:     Right Left Comment   Knee Extension: 5/5 4/5        Knee Flexion: 5/5 4/5        Hip Abduction: 4+/5 4-/5       Functional tests(*=pain):   Gait/stairs: normalized  Quad  activation: good     Joint Mobility: hypomobile PFJ and tibiofemoral    TTP along MCL- minimal to none    Treatment     Billy received the treatments listed below:      therapeutic exercises to develop strength, endurance, ROM, flexibility, posture, and core stabilization for 42 minutes including:  PROM L knee and ankle; manual stretching LE/hips  Prone and drew position knee flexion  Heel prop x 2 min   BFR 70%  -LAQ 7.5 lbs  -HS curl 12.5 lbs  -SL shuttle press 4 bands  Education/assessment    manual therapy techniques: Joint mobilizations and Soft tissue Mobilization were applied for 10 minutes, including:  PFJ gr 3 glides all planes  Tibial AP glide gr 3 with and without rotation bias on and off heel prop  Knee hyperextension gr 3  Fat pad mobility  Scar mobilization  Knee flx->ext with tibial AP gr 2-3; quick motion    neuromuscular re-education activities to improve: Coordination, Kinesthetic, Sense, and Proprioception for 8 minutes. The following activities were included:  Quad set into hyperextension x 5 min 3 sec holds AAROM to AROM  SLR from quad set x 15 off heel prop  Standing clamshell BTB 3 x 15 linus-NP    Therapeutic activities to improve functional performance for 30 minutes, including:  BFR: 70%  -DL RDL 30 lbs each UE  SKTC, quad pull, HS scoop, HS march x 3 laps each  Walking lunge 2 x 10 yds 50% depth  X 10 lunge in squat rack with mild UE support      Patient Education and Home Exercises     Home Exercises Provided and Patient Education Provided     Education provided:   - HEP update, use of compression sleeve, precautions, activity pacing, goals    Written Home Exercises Provided: yes. Exercises were reviewed and Billy was able to demonstrate them prior to the end of the session.  Billy demonstrated good  understanding of the education provided. See EMR under Patient Instructions for exercises provided during therapy sessions    ASSESSMENT     Billy did not tolerate 70% LOP with BFR too  well today, required breaks due to discomfort from compression, but able to complete all exercises. ROM still requires a little manual work prior to being full. Advised on HEP to improve compliance with ROM work. Pt reported/demonstrated no adverse response or exacerbation of symptoms during today's session.      Billy Is progressing well towards his goals.   Pt prognosis is Good.     Pt will continue to benefit from skilled outpatient physical therapy to address the deficits listed in the problem list box on initial evaluation, provide pt/family education and to maximize pt's level of independence in the home and community environment.     Pt's spiritual, cultural and educational needs considered and pt agreeable to plan of care and goals.     Anticipated barriers to physical therapy: none    Goals:  Short Term Goals: 8-10 weeks (progressing, not met)   1. Pt will be compliant with HEP 50% of prescribed amount. MET  2. The pt to demo improvement in left knee ROM to equal right knee ROM pain free MET  3.  The pt to demo good quad set with proper hyperextension moment of the Left knee   4. The pt to demo ambualting with elast restricitve AD witout major compensatory pattern left knee for at least 20 feet      Long Term Goals: 56 weeks (progressing, not met)   Pt will be compliant with % of prescribed amount.   The pt to demo pain free and uncompensated running mechanics x10 min on an indoor treadmill  The pt to demo tolerance to a squat at or bellow parallel with uncompensated mechanics x10   The pt to demo strength of L LE within 10% of R LE as demo'd on the biodex machine   The pt to demo a deficit of 10% or less on a triple hop, single leg broad jump and crossover hop compared to non operative LE.   The pt will report full participation in ADLs and IADLs without restrictions related to L  knee.     PLAN     Progress ROM to full at entry not requiring any manual/stretching. Progress motor control.into  strength exercise as tolerated in precautions.     Manjit Lazcano, PT

## 2022-11-04 ENCOUNTER — CLINICAL SUPPORT (OUTPATIENT)
Dept: REHABILITATION | Facility: HOSPITAL | Age: 27
End: 2022-11-04
Payer: COMMERCIAL

## 2022-11-04 DIAGNOSIS — R26.2 DIFFICULTY WALKING: ICD-10-CM

## 2022-11-04 DIAGNOSIS — M25.562 ACUTE PAIN OF LEFT KNEE: Primary | ICD-10-CM

## 2022-11-04 DIAGNOSIS — M62.81 QUADRICEPS WEAKNESS: ICD-10-CM

## 2022-11-04 DIAGNOSIS — M25.662 DECREASED RANGE OF MOTION OF LEFT KNEE: ICD-10-CM

## 2022-11-04 PROCEDURE — 97112 NEUROMUSCULAR REEDUCATION: CPT

## 2022-11-04 PROCEDURE — 97530 THERAPEUTIC ACTIVITIES: CPT

## 2022-11-04 PROCEDURE — 97140 MANUAL THERAPY 1/> REGIONS: CPT

## 2022-11-04 PROCEDURE — 97110 THERAPEUTIC EXERCISES: CPT

## 2022-11-04 NOTE — PROGRESS NOTES
[facility]   Physical Therapy Treatment Note     Name: Billy Holbrook  [facility] Number: [id number]    Therapy Diagnosis:   Encounter Diagnoses   Name Primary?    Acute pain of left knee Yes    Decreased range of motion of left knee     Quadriceps weakness     Difficulty walking      Physician: PEACE Dempsey MD    Visit Date: 11/4/2022    Physician Orders: PT Eval and Treat   Medical Diagnosis from Referral: M25.562 (ICD-10-CM) - Acute pain of left knee  Evaluation Date: 9/7/2022  Authorization Period Expiration: 9/6/2023  Plan of Care Expiration: 12/31/2022  Visit # / Visits authorized: 41      Precautions: Standard     Time In: 10:00 am  Time Out: 11:30  Total Billable Time: 90 minutes    Procedure: 9/20/2022  1. Left Knee Arthroscopy, anterior cruciate ligament reconstruction 48998  2.  Left Knee Arthroscopy, with meniscectomy (medial OR lateral) 99171  3.  Left Knee Arthroscopy, debridement/shaving of articular cartilage (chondroplasty) 81824  4.  Left Knee  platelet rich plasma injection to the bone-patellar tendon-bone harvest site    Post-Op Plan:  ACL reconstruction with a bone-patellar tendon-bone autograft    SUBJECTIVE     Pt reports: he feels good. Denies any issues. Ready to exercise as tolerated.     He was compliant with home exercise program.  Response to previous treatment: no issues  Functional change: normal gait    Pain: 0/10 to tension anterior knee with bending  Location: left knee      OBJECTIVE     6 wks 4 day post-op    Observation: trace to none effusion noted L knee  Well healed scar  Atrophy in calf/quad on L     Range of Motion (extension/neutral/flexion):     Right Left   Knee PROM: 8-0-125/130 deg P:8-0-130 deg  A:6-0-120 deg      Strength:     Right Left Comment   Knee Extension: 5/5 4/5        Knee Flexion: 5/5 4/5        Hip Abduction: 4+/5 4-/5       Functional tests(*=pain):   Gait/stairs: normalized  Quad activation: good     Joint Mobility:  hypomobile PFJ and tibiofemoral    TTP along MCL- minimal to none    Treatment     Billy received the treatments listed below:      therapeutic exercises to develop strength, endurance, ROM, flexibility, posture, and core stabilization for 38 minutes including:  PROM L knee and ankle; manual stretching LE/hips  Prone and drew position knee flexion  Heel prop x 2 min   Side plank clamshells BTB 4 x 15 linus  Seated soleus raise 4 x 20 45 lbs  Education/assessment    manual therapy techniques: Joint mobilizations and Soft tissue Mobilization were applied for 10 minutes, including:  PFJ gr 3 glides all planes  Tibial AP glide gr 3 with and without rotation bias on and off heel prop  Knee hyperextension gr 3  Fat pad mobility  Scar mobilization  Knee flx->ext with tibial AP gr 2-3; quick motion    neuromuscular re-education activities to improve: Coordination, Kinesthetic, Sense, and Proprioception for 8 minutes. The following activities were included:  Quad set into hyperextension x 5 min 3 sec holds AAROM to AROM  SLR from quad set x 15 off heel prop  Standing clamshell BTB 3 x 15 linus-NP    Therapeutic activities to improve functional performance for 34 minutes, including:  SKTC, quad, HS scoop, SL RDL, fwd lunge, side lunge x 1 lap each  RESS 4 x 10 linus: UE support needed with L LE anterior  DL trap bar squat 4 x 10 135-185 lbs  Devries carry 4 x 30 yds 135-185lbs      Patient Education and Home Exercises     Home Exercises Provided and Patient Education Provided     Education provided:   - HEP update, use of compression sleeve, precautions, activity pacing, goals    Written Home Exercises Provided: yes. Exercises were reviewed and Billy was able to demonstrate them prior to the end of the session.  Billy demonstrated good  understanding of the education provided. See EMR under Patient Instructions for exercises provided during therapy sessions    ASSESSMENT     Billy did do well with exercise today. Progressing  resistance and depth of movements. Taking away UE support with lunge without issue. ROM doing well, still requires some manual intervention to be full at entry; but is well maintained in session. Pt reported/demonstrated no adverse response or exacerbation of symptoms during today's session.      Billy Is progressing well towards his goals.   Pt prognosis is Good.     Pt will continue to benefit from skilled outpatient physical therapy to address the deficits listed in the problem list box on initial evaluation, provide pt/family education and to maximize pt's level of independence in the home and community environment.     Pt's spiritual, cultural and educational needs considered and pt agreeable to plan of care and goals.     Anticipated barriers to physical therapy: none    Goals:  Short Term Goals: 8-10 weeks   1. Pt will be compliant with HEP 50% of prescribed amount. MET  2. The pt to demo improvement in left knee ROM to equal right knee ROM pain free MET  3.  The pt to demo good quad set with proper hyperextension moment of the Left knee MET  4. The pt to demo ambualting with elast restricitve AD witout major compensatory pattern left knee for at least 20 feet MET     Long Term Goals: 56 weeks (progressing, not met)   Pt will be compliant with % of prescribed amount.   The pt to demo pain free and uncompensated running mechanics x10 min on an indoor treadmill  The pt to demo tolerance to a squat at or bellow parallel with uncompensated mechanics x10   The pt to demo strength of L LE within 10% of R LE as demo'd on the biodex machine   The pt to demo a deficit of 10% or less on a triple hop, single leg broad jump and crossover hop compared to non operative LE.   The pt will report full participation in ADLs and IADLs without restrictions related to L  knee.     PLAN     Progress ROM to full at entry not requiring any manual/stretching. Progress motor control.into strength exercise as tolerated in  precautions.     Manjit Lazcano, PT

## 2022-11-07 ENCOUNTER — CLINICAL SUPPORT (OUTPATIENT)
Dept: REHABILITATION | Facility: HOSPITAL | Age: 27
End: 2022-11-07
Payer: COMMERCIAL

## 2022-11-07 DIAGNOSIS — M25.562 ACUTE PAIN OF LEFT KNEE: Primary | ICD-10-CM

## 2022-11-07 DIAGNOSIS — R26.2 DIFFICULTY WALKING: ICD-10-CM

## 2022-11-07 DIAGNOSIS — M62.81 QUADRICEPS WEAKNESS: ICD-10-CM

## 2022-11-07 DIAGNOSIS — M25.662 DECREASED RANGE OF MOTION OF LEFT KNEE: ICD-10-CM

## 2022-11-07 PROCEDURE — 97112 NEUROMUSCULAR REEDUCATION: CPT

## 2022-11-07 PROCEDURE — 97140 MANUAL THERAPY 1/> REGIONS: CPT

## 2022-11-07 PROCEDURE — 97110 THERAPEUTIC EXERCISES: CPT

## 2022-11-07 PROCEDURE — 97530 THERAPEUTIC ACTIVITIES: CPT

## 2022-11-07 NOTE — PROGRESS NOTES
[facility]   Physical Therapy Treatment Note     Name: Billy Holbrook  [facility] Number: [id number]    Therapy Diagnosis:   Encounter Diagnoses   Name Primary?    Acute pain of left knee Yes    Decreased range of motion of left knee     Quadriceps weakness     Difficulty walking        Physician: PEACE Dempsey MD    Visit Date: 11/7/2022    Physician Orders: PT Eval and Treat   Medical Diagnosis from Referral: M25.562 (ICD-10-CM) - Acute pain of left knee  Evaluation Date: 9/7/2022  Authorization Period Expiration: 9/6/2023  Plan of Care Expiration: 12/31/2022  Visit # / Visits authorized: 42      Precautions: Standard     Time In: 11:00 am  Time Out: 12:15  Total Billable Time: 60 minutes    Procedure: 9/20/2022  1. Left Knee Arthroscopy, anterior cruciate ligament reconstruction 60964  2.  Left Knee Arthroscopy, with meniscectomy (medial OR lateral) 94168  3.  Left Knee Arthroscopy, debridement/shaving of articular cartilage (chondroplasty) 79294  4.  Left Knee  platelet rich plasma injection to the bone-patellar tendon-bone harvest site    Post-Op Plan:  ACL reconstruction with a bone-patellar tendon-bone autograft    SUBJECTIVE     Pt reports: feeling good. No issues. Ready to exercise.     He was compliant with home exercise program.  Response to previous treatment: no issues  Functional change: normal gait    Pain: 0/10 to tension anterior knee with bending  Location: left knee      OBJECTIVE     6 wks 5 days post-op    Observation: trace to none effusion noted L knee  Well healed scar  Atrophy in calf/quad on L     Range of Motion (extension/neutral/flexion):     Right Left   Knee PROM: 8-0-125/130 deg P:8-0-130 deg  A:6-0-120 deg      Strength:     Right Left Comment   Knee Extension: 5/5 4/5        Knee Flexion: 5/5 4/5        Hip Abduction: 4+/5 4-/5       Functional tests(*=pain):   Gait/stairs: normalized  Quad activation: good     Joint Mobility: hypomobile PFJ and  tibiofemoral    TTP along MCL- minimal to none    Treatment     Billy received the treatments listed below:      therapeutic exercises to develop strength, endurance, ROM, flexibility, posture, and core stabilization for 32 minutes including:  PROM L knee/hips/ankle  Prone and drew position knee flexion  Heel prop x 2 min   Side step with black sport band presses: BTB at feet 3 x 10 yds  Seated soleus raise 4 x 20 45 lbs-NP  Knee extension isometrics 8 x 30 sec at 80 deg flx  Education/assessment    manual therapy techniques: Joint mobilizations and Soft tissue Mobilization were applied for 10 minutes, including:  PFJ gr 3 glides all planes  Tibial AP glide gr 3 with and without rotation bias on and off heel prop  Knee hyperextension gr 3  Fat pad mobility  Scar mobilization  Knee flx->ext with tibial AP gr 2-3; quick motion    neuromuscular re-education activities to improve: Coordination, Kinesthetic, Sense, and Proprioception for 10 minutes. The following activities were included:  Quad set into hyperextension x 2 min 3 sec holds AAROM to AROM  Standing clamshell BTB 3 x 15 linus-NP  3 way cone taps in SLS anterior, lateral, posterior 4 x 5 taps each- stable    Therapeutic activities to improve functional performance for 23 minutes, including:  SKTC, quad, HS scoop, HS march, fwd lunge, side lunge x 1 lap each  RESS 4 x 10 linus: UE support needed with L LE anterior-NP  DL trap bar squat 4 x 10 135-185 lbs-NP  Devries carry 4 x 30 yds 135-185 lbs-NP  3 way heel tap 6 inch 4 x 6 reps each way: anterior, lateral, posterior  Blue heavy band TKE 4 x 15 in standing      Patient Education and Home Exercises     Home Exercises Provided and Patient Education Provided     Education provided:   - HEP update, use of compression sleeve, precautions, activity pacing, goals    Written Home Exercises Provided: yes. Exercises were reviewed and Billy was able to demonstrate them prior to the end of the session.  Billy  demonstrated good  understanding of the education provided. See EMR under Patient Instructions for exercises provided during therapy sessions    ASSESSMENT     Billy did well today with progression of dynamic balance, reach, multi-directional movements with good control in shallow depth. Cuing required to control knee axis with some reps. No irritation noted to PT today, mildly tender along scars. Pt reported/demonstrated no adverse response or exacerbation of symptoms during today's session.      Billy Is progressing well towards his goals.   Pt prognosis is Good.     Pt will continue to benefit from skilled outpatient physical therapy to address the deficits listed in the problem list box on initial evaluation, provide pt/family education and to maximize pt's level of independence in the home and community environment.     Pt's spiritual, cultural and educational needs considered and pt agreeable to plan of care and goals.     Anticipated barriers to physical therapy: none    Goals:  Short Term Goals: 8-10 weeks   1. Pt will be compliant with HEP 50% of prescribed amount. MET  2. The pt to demo improvement in left knee ROM to equal right knee ROM pain free MET  3.  The pt to demo good quad set with proper hyperextension moment of the Left knee MET  4. The pt to demo ambualting with elast restricitve AD witout major compensatory pattern left knee for at least 20 feet MET     Long Term Goals: 56 weeks (progressing, not met)   Pt will be compliant with % of prescribed amount.   The pt to demo pain free and uncompensated running mechanics x10 min on an indoor treadmill  The pt to demo tolerance to a squat at or bellow parallel with uncompensated mechanics x10   The pt to demo strength of L LE within 10% of R LE as demo'd on the biodex machine   The pt to demo a deficit of 10% or less on a triple hop, single leg broad jump and crossover hop compared to non operative LE.   The pt will report full participation  in ADLs and IADLs without restrictions related to L  knee.     PLAN     Progress ROM to full at entry not requiring any manual/stretching. Progress motor control.into strength exercise as tolerated in precautions.     Manjit Lazcano, PT

## 2022-11-08 ENCOUNTER — CLINICAL SUPPORT (OUTPATIENT)
Dept: REHABILITATION | Facility: HOSPITAL | Age: 27
End: 2022-11-08
Payer: COMMERCIAL

## 2022-11-08 DIAGNOSIS — M62.81 QUADRICEPS WEAKNESS: ICD-10-CM

## 2022-11-08 DIAGNOSIS — M25.662 DECREASED RANGE OF MOTION OF LEFT KNEE: ICD-10-CM

## 2022-11-08 DIAGNOSIS — R26.2 DIFFICULTY WALKING: ICD-10-CM

## 2022-11-08 DIAGNOSIS — M25.562 ACUTE PAIN OF LEFT KNEE: Primary | ICD-10-CM

## 2022-11-08 PROCEDURE — 97112 NEUROMUSCULAR REEDUCATION: CPT

## 2022-11-08 PROCEDURE — 97140 MANUAL THERAPY 1/> REGIONS: CPT

## 2022-11-08 PROCEDURE — 97530 THERAPEUTIC ACTIVITIES: CPT

## 2022-11-08 PROCEDURE — 97110 THERAPEUTIC EXERCISES: CPT

## 2022-11-08 NOTE — PROGRESS NOTES
[facility]   Physical Therapy Treatment Note     Name: Billy Holbrook  [facility] Number: [id number]    Therapy Diagnosis:   Encounter Diagnoses   Name Primary?    Acute pain of left knee Yes    Decreased range of motion of left knee     Quadriceps weakness     Difficulty walking          Physician: PEACE Dempsey MD    Visit Date: 11/8/2022    Physician Orders: PT Eval and Treat   Medical Diagnosis from Referral: M25.562 (ICD-10-CM) - Acute pain of left knee  Evaluation Date: 9/7/2022  Authorization Period Expiration: 9/6/2023  Plan of Care Expiration: 12/31/2022  Visit # / Visits authorized: 43      Precautions: Standard     Time In: 11:30 am  Time Out: 01:00 pm  Total Billable Time: 90 minutes    Procedure: 9/20/2022  1. Left Knee Arthroscopy, anterior cruciate ligament reconstruction [id number]  2.  Left Knee Arthroscopy, with meniscectomy (medial OR lateral) 76091  3.  Left Knee Arthroscopy, debridement/shaving of articular cartilage (chondroplasty) 48786  4.  Left Knee  platelet rich plasma injection to the bone-patellar tendon-bone harvest site    Post-Op Plan:  ACL reconstruction with a bone-patellar tendon-bone autograft    SUBJECTIVE     Pt reports: feeling good, a little muscle soreness from last session but nothing major    He was compliant with home exercise program.  Response to previous treatment: no issues  Functional change: normal gait    Pain: 0/10 to tension anterior knee with bending  Location: left knee      OBJECTIVE     7 wks post-op    Observation: trace to none effusion noted L knee  Well healed scar  Atrophy in calf/quad on L     Range of Motion (extension/neutral/flexion):     Right Left   Knee PROM: 8-0-125/130 deg P:8-0-130 deg  A:6-0-125 deg      Strength:     Right Left Comment   Knee Extension: 5/5 4/5        Knee Flexion: 5/5 4/5        Hip Abduction: 4+/5 4-/5       Functional tests(*=pain):   Gait/stairs: normalized  Quad activation: good     Joint Mobility:  "hypomobile PFJ and tibiofemoral    TTP along MCL- minimal to none    Treatment     Billy received the treatments listed below:      therapeutic exercises to develop strength, endurance, ROM, flexibility, posture, and core stabilization for 40 minutes including:  PROM L knee/hips/ankle  Prone and drew position knee flexion; 4 x 20"   Heel prop x 2 min   BFR 70%  -LAQ 7.5 lbs  -HS curl 12.5 lbs  Education/assessment    NP Today:  Side step with black sport band presses: BTB at feet 3 x 10 yds  Seated soleus raise 4 x 20 45 lbs  Knee extension isometrics 8 x 30 sec at 80 deg flx    manual therapy techniques: Joint mobilizations and Soft tissue Mobilization were applied for 10 minutes, including:  PFJ gr 3 glides all planes  Tibial AP glide gr 3 with and without rotation bias on and off heel prop  Knee hyperextension gr 3  Fat pad mobility  Scar mobilization  Knee flx->ext with tibial AP gr 2-3; quick motion- NP    neuromuscular re-education activities to improve: Coordination, Kinesthetic, Sense, and Proprioception for 15 minutes. The following activities were included:  Quad set into hyperextension x 20x 5 sec holds AAROM to Active Range of Motion  SLR w/ quad set into hyperextension; 3 x 10 w/ 3" hold    NP Today:   Standing clamshell BTB 3 x 15 linus-NP  3 way cone taps in SLS anterior, lateral, posterior 4 x 5 taps each- stable    Therapeutic activities to improve functional performance for 25 minutes, including:  SKTC, quad pull, HS scoop, HS march x 3 laps each  BFR: 70%  - Black heavy band TKE standing  -12 inch step up; ----stopped at 30/15/15/5 due to 10 min time, finished last 10 reps without restriction      NP Today:     Walking lunge 2 x 10 yds 50% depth  RESS 4 x 10 linus: UE support needed with L LE anterior  DL trap bar squat 4 x 10 135-185 lbs  Devries carry 4 x 30 yds 135-185 lbs  3 way heel tap 6 inch 4 x 6 reps each way: anterior, lateral, posterior        Patient Education and Home Exercises     Home " Exercises Provided and Patient Education Provided     Education provided:   - HEP update, use of compression sleeve, precautions, activity pacing, goals    Written Home Exercises Provided: yes. Exercises were reviewed and Billy was able to demonstrate them prior to the end of the session.  Billy demonstrated good  understanding of the education provided. See EMR under Patient Instructions for exercises provided during therapy sessions    ASSESSMENT     Billy presented initially with increased hypomobility of his patellar superior glide, but this improved with manual interventions. He tolerated 70% LOP with BFR better today than last attempt with only 1 break required for 4 exercises. He again was unable to complete step ups in 10 minute time frame, but was closer to finishing this session compared to last BFR based session. Will resume non-BFR based exercise next session.     Billy Is progressing well towards his goals.   Pt prognosis is Good.     Pt will continue to benefit from skilled outpatient physical therapy to address the deficits listed in the problem list box on initial evaluation, provide pt/family education and to maximize pt's level of independence in the home and community environment.     Pt's spiritual, cultural and educational needs considered and pt agreeable to plan of care and goals.     Anticipated barriers to physical therapy: none    Goals:  Short Term Goals: 8-10 weeks   1. Pt will be compliant with HEP 50% of prescribed amount. MET  2. The pt to demo improvement in left knee ROM to equal right knee ROM pain free MET  3.  The pt to demo good quad set with proper hyperextension moment of the Left knee MET  4. The pt to demo ambualting with elast restricitve AD witout major compensatory pattern left knee for at least 20 feet MET     Long Term Goals: 56 weeks (progressing, not met)   Pt will be compliant with % of prescribed amount.   The pt to demo pain free and uncompensated running  mechanics x10 min on an indoor treadmill  The pt to demo tolerance to a squat at or bellow parallel with uncompensated mechanics x10   The pt to demo strength of L LE within 10% of R LE as demo'd on the biodex machine   The pt to demo a deficit of 10% or less on a triple hop, single leg broad jump and crossover hop compared to non operative LE.   The pt will report full participation in ADLs and IADLs without restrictions related to L  knee.     PLAN     Progress ROM to full at entry not requiring any manual/stretching. Progress motor control.into strength exercise as tolerated in precautions.     Joycelyn Willson, PT, DPT

## 2022-11-09 ENCOUNTER — CLINICAL SUPPORT (OUTPATIENT)
Dept: REHABILITATION | Facility: HOSPITAL | Age: 27
End: 2022-11-09
Payer: COMMERCIAL

## 2022-11-09 ENCOUNTER — TELEPHONE (OUTPATIENT)
Dept: SPORTS MEDICINE | Facility: CLINIC | Age: 27
End: 2022-11-09
Payer: COMMERCIAL

## 2022-11-09 DIAGNOSIS — R26.2 DIFFICULTY WALKING: ICD-10-CM

## 2022-11-09 DIAGNOSIS — S83.512A TEARS OF MENISCUS AND ACL OF LEFT KNEE, INITIAL ENCOUNTER: Primary | ICD-10-CM

## 2022-11-09 DIAGNOSIS — S83.207A TEARS OF MENISCUS AND ACL OF LEFT KNEE, INITIAL ENCOUNTER: ICD-10-CM

## 2022-11-09 DIAGNOSIS — S83.207A TEARS OF MENISCUS AND ACL OF LEFT KNEE, INITIAL ENCOUNTER: Primary | ICD-10-CM

## 2022-11-09 DIAGNOSIS — S83.512A TEARS OF MENISCUS AND ACL OF LEFT KNEE, INITIAL ENCOUNTER: ICD-10-CM

## 2022-11-09 DIAGNOSIS — M25.662 DECREASED RANGE OF MOTION OF LEFT KNEE: ICD-10-CM

## 2022-11-09 DIAGNOSIS — M62.81 QUADRICEPS WEAKNESS: ICD-10-CM

## 2022-11-09 DIAGNOSIS — S83.412A: ICD-10-CM

## 2022-11-09 DIAGNOSIS — M25.562 ACUTE PAIN OF LEFT KNEE: Primary | ICD-10-CM

## 2022-11-09 PROCEDURE — 97112 NEUROMUSCULAR REEDUCATION: CPT

## 2022-11-09 PROCEDURE — 97110 THERAPEUTIC EXERCISES: CPT

## 2022-11-09 PROCEDURE — 97530 THERAPEUTIC ACTIVITIES: CPT

## 2022-11-09 PROCEDURE — 97140 MANUAL THERAPY 1/> REGIONS: CPT

## 2022-11-09 NOTE — PROGRESS NOTES
[facility]   Physical Therapy Treatment Note     Name: Billy Holbrook  [facility] Number: [id number]    Therapy Diagnosis:   Encounter Diagnoses   Name Primary?    Acute pain of left knee Yes    Decreased range of motion of left knee     Quadriceps weakness     Difficulty walking      Physician: PEACE Dempsey MD    Visit Date: 11/9/2022    Physician Orders: PT Eval and Treat   Medical Diagnosis from Referral: M25.562 (ICD-10-CM) - Acute pain of left knee  Evaluation Date: 9/7/2022  Authorization Period Expiration: 9/6/2023  Plan of Care Expiration: 12/31/2022  Visit # / Visits authorized: 44      Precautions: Standard     Time In: 11:08 am  Time Out: 12:40  Total Billable Time: 92 minutes    Procedure: 9/20/2022  1. Left Knee Arthroscopy, anterior cruciate ligament reconstruction [id number]  2.  Left Knee Arthroscopy, with meniscectomy (medial OR lateral) 77209  3.  Left Knee Arthroscopy, debridement/shaving of articular cartilage (chondroplasty) 58811  4.  Left Knee  platelet rich plasma injection to the bone-patellar tendon-bone harvest site    Post-Op Plan:  ACL reconstruction with a bone-patellar tendon-bone autograft    SUBJECTIVE     Pt reports: a little stiff today, much better after some manual. Ready to progress. Feeling a little down overall. [facility] advised him to get Prosil cream for scar management.     He was compliant with home exercise program.  Response to previous treatment: no issues  Functional change: normal gait    Pain: 0/10 to tension anterior knee with bending  Location: left knee      OBJECTIVE     7 wks 1 day post-op    Observation: trace to none effusion noted L knee  Well healed scar  Atrophy in calf/quad on L     Range of Motion (extension/neutral/flexion):     Right Left   Knee PROM: 8-0-125/130 deg P:8-0-130 deg  A:6-0-125 deg      Strength:     Right Left Comment   Knee Extension: 5/5 4/5        Knee Flexion: 5/5 4/5        Hip Abduction: 4+/5 4-/5    "    Functional tests(*=pain):   Gait/stairs: normalized  Quad activation: good     Joint Mobility: hypomobile PFJ and tibiofemoral    TTP along MCL- minimal to none    Treatment     Billy received the treatments listed below:      therapeutic exercises to develop strength, endurance, ROM, flexibility, posture, and core stabilization for 20 minutes including:  Education/assessment  Upright bike 10 min lv 4-6  PROM L knee/hips/ankle  Prone and drew position knee flexion; 4 x 20"   Heel prop x 2 min       manual therapy techniques: Joint mobilizations and Soft tissue Mobilization were applied for 12 minutes, including:  PFJ gr 3-4 glides all planes: re-instructed with HEP  Tibial AP glide gr 3-4 with and without rotation bias on and off heel prop  Knee hyperextension gr 3-4  Fat pad mobility  Scar mobilization  Knee flx->ext with tibial AP gr 2-3; quick motion- NP    neuromuscular re-education activities to improve: Coordination, Kinesthetic, Sense, and Proprioception for 15 minutes. The following activities were included:  Quad set into hyperextension x 20x 5 sec holds AAROM to Active Range of Motion  SLR w/ quad set into hyperextension; 3 x 10 w/ 3" hold  Step up to hip drive pause 4 x 15 x 5 sec on stable side bosu      Therapeutic activities to improve functional performance for 45 minutes, including:  SKTC, quad pull, HS scoop, HS march, walking lunge, lateral lunge x 1 lap each  RESS 4 x 15 linus in squat rack with some UE support  DL trap bar squat 4 rds 15, 10, x8, x8 135-205 lbs  Devries carry 4 x 30 yds 135-185 lbs-NP  3 way heel tap 6 inch 4 x 6 reps each way: anterior, lateral, posterior-NP  RDL 4 x 15 50 lbs each UE  Seated soleus raise 4 x 20 70 lbs        Patient Education and Home Exercises     Home Exercises Provided and Patient Education Provided     Education provided:   - HEP update, precautions, activity pacing, goals, timeframes    Written Home Exercises Provided: yes. Exercises were reviewed and " Billy was able to demonstrate them prior to the end of the session.  Billy demonstrated good  understanding of the education provided. See EMR under Patient Instructions for exercises provided during therapy sessions    ASSESSMENT     Billy is doing well. Arrives with some stiffness resolved in minutes on manual therapy and stretching. Advised on compliance with HEP between visits to help with this. Able to get full active hyperextension with ease now. Progressing strength today without irritability reported. Pt reported/demonstrated no adverse response or exacerbation of symptoms during today's session.       Billy Is progressing well towards his goals.   Pt prognosis is Good.     Pt will continue to benefit from skilled outpatient physical therapy to address the deficits listed in the problem list box on initial evaluation, provide pt/family education and to maximize pt's level of independence in the home and community environment.     Pt's spiritual, cultural and educational needs considered and pt agreeable to plan of care and goals.     Anticipated barriers to physical therapy: none    Goals:  Short Term Goals: 8-10 weeks   1. Pt will be compliant with HEP 50% of prescribed amount. MET  2. The pt to demo improvement in left knee ROM to equal right knee ROM pain free MET  3.  The pt to demo good quad set with proper hyperextension moment of the Left knee MET  4. The pt to demo ambualting with elast restricitve AD witout major compensatory pattern left knee for at least 20 feet MET     Long Term Goals: 56 weeks (progressing, not met)   Pt will be compliant with % of prescribed amount.   The pt to demo pain free and uncompensated running mechanics x10 min on an indoor treadmill  The pt to demo tolerance to a squat at or bellow parallel with uncompensated mechanics x10   The pt to demo strength of L LE within 10% of R LE as demo'd on the biodex machine   The pt to demo a deficit of 10% or less on a  triple hop, single leg broad jump and crossover hop compared to non operative LE.   The pt will report full participation in ADLs and IADLs without restrictions related to L  knee.     PLAN     Progress ROM to full at entry not requiring any manual/stretching. Progress motor control.into strength exercise as tolerated in precautions.     Manjit Lazcano, PT, DPT

## 2022-11-10 ENCOUNTER — CLINICAL SUPPORT (OUTPATIENT)
Dept: REHABILITATION | Facility: HOSPITAL | Age: 27
End: 2022-11-10
Payer: COMMERCIAL

## 2022-11-10 DIAGNOSIS — M25.562 ACUTE PAIN OF LEFT KNEE: Primary | ICD-10-CM

## 2022-11-10 DIAGNOSIS — M25.662 DECREASED RANGE OF MOTION OF LEFT KNEE: ICD-10-CM

## 2022-11-10 DIAGNOSIS — M62.81 QUADRICEPS WEAKNESS: ICD-10-CM

## 2022-11-10 DIAGNOSIS — R26.2 DIFFICULTY WALKING: ICD-10-CM

## 2022-11-10 PROCEDURE — 97140 MANUAL THERAPY 1/> REGIONS: CPT

## 2022-11-10 PROCEDURE — 97530 THERAPEUTIC ACTIVITIES: CPT

## 2022-11-10 PROCEDURE — 97112 NEUROMUSCULAR REEDUCATION: CPT

## 2022-11-10 PROCEDURE — 97110 THERAPEUTIC EXERCISES: CPT

## 2022-11-10 NOTE — PROGRESS NOTES
[facility]   Physical Therapy Treatment Note     Name: Billy Holbrook  [facility] Number: [id number]    Therapy Diagnosis:   Encounter Diagnoses   Name Primary?    Acute pain of left knee Yes    Decreased range of motion of left knee     Quadriceps weakness     Difficulty walking        Physician: PEACE Dempsey MD    Visit Date: 11/10/2022    Physician Orders: PT Eval and Treat   Medical Diagnosis from Referral: M25.562 (ICD-10-CM) - Acute pain of left knee  Evaluation Date: 9/7/2022  Authorization Period Expiration: 9/6/2023  Plan of Care Expiration: 12/31/2022  Visit # / Visits authorized: 45      Precautions: Standard     Time In: 2:30 am  Time Out: 3:50  Total Billable Time: 60 minutes    Procedure: 9/20/2022  1. Left Knee Arthroscopy, anterior cruciate ligament reconstruction 74854  2.  Left Knee Arthroscopy, with meniscectomy (medial OR lateral) 33536  3.  Left Knee Arthroscopy, debridement/shaving of articular cartilage (chondroplasty) 36474  4.  Left Knee  platelet rich plasma injection to the bone-patellar tendon-bone harvest site    Post-Op Plan:  ACL reconstruction with a bone-patellar tendon-bone autograft    SUBJECTIVE     Pt reports: he slept through appt at 11 am, forgot to set alarm on phone.     He was compliant with home exercise program.  Response to previous treatment: no issues  Functional change: normal gait    Pain: 0/10 to tension anterior knee with bending  Location: left knee      OBJECTIVE     7 wks 2 days post-op    Observation: trace to none effusion noted L knee  Well healed scar  Atrophy in calf/quad on L     Range of Motion (extension/neutral/flexion):     Right Left   Knee PROM: 8-0-125/130 deg P:8-0-130 deg  A:6-0-125 deg      Strength:     Right Left Comment   Knee Extension: 5/5 4/5        Knee Flexion: 5/5 4/5        Hip Abduction: 4+/5 4-/5       Functional tests(*=pain):   Gait/stairs: normalized  Quad activation: good     Joint Mobility: hypomobile  "PFJ and tibiofemoral    TTP along MCL- minimal to none    Treatment     Billy received the treatments listed below:      therapeutic exercises to develop strength, endurance, ROM, flexibility, posture, and core stabilization for 40 minutes including:  Education/assessment  Upright bike 10 min lv 4-6-NP  PROM L knee/hips/ankle  Prone and drew position knee flexion; 4 x 20"   Heel prop x 2 min   BFR 70% LOP  -SL shuttle 3 bands  -LAQ 7.5 lbs  -standing HS curl 12 lbs  -standing TKE medium black sport band      manual therapy techniques: Joint mobilizations and Soft tissue Mobilization were applied for 10 minutes, including:  PFJ gr 3-4 glides all planes: re-instructed with HEP  Tibial AP glide gr 3-4 with and without rotation bias on and off heel prop  Knee hyperextension gr 3-4  Fat pad mobility  Scar mobilization  Knee flx->ext with tibial AP gr 2-3; quick motion- NP    neuromuscular re-education activities to improve: Coordination, Kinesthetic, Sense, and Proprioception for 10 minutes. The following activities were included:  Quad set into hyperextension x 20x 5 sec holds AAROM to Active Range of Motion  SLR w/ quad set into hyperextension; 3 x 10 w/ 3" hold  Step up to hip drive pause 4 x 15 x 5 sec on stable side bosu-NP      Therapeutic activities to improve functional performance for 20 minutes, including:  SKTC, quad pull, HS scoop, HS march, walking lunge, lateral lunge x 1 lap each  SL sit to stands 24 inch box x 15 linus  RESS 4 x 15 linus in squat rack with some UE support-NP  DL trap bar squat 4 rds 15, 10, x8, x8 135-205 lbs-NP  Devries carry 4 x 30 yds 135-185 lbs-NP  3 way heel tap 6->8 inch 4 x 6 reps each way: anterior, lateral, posterior  RDL 4 x 15 50 lbs each UE-NP  Seated soleus raise 4 x 20 70 lbs-NP        Patient Education and Home Exercises     Home Exercises Provided and Patient Education Provided     Education provided:   - HEP update, precautions, activity pacing, goals, " timeframes    Written Home Exercises Provided: yes. Exercises were reviewed and Billy was able to demonstrate them prior to the end of the session.  Billy demonstrated good  understanding of the education provided. See EMR under Patient Instructions for exercises provided during therapy sessions    ASSESSMENT     Billy arrived late today as he slept through appt time. Able to work strength with BFR after some manual therapy to restore joint play and full extension AROM/PROM. Pt reported/demonstrated no adverse response or exacerbation of symptoms during today's session.       Billy Is progressing well towards his goals.   Pt prognosis is Good.     Pt will continue to benefit from skilled outpatient physical therapy to address the deficits listed in the problem list box on initial evaluation, provide pt/family education and to maximize pt's level of independence in the home and community environment.     Pt's spiritual, cultural and educational needs considered and pt agreeable to plan of care and goals.     Anticipated barriers to physical therapy: none    Goals:  Short Term Goals: 8-10 weeks   1. Pt will be compliant with HEP 50% of prescribed amount. MET  2. The pt to demo improvement in left knee ROM to equal right knee ROM pain free MET  3.  The pt to demo good quad set with proper hyperextension moment of the Left knee MET  4. The pt to demo ambualting with elast restricitve AD witout major compensatory pattern left knee for at least 20 feet MET     Long Term Goals: 56 weeks (progressing, not met)   Pt will be compliant with % of prescribed amount.   The pt to demo pain free and uncompensated running mechanics x10 min on an indoor treadmill  The pt to demo tolerance to a squat at or bellow parallel with uncompensated mechanics x10   The pt to demo strength of L LE within 10% of R LE as demo'd on the biodex machine   The pt to demo a deficit of 10% or less on a triple hop, single leg broad jump and  crossover hop compared to non operative LE.   The pt will report full participation in ADLs and IADLs without restrictions related to L  knee.     PLAN     Progress ROM to full at entry not requiring any manual/stretching. Progress motor control.into strength exercise as tolerated in precautions.     Manjit Lazcano, PT, DPT

## 2022-11-11 ENCOUNTER — CLINICAL SUPPORT (OUTPATIENT)
Dept: REHABILITATION | Facility: HOSPITAL | Age: 27
End: 2022-11-11
Payer: COMMERCIAL

## 2022-11-11 DIAGNOSIS — M25.662 DECREASED RANGE OF MOTION OF LEFT KNEE: ICD-10-CM

## 2022-11-11 DIAGNOSIS — M25.562 ACUTE PAIN OF LEFT KNEE: Primary | ICD-10-CM

## 2022-11-11 DIAGNOSIS — M62.81 QUADRICEPS WEAKNESS: ICD-10-CM

## 2022-11-11 DIAGNOSIS — R26.2 DIFFICULTY WALKING: ICD-10-CM

## 2022-11-11 PROCEDURE — 97110 THERAPEUTIC EXERCISES: CPT

## 2022-11-11 PROCEDURE — 97530 THERAPEUTIC ACTIVITIES: CPT

## 2022-11-11 PROCEDURE — 97140 MANUAL THERAPY 1/> REGIONS: CPT

## 2022-11-11 PROCEDURE — 97112 NEUROMUSCULAR REEDUCATION: CPT

## 2022-11-11 NOTE — PROGRESS NOTES
[facility]   Physical Therapy Treatment Note     Name: Billy Holbrook  [facility] Number: [id number]    Therapy Diagnosis:   Encounter Diagnoses   Name Primary?    Acute pain of left knee Yes    Decreased range of motion of left knee     Quadriceps weakness     Difficulty walking        Physician: PEACE Dempsey MD    Visit Date: 11/11/2022    Physician Orders: PT Eval and Treat   Medical Diagnosis from Referral: M25.562 (ICD-10-CM) - Acute pain of left knee  Evaluation Date: 9/7/2022  Authorization Period Expiration: 9/6/2023  Plan of Care Expiration: 12/31/2022  Visit # / Visits authorized: 46     Precautions: Standard     Time In: 7:36 am  Time Out: 9:00  Total Billable Time: 60 minutes    Procedure: 9/20/2022  1. Left Knee Arthroscopy, anterior cruciate ligament reconstruction 98694  2.  Left Knee Arthroscopy, with meniscectomy (medial OR lateral) 52979  3.  Left Knee Arthroscopy, debridement/shaving of articular cartilage (chondroplasty) 37029  4.  Left Knee  platelet rich plasma injection to the bone-patellar tendon-bone harvest site    Post-Op Plan:  ACL reconstruction with a bone-patellar tendon-bone autograft    SUBJECTIVE     Pt reports: feeling good. Ready to exercise.     He was compliant with home exercise program.  Response to previous treatment: no issues  Functional change: normal gait    Pain: 0/10 to tension anterior knee with bending  Location: left knee      OBJECTIVE     7 wks 3 days post-op    Observation: trace to none effusion noted L knee  Well healed scar  Atrophy in calf/quad on L     Range of Motion (extension/neutral/flexion):     Right Left   Knee PROM: 8-0-125/130 deg P:8-0-130 deg  A:6-0-125 deg      Strength:     Right Left Comment   Knee Extension: 5/5 4/5        Knee Flexion: 5/5 4/5        Hip Abduction: 4+/5 4-/5       Functional tests(*=pain):   Gait/stairs: normalized  Quad activation: good     Joint Mobility: hypomobile PFJ and tibiofemoral    TTP  "along MCL- minimal to none    Treatment     Billy received the treatments listed below:      therapeutic exercises to develop strength, endurance, ROM, flexibility, posture, and core stabilization for 25 minutes including:  Education/assessment  Upright bike 10 min lv 4-8  PROM/manual stretching to linus knee/hips/ankle  Prone and drew position knee flexion; 4 x 20"   Heel prop x 2 min       manual therapy techniques: Joint mobilizations and Soft tissue Mobilization were applied for 10 minutes, including:  PFJ gr 3-4 glides all planes: re-instructed with HEP  Tibial AP glide gr 3-4 with and without rotation bias on and off heel prop  Knee hyperextension gr 3-4  Fat pad mobility  Scar mobilization  Knee flx->ext with tibial AP gr 2-3; quick motion- NP    neuromuscular re-education activities to improve: Coordination, Kinesthetic, Sense, and Proprioception for 8 minutes. The following activities were included:  Quad set into hyperextension x 20x 5 sec holds AAROM to Active Range of Motion  SLR w/ quad set into hyperextension; 3 x 10 w/ 3" hold      Therapeutic activities to improve functional performance for 41 minutes, including:  SKTC, quad pull, HS scoop, HS march, walking lunge, lateral lunge x 4 lap each  SL sit to stands 24 inch box 2 x 15 linus-NP  3 way lunges 3 x 5 each way linus  RESS 4 x 10 with no UE support  12 inch rotational step up 4 x 10  Sled push/pull 4 laps each 115 lbs added 30 yds  DL goblet squat to 20 inch box 4 x 15 53 lbs          Patient Education and Home Exercises     Home Exercises Provided and Patient Education Provided     Education provided:   - HEP update, precautions, activity pacing, goals, timeframes    Written Home Exercises Provided: yes. Exercises were reviewed and Billy was able to demonstrate them prior to the end of the session.  Billy demonstrated good  understanding of the education provided. See EMR under Patient Instructions for exercises provided during therapy " sessions    ASSESSMENT     Billy did well with new multi-directional functional tasks, demonstrating good rotational control and form with some cuing required. He continues to need some manual therapy to achieve full ROM at knee upon arrival; advised on indep HEP to help maintain this better.  Pt reported/demonstrated no adverse response or exacerbation of symptoms during today's session.       Billy Is progressing well towards his goals.   Pt prognosis is Good.     Pt will continue to benefit from skilled outpatient physical therapy to address the deficits listed in the problem list box on initial evaluation, provide pt/family education and to maximize pt's level of independence in the home and community environment.     Pt's spiritual, cultural and educational needs considered and pt agreeable to plan of care and goals.     Anticipated barriers to physical therapy: none    Goals:  Short Term Goals: 8-10 weeks   1. Pt will be compliant with HEP 50% of prescribed amount. MET  2. The pt to demo improvement in left knee ROM to equal right knee ROM pain free MET  3.  The pt to demo good quad set with proper hyperextension moment of the Left knee MET  4. The pt to demo ambualting with elast restricitve AD witout major compensatory pattern left knee for at least 20 feet MET     Long Term Goals: 56 weeks (progressing, not met)   Pt will be compliant with % of prescribed amount.   The pt to demo pain free and uncompensated running mechanics x10 min on an indoor treadmill  The pt to demo tolerance to a squat at or bellow parallel with uncompensated mechanics x10   The pt to demo strength of L LE within 10% of R LE as demo'd on the biodex machine   The pt to demo a deficit of 10% or less on a triple hop, single leg broad jump and crossover hop compared to non operative LE.   The pt will report full participation in ADLs and IADLs without restrictions related to L  knee.     PLAN     Progress ROM to full at entry not  requiring any manual/stretching. Progress motor control.into strength exercise as tolerated in precautions.     Manjit Lazcano, PT, DPT

## 2022-11-14 ENCOUNTER — CLINICAL SUPPORT (OUTPATIENT)
Dept: REHABILITATION | Facility: HOSPITAL | Age: 27
End: 2022-11-14
Payer: COMMERCIAL

## 2022-11-14 DIAGNOSIS — M25.562 ACUTE PAIN OF LEFT KNEE: Primary | ICD-10-CM

## 2022-11-14 DIAGNOSIS — M25.662 DECREASED RANGE OF MOTION OF LEFT KNEE: ICD-10-CM

## 2022-11-14 DIAGNOSIS — R26.2 DIFFICULTY WALKING: ICD-10-CM

## 2022-11-14 DIAGNOSIS — M62.81 QUADRICEPS WEAKNESS: ICD-10-CM

## 2022-11-14 PROCEDURE — 97140 MANUAL THERAPY 1/> REGIONS: CPT

## 2022-11-14 PROCEDURE — 97112 NEUROMUSCULAR REEDUCATION: CPT

## 2022-11-14 PROCEDURE — 97530 THERAPEUTIC ACTIVITIES: CPT

## 2022-11-14 PROCEDURE — 97110 THERAPEUTIC EXERCISES: CPT

## 2022-11-14 NOTE — PROGRESS NOTES
[facility]   Physical Therapy Treatment Note     Name: Billy Holbrook  [facility] Number: [id number]    Therapy Diagnosis:   Encounter Diagnoses   Name Primary?    Acute pain of left knee Yes    Decreased range of motion of left knee     Quadriceps weakness     Difficulty walking        Physician: PEACE Dempsey MD    Visit Date: 11/14/2022    Physician Orders: PT Eval and Treat   Medical Diagnosis from Referral: M25.562 (ICD-10-CM) - Acute pain of left knee  Evaluation Date: 9/7/2022  Authorization Period Expiration: 9/6/2023  Plan of Care Expiration: 12/31/2022  Visit # / Visits authorized: 47     Precautions: Standard     Time In: 1:00 pm  Time Out: 2:40  Total Billable Time: 60 minutes    Procedure: 9/20/2022  1. Left Knee Arthroscopy, anterior cruciate ligament reconstruction 62481  2.  Left Knee Arthroscopy, with meniscectomy (medial OR lateral) 48041  3.  Left Knee Arthroscopy, debridement/shaving of articular cartilage (chondroplasty) 34191  4.  Left Knee  platelet rich plasma injection to the bone-patellar tendon-bone harvest site    Post-Op Plan:  ACL reconstruction with a bone-patellar tendon-bone autograft    SUBJECTIVE     Pt reports: felt good after last session. Back got sore from sled-> squat combo, but not terrible and went away.      He was compliant with home exercise program.  Response to previous treatment: no issues  Functional change: normal gait    Pain: 0/10 to tension anterior knee with bending  Location: left knee      OBJECTIVE     7 wks 6 days post-op    Observation: trace to none effusion noted L knee  Well healed scar  Atrophy in calf/quad on L     Range of Motion (extension/neutral/flexion):     Right Left   Knee PROM: 8-0-125/130 deg P:8-0-130 deg  A:6-0-125 deg      Strength:     Right Left Comment   Knee Extension: 5/5 4/5        Knee Flexion: 5/5 4/5        Hip Abduction: 4+/5 4-/5       Functional tests(*=pain):   Gait/stairs: normalized  Quad  "activation: good     Joint Mobility: hypomobile PFJ and tibiofemoral    TTP along MCL- minimal to none    Treatment     Billy received the treatments listed below:      therapeutic exercises to develop strength, endurance, ROM, flexibility, posture, and core stabilization for 25 minutes including:  Education/assessment  Upright bike 10 min lv 4-8  PROM/manual stretching to linus knee/hips/ankle  Prone and drew position knee flexion; 4 x 20"   Heel prop x 2 min   Isometric knee extensions 5 x 45 sec at 60 deg      manual therapy techniques: Joint mobilizations and Soft tissue Mobilization were applied for 10 minutes, including:  PFJ gr 3-4 glides all planes: re-instructed with HEP  Tibial AP glide gr 3-4 with and without rotation bias on and off heel prop  Knee hyperextension gr 3-4  Fat pad mobility  Scar mobilization  Knee flx->ext with tibial AP gr 2-3; quick motion- NP    neuromuscular re-education activities to improve: Coordination, Kinesthetic, Sense, and Proprioception for 25 minutes. The following activities were included:  Quad set into hyperextension x 20x 5 sec holds AAROM to Active Range of Motion  SLR w/ quad set into hyperextension; 3 x 10 w/ 3" hold  Marching ipsilateral 38 lbs 3 x 15 yds with 5 sec pause  SL bridge with black sport band pull down 3 x 15 linus    Therapeutic activities to improve functional performance for 40 minutes, including:  SKTC, quad pull, HS scoop, HS march, walking lunge, lateral lunge x 1 lap each  SL sit to stands 24 inch box 3 x 15 linus  3 way lunges 3 x 5 each way linus-NP  RESS 4 x 10 -NP  12 inch rotational step up 4 x 10  3 way heel taps off 9-> inch box 3 rds 10 each     Patient Education and Home Exercises     Home Exercises Provided and Patient Education Provided     Education provided:   - HEP update, precautions, activity pacing, goals, timeframes    Written Home Exercises Provided: yes. Exercises were reviewed and Billy was able to demonstrate them prior to the end " of the session.  Billy demonstrated good  understanding of the education provided. See EMR under Patient Instructions for exercises provided during therapy sessions    ASSESSMENT     Billy continues to do well. Requires some manual therapy to re-gain full knee A/PROM today. Did well with BW SL exercises today with improved neuromuscular control. Pt reported/demonstrated no adverse response or exacerbation of symptoms during today's session.       Billy Is progressing well towards his goals.   Pt prognosis is Good.     Pt will continue to benefit from skilled outpatient physical therapy to address the deficits listed in the problem list box on initial evaluation, provide pt/family education and to maximize pt's level of independence in the home and community environment.     Pt's spiritual, cultural and educational needs considered and pt agreeable to plan of care and goals.     Anticipated barriers to physical therapy: none    Goals:  Short Term Goals: 8-10 weeks   1. Pt will be compliant with HEP 50% of prescribed amount. MET  2. The pt to demo improvement in left knee ROM to equal right knee ROM pain free MET  3.  The pt to demo good quad set with proper hyperextension moment of the Left knee MET  4. The pt to demo ambualting with elast restricitve AD witout major compensatory pattern left knee for at least 20 feet MET     Long Term Goals: 56 weeks (progressing, not met)   Pt will be compliant with % of prescribed amount.   The pt to demo pain free and uncompensated running mechanics x10 min on an indoor treadmill  The pt to demo tolerance to a squat at or bellow parallel with uncompensated mechanics x10   The pt to demo strength of L LE within 10% of R LE as demo'd on the biodex machine   The pt to demo a deficit of 10% or less on a triple hop, single leg broad jump and crossover hop compared to non operative LE.   The pt will report full participation in ADLs and IADLs without restrictions related to L   knee.     PLAN     Progress ROM to full at entry not requiring any manual/stretching. Progress motor control.into strength exercise as tolerated in precautions.     Manjit Lazcano, PT, DPT

## 2022-11-15 ENCOUNTER — CLINICAL SUPPORT (OUTPATIENT)
Dept: REHABILITATION | Facility: HOSPITAL | Age: 27
End: 2022-11-15
Payer: COMMERCIAL

## 2022-11-15 DIAGNOSIS — R26.2 DIFFICULTY WALKING: ICD-10-CM

## 2022-11-15 DIAGNOSIS — M25.562 ACUTE PAIN OF LEFT KNEE: Primary | ICD-10-CM

## 2022-11-15 DIAGNOSIS — M62.81 QUADRICEPS WEAKNESS: ICD-10-CM

## 2022-11-15 DIAGNOSIS — M25.662 DECREASED RANGE OF MOTION OF LEFT KNEE: ICD-10-CM

## 2022-11-15 PROCEDURE — 97140 MANUAL THERAPY 1/> REGIONS: CPT

## 2022-11-15 PROCEDURE — 97530 THERAPEUTIC ACTIVITIES: CPT

## 2022-11-15 PROCEDURE — 97110 THERAPEUTIC EXERCISES: CPT

## 2022-11-15 PROCEDURE — 97112 NEUROMUSCULAR REEDUCATION: CPT

## 2022-11-16 ENCOUNTER — CLINICAL SUPPORT (OUTPATIENT)
Dept: REHABILITATION | Facility: HOSPITAL | Age: 27
End: 2022-11-16
Payer: COMMERCIAL

## 2022-11-16 DIAGNOSIS — M25.562 ACUTE PAIN OF LEFT KNEE: Primary | ICD-10-CM

## 2022-11-16 DIAGNOSIS — R26.2 DIFFICULTY WALKING: ICD-10-CM

## 2022-11-16 DIAGNOSIS — M25.662 DECREASED RANGE OF MOTION OF LEFT KNEE: ICD-10-CM

## 2022-11-16 DIAGNOSIS — M62.81 QUADRICEPS WEAKNESS: ICD-10-CM

## 2022-11-16 PROCEDURE — 97140 MANUAL THERAPY 1/> REGIONS: CPT

## 2022-11-16 PROCEDURE — 97110 THERAPEUTIC EXERCISES: CPT

## 2022-11-16 PROCEDURE — 97112 NEUROMUSCULAR REEDUCATION: CPT

## 2022-11-16 PROCEDURE — 97530 THERAPEUTIC ACTIVITIES: CPT

## 2022-11-16 NOTE — PROGRESS NOTES
[facility]   Physical Therapy Treatment Note     Name: Billy Holbrook  [facility] Number: [id number]    Therapy Diagnosis:   Encounter Diagnoses   Name Primary?    Acute pain of left knee Yes    Decreased range of motion of left knee     Quadriceps weakness     Difficulty walking        Physician: PEACE Dempsey MD    Visit Date: 11/15/2022    Physician Orders: PT Eval and Treat   Medical Diagnosis from Referral: M25.562 (ICD-10-CM) - Acute pain of left knee  Evaluation Date: 9/7/2022  Authorization Period Expiration: 9/6/2023  Plan of Care Expiration: 12/31/2022  Visit # / Visits authorized: 48     Precautions: Standard     Time In: 11:00 am  Time Out: 12:25  Total Billable Time: 85 minutes    Procedure: 9/20/2022  1. Left Knee Arthroscopy, anterior cruciate ligament reconstruction 66921  2.  Left Knee Arthroscopy, with meniscectomy (medial OR lateral) 17326  3.  Left Knee Arthroscopy, debridement/shaving of articular cartilage (chondroplasty) 91043  4.  Left Knee  platelet rich plasma injection to the bone-patellar tendon-bone harvest site    Post-Op Plan:  ACL reconstruction with a bone-patellar tendon-bone autograft    SUBJECTIVE     Pt reports: feeling good and ready to exercise.     He was compliant with home exercise program.  Response to previous treatment: no issues  Functional change: normal gait    Pain: 0/10 to tension anterior knee with bending  Location: left knee      OBJECTIVE     8 wks post-op    Observation: trace to none effusion noted L knee  Well healed scar  Atrophy in calf/quad on L     Range of Motion (extension/neutral/flexion):     Right Left   Knee PROM: 8-0-125/130 deg P:8-0-130 deg  A:6-0-125 deg      Strength:     Right Left Comment   Knee Extension: 5/5 4/5        Knee Flexion: 5/5 4/5        Hip Abduction: 4+/5 4-/5       Functional tests(*=pain):   Gait/stairs: normalized  Quad activation: good     Joint Mobility: hypomobile PFJ and tibiofemoral    TTP  "along MCL- minimal to none    Treatment     Billy received the treatments listed below:      therapeutic exercises to develop strength, endurance, ROM, flexibility, posture, and core stabilization for 45 minutes including:  Education/assessment  Upright bike 10 min lv 4-8-NP  PROM/manual stretching to linus knee/hips/ankle  Prone and drew position knee flexion; 4 x 20"   Heel prop x 2 min   Isometric knee extensions 5 x 45 sec at 60 deg-NP  BFR: 70% LOP  -LAQ 7.5 lbs  -RDLs heavy BTB  -Squat black band  -bridge BTB      manual therapy techniques: Joint mobilizations and Soft tissue Mobilization were applied for 10 minutes, including:  PFJ gr 3-4 glides all planes: re-instructed with HEP  Tibial AP glide gr 3-4 with and without rotation bias on and off heel prop  Knee hyperextension gr 3-4  Fat pad mobility  Scar mobilization  Knee flx->ext with tibial AP gr 2-3; quick motion- NP    neuromuscular re-education activities to improve: Coordination, Kinesthetic, Sense, and Proprioception for 10 minutes. The following activities were included:  Quad set into hyperextension x 20x 5 sec holds AAROM to Active Range of Motion  SLR w/ quad set into hyperextension; 3 x 10 w/ 3" hold  Marching ipsilateral 38 lbs 3 x 15 yds with 5 sec pause-NP  SL bridge with black sport band pull down 3 x 15 linus-NP    Therapeutic activities to improve functional performance for 20 minutes, including:  SKTC, quad pull, HS scoop, HS march, walking lunge, lateral lunge x 1 lap each  SL sit to stands 24 inch box 3 x 15 linus  3 way lunges 3 x 5 each way linus-NP  RESS 4 x 10 -NP  12 inch rotational step up 4 x 10-NP  3 way heel taps off 9-> inch box 3 rds 10 each -NP    Patient Education and Home Exercises     Home Exercises Provided and Patient Education Provided     Education provided:   - HEP update, precautions, activity pacing, goals, timeframes    Written Home Exercises Provided: yes. Exercises were reviewed and Billy was able to demonstrate " them prior to the end of the session.  Billy demonstrated good  understanding of the education provided. See EMR under Patient Instructions for exercises provided during therapy sessions    ASSESSMENT     Billy did well with session focused on BFR. His ROM requires some manual therapy still, a little more today likely due to cold weather. Pt reported/demonstrated no adverse response or exacerbation of symptoms during today's session.       Billy Is progressing well towards his goals.   Pt prognosis is Good.     Pt will continue to benefit from skilled outpatient physical therapy to address the deficits listed in the problem list box on initial evaluation, provide pt/family education and to maximize pt's level of independence in the home and community environment.     Pt's spiritual, cultural and educational needs considered and pt agreeable to plan of care and goals.     Anticipated barriers to physical therapy: none    Goals:  Short Term Goals: 8-10 weeks   1. Pt will be compliant with HEP 50% of prescribed amount. MET  2. The pt to demo improvement in left knee ROM to equal right knee ROM pain free MET  3.  The pt to demo good quad set with proper hyperextension moment of the Left knee MET  4. The pt to demo ambualting with elast restricitve AD witout major compensatory pattern left knee for at least 20 feet MET     Long Term Goals: 56 weeks (progressing, not met)   Pt will be compliant with % of prescribed amount.   The pt to demo pain free and uncompensated running mechanics x10 min on an indoor treadmill  The pt to demo tolerance to a squat at or bellow parallel with uncompensated mechanics x10   The pt to demo strength of L LE within 10% of R LE as demo'd on the biodex machine   The pt to demo a deficit of 10% or less on a triple hop, single leg broad jump and crossover hop compared to non operative LE.   The pt will report full participation in ADLs and IADLs without restrictions related to L  knee.      PLAN     Progress ROM to full at entry not requiring any manual/stretching. Progress motor control.into strength exercise as tolerated in precautions.     Manjit Lazcano, PT, DPT

## 2022-11-16 NOTE — PROGRESS NOTES
[facility]   Physical Therapy Treatment Note     Name: Billy Holbrook  [facility] Number: [id number]    Therapy Diagnosis:   Encounter Diagnoses   Name Primary?    Acute pain of left knee Yes    Decreased range of motion of left knee     Quadriceps weakness     Difficulty walking      Physician: PEACE Dempsey MD    Visit Date: 11/16/2022    Physician Orders: PT Eval and Treat   Medical Diagnosis from Referral: M25.562 (ICD-10-CM) - Acute pain of left knee  Evaluation Date: 9/7/2022  Authorization Period Expiration: 9/6/2023  Plan of Care Expiration: 12/31/2022  Visit # / Visits authorized: 49     Precautions: Standard     Time In: 11:06 am  Time Out: 12:46  Total Billable Time: 85 minutes    Procedure: 9/20/2022  1. Left Knee Arthroscopy, anterior cruciate ligament reconstruction 81146  2.  Left Knee Arthroscopy, with meniscectomy (medial OR lateral) 85666  3.  Left Knee Arthroscopy, debridement/shaving of articular cartilage (chondroplasty) 60349  4.  Left Knee  platelet rich plasma injection to the bone-patellar tendon-bone harvest site    Post-Op Plan:  ACL reconstruction with a bone-patellar tendon-bone autograft    SUBJECTIVE     Pt reports: ICE with heel prop seems to help gain full hyperextension at home. Feels good.     He was compliant with home exercise program.  Response to previous treatment: no issues  Functional change: normal gait    Pain: 0/10 to tension anterior knee with bending  Location: left knee      OBJECTIVE     8 wks 1 day post-op    Observation: trace to none effusion noted L knee  Well healed scar  Atrophy in calf/quad on L     Range of Motion (extension/neutral/flexion):     Right Left   Knee PROM: 8-0-125/130 deg P:8-0-130 deg  A:6-0-125 deg      Strength:     Right Left Comment   Knee Extension: 5/5 4/5        Knee Flexion: 5/5 4/5        Hip Abduction: 4+/5 4-/5       Functional tests(*=pain):   Gait/stairs: normalized  Quad activation: good     Joint  "Mobility: hypomobile PFJ and tibiofemoral    TTP along MCL- minimal to none    Treatment     Billy received the treatments listed below:      therapeutic exercises to develop strength, endurance, ROM, flexibility, posture, and core stabilization for 25 minutes including:  Education/assessment  Upright bike 10 min lv 5-9  PROM/manual stretching to linus knee/hips/ankle  Prone and drew position knee flexion; 4 x 20"   Heel prop x 2 min   Isometric knee extensions 5 x 45 sec at 60 deg-NP  HS eccentric with bridge 3 x 10 x 5 sec- DL  SL heel raise 3 x 20 on slantboard    manual therapy techniques: Joint mobilizations and Soft tissue Mobilization were applied for 10 minutes, including:  PFJ gr 3-4 glides all planes: re-instructed with HEP  Tibial AP glide gr 3-4 with and without rotation bias on and off heel prop  Knee hyperextension gr 3-4  Fat pad mobility  Scar mobilization  Knee flx->ext with tibial AP gr 2-3; quick motion    neuromuscular re-education activities to improve: Coordination, Kinesthetic, Sense, and Proprioception for 10 minutes. The following activities were included:  Quad set into hyperextension x 20x 5 sec holds AAROM to Active Range of Motion  SLR w/ quad set into hyperextension; 3 x 10 w/ 3" hold  SLS on foam with 12 lb med ball tosses 3D 3 rds 10 each way-variable    Therapeutic activities to improve functional performance for 40 minutes, including:  SKTC, quad pull, HS scoop, HS march, walking lunge, lateral lunge x 1 lap each  SL sit to stands 24 inch box 3 x 15 linus  RESS 3 x 10; 2-3rd sets with 25 lbs each UE  12 inch rotational step up 4 x 10  Trap bar squat 5 x 5 135->245 lbs      Patient Education and Home Exercises     Home Exercises Provided and Patient Education Provided     Education provided:   - HEP update, precautions, activity pacing, goals, timeframes    Written Home Exercises Provided: yes. Exercises were reviewed and Billy was able to demonstrate them prior to the end of the " session.  Billy demonstrated good  understanding of the education provided. See EMR under Patient Instructions for exercises provided during therapy sessions    ASSESSMENT     Billy had much better mobility and ROM today. No reports of irritation in knee today, but mentions low back bothers him with tightness, hx of disc issue at 2 levels- chronic issue. Symptoms typically resolve in a day, denies LE symptoms or red flags.  LE strength, balance, and neuromuscular control progressing well. Pt reported/demonstrated no adverse response or exacerbation of symptoms during today's session.       Billy Is progressing well towards his goals.   Pt prognosis is Good.     Pt will continue to benefit from skilled outpatient physical therapy to address the deficits listed in the problem list box on initial evaluation, provide pt/family education and to maximize pt's level of independence in the home and community environment.     Pt's spiritual, cultural and educational needs considered and pt agreeable to plan of care and goals.     Anticipated barriers to physical therapy: none    Goals:  Short Term Goals: 8-10 weeks   1. Pt will be compliant with HEP 50% of prescribed amount. MET  2. The pt to demo improvement in left knee ROM to equal right knee ROM pain free MET  3.  The pt to demo good quad set with proper hyperextension moment of the Left knee MET  4. The pt to demo ambualting with elast restricitve AD witout major compensatory pattern left knee for at least 20 feet MET     Long Term Goals: 56 weeks (progressing, not met)   Pt will be compliant with % of prescribed amount.   The pt to demo pain free and uncompensated running mechanics x10 min on an indoor treadmill  The pt to demo tolerance to a squat at or bellow parallel with uncompensated mechanics x10   The pt to demo strength of L LE within 10% of R LE as demo'd on the biodex machine   The pt to demo a deficit of 10% or less on a triple hop, single leg  broad jump and crossover hop compared to non operative LE.   The pt will report full participation in ADLs and IADLs without restrictions related to L  knee.     PLAN     Once ROM is maintained, progress to elliptical and wean from BFR use.     Manjit Lazcano, PT, DPT

## 2022-11-17 ENCOUNTER — CLINICAL SUPPORT (OUTPATIENT)
Dept: REHABILITATION | Facility: HOSPITAL | Age: 27
End: 2022-11-17
Payer: COMMERCIAL

## 2022-11-17 DIAGNOSIS — R26.2 DIFFICULTY WALKING: ICD-10-CM

## 2022-11-17 DIAGNOSIS — M25.662 DECREASED RANGE OF MOTION OF LEFT KNEE: ICD-10-CM

## 2022-11-17 DIAGNOSIS — M25.562 ACUTE PAIN OF LEFT KNEE: Primary | ICD-10-CM

## 2022-11-17 DIAGNOSIS — M62.81 QUADRICEPS WEAKNESS: ICD-10-CM

## 2022-11-17 PROCEDURE — 97530 THERAPEUTIC ACTIVITIES: CPT

## 2022-11-17 PROCEDURE — 97110 THERAPEUTIC EXERCISES: CPT

## 2022-11-17 PROCEDURE — 97140 MANUAL THERAPY 1/> REGIONS: CPT

## 2022-11-17 NOTE — PROGRESS NOTES
[facility]   Physical Therapy Treatment Note     Name: Billy Holbrook  [facility] Number: [id number]    Therapy Diagnosis:   Encounter Diagnoses   Name Primary?    Acute pain of left knee Yes    Decreased range of motion of left knee     Quadriceps weakness     Difficulty walking      Physician: PEACE Dempsey MD    Visit Date: 11/17/2022    Physician Orders: PT Eval and Treat   Medical Diagnosis from Referral: M25.562 (ICD-10-CM) - Acute pain of left knee  Evaluation Date: 9/7/2022  Authorization Period Expiration: 9/6/2023  Plan of Care Expiration: 12/31/2022  Visit # / Visits authorized: 50     Precautions: Standard     Time In: 11:08 am  Time Out: 12:15  Total Billable Time: 67 minutes    Procedure: 9/20/2022  1. Left Knee Arthroscopy, anterior cruciate ligament reconstruction 42866  2.  Left Knee Arthroscopy, with meniscectomy (medial OR lateral) 99105  3.  Left Knee Arthroscopy, debridement/shaving of articular cartilage (chondroplasty) 08022  4.  Left Knee  platelet rich plasma injection to the bone-patellar tendon-bone harvest site    Post-Op Plan:  ACL reconstruction with a bone-patellar tendon-bone autograft    SUBJECTIVE     Pt reports: knee is weak, back hurts, feels out of shape at 330 lbs. Playing weight is 315. Otherwise ready to exercise. No issues in session other than expected muscle fatigue.     He was compliant with home exercise program.  Response to previous treatment: no issues  Functional change: normal gait    Pain: 0/10 to tension anterior knee with bending  Location: left knee      OBJECTIVE     8 wks 2 day post-op    Observation: trace to none effusion noted L knee  Well healed scar  Atrophy in calf/quad on L     Range of Motion (extension/neutral/flexion):     Right Left   Knee PROM: 8-0-125/130 deg P:8-0-130 deg  A:6-0-125 deg      Strength:     Right Left Comment   Knee Extension: 5/5 4/5        Knee Flexion: 5/5 4/5        Hip Abduction: 4+/5 4-/5    "    Functional tests(*=pain):   Gait/stairs: normalized  Quad activation: good     Joint Mobility: hypomobile PFJ and tibiofemoral    TTP along MCL- minimal to none    Treatment     Billy received the treatments listed below:      therapeutic exercises to develop strength, endurance, ROM, flexibility, posture, and core stabilization for 45 minutes including:  Education/assessment  Upright bike 10 min lv 5-9-NP  PROM/manual stretching to linus knee/hips/ankle  Prone and drew position knee flexion; 4 x 20"   Heel prop x 2 min   BFR 70%  -SL shuttle 4 bands  -soleus raise 45 lbs  -Prone HS curl 15 lbs  -LAQ 10 lbs  -knee hyperextensions    manual therapy techniques: Joint mobilizations and Soft tissue Mobilization were applied for 10 minutes, including:  PFJ gr 3-4 glides all planes: re-instructed with HEP  Tibial AP glide gr 3-4 with and without rotation bias on and off heel prop  Knee hyperextension gr 3-4  Fat pad mobility  Scar mobilization  Knee flx->ext with tibial AP gr 2-3; quick motion    neuromuscular re-education activities to improve: Coordination, Kinesthetic, Sense, and Proprioception for 2 minutes. The following activities were included:  Quad set into hyperextension x 15 x 3 sec  SLS on foam with 12 lb med ball tosses 3D 3 rds 10 each way-variable-NP    Therapeutic activities to improve functional performance for 10 minutes, including:  SKTC, quad pull, HS scoop, SL DL, walking lunge, lateral lunge x 1 lap each  SL sit to stands 24 inch box 3 x 15 linus-NP  RESS 3 x 10; 2-3rd sets with 25 lbs each UE-NP  12 inch rotational step up 4 x 10-NP  Trap bar squat 5 x 5 135->245 lbs-NP      Patient Education and Home Exercises     Home Exercises Provided and Patient Education Provided     Education provided:   - HEP update, precautions, activity pacing, goals, timeframes    Written Home Exercises Provided: yes. Exercises were reviewed and Billy was able to demonstrate them prior to the end of the session.  " Bilyl demonstrated good  understanding of the education provided. See EMR under Patient Instructions for exercises provided during therapy sessions    ASSESSMENT     Billy did well with BFR focus today at 70% for 5 exercises. He tolerated it the best yet, with a good muscle burn. ROM remains full with some manual therapy required.  Pt reported/demonstrated no adverse response or exacerbation of symptoms during today's session.       Billy Is progressing well towards his goals.   Pt prognosis is Good.     Pt will continue to benefit from skilled outpatient physical therapy to address the deficits listed in the problem list box on initial evaluation, provide pt/family education and to maximize pt's level of independence in the home and community environment.     Pt's spiritual, cultural and educational needs considered and pt agreeable to plan of care and goals.     Anticipated barriers to physical therapy: none    Goals:  Short Term Goals: 8-10 weeks   1. Pt will be compliant with HEP 50% of prescribed amount. MET  2. The pt to demo improvement in left knee ROM to equal right knee ROM pain free MET  3.  The pt to demo good quad set with proper hyperextension moment of the Left knee MET  4. The pt to demo ambualting with elast restricitve AD witout major compensatory pattern left knee for at least 20 feet MET     Long Term Goals: 56 weeks (progressing, not met)   Pt will be compliant with % of prescribed amount.   The pt to demo pain free and uncompensated running mechanics x10 min on an indoor treadmill  The pt to demo tolerance to a squat at or bellow parallel with uncompensated mechanics x10   The pt to demo strength of L LE within 10% of R LE as demo'd on the biodex machine   The pt to demo a deficit of 10% or less on a triple hop, single leg broad jump and crossover hop compared to non operative LE.   The pt will report full participation in ADLs and IADLs without restrictions related to L  knee.      PLAN     Once ROM is maintained, progress to elliptical and wean from BFR use.     Manjit Lazcano, PT, DPT

## 2022-11-18 ENCOUNTER — CLINICAL SUPPORT (OUTPATIENT)
Dept: REHABILITATION | Facility: HOSPITAL | Age: 27
End: 2022-11-18
Payer: COMMERCIAL

## 2022-11-18 DIAGNOSIS — M25.562 ACUTE PAIN OF LEFT KNEE: Primary | ICD-10-CM

## 2022-11-18 DIAGNOSIS — M25.662 DECREASED RANGE OF MOTION OF LEFT KNEE: ICD-10-CM

## 2022-11-18 DIAGNOSIS — M62.81 QUADRICEPS WEAKNESS: ICD-10-CM

## 2022-11-18 DIAGNOSIS — R26.2 DIFFICULTY WALKING: ICD-10-CM

## 2022-11-18 PROCEDURE — 97530 THERAPEUTIC ACTIVITIES: CPT

## 2022-11-18 PROCEDURE — 97112 NEUROMUSCULAR REEDUCATION: CPT

## 2022-11-18 PROCEDURE — 97140 MANUAL THERAPY 1/> REGIONS: CPT

## 2022-11-18 PROCEDURE — 97110 THERAPEUTIC EXERCISES: CPT

## 2022-11-18 NOTE — PROGRESS NOTES
[facility]   Physical Therapy Treatment Note     Name: Billy Holbrook  [facility] Number: [id number]    Therapy Diagnosis:   Encounter Diagnoses   Name Primary?    Acute pain of left knee Yes    Decreased range of motion of left knee     Quadriceps weakness     Difficulty walking      Physician: PEACE Dempsey MD    Visit Date: 11/18/2022    Physician Orders: PT Eval and Treat   Medical Diagnosis from Referral: M25.562 (ICD-10-CM) - Acute pain of left knee  Evaluation Date: 9/7/2022  Authorization Period Expiration: 9/6/2023  Plan of Care Expiration: 12/31/2022  Visit # / Visits authorized: 51     Precautions: Standard     Time In: 10:08 am  Time Out: 12:45  Total Billable Time: 97 minutes    Procedure: 9/20/2022  1. Left Knee Arthroscopy, anterior cruciate ligament reconstruction 61621  2.  Left Knee Arthroscopy, with meniscectomy (medial OR lateral) 72485  3.  Left Knee Arthroscopy, debridement/shaving of articular cartilage (chondroplasty) 11244  4.  Left Knee  platelet rich plasma injection to the bone-patellar tendon-bone harvest site    Post-Op Plan:  ACL reconstruction with a bone-patellar tendon-bone autograft    SUBJECTIVE     Pt reports: knee is stiff today, but gained full mobility quick. Feeling good. Would like to have his low back dry needled as he has been getting sore due to chronic 2 level disc issues. Denies red flags or radiating symptoms.     He was compliant with home exercise program.  Response to previous treatment: no issues  Functional change: normal gait    Pain: 0/10 to tension anterior knee with bending  Location: left knee      OBJECTIVE     8 wks 3 day post-op    Observation: trace to none effusion noted L knee  Well healed scar  Atrophy in calf/quad on L     Range of Motion (extension/neutral/flexion):     Right Left   Knee PROM: 8-0-125/130 deg P:8-0-130 deg  A:6-0-125 deg      Strength:     Right Left Comment   Knee Extension: 5/5 4/5        Knee Flexion:  "5/5 4/5        Hip Abduction: 4+/5 4-/5       Functional tests(*=pain):   Gait/stairs: normalized  Quad activation: good     Joint Mobility: hypomobile PFJ and tibiofemoral    TTP along MCL- minimal to none    Treatment     Billy received the treatments listed below:      therapeutic exercises to develop strength, endurance, ROM, flexibility, posture, and core stabilization for 20 minutes including:  Education/assessment  Upright bike 10 min lv 5-9  PROM/manual stretching to linus knee/hips/ankle  Prone and drew position knee flexion; 4 x 20"   Heel prop x 2 min     manual therapy techniques: Joint mobilizations and Soft tissue Mobilization were applied for 27 minutes, including:  PFJ gr 3-4 glides all planes: re-instructed with HEP  Tibial AP glide gr 3-4 with and without rotation bias on and off heel prop  Knee hyperextension gr 3-4  Fat pad mobility  Scar mobilization  Knee flx->ext with tibial AP gr 2-3; quick motion  TDN to lumbar PVM/multifidi L3-5 and glute max/med/min linus- 17 min: tx not billed; assessment, set up, and re-assessment billed    neuromuscular re-education activities to improve: Coordination, Kinesthetic, Sense, and Proprioception for 15 minutes. The following activities were included:  Quad set into hyperextension x 15 x 3 sec  Cone taps LE Y balance 3 rds 6 each way  SL bridge with band pull down 3 x 15 linus      Therapeutic activities to improve functional performance for 35 minutes, including:  SKTC, quad pull, HS scoop, SL DL, walking lunge, lateral lunge x 1 lap each  SL sit to stands 24 inch box x 30 linus  Lunge matrix 3 rds 5 each   12 inch rotational step up 3 x 10 linus  Trap bar squat 5 x 5 135->245 lbs-NP  Lateral sled pull with 45 lbs added 3 x 15 yds      Patient Education and Home Exercises     Home Exercises Provided and Patient Education Provided     Education provided:   - HEP update, precautions, activity pacing, goals, timeframes    Written Home Exercises Provided: yes. " Exercises were reviewed and Billy was able to demonstrate them prior to the end of the session.  Billy demonstrated good  understanding of the education provided. See EMR under Patient Instructions for exercises provided during therapy sessions    ASSESSMENT     Billy showed improved control of rotational control with dynamic multi-directional movements for LE's today. Able to gain full hyperextension easier today.  Did dry needle with no pistoning, just place and stay for low back stiffness/tightness from exercise. Felt good at exit and was able to self manipulate low back feeling much better. Pt reported/demonstrated no adverse response or exacerbation of symptoms during today's session.       Billy Is progressing well towards his goals.   Pt prognosis is Good.     Pt will continue to benefit from skilled outpatient physical therapy to address the deficits listed in the problem list box on initial evaluation, provide pt/family education and to maximize pt's level of independence in the home and community environment.     Pt's spiritual, cultural and educational needs considered and pt agreeable to plan of care and goals.     Anticipated barriers to physical therapy: none    Goals:  Short Term Goals: 8-10 weeks   1. Pt will be compliant with HEP 50% of prescribed amount. MET  2. The pt to demo improvement in left knee ROM to equal right knee ROM pain free MET  3.  The pt to demo good quad set with proper hyperextension moment of the Left knee MET  4. The pt to demo ambualting with elast restricitve AD witout major compensatory pattern left knee for at least 20 feet MET     Long Term Goals: 56 weeks (progressing, not met)   Pt will be compliant with % of prescribed amount.   The pt to demo pain free and uncompensated running mechanics x10 min on an indoor treadmill  The pt to demo tolerance to a squat at or bellow parallel with uncompensated mechanics x10   The pt to demo strength of L LE within 10% of R  LE as demo'd on the biodex machine   The pt to demo a deficit of 10% or less on a triple hop, single leg broad jump and crossover hop compared to non operative LE.   The pt will report full participation in ADLs and IADLs without restrictions related to L  knee.     PLAN     Once ROM is maintained, progress to elliptical and wean from BFR use.     Manjit Lazcano, PT, DPT

## 2022-11-21 ENCOUNTER — CLINICAL SUPPORT (OUTPATIENT)
Dept: REHABILITATION | Facility: HOSPITAL | Age: 27
End: 2022-11-21
Payer: COMMERCIAL

## 2022-11-21 DIAGNOSIS — M25.562 ACUTE PAIN OF LEFT KNEE: Primary | ICD-10-CM

## 2022-11-21 DIAGNOSIS — M62.81 QUADRICEPS WEAKNESS: ICD-10-CM

## 2022-11-21 DIAGNOSIS — M25.662 DECREASED RANGE OF MOTION OF LEFT KNEE: ICD-10-CM

## 2022-11-21 DIAGNOSIS — R26.2 DIFFICULTY WALKING: ICD-10-CM

## 2022-11-21 PROCEDURE — 97530 THERAPEUTIC ACTIVITIES: CPT

## 2022-11-21 PROCEDURE — 97110 THERAPEUTIC EXERCISES: CPT

## 2022-11-21 PROCEDURE — 97112 NEUROMUSCULAR REEDUCATION: CPT

## 2022-11-21 PROCEDURE — 97140 MANUAL THERAPY 1/> REGIONS: CPT

## 2022-11-21 NOTE — PROGRESS NOTES
[facility]   Physical Therapy Treatment Note     Name: Billy Holbrook  [facility] Number: [id number]    Therapy Diagnosis:   Encounter Diagnoses   Name Primary?    Acute pain of left knee Yes    Decreased range of motion of left knee     Quadriceps weakness     Difficulty walking        Physician: PEACE Dempsey MD    Visit Date: 11/21/2022    Physician Orders: PT Eval and Treat   Medical Diagnosis from Referral: M25.562 (ICD-10-CM) - Acute pain of left knee  Evaluation Date: 9/7/2022  Authorization Period Expiration: 9/6/2023  Plan of Care Expiration: 12/31/2022  Visit # / Visits authorized: 52     Precautions: Standard     Time In: 11:00 am  Time Out: 12:30  Total Billable Time: 90 minutes    Procedure: 9/20/2022  1. Left Knee Arthroscopy, anterior cruciate ligament reconstruction 37731  2.  Left Knee Arthroscopy, with meniscectomy (medial OR lateral) 20755  3.  Left Knee Arthroscopy, debridement/shaving of articular cartilage (chondroplasty) 47427  4.  Left Knee  platelet rich plasma injection to the bone-patellar tendon-bone harvest site    Post-Op Plan:  ACL reconstruction with a bone-patellar tendon-bone autograft    SUBJECTIVE     Pt reports: he is feeling good. Low back feels great. Seeing good progress.     He was compliant with home exercise program.  Response to previous treatment: no issues  Functional change: normal gait    Pain: 0/10 to tension anterior knee with bending  Location: left knee      OBJECTIVE     8 wks 6 days post-op    Observation: trace to none effusion noted L knee  Well healed scar  Atrophy in calf/quad on L     Range of Motion (extension/neutral/flexion):     Right Left   Knee PROM: 8-0-125/130 deg P:8-0-130 deg  A:6-0-125 deg      Strength:     Right Left Comment   Knee Extension: 5/5 4/5        Knee Flexion: 5/5 4/5        Hip Abduction: 4+/5 4-/5       Functional tests(*=pain):   Gait/stairs: normalized  Quad activation: good     Joint Mobility:  "hypomobile PFJ and tibiofemoral    TTP along MCL- minimal to none    Treatment     Billy received the treatments listed below:      therapeutic exercises to develop strength, endurance, ROM, flexibility, posture, and core stabilization for 25 minutes including:  Education/assessment  Elliptical 10 min lv 3  PROM/manual stretching to linus knee/hips/ankle  Prone and drew position knee flexion; 4 x 20"   Heel prop x 2 min   Seated HS curl 35-45 lbs 5 x 5    manual therapy techniques: Joint mobilizations and Soft tissue Mobilization were applied for 10 minutes, including:  PFJ gr 3-4 glides all planes: re-instructed with HEP  Tibial AP glide gr 3-4 with and without rotation bias on and off heel prop  Knee hyperextension gr 3-4  Fat pad mobility  Scar mobilization    neuromuscular re-education activities to improve: Coordination, Kinesthetic, Sense, and Proprioception for 30 minutes. The following activities were included:  Quad set into hyperextension x 15 x 3 sec  Dying bugs with swiss ball 2 x 20  Palloff press black sport band- medium 2 x 20 linus  Marching 1 offset KB(35 lbs) x 3 laps of 20 yds      Therapeutic activities to improve functional performance for 25 minutes, including:  SKTC, quad pull, HS scoop, SL DL, walking lunge, lateral lunge x 1 lap each  RESS 5 x 5 linus 25-45 lb Dbs each UE  Trap bar squat 5 x 5 135- 285 lbs      Patient Education and Home Exercises     Home Exercises Provided and Patient Education Provided     Education provided:   - HEP update, precautions, activity pacing, goals, timeframes    Written Home Exercises Provided: yes. Exercises were reviewed and Billy was able to demonstrate them prior to the end of the session.  Billy demonstrated good  understanding of the education provided. See EMR under Patient Instructions for exercises provided during therapy sessions    ASSESSMENT     Billy did well with session. Tolerated elliptical well with no s/sx of buckling; form looked good. " Progressing loading for strength as able in precautions.  He is maintaining knee ROM/mobility much better, taking a minute or 2 to improve to full with some manual therapy; did a few bouts in session between exercises to maintain as he stiffens quickly a couple deg. Pt reported/demonstrated no adverse response or exacerbation of symptoms during today's session.       Billy Is progressing well towards his goals.   Pt prognosis is Good.     Pt will continue to benefit from skilled outpatient physical therapy to address the deficits listed in the problem list box on initial evaluation, provide pt/family education and to maximize pt's level of independence in the home and community environment.     Pt's spiritual, cultural and educational needs considered and pt agreeable to plan of care and goals.     Anticipated barriers to physical therapy: none    Goals:  Short Term Goals: 8-10 weeks   1. Pt will be compliant with HEP 50% of prescribed amount. MET  2. The pt to demo improvement in left knee ROM to equal right knee ROM pain free MET  3.  The pt to demo good quad set with proper hyperextension moment of the Left knee MET  4. The pt to demo ambualting with elast restricitve AD witout major compensatory pattern left knee for at least 20 feet MET     Long Term Goals: 56 weeks (progressing, not met)   Pt will be compliant with % of prescribed amount.   The pt to demo pain free and uncompensated running mechanics x10 min on an indoor treadmill  The pt to demo tolerance to a squat at or bellow parallel with uncompensated mechanics x10   The pt to demo strength of L LE within 10% of R LE as demo'd on the biodex machine   The pt to demo a deficit of 10% or less on a triple hop, single leg broad jump and crossover hop compared to non operative LE.   The pt will report full participation in ADLs and IADLs without restrictions related to L  knee.     PLAN     Once ROM is maintained, progress to elliptical and wean from  BFR use.     Manjit Lazcano, PT, DPT

## 2022-11-22 ENCOUNTER — CLINICAL SUPPORT (OUTPATIENT)
Dept: REHABILITATION | Facility: HOSPITAL | Age: 27
End: 2022-11-22
Payer: COMMERCIAL

## 2022-11-22 DIAGNOSIS — R26.2 DIFFICULTY WALKING: ICD-10-CM

## 2022-11-22 DIAGNOSIS — M25.662 DECREASED RANGE OF MOTION OF LEFT KNEE: ICD-10-CM

## 2022-11-22 DIAGNOSIS — M62.81 QUADRICEPS WEAKNESS: ICD-10-CM

## 2022-11-22 DIAGNOSIS — M25.562 ACUTE PAIN OF LEFT KNEE: Primary | ICD-10-CM

## 2022-11-22 PROCEDURE — 97110 THERAPEUTIC EXERCISES: CPT

## 2022-11-22 PROCEDURE — 97530 THERAPEUTIC ACTIVITIES: CPT

## 2022-11-22 PROCEDURE — 97112 NEUROMUSCULAR REEDUCATION: CPT

## 2022-11-22 PROCEDURE — 97140 MANUAL THERAPY 1/> REGIONS: CPT

## 2022-11-22 NOTE — PROGRESS NOTES
[facility]   Physical Therapy Treatment Note     Name: Billy Holbrook  [facility] Number: [id number]    Therapy Diagnosis:   Encounter Diagnoses   Name Primary?    Acute pain of left knee Yes    Decreased range of motion of left knee     Quadriceps weakness     Difficulty walking        Physician: PEACE Dempsey MD    Visit Date: 11/22/2022    Physician Orders: PT Eval and Treat   Medical Diagnosis from Referral: M25.562 (ICD-10-CM) - Acute pain of left knee  Evaluation Date: 9/7/2022  Authorization Period Expiration: 9/6/2023  Plan of Care Expiration: 12/31/2022  Visit # / Visits authorized: 53     Precautions: Standard     Time In: 11:06 am  Time Out: 12:10  Total Billable Time: 64 minutes    Procedure: 9/20/2022  1. Left Knee Arthroscopy, anterior cruciate ligament reconstruction 19018  2.  Left Knee Arthroscopy, with meniscectomy (medial OR lateral) 11841  3.  Left Knee Arthroscopy, debridement/shaving of articular cartilage (chondroplasty) 44957  4.  Left Knee  platelet rich plasma injection to the bone-patellar tendon-bone harvest site    Post-Op Plan:  ACL reconstruction with a bone-patellar tendon-bone autograft    SUBJECTIVE     Pt reports: feeling good. Hip is sore from yesterday.     He was compliant with home exercise program.  Response to previous treatment: no issues  Functional change: normal gait    Pain: 0/10 to tension anterior knee with bending  Location: left knee      OBJECTIVE     9 wks post-op    Observation: trace to none effusion noted L knee  Well healed scar  Atrophy in calf/quad on L     Range of Motion (extension/neutral/flexion):     Right Left   Knee PROM: 8-0-125/130 deg P:8-0-130 deg  A:6-0-125 deg      Strength:     Right Left Comment   Knee Extension: 5/5 4/5        Knee Flexion: 5/5 4/5        Hip Abduction: 4+/5 4-/5       Functional tests(*=pain):   Gait/stairs: normalized  Quad activation: good     Joint Mobility: hypomobile PFJ and  tibiofemoral    TTP along MCL- minimal to none    Treatment     Billy received the treatments listed below:      therapeutic exercises to develop strength, endurance, ROM, flexibility, posture, and core stabilization for 11 minutes including:  Education/assessment  Elliptical 10 min lv 3-NP  Manual knee stretching flx and extension  Heel prop x 2 min   Seated soleus raise 90 lbs 3 x 15    manual therapy techniques: Joint mobilizations and Soft tissue Mobilization were applied for 10 minutes, including:  PFJ gr 3-4 glides all planes  Tibial AP glide gr 3-4 with and without rotation bias on and off heel prop  Knee hyperextension gr 3-4  Fat pad mobility  Scar mobilization    neuromuscular re-education activities to improve: Coordination, Kinesthetic, Sense, and Proprioception for 23 minutes. The following activities were included:  Quad set into hyperextension x 20 x 3 sec  Standing TKE green heavy sport band 2 x 20 x 3 sec  LE Y balance cone taps 4 x 5 each way 2 rds stable, 2 rds foam  Side plank clams BTB 3 x 10 x 5 sec linus    Therapeutic activities to improve functional performance for 20 minutes, including:  SKTC, quad pull, HS scoop, SL DL, walking lunge, lateral lunge x 1 lap each  Sled pull with TKE focus 2 x 30 yds 90 lbs added  Lateral sled pull 45 lbs 2 x 30 yds    Patient Education and Home Exercises     Home Exercises Provided and Patient Education Provided     Education provided:   - HEP update, precautions, activity pacing, goals, timeframes    Written Home Exercises Provided: yes. Exercises were reviewed and Billy was able to demonstrate them prior to the end of the session.  Billy demonstrated good  understanding of the education provided. See EMR under Patient Instructions for exercises provided during therapy sessions.     ASSESSMENT     Billy did well in session. Focused on accessory strength and activations with neuromuscular control/balance tasks. No complaints in session. Pt  reported/demonstrated no adverse response or exacerbation of symptoms during today's session.       Billy Is progressing well towards his goals.   Pt prognosis is Good.     Pt will continue to benefit from skilled outpatient physical therapy to address the deficits listed in the problem list box on initial evaluation, provide pt/family education and to maximize pt's level of independence in the home and community environment.     Pt's spiritual, cultural and educational needs considered and pt agreeable to plan of care and goals.     Anticipated barriers to physical therapy: none    Goals:  Short Term Goals: 8-10 weeks   1. Pt will be compliant with HEP 50% of prescribed amount. MET  2. The pt to demo improvement in left knee ROM to equal right knee ROM pain free MET  3.  The pt to demo good quad set with proper hyperextension moment of the Left knee MET  4. The pt to demo ambualting with elast restricitve AD witout major compensatory pattern left knee for at least 20 feet MET     Long Term Goals: 56 weeks (progressing, not met)   Pt will be compliant with % of prescribed amount.   The pt to demo pain free and uncompensated running mechanics x10 min on an indoor treadmill  The pt to demo tolerance to a squat at or bellow parallel with uncompensated mechanics x10   The pt to demo strength of L LE within 10% of R LE as demo'd on the biodex machine   The pt to demo a deficit of 10% or less on a triple hop, single leg broad jump and crossover hop compared to non operative LE.   The pt will report full participation in ADLs and IADLs without restrictions related to L  knee.     PLAN     Progress loading, work elliptical for cardio. Will have Thanksgiving off due to clinic closure.     Manjit Lazcano, PT, DPT

## 2022-11-23 ENCOUNTER — CLINICAL SUPPORT (OUTPATIENT)
Dept: REHABILITATION | Facility: HOSPITAL | Age: 27
End: 2022-11-23
Payer: COMMERCIAL

## 2022-11-23 DIAGNOSIS — M25.662 DECREASED RANGE OF MOTION OF LEFT KNEE: ICD-10-CM

## 2022-11-23 DIAGNOSIS — M62.81 QUADRICEPS WEAKNESS: ICD-10-CM

## 2022-11-23 DIAGNOSIS — M25.562 ACUTE PAIN OF LEFT KNEE: Primary | ICD-10-CM

## 2022-11-23 DIAGNOSIS — R26.2 DIFFICULTY WALKING: ICD-10-CM

## 2022-11-23 PROCEDURE — 97110 THERAPEUTIC EXERCISES: CPT

## 2022-11-23 PROCEDURE — 97530 THERAPEUTIC ACTIVITIES: CPT

## 2022-11-23 PROCEDURE — 97140 MANUAL THERAPY 1/> REGIONS: CPT

## 2022-11-28 ENCOUNTER — CLINICAL SUPPORT (OUTPATIENT)
Dept: REHABILITATION | Facility: HOSPITAL | Age: 27
End: 2022-11-28
Payer: COMMERCIAL

## 2022-11-28 DIAGNOSIS — M25.662 DECREASED RANGE OF MOTION OF LEFT KNEE: ICD-10-CM

## 2022-11-28 DIAGNOSIS — M25.562 ACUTE PAIN OF LEFT KNEE: Primary | ICD-10-CM

## 2022-11-28 DIAGNOSIS — M62.81 QUADRICEPS WEAKNESS: ICD-10-CM

## 2022-11-28 DIAGNOSIS — R26.2 DIFFICULTY WALKING: ICD-10-CM

## 2022-11-28 PROCEDURE — 97110 THERAPEUTIC EXERCISES: CPT

## 2022-11-28 PROCEDURE — 97530 THERAPEUTIC ACTIVITIES: CPT

## 2022-11-28 PROCEDURE — 97140 MANUAL THERAPY 1/> REGIONS: CPT

## 2022-11-28 NOTE — PROGRESS NOTES
[facility]   Physical Therapy Treatment Note     Name: Billy Holbrook  [facility] Number: [id number]    Therapy Diagnosis:   Encounter Diagnoses   Name Primary?    Acute pain of left knee Yes    Decreased range of motion of left knee     Quadriceps weakness     Difficulty walking        Physician: PEACE Dempsey MD    Visit Date: 11/28/2022    Physician Orders: PT Eval and Treat   Medical Diagnosis from Referral: M25.562 (ICD-10-CM) - Acute pain of left knee  Evaluation Date: 9/7/2022  Authorization Period Expiration: 9/6/2023  Plan of Care Expiration: 12/31/2022  Visit # / Visits authorized: 55     Precautions: Standard     Time In: 11:05 am  Time Out: 12:30  Total Billable Time: 75 minutes    Procedure: 9/20/2022  1. Left Knee Arthroscopy, anterior cruciate ligament reconstruction 76658  2.  Left Knee Arthroscopy, with meniscectomy (medial OR lateral) 37685  3.  Left Knee Arthroscopy, debridement/shaving of articular cartilage (chondroplasty) 87673  4.  Left Knee  platelet rich plasma injection to the bone-patellar tendon-bone harvest site    Post-Op Plan:  ACL reconstruction with a bone-patellar tendon-bone autograft    SUBJECTIVE     Pt reports: he feels good. Did a lot of stretching, feels loose coming in. Ready to exercise. Feels the best he has yet after surgery. Wants to go bowling, but knows he can not bowl as hard as he normally does.     He was compliant with home exercise program.  Response to previous treatment: no issues  Functional change: normal gait    Pain: 0/10 to tension anterior knee with bending  Location: left knee      OBJECTIVE     9 wks 6 days post-op    Observation: trace to none effusion noted L knee  Well healed scar  Atrophy in calf/quad on L     Range of Motion (extension/neutral/flexion):     Right Left   Knee PROM: 8-0-125/130 deg P:8-0-130 deg  A:6-0-125 deg      Strength:     Right Left Comment   Knee Extension: 5/5 4/5        Knee Flexion: 5/5 4/5         Hip Abduction: 4+/5 4-/5       Functional tests(*=pain):   Gait/stairs: normalized  Quad activation: good     Joint Mobility: hypomobile PFJ and tibiofemoral    TTP along MCL- minimal to none    Treatment     Billy received the treatments listed below:      therapeutic exercises to develop strength, endurance, ROM, flexibility, posture, and core stabilization for 25 minutes including:  Education/assessment  Elliptical 12 min lv 4  Manual knee stretching flx and extension    manual therapy techniques: Joint mobilizations and Soft tissue Mobilization were applied for 10 minutes, including:  PFJ gr 3-4 glides all planes  Tibial AP glide gr 3-4 with and without rotation bias on and off heel prop  Knee hyperextension gr 3-4  Fat pad mobility  Scar mobilization    neuromuscular re-education activities to improve: Coordination, Kinesthetic, Sense, and Proprioception for 3 minutes. The following activities were included:  Quad set into hyperextension x 20 x 3 sec    Therapeutic activities to improve functional performance for 47 minutes, including:  SKTC, quad pull, HS scoop, SL DL, walking lunge, lateral lunge x 1 lap each  RESS 5 x 5: 12 inch box 35->50 lbs  Trap bar squat 5 x 5 185->275 lbs  SL RDL landmine 4 x 5 x 3 reps row: 45 lb bar-> 45 lbs added    Patient Education and Home Exercises     Home Exercises Provided and Patient Education Provided     Education provided:   - HEP update, precautions, activity pacing, goals, timeframes    Written Home Exercises Provided: yes. Exercises were reviewed and Billy was able to demonstrate them prior to the end of the session.  Billy demonstrated good  understanding of the education provided. See EMR under Patient Instructions for exercises provided during therapy sessions.     ASSESSMENT     Billy did well with heavier loading session today. Less reps and more rest breaks. ROM remains full, arrived with full mobility. Pt reported/demonstrated no adverse response or  exacerbation of symptoms during today's session.       Billy Is progressing well towards his goals.   Pt prognosis is Good.     Pt will continue to benefit from skilled outpatient physical therapy to address the deficits listed in the problem list box on initial evaluation, provide pt/family education and to maximize pt's level of independence in the home and community environment.     Pt's spiritual, cultural and educational needs considered and pt agreeable to plan of care and goals.     Anticipated barriers to physical therapy: none    Goals:  Short Term Goals: 8-10 weeks   1. Pt will be compliant with HEP 50% of prescribed amount. MET  2. The pt to demo improvement in left knee ROM to equal right knee ROM pain free MET  3.  The pt to demo good quad set with proper hyperextension moment of the Left knee MET  4. The pt to demo ambualting with elast restricitve AD witout major compensatory pattern left knee for at least 20 feet MET     Long Term Goals: 56 weeks (progressing, not met)   Pt will be compliant with % of prescribed amount.   The pt to demo pain free and uncompensated running mechanics x10 min on an indoor treadmill  The pt to demo tolerance to a squat at or bellow parallel with uncompensated mechanics x10   The pt to demo strength of L LE within 10% of R LE as demo'd on the biodex machine   The pt to demo a deficit of 10% or less on a triple hop, single leg broad jump and crossover hop compared to non operative LE.   The pt will report full participation in ADLs and IADLs without restrictions related to L  knee.     PLAN     Progress loading, work elliptical for cardio. Maintain full ROM and mobility.     Manjit Lazcano, PT, DPT

## 2022-11-28 NOTE — PROGRESS NOTES
[facility]   Physical Therapy Treatment Note     Name: Billy Holbrook  [facility] Number: [id number]    Therapy Diagnosis:   Encounter Diagnoses   Name Primary?    Acute pain of left knee Yes    Decreased range of motion of left knee     Quadriceps weakness     Difficulty walking        Physician: PEACE Dempsey MD    Visit Date: 11/23/2022    Physician Orders: PT Eval and Treat   Medical Diagnosis from Referral: M25.562 (ICD-10-CM) - Acute pain of left knee  Evaluation Date: 9/7/2022  Authorization Period Expiration: 9/6/2023  Plan of Care Expiration: 12/31/2022  Visit # / Visits authorized: 54     Precautions: Standard     Time In: 9:00 am  Time Out: 10:25  Total Billable Time: 60 minutes    Procedure: 9/20/2022  1. Left Knee Arthroscopy, anterior cruciate ligament reconstruction 67982  2.  Left Knee Arthroscopy, with meniscectomy (medial OR lateral) 67770  3.  Left Knee Arthroscopy, debridement/shaving of articular cartilage (chondroplasty) 32153  4.  Left Knee  platelet rich plasma injection to the bone-patellar tendon-bone harvest site    Post-Op Plan:  ACL reconstruction with a bone-patellar tendon-bone autograft    SUBJECTIVE     Pt reports: feeling  ready to exercise.     He was compliant with home exercise program.  Response to previous treatment: no issues  Functional change: normal gait    Pain: 0/10 to tension anterior knee with bending  Location: left knee      OBJECTIVE     9 wks 1 day post-op    Observation: trace to none effusion noted L knee  Well healed scar  Atrophy in calf/quad on L     Range of Motion (extension/neutral/flexion):     Right Left   Knee PROM: 8-0-125/130 deg P:8-0-130 deg  A:6-0-125 deg      Strength:     Right Left Comment   Knee Extension: 5/5 4/5        Knee Flexion: 5/5 4/5        Hip Abduction: 4+/5 4-/5       Functional tests(*=pain):   Gait/stairs: normalized  Quad activation: good     Joint Mobility: hypomobile PFJ and tibiofemoral    TTP along  MCL- minimal to none    Treatment     Billy received the treatments listed below:      therapeutic exercises to develop strength, endurance, ROM, flexibility, posture, and core stabilization for 42 minutes including:  Education/assessment  Elliptical 10 min lv 3-4  Manual knee stretching flx and extension  Heel prop x 2 min   SL calf raise on shuttle 4 x 12 linus R= 10 bands; L= 5-6 bands  SL shuttle press 5 x 8 reps R= 10 bands; L=6-7 bands  HS curl SL 4 x 8 25->50 lbs    manual therapy techniques: Joint mobilizations and Soft tissue Mobilization were applied for 10 minutes, including:  PFJ gr 3-4 glides all planes  Tibial AP glide gr 3-4 with and without rotation bias on and off heel prop  Knee hyperextension gr 3-4  Fat pad mobility  Scar mobilization    neuromuscular re-education activities to improve: Coordination, Kinesthetic, Sense, and Proprioception for 3 minutes. The following activities were included:  Quad set into hyperextension x 20 x 3 sec    Therapeutic activities to improve functional performance for 20 minutes, including:  SKTC, quad pull, HS scoop, SL DL, walking lunge, lateral lunge x 1 lap each  Lunge matrix 4 x 5 each way      Patient Education and Home Exercises     Home Exercises Provided and Patient Education Provided     Education provided:   - HEP update, precautions, activity pacing, goals, timeframes    Written Home Exercises Provided: yes. Exercises were reviewed and Billy was able to demonstrate them prior to the end of the session.  Billy demonstrated good  understanding of the education provided. See EMR under Patient Instructions for exercises provided during therapy sessions.     ASSESSMENT     Billy did well with heavier loading session today. Less reps and more rest breaks. Pt reported/demonstrated no adverse response or exacerbation of symptoms during today's session.       Billy Is progressing well towards his goals.   Pt prognosis is Good.     Pt will continue to benefit  from skilled outpatient physical therapy to address the deficits listed in the problem list box on initial evaluation, provide pt/family education and to maximize pt's level of independence in the home and community environment.     Pt's spiritual, cultural and educational needs considered and pt agreeable to plan of care and goals.     Anticipated barriers to physical therapy: none    Goals:  Short Term Goals: 8-10 weeks   1. Pt will be compliant with HEP 50% of prescribed amount. MET  2. The pt to demo improvement in left knee ROM to equal right knee ROM pain free MET  3.  The pt to demo good quad set with proper hyperextension moment of the Left knee MET  4. The pt to demo ambualting with elast restricitve AD witout major compensatory pattern left knee for at least 20 feet MET     Long Term Goals: 56 weeks (progressing, not met)   Pt will be compliant with % of prescribed amount.   The pt to demo pain free and uncompensated running mechanics x10 min on an indoor treadmill  The pt to demo tolerance to a squat at or bellow parallel with uncompensated mechanics x10   The pt to demo strength of L LE within 10% of R LE as demo'd on the biodex machine   The pt to demo a deficit of 10% or less on a triple hop, single leg broad jump and crossover hop compared to non operative LE.   The pt will report full participation in ADLs and IADLs without restrictions related to L  knee.     PLAN     Progress loading, work elliptical for cardio. Will have Thanksgiving off due to clinic closure.     Manjit Lazcano, PT, DPT

## 2022-11-29 ENCOUNTER — CLINICAL SUPPORT (OUTPATIENT)
Dept: REHABILITATION | Facility: HOSPITAL | Age: 27
End: 2022-11-29
Payer: COMMERCIAL

## 2022-11-29 DIAGNOSIS — M25.662 DECREASED RANGE OF MOTION OF LEFT KNEE: ICD-10-CM

## 2022-11-29 DIAGNOSIS — R26.2 DIFFICULTY WALKING: ICD-10-CM

## 2022-11-29 DIAGNOSIS — M25.562 ACUTE PAIN OF LEFT KNEE: Primary | ICD-10-CM

## 2022-11-29 DIAGNOSIS — M62.81 QUADRICEPS WEAKNESS: ICD-10-CM

## 2022-11-29 PROCEDURE — 97110 THERAPEUTIC EXERCISES: CPT

## 2022-11-29 PROCEDURE — 97112 NEUROMUSCULAR REEDUCATION: CPT

## 2022-11-29 PROCEDURE — 97530 THERAPEUTIC ACTIVITIES: CPT

## 2022-11-29 PROCEDURE — 97140 MANUAL THERAPY 1/> REGIONS: CPT

## 2022-11-29 NOTE — PROGRESS NOTES
[facility]   Physical Therapy Treatment Note     Name: Billy Holbrook  [facility] Number: [id number]    Therapy Diagnosis:   Encounter Diagnoses   Name Primary?    Acute pain of left knee Yes    Decreased range of motion of left knee     Quadriceps weakness     Difficulty walking        Physician: PEACE Dempsey MD    Visit Date: 11/29/2022    Physician Orders: PT Eval and Treat   Medical Diagnosis from Referral: M25.562 (ICD-10-CM) - Acute pain of left knee  Evaluation Date: 9/7/2022  Authorization Period Expiration: 9/6/2023  Plan of Care Expiration: 12/31/2022  Visit # / Visits authorized: 56     Precautions: Standard     Time In: 11:06 am  Time Out: 12:15  Total Billable Time: 69 minutes    Procedure: 9/20/2022  1. Left Knee Arthroscopy, anterior cruciate ligament reconstruction 74200  2.  Left Knee Arthroscopy, with meniscectomy (medial OR lateral) 86078  3.  Left Knee Arthroscopy, debridement/shaving of articular cartilage (chondroplasty) 61487  4.  Left Knee  platelet rich plasma injection to the bone-patellar tendon-bone harvest site    Post-Op Plan:  ACL reconstruction with a bone-patellar tendon-bone autograft    SUBJECTIVE     Pt reports: no issues from yesterday, just sore in hips/quad. Knee feels good, denies any stiffness. Maintaining ROM easy. Low back still feeling good from TDN.     He was compliant with home exercise program.  Response to previous treatment: no issues  Functional change: normal gait    Pain: 0/10 to tension anterior knee with bending  Location: left knee      OBJECTIVE     10 wks post-op    Observation: trace to none effusion noted L knee  Well healed scar  Atrophy in calf/quad on L     Range of Motion (extension/neutral/flexion):     Right Left   Knee PROM: 8-0-125/130 deg P:8-0-130 deg  A:6-0-125 deg      Strength:     Right Left Comment   Knee Extension: 5/5 4/5        Knee Flexion: 5/5 4/5        Hip Abduction: 4+/5 4-/5       Functional  tests(*=pain):   Gait/stairs: normalized  Quad activation: good     Joint Mobility: hypomobile PFJ and tibiofemoral    TTP along MCL- minimal to none    Treatment     Billy received the treatments listed below:      therapeutic exercises to develop strength, endurance, ROM, flexibility, posture, and core stabilization for 20 minutes including:  Education/assessment  Manual knee stretching flx and extension  Lateral band walks Black band at ankles 2 x 10 yds  HS eccentric slide out DL from glute bridge 3 x 10 x 5 sec  Med ball sit up/throw 3 x 15 8 lbs: legs extended    manual therapy techniques: Joint mobilizations and Soft tissue Mobilization were applied for 8 minutes, including:  PFJ gr 3-4 glides all planes  Tibial AP glide gr 3-4 with and without rotation bias on and off heel prop  Knee hyperextension gr 3-4  Fat pad mobility  Scar mobilization    neuromuscular re-education activities to improve: Coordination, Kinesthetic, Sense, and Proprioception for 10 minutes. The following activities were included:  Quad set into hyperextension x 20 x 3 sec  LE Y balance cone taps 3 x 8 each way on foam    Therapeutic activities to improve functional performance for 31 minutes, including:  SKTC, quad pull, HS scoop, SL DL, walking lunge, lateral lunge x 1 lap each  Marching ipsilateral 35 lb KB 2 x 20 yds 5 sec pause in hip drive  SL sit to stand 24->20 inch box 3 x 15: cuing for eccentric control  Standing TKE 3 x 15 large blue sport band      Patient Education and Home Exercises     Home Exercises Provided and Patient Education Provided     Education provided:   - HEP update, precautions, activity pacing, goals, timeframes    Written Home Exercises Provided: yes. Exercises were reviewed and Billy was able to demonstrate them prior to the end of the session.  Billy demonstrated good  understanding of the education provided. See EMR under Patient Instructions for exercises provided during therapy sessions.      ASSESSMENT     Focused session of circuit with accessory muscle activations, balance, and neuromuscular control. Active recovery from yesterdays heavy load day. No complaints other than fatigue in target muscles. Pt reported/demonstrated no adverse response or exacerbation of symptoms during today's session.       Billy Is progressing well towards his goals.   Pt prognosis is Good.     Pt will continue to benefit from skilled outpatient physical therapy to address the deficits listed in the problem list box on initial evaluation, provide pt/family education and to maximize pt's level of independence in the home and community environment.     Pt's spiritual, cultural and educational needs considered and pt agreeable to plan of care and goals.     Anticipated barriers to physical therapy: none    Goals:  Short Term Goals: 8-10 weeks   1. Pt will be compliant with HEP 50% of prescribed amount. MET  2. The pt to demo improvement in left knee ROM to equal right knee ROM pain free MET  3.  The pt to demo good quad set with proper hyperextension moment of the Left knee MET  4. The pt to demo ambualting with elast restricitve AD witout major compensatory pattern left knee for at least 20 feet MET     Long Term Goals: 56 weeks (progressing, not met)   Pt will be compliant with % of prescribed amount.   The pt to demo pain free and uncompensated running mechanics x10 min on an indoor treadmill  The pt to demo tolerance to a squat at or bellow parallel with uncompensated mechanics x10   The pt to demo strength of L LE within 10% of R LE as demo'd on the biodex machine   The pt to demo a deficit of 10% or less on a triple hop, single leg broad jump and crossover hop compared to non operative LE.   The pt will report full participation in ADLs and IADLs without restrictions related to L  knee.     PLAN     Progress loading, work elliptical for cardio. Maintain full ROM and mobility.     Manjit Lazcano, PT,  DPT

## 2022-11-30 ENCOUNTER — CLINICAL SUPPORT (OUTPATIENT)
Dept: REHABILITATION | Facility: HOSPITAL | Age: 27
End: 2022-11-30
Payer: COMMERCIAL

## 2022-11-30 DIAGNOSIS — M25.562 ACUTE PAIN OF LEFT KNEE: Primary | ICD-10-CM

## 2022-11-30 DIAGNOSIS — M25.662 DECREASED RANGE OF MOTION OF LEFT KNEE: ICD-10-CM

## 2022-11-30 DIAGNOSIS — R26.2 DIFFICULTY WALKING: ICD-10-CM

## 2022-11-30 DIAGNOSIS — M62.81 QUADRICEPS WEAKNESS: ICD-10-CM

## 2022-11-30 PROCEDURE — 97110 THERAPEUTIC EXERCISES: CPT

## 2022-11-30 PROCEDURE — 97140 MANUAL THERAPY 1/> REGIONS: CPT

## 2022-11-30 PROCEDURE — 97530 THERAPEUTIC ACTIVITIES: CPT

## 2022-11-30 PROCEDURE — 97112 NEUROMUSCULAR REEDUCATION: CPT

## 2022-11-30 NOTE — PROGRESS NOTES
[facility]   Physical Therapy Treatment Note     Name: Billy Holbrook  [facility] Number: [id number]    Therapy Diagnosis:   Encounter Diagnoses   Name Primary?    Acute pain of left knee Yes    Decreased range of motion of left knee     Quadriceps weakness     Difficulty walking        Physician: PEACE Dempsey MD    Visit Date: 11/30/2022    Physician Orders: PT Eval and Treat   Medical Diagnosis from Referral: M25.562 (ICD-10-CM) - Acute pain of left knee  Evaluation Date: 9/7/2022  Authorization Period Expiration: 9/6/2023  Plan of Care Expiration: 12/31/2022  Visit # / Visits authorized: 57     Precautions: Standard     Time In: 11:08 am  Time Out: 12:31  Total Billable Time: 60 minutes    Procedure: 9/20/2022  1. Left Knee Arthroscopy, anterior cruciate ligament reconstruction 54723  2.  Left Knee Arthroscopy, with meniscectomy (medial OR lateral) 86107  3.  Left Knee Arthroscopy, debridement/shaving of articular cartilage (chondroplasty) 47961  4.  Left Knee  platelet rich plasma injection to the bone-patellar tendon-bone harvest site    Post-Op Plan:  ACL reconstruction with a bone-patellar tendon-bone autograft    SUBJECTIVE     Pt reports: tired today. No issues.     He was compliant with home exercise program.  Response to previous treatment: no issues  Functional change: improved squat and lunge    Pain: 0/10  Location: left knee      OBJECTIVE     10 wks 1 day post-op    Observation:   no effusion noted L knee  Well healed scar  Atrophy in calf/quad on L     Range of Motion (extension/neutral/flexion):     Right Left   Knee PROM: 8-0-125/130 deg P:8-0-130 deg  A:6-0-125 deg      Strength:     Right Left Comment   Knee Extension: 5/5 4/5        Knee Flexion: 5/5 4/5        Hip Abduction: 4+/5 4-/5       Functional tests(*=pain):   Gait/stairs: normalized  Quad activation: good     Joint Mobility: hypomobile PFJ and tibiofemoral    TTP along MCL- minimal to none    Treatment      Billy received the treatments listed below:      therapeutic exercises to develop strength, endurance, ROM, flexibility, posture, and core stabilization for 40 minutes including:  Education/assessment  Elliptical x 12 min lv 4-5  Manual knee stretching flx and extension  SL leg press 5 x 5 linus R= 360, L= 140-240 lbs  Seated soleus raise 4 x 10 135lbs  Swiss ball HS curl from glute bridge 3 x 10 x 5 sec eccentric    manual therapy techniques: Joint mobilizations and Soft tissue Mobilization were applied for 8 minutes, including:  PFJ gr 3-4 glides all planes  Tibial AP glide gr 3-4 with and without rotation bias on and off heel prop  Knee hyperextension gr 3-4  Fat pad mobility  Scar mobilization    neuromuscular re-education activities to improve: Coordination, Kinesthetic, Sense, and Proprioception for 10 minutes. The following activities were included:  Quad set into hyperextension x 20 x 3 sec  LE Y balance cone taps 3 x 8 each way on foam  Palloff press CC 3 x 15 linus 20 lbs-NP    Therapeutic activities to improve functional performance for 25 minutes, including:  SKTC, quad pull, HS scoop, SL DL, walking lunge, lateral lunge x 1 lap each  Sled pulls: retro and side 2 x 30 yds each 90 lbs added  Sports band x 2 deceleration lunge 3 x 8 linus      Patient Education and Home Exercises     Home Exercises Provided and Patient Education Provided     Education provided:   - HEP update, precautions, activity pacing, goals, timeframes    Written Home Exercises Provided: yes. Exercises were reviewed and Billy was able to demonstrate them prior to the end of the session.  Billy demonstrated good  understanding of the education provided. See EMR under Patient Instructions for exercises provided during therapy sessions.     ASSESSMENT     Focused session on heavier lifting today without irritability. Cuing required for decreasing compensations with lunge when fatigued. Pt reported/demonstrated no adverse response or  exacerbation of symptoms during today's session.       Billy Is progressing well towards his goals.   Pt prognosis is Good.     Pt will continue to benefit from skilled outpatient physical therapy to address the deficits listed in the problem list box on initial evaluation, provide pt/family education and to maximize pt's level of independence in the home and community environment.     Pt's spiritual, cultural and educational needs considered and pt agreeable to plan of care and goals.     Anticipated barriers to physical therapy: none    Goals:  Short Term Goals: 8-10 weeks   1. Pt will be compliant with HEP 50% of prescribed amount. MET  2. The pt to demo improvement in left knee ROM to equal right knee ROM pain free MET  3.  The pt to demo good quad set with proper hyperextension moment of the Left knee MET  4. The pt to demo ambualting with elast restricitve AD witout major compensatory pattern left knee for at least 20 feet MET     Long Term Goals: 56 weeks (progressing, not met)   Pt will be compliant with % of prescribed amount.   The pt to demo pain free and uncompensated running mechanics x10 min on an indoor treadmill  The pt to demo tolerance to a squat at or bellow parallel with uncompensated mechanics x10   The pt to demo strength of L LE within 10% of R LE as demo'd on the biodex machine   The pt to demo a deficit of 10% or less on a triple hop, single leg broad jump and crossover hop compared to non operative LE.   The pt will report full participation in ADLs and IADLs without restrictions related to L  knee.     PLAN     Progress loading, work elliptical for cardio. Maintain full ROM and mobility.     Manjit Lazcano, PT, DPT

## 2022-12-01 ENCOUNTER — CLINICAL SUPPORT (OUTPATIENT)
Dept: REHABILITATION | Facility: HOSPITAL | Age: 27
End: 2022-12-01
Payer: COMMERCIAL

## 2022-12-01 DIAGNOSIS — M25.662 DECREASED RANGE OF MOTION OF LEFT KNEE: ICD-10-CM

## 2022-12-01 DIAGNOSIS — M25.562 ACUTE PAIN OF LEFT KNEE: Primary | ICD-10-CM

## 2022-12-01 DIAGNOSIS — R26.2 DIFFICULTY WALKING: ICD-10-CM

## 2022-12-01 DIAGNOSIS — M62.81 QUADRICEPS WEAKNESS: ICD-10-CM

## 2022-12-01 PROCEDURE — 97140 MANUAL THERAPY 1/> REGIONS: CPT

## 2022-12-01 PROCEDURE — 97110 THERAPEUTIC EXERCISES: CPT

## 2022-12-01 PROCEDURE — 97530 THERAPEUTIC ACTIVITIES: CPT

## 2022-12-02 ENCOUNTER — CLINICAL SUPPORT (OUTPATIENT)
Dept: REHABILITATION | Facility: HOSPITAL | Age: 27
End: 2022-12-02
Payer: COMMERCIAL

## 2022-12-02 DIAGNOSIS — M25.662 DECREASED RANGE OF MOTION OF LEFT KNEE: ICD-10-CM

## 2022-12-02 DIAGNOSIS — M62.81 QUADRICEPS WEAKNESS: ICD-10-CM

## 2022-12-02 DIAGNOSIS — R26.2 DIFFICULTY WALKING: ICD-10-CM

## 2022-12-02 DIAGNOSIS — M25.562 ACUTE PAIN OF LEFT KNEE: Primary | ICD-10-CM

## 2022-12-02 PROCEDURE — 97140 MANUAL THERAPY 1/> REGIONS: CPT

## 2022-12-02 PROCEDURE — 97530 THERAPEUTIC ACTIVITIES: CPT

## 2022-12-02 PROCEDURE — 97110 THERAPEUTIC EXERCISES: CPT

## 2022-12-02 NOTE — PROGRESS NOTES
[facility]   Physical Therapy Treatment Note     Name: Billy Holbrook  [facility] Number: [id number]    Therapy Diagnosis:   Encounter Diagnoses   Name Primary?    Acute pain of left knee Yes    Decreased range of motion of left knee     Quadriceps weakness     Difficulty walking        Physician: PEACE Dempsey MD    Visit Date: 12/1/2022    Physician Orders: PT Eval and Treat   Medical Diagnosis from Referral: M25.562 (ICD-10-CM) - Acute pain of left knee  Evaluation Date: 9/7/2022  Authorization Period Expiration: 9/6/2023  Plan of Care Expiration: 12/31/2022  Visit # / Visits authorized: 58     Precautions: Standard     Time In: 11:05 am  Time Out: 12:30  Total Billable Time: 85 minutes    Procedure: 9/20/2022  1. Left Knee Arthroscopy, anterior cruciate ligament reconstruction 22982  2.  Left Knee Arthroscopy, with meniscectomy (medial OR lateral) 62596  3.  Left Knee Arthroscopy, debridement/shaving of articular cartilage (chondroplasty) 54123  4.  Left Knee  platelet rich plasma injection to the bone-patellar tendon-bone harvest site    Post-Op Plan:  ACL reconstruction with a bone-patellar tendon-bone autograft    SUBJECTIVE     Pt reports: ready to exercise. Knee feels good.     He was compliant with home exercise program.  Response to previous treatment: no issues  Functional change: improved squat and lunge    Pain: 0/10  Location: left knee      OBJECTIVE     10 wks 2 days post-op    Observation:   no effusion noted L knee  Well healed scar  Atrophy in calf/quad on L     Range of Motion (extension/neutral/flexion):     Right Left   Knee PROM: 8-0-125/130 deg P:8-0-130 deg  A:6-0-125 deg      Strength:     Right Left Comment   Knee Extension: 5/5 4/5        Knee Flexion: 5/5 4/5        Hip Abduction: 4+/5 4-/5       Functional tests(*=pain):   Gait/stairs: normalized  Quad activation: good     Joint Mobility: hypomobile PFJ and tibiofemoral    TTP along MCL- minimal to  none    Treatment     Billy received the treatments listed below:      therapeutic exercises to develop strength, endurance, ROM, flexibility, posture, and core stabilization for 25 minutes including:  Education/assessment  Elliptical x 10 min lv 4  Manual knee stretching flx and extension  SL leg press 5 x 5 linus R= 360, L= 140-240 lbs-NP  Seated soleus raise 4 x 10 135lbs-NP  12 lb med ball lateral throws from floor for core 3 x 12 linus    manual therapy techniques: Joint mobilizations and Soft tissue Mobilization were applied for 8 minutes, including:  PFJ gr 3-4 glides all planes  Tibial AP glide gr 3-4 with and without rotation bias on and off heel prop  Knee hyperextension gr 3-4  Fat pad mobility  Scar mobilization    neuromuscular re-education activities to improve: Coordination, Kinesthetic, Sense, and Proprioception for 2 minutes. The following activities were included:  Quad set into hyperextension x 20 x 3 sec    Therapeutic activities to improve functional performance for 50 minutes, including:  SKTC, quad pull, HS scoop, SL DL, lateral band walk, monster walk fwd/back  Landmine squat to thrust 3 x 15 25 lbs added  Landmine lunge to thrust 3 x 8 linus with 25 lbs added  Devries carry with 25 squats along lap x 2 laps 185 lbs trap bar  Sports band x 2 deceleration lunge 3 x 8 linus-NP      Patient Education and Home Exercises     Home Exercises Provided and Patient Education Provided     Education provided:   - HEP update, precautions, activity pacing, goals, timeframes    Written Home Exercises Provided: yes. Exercises were reviewed and Billy was able to demonstrate them prior to the end of the session.  Billy demonstrated good  understanding of the education provided. See EMR under Patient Instructions for exercises provided during therapy sessions.     ASSESSMENT     Billy required cuing today for squat mechanics to avoid weight shift to R. He did well adding in UE power movements to LE loading.  Continues to deny any irritability. Dr. Dempsey stopped by to chat with him for a couple minutes- nothing formal. Pt reported/demonstrated no adverse response or exacerbation of symptoms during today's session.       Billy Is progressing well towards his goals.   Pt prognosis is Good.     Pt will continue to benefit from skilled outpatient physical therapy to address the deficits listed in the problem list box on initial evaluation, provide pt/family education and to maximize pt's level of independence in the home and community environment.     Pt's spiritual, cultural and educational needs considered and pt agreeable to plan of care and goals.     Anticipated barriers to physical therapy: none    Goals:  Short Term Goals: 8-10 weeks   1. Pt will be compliant with HEP 50% of prescribed amount. MET  2. The pt to demo improvement in left knee ROM to equal right knee ROM pain free MET  3.  The pt to demo good quad set with proper hyperextension moment of the Left knee MET  4. The pt to demo ambualting with elast restricitve AD witout major compensatory pattern left knee for at least 20 feet MET     Long Term Goals: 56 weeks (progressing, not met)   Pt will be compliant with % of prescribed amount.   The pt to demo pain free and uncompensated running mechanics x10 min on an indoor treadmill  The pt to demo tolerance to a squat at or bellow parallel with uncompensated mechanics x10   The pt to demo strength of L LE within 10% of R LE as demo'd on the biodex machine   The pt to demo a deficit of 10% or less on a triple hop, single leg broad jump and crossover hop compared to non operative LE.   The pt will report full participation in ADLs and IADLs without restrictions related to L  knee.     PLAN     Progress loading, work elliptical for cardio. Maintain full ROM and mobility.     Manjit Lazcano, PT, DPT

## 2022-12-02 NOTE — PROGRESS NOTES
[facility]   Physical Therapy Treatment Note     Name: Billy Holbrook  [facility] Number: [id number]    Therapy Diagnosis:   Encounter Diagnoses   Name Primary?    Acute pain of left knee Yes    Decreased range of motion of left knee     Quadriceps weakness     Difficulty walking        Physician: PEACE Dempsey MD    Visit Date: 12/2/2022    Physician Orders: PT Eval and Treat   Medical Diagnosis from Referral: M25.562 (ICD-10-CM) - Acute pain of left knee  Evaluation Date: 9/7/2022  Authorization Period Expiration: 9/6/2023  Plan of Care Expiration: 12/31/2022  Visit # / Visits authorized: 58     Precautions: Standard     Time In: 11:45 am  Time Out: 1:15  Total Billable Time: 90 minutes    Procedure: 9/20/2022  1. Left Knee Arthroscopy, anterior cruciate ligament reconstruction 63696  2.  Left Knee Arthroscopy, with meniscectomy (medial OR lateral) 76603  3.  Left Knee Arthroscopy, debridement/shaving of articular cartilage (chondroplasty) 18771  4.  Left Knee  platelet rich plasma injection to the bone-patellar tendon-bone harvest site    Post-Op Plan:  ACL reconstruction with a bone-patellar tendon-bone autograft    SUBJECTIVE     Pt reports: no issues, ready to exercise.     He was compliant with home exercise program.  Response to previous treatment: no issues  Functional change: improved squat and lunge    Pain: 0/10  Location: left knee      OBJECTIVE     10 wks 3 days post-op    Observation:   no effusion noted L knee  Well healed scar  Atrophy in calf/quad on L     Range of Motion (extension/neutral/flexion):     Right Left   Knee PROM: 8-0-125/130 deg P:8-0-130 deg  A:6-0-125 deg      Strength:     Right Left Comment   Knee Extension: 5/5 4/5        Knee Flexion: 5/5 4/5        Hip Abduction: 4+/5 4-/5       Functional tests(*=pain):   Gait/stairs: normalized  Quad activation: good     Joint Mobility: hypomobile PFJ and tibiofemoral    TTP along MCL- minimal to  none    Treatment     Billy received the treatments listed below:      therapeutic exercises to develop strength, endurance, ROM, flexibility, posture, and core stabilization for 45 minutes including:  Education/assessment  Elliptical x 15 min lv 4  Manual knee stretching flx and extension  SL leg press 5 x 5 linus R= 360, L= 140-240 lbs-NP  Standing calf raise off box 3 x 15 linus  12 lb med ball lateral throws from floor for core 3 x 12 linus-NP  Knee ext 4 x 8 linus: R=45 lbs, L= 20->30 lbs  Seated HS curls SL 4 x 10 35->45 lbs    manual therapy techniques: Joint mobilizations and Soft tissue Mobilization were applied for 8 minutes, including:  PFJ gr 3-4 glides all planes  Tibial AP glide gr 3-4 with and without rotation bias on and off heel prop  Knee hyperextension gr 3-4  Fat pad mobility  Scar mobilization    neuromuscular re-education activities to improve: Coordination, Kinesthetic, Sense, and Proprioception for 2 minutes. The following activities were included:  Quad set into hyperextension x 20 x 3 sec    Therapeutic activities to improve functional performance for 35 minutes, including:  SKTC, quad pull, HS scoop, SL DL, lateral band walk, monster walk fwd/back  SL sit to stands 24 inch box x 30 linus  3D lunge matrix 35-40 lbs 3 rds of 3 each way linus      Patient Education and Home Exercises     Home Exercises Provided and Patient Education Provided     Education provided:   - HEP update, precautions, activity pacing, goals, timeframes    Written Home Exercises Provided: yes. Exercises were reviewed and Billy was able to demonstrate them prior to the end of the session.  Billy demonstrated good  understanding of the education provided. See EMR under Patient Instructions for exercises provided during therapy sessions.     ASSESSMENT     Billy continues to do very well with maintaining ROM and progressing strength without irritation reported. Pt reported/demonstrated no adverse response or exacerbation of  symptoms during today's session.       Billy Is progressing well towards his goals.   Pt prognosis is Good.     Pt will continue to benefit from skilled outpatient physical therapy to address the deficits listed in the problem list box on initial evaluation, provide pt/family education and to maximize pt's level of independence in the home and community environment.     Pt's spiritual, cultural and educational needs considered and pt agreeable to plan of care and goals.     Anticipated barriers to physical therapy: none    Goals:  Short Term Goals: 8-10 weeks   1. Pt will be compliant with HEP 50% of prescribed amount. MET  2. The pt to demo improvement in left knee ROM to equal right knee ROM pain free MET  3.  The pt to demo good quad set with proper hyperextension moment of the Left knee MET  4. The pt to demo ambualting with elast restricitve AD witout major compensatory pattern left knee for at least 20 feet MET     Long Term Goals: 56 weeks (progressing, not met)   Pt will be compliant with % of prescribed amount.   The pt to demo pain free and uncompensated running mechanics x10 min on an indoor treadmill  The pt to demo tolerance to a squat at or bellow parallel with uncompensated mechanics x10   The pt to demo strength of L LE within 10% of R LE as demo'd on the biodex machine   The pt to demo a deficit of 10% or less on a triple hop, single leg broad jump and crossover hop compared to non operative LE.   The pt will report full participation in ADLs and IADLs without restrictions related to L  knee.     PLAN     Progress loading, work elliptical for cardio. Maintain full ROM and mobility.     Manjit Lazcano, PT, DPT

## 2022-12-05 ENCOUNTER — CLINICAL SUPPORT (OUTPATIENT)
Dept: REHABILITATION | Facility: HOSPITAL | Age: 27
End: 2022-12-05
Payer: COMMERCIAL

## 2022-12-05 DIAGNOSIS — M62.81 QUADRICEPS WEAKNESS: ICD-10-CM

## 2022-12-05 DIAGNOSIS — M25.562 ACUTE PAIN OF LEFT KNEE: Primary | ICD-10-CM

## 2022-12-05 DIAGNOSIS — R26.2 DIFFICULTY WALKING: ICD-10-CM

## 2022-12-05 DIAGNOSIS — M25.662 DECREASED RANGE OF MOTION OF LEFT KNEE: ICD-10-CM

## 2022-12-05 PROCEDURE — 97140 MANUAL THERAPY 1/> REGIONS: CPT

## 2022-12-05 PROCEDURE — 97530 THERAPEUTIC ACTIVITIES: CPT

## 2022-12-05 PROCEDURE — 97110 THERAPEUTIC EXERCISES: CPT

## 2022-12-05 NOTE — PROGRESS NOTES
[facility]   Physical Therapy Treatment Note     Name: Billy Holbrook  [facility] Number: [id number]    Therapy Diagnosis:   Encounter Diagnoses   Name Primary?    Acute pain of left knee Yes    Decreased range of motion of left knee     Quadriceps weakness     Difficulty walking        Physician: PEACE Dempsey MD    Visit Date: 12/5/2022    Physician Orders: PT Eval and Treat   Medical Diagnosis from Referral: M25.562 (ICD-10-CM) - Acute pain of left knee  Evaluation Date: 9/7/2022  Authorization Period Expiration: 9/6/2023  Plan of Care Expiration: 12/31/2022  Visit # / Visits authorized: 59     Precautions: Standard     Time In: 10:00 am  Time Out: 11:40  Total Billable Time: 100 minutes    Procedure: 9/20/2022  1. Left Knee Arthroscopy, anterior cruciate ligament reconstruction 13057  2.  Left Knee Arthroscopy, with meniscectomy (medial OR lateral) 28724  3.  Left Knee Arthroscopy, debridement/shaving of articular cartilage (chondroplasty) 19264  4.  Left Knee  platelet rich plasma injection to the bone-patellar tendon-bone harvest site    Post-Op Plan:  ACL reconstruction with a bone-patellar tendon-bone autograft    SUBJECTIVE     Pt reports: feeling good. Wants to leave for [place] Thursday evening so he would miss Friday appt.     He was compliant with home exercise program.  Response to previous treatment: no issues  Functional change: improved squat and lunge    Pain: 0/10  Location: left knee      OBJECTIVE     10 wks 6 days post-op    Observation:   no effusion noted L knee  Well healed scar  Atrophy in calf/quad on L     Range of Motion (extension/neutral/flexion):     Right Left   Knee PROM: 8-0-125/130 deg P:8-0-130 deg  A:6-0-125 deg      Strength:     Right Left Comment   Knee Extension: 5/5 4/5        Knee Flexion: 5/5 4/5        Hip Abduction: 4+/5 4-/5       HHD testing soon.     Functional tests(*=pain):   Gait/stairs: normalized  Quad activation: good     Joint  Mobility: hypomobile PFJ and tibiofemoral    TTP along MCL- minimal to none    Treatment     Billy received the treatments listed below:      therapeutic exercises to develop strength, endurance, ROM, flexibility, posture, and core stabilization for 40 minutes including:  Education/assessment  Elliptical x 10 min lv 4-5  Manual knee stretching flx and extension  SL leg press 3 x 5 linus R= 360, L= 180-260 lbs  Standing calf raise off box 3 x 15 linus-NP  12 lb med ball lateral throws from floor for core 3 x 12 linus-NP    manual therapy techniques: Joint mobilizations and Soft tissue Mobilization were applied for 8 minutes, including:  PFJ gr 3-4 glides all planes  Tibial AP glide gr 3-4 with and without rotation bias on and off heel prop  Knee hyperextension gr 3-4  Fat pad mobility  Scar mobilization    neuromuscular re-education activities to improve: Coordination, Kinesthetic, Sense, and Proprioception for 2 minutes. The following activities were included:  Quad set into hyperextension x 20 x 3 sec    Therapeutic activities to improve functional performance for 50 minutes, including:  SKTC, quad pull, HS scoop, SL DL, lateral band walk, monster walk fwd/back  RESS 5 x 6 20->35 lbs linus: 12 inch box  Trap bar DL squat 5 x 6 185-275 lbs: cuing on weight shift last 2 sets  Sled push/pull 4 x 15 yds each 90 lbs added      Patient Education and Home Exercises     Home Exercises Provided and Patient Education Provided     Education provided:   - HEP update, precautions, activity pacing, goals, timeframes    Written Home Exercises Provided: yes. Exercises were reviewed and Billy was able to demonstrate them prior to the end of the session.  Billy demonstrated good  understanding of the education provided. See EMR under Patient Instructions for exercises provided during therapy sessions.     ASSESSMENT     Billy did well today. Progressing loading with more reps at higher resistance. Less of a weight shift that was  correctable with cuing/mirror use with squatting. More weight pushed SL on L v R. No irritation reported. Pt reported/demonstrated no adverse response or exacerbation of symptoms during today's session.       Billy Is progressing well towards his goals.   Pt prognosis is Good.     Pt will continue to benefit from skilled outpatient physical therapy to address the deficits listed in the problem list box on initial evaluation, provide pt/family education and to maximize pt's level of independence in the home and community environment.     Pt's spiritual, cultural and educational needs considered and pt agreeable to plan of care and goals.     Anticipated barriers to physical therapy: none    Goals:  Short Term Goals: 8-10 weeks   1. Pt will be compliant with HEP 50% of prescribed amount. MET  2. The pt to demo improvement in left knee ROM to equal right knee ROM pain free MET  3.  The pt to demo good quad set with proper hyperextension moment of the Left knee MET  4. The pt to demo ambualting with elast restricitve AD witout major compensatory pattern left knee for at least 20 feet MET     Long Term Goals: 56 weeks (progressing, not met)   Pt will be compliant with % of prescribed amount.   The pt to demo pain free and uncompensated running mechanics x10 min on an indoor treadmill  The pt to demo tolerance to a squat at or bellow parallel with uncompensated mechanics x10   The pt to demo strength of L LE within 10% of R LE as demo'd on the biodex machine   The pt to demo a deficit of 10% or less on a triple hop, single leg broad jump and crossover hop compared to non operative LE.   The pt will report full participation in ADLs and IADLs without restrictions related to L  knee.     PLAN     Progress loading, work elliptical for cardio. Maintain full ROM and mobility.     Manjit Lazcano, PT, DPT

## 2022-12-06 ENCOUNTER — CLINICAL SUPPORT (OUTPATIENT)
Dept: REHABILITATION | Facility: HOSPITAL | Age: 27
End: 2022-12-06
Payer: COMMERCIAL

## 2022-12-06 DIAGNOSIS — M62.81 QUADRICEPS WEAKNESS: ICD-10-CM

## 2022-12-06 DIAGNOSIS — M25.662 DECREASED RANGE OF MOTION OF LEFT KNEE: ICD-10-CM

## 2022-12-06 DIAGNOSIS — R26.2 DIFFICULTY WALKING: ICD-10-CM

## 2022-12-06 DIAGNOSIS — M25.562 ACUTE PAIN OF LEFT KNEE: Primary | ICD-10-CM

## 2022-12-06 PROCEDURE — 97140 MANUAL THERAPY 1/> REGIONS: CPT

## 2022-12-06 PROCEDURE — 97112 NEUROMUSCULAR REEDUCATION: CPT

## 2022-12-06 PROCEDURE — 97110 THERAPEUTIC EXERCISES: CPT

## 2022-12-06 PROCEDURE — 97530 THERAPEUTIC ACTIVITIES: CPT

## 2022-12-06 NOTE — PROGRESS NOTES
[facility]   Physical Therapy Treatment Note     Name: Billy Holbrook  [facility] Number: [id number]    Therapy Diagnosis:   Encounter Diagnoses   Name Primary?    Acute pain of left knee Yes    Decreased range of motion of left knee     Quadriceps weakness     Difficulty walking      Physician: PEACE Dempsey MD    Visit Date: 12/6/2022    Physician Orders: PT Eval and Treat   Medical Diagnosis from Referral: M25.562 (ICD-10-CM) - Acute pain of left knee  Evaluation Date: 9/7/2022  Authorization Period Expiration: 9/6/2023  Plan of Care Expiration: 12/31/2022  Visit # / Visits authorized: 60     Precautions: Standard     Time In: 11:06 am  Time Out: 12:40  Total Billable Time: 94 minutes    Procedure: 9/20/2022  1. Left Knee Arthroscopy, anterior cruciate ligament reconstruction 93209  2.  Left Knee Arthroscopy, with meniscectomy (medial OR lateral) 28539  3.  Left Knee Arthroscopy, debridement/shaving of articular cartilage (chondroplasty) 66568  4.  Left Knee  platelet rich plasma injection to the bone-patellar tendon-bone harvest site    Post-Op Plan:  ACL reconstruction with a bone-patellar tendon-bone autograft    SUBJECTIVE     Pt reports: knee felt a little stiff, but better after session.     He was compliant with home exercise program.  Response to previous treatment: no issues  Functional change: improved squat and lunge    Pain: 0/10  Location: left knee      OBJECTIVE     11 wks post-op    Observation:   no effusion noted L knee  Well healed scar  Atrophy in calf/quad on L     Range of Motion (extension/neutral/flexion):     Right Left   Knee PROM: 8-0-125/130 deg P:8-0-130 deg  A:6-0-125 deg      Strength:     Right Left Comment   Knee Extension: 5/5 4/5        Knee Flexion: 5/5 4/5        Hip Abduction: 4+/5 4-/5       HHD testing soon.     Functional tests(*=pain):   Gait/stairs: normalized  Quad activation: good     Joint Mobility: hypomobile PFJ and tibiofemoral    TTP  along MCL- minimal to none    Treatment     Billy received the treatments listed below:      therapeutic exercises to develop strength, endurance, ROM, flexibility, posture, and core stabilization for 26 minutes including:  Education/assessment  Elliptical x 16 min lv 3->6 (intervals after 3 min 30/60 sec)  Manual knee stretching flx and extension  SL leg press 3 x 5 linus R= 360, L= 180-260 lbs-NP  HS curl up from glute bridge swiss ball 3 rds 10    manual therapy techniques: Joint mobilizations and Soft tissue Mobilization were applied for 8 minutes, including:  PFJ gr 3-4 glides all planes  Tibial AP glide gr 3-4 with and without rotation bias on and off heel prop  Knee hyperextension gr 3-4  Fat pad mobility  Scar mobilization    neuromuscular re-education activities to improve: Coordination, Kinesthetic, Sense, and Proprioception for 20 minutes. The following activities were included:  Quad set into hyperextension x 20 x 3 sec  Lateral band walk with palloff hold BTB at ankles and Black sport band  Dying bug with black sport band UE hold 2 x 20    Therapeutic activities to improve functional performance for 40 minutes, including:  SKTC, quad pull, HS scoop, SL DL, lateral band walk, monster walk fwd/back  Push up series on 45 lb plate 2 rds  3D lunge matrix 3 rds 5 each with 40 lb weight vest  Suitcase carry 60 lbs 2 x 30 yds    Patient Education and Home Exercises     Home Exercises Provided and Patient Education Provided     Education provided:   - HEP update, precautions, activity pacing, goals, timeframes    Written Home Exercises Provided: yes. Exercises were reviewed and Billy was able to demonstrate them prior to the end of the session.  Billy demonstrated good  understanding of the education provided. See EMR under Patient Instructions for exercises provided during therapy sessions.     ASSESSMENT     Billy did well. Progressing cardio on elliptical with some intervals at higher intensity/pace.  Working hips, core more in circuit today. Cuing required for lunge mechanics and tibial angle. Pt reported/demonstrated no adverse response or exacerbation of symptoms during today's session.       Billy Is progressing well towards his goals.   Pt prognosis is Good.     Pt will continue to benefit from skilled outpatient physical therapy to address the deficits listed in the problem list box on initial evaluation, provide pt/family education and to maximize pt's level of independence in the home and community environment.     Pt's spiritual, cultural and educational needs considered and pt agreeable to plan of care and goals.     Anticipated barriers to physical therapy: none    Goals:  Short Term Goals: 8-10 weeks   1. Pt will be compliant with HEP 50% of prescribed amount. MET  2. The pt to demo improvement in left knee ROM to equal right knee ROM pain free MET  3.  The pt to demo good quad set with proper hyperextension moment of the Left knee MET  4. The pt to demo ambualting with elast restricitve AD witout major compensatory pattern left knee for at least 20 feet MET     Long Term Goals: 56 weeks (progressing, not met)   Pt will be compliant with % of prescribed amount.   The pt to demo pain free and uncompensated running mechanics x10 min on an indoor treadmill  The pt to demo tolerance to a squat at or bellow parallel with uncompensated mechanics x10   The pt to demo strength of L LE within 10% of R LE as demo'd on the biodex machine   The pt to demo a deficit of 10% or less on a triple hop, single leg broad jump and crossover hop compared to non operative LE.   The pt will report full participation in ADLs and IADLs without restrictions related to L  knee.     PLAN     Progress loading, work elliptical for cardio. Maintain full ROM and mobility.     Manjit Lazcano, PT, DPT

## 2022-12-07 ENCOUNTER — CLINICAL SUPPORT (OUTPATIENT)
Dept: REHABILITATION | Facility: HOSPITAL | Age: 27
End: 2022-12-07
Payer: COMMERCIAL

## 2022-12-07 DIAGNOSIS — R26.2 DIFFICULTY WALKING: ICD-10-CM

## 2022-12-07 DIAGNOSIS — M25.662 DECREASED RANGE OF MOTION OF LEFT KNEE: ICD-10-CM

## 2022-12-07 DIAGNOSIS — M62.81 QUADRICEPS WEAKNESS: ICD-10-CM

## 2022-12-07 DIAGNOSIS — M25.562 ACUTE PAIN OF LEFT KNEE: Primary | ICD-10-CM

## 2022-12-07 PROCEDURE — 97110 THERAPEUTIC EXERCISES: CPT

## 2022-12-07 PROCEDURE — 97140 MANUAL THERAPY 1/> REGIONS: CPT

## 2022-12-07 PROCEDURE — 97530 THERAPEUTIC ACTIVITIES: CPT

## 2022-12-07 NOTE — PROGRESS NOTES
[facility]   Physical Therapy Treatment Note     Name: Billy Holbrook  [facility] Number: [id number]    Therapy Diagnosis:   Encounter Diagnoses   Name Primary?    Acute pain of left knee Yes    Decreased range of motion of left knee     Quadriceps weakness     Difficulty walking      Physician: PEACE Dempsey MD    Visit Date: 12/7/2022    Physician Orders: PT Eval and Treat   Medical Diagnosis from Referral: M25.562 (ICD-10-CM) - Acute pain of left knee  Evaluation Date: 9/7/2022  Authorization Period Expiration: 9/6/2023  Plan of Care Expiration: 12/31/2022  Visit # / Visits authorized: 61     Precautions: Standard     Time In: 11:04 am  Time Out: 12:25  Total Billable Time: 81 minutes    Procedure: 9/20/2022  1. Left Knee Arthroscopy, anterior cruciate ligament reconstruction 36883  2.  Left Knee Arthroscopy, with meniscectomy (medial OR lateral) 23254  3.  Left Knee Arthroscopy, debridement/shaving of articular cartilage (chondroplasty) 58824  4.  Left Knee  platelet rich plasma injection to the bone-patellar tendon-bone harvest site    Post-Op Plan:  ACL reconstruction with a bone-patellar tendon-bone autograft    SUBJECTIVE     Pt reports: knee felt much looser today. Feels stronger, but still noticeable weaker on L visually with exercises.     He was compliant with home exercise program.  Response to previous treatment: no issues  Functional change: improved squat and lunge    Pain: 0/10  Location: left knee      OBJECTIVE     11 wks and 1 day post-op    Observation:   no effusion noted L knee  Well healed scar  Atrophy in calf/quad on L     Range of Motion (extension/neutral/flexion):     Right Left   Knee PROM: 8-0-125/130 deg P:8-0-130 deg  A:7-0-125 deg      Strength:     Right Left Comment   Knee Extension: 5/5 4/5        Knee Flexion: 5/5 4/5        Hip Abduction: 4+/5 4-/5       HHD testing soon.     Functional tests(*=pain):   Gait/stairs: normalized  Quad activation:  good     Joint Mobility: hypomobile PFJ and tibiofemoral    TTP along MCL- minimal to none    Treatment     Billy received the treatments listed below:      therapeutic exercises to develop strength, endurance, ROM, flexibility, posture, and core stabilization for 40 minutes including:  Education/assessment  Elliptical x 16 min lv 3->6 (intervals after 3 min 30/60 sec)-NP  Manual knee stretching flx and extension  SL leg press 4 x 8 linus R= 360, L= 180-260 lbs  Seated soleus raise 4 x 8 70->100 lbs  Heel prop x 5 min with 10 lbs  Knee extensions 4 x 8 R=45 lbs, L=20->25 lbs    manual therapy techniques: Joint mobilizations and Soft tissue Mobilization were applied for 8 minutes, including:  PFJ gr 3-4 glides all planes  Tibial AP glide gr 3-4 with and without rotation bias on and off heel prop  Knee hyperextension gr 3-4  Fat pad mobility  Scar mobilization    neuromuscular re-education activities to improve: Coordination, Kinesthetic, Sense, and Proprioception for 3 minutes. The following activities were included:  Quad set into hyperextension x 20 x 3 sec    Therapeutic activities to improve functional performance for 30 minutes, including:  SKTC, quad pull, HS scoop, SL DL, lateral band walk, monster walk fwd/back  Lunge cuing for tibial angle tapping box 3 x 10 linus  Split squat 4 x 8 30 lbs linus    Patient Education and Home Exercises     Home Exercises Provided and Patient Education Provided     Education provided:   - HEP update, precautions, activity pacing, goals, timeframes    Written Home Exercises Provided: yes. Exercises were reviewed and Billy was able to demonstrate them prior to the end of the session.  Billy demonstrated good  understanding of the education provided. See EMR under Patient Instructions for exercises provided during therapy sessions.     ASSESSMENT     Billy demonstrated improved lunge mechanics with cuing and reps at box to tap knee on.  Progressing SL strength on involved LE  without irritability. Pt reported/demonstrated no adverse response or exacerbation of symptoms during today's session.       Billy Is progressing well towards his goals.   Pt prognosis is Good.     Pt will continue to benefit from skilled outpatient physical therapy to address the deficits listed in the problem list box on initial evaluation, provide pt/family education and to maximize pt's level of independence in the home and community environment.     Pt's spiritual, cultural and educational needs considered and pt agreeable to plan of care and goals.     Anticipated barriers to physical therapy: none    Goals:  Short Term Goals: 8-10 weeks   1. Pt will be compliant with HEP 50% of prescribed amount. MET  2. The pt to demo improvement in left knee ROM to equal right knee ROM pain free MET  3.  The pt to demo good quad set with proper hyperextension moment of the Left knee MET  4. The pt to demo ambualting with elast restricitve AD witout major compensatory pattern left knee for at least 20 feet MET     Long Term Goals: 56 weeks (progressing, not met)   Pt will be compliant with % of prescribed amount.   The pt to demo pain free and uncompensated running mechanics x10 min on an indoor treadmill  The pt to demo tolerance to a squat at or bellow parallel with uncompensated mechanics x10   The pt to demo strength of L LE within 10% of R LE as demo'd on the biodex machine   The pt to demo a deficit of 10% or less on a triple hop, single leg broad jump and crossover hop compared to non operative LE.   The pt will report full participation in ADLs and IADLs without restrictions related to L  knee.     PLAN     Progress loading, work elliptical for cardio. Maintain full ROM and mobility.     Manjit Lazcano, PT, DPT

## 2022-12-08 ENCOUNTER — CLINICAL SUPPORT (OUTPATIENT)
Dept: REHABILITATION | Facility: HOSPITAL | Age: 27
End: 2022-12-08
Payer: COMMERCIAL

## 2022-12-08 DIAGNOSIS — M25.662 DECREASED RANGE OF MOTION OF LEFT KNEE: ICD-10-CM

## 2022-12-08 DIAGNOSIS — M62.81 QUADRICEPS WEAKNESS: ICD-10-CM

## 2022-12-08 DIAGNOSIS — R26.2 DIFFICULTY WALKING: ICD-10-CM

## 2022-12-08 DIAGNOSIS — M25.562 ACUTE PAIN OF LEFT KNEE: Primary | ICD-10-CM

## 2022-12-08 PROCEDURE — 97140 MANUAL THERAPY 1/> REGIONS: CPT

## 2022-12-08 PROCEDURE — 97110 THERAPEUTIC EXERCISES: CPT

## 2022-12-08 PROCEDURE — 97112 NEUROMUSCULAR REEDUCATION: CPT

## 2022-12-08 PROCEDURE — 97530 THERAPEUTIC ACTIVITIES: CPT

## 2022-12-08 NOTE — PROGRESS NOTES
[facility]   Physical Therapy Treatment Note     Name: Billy Holbrook  [facility] Number: [id number]    Therapy Diagnosis:   Encounter Diagnoses   Name Primary?    Acute pain of left knee Yes    Decreased range of motion of left knee     Quadriceps weakness     Difficulty walking      Physician: No ref. provider found    Visit Date: 12/8/2022    Physician Orders: PT Eval and Treat   Medical Diagnosis from Referral: M25.562 (ICD-10-CM) - Acute pain of left knee  Evaluation Date: 9/7/2022  Authorization Period Expiration: 9/6/2023  Plan of Care Expiration: 12/31/2022  Visit # / Visits authorized: 62     Precautions: Standard     Time In: 10:12 am  Time Out: 12:00  Total Billable Time: 108 minutes    Procedure: 9/20/2022  1. Left Knee Arthroscopy, anterior cruciate ligament reconstruction 80417  2.  Left Knee Arthroscopy, with meniscectomy (medial OR lateral) 97587  3.  Left Knee Arthroscopy, debridement/shaving of articular cartilage (chondroplasty) 09570  4.  Left Knee  platelet rich plasma injection to the bone-patellar tendon-bone harvest site    Post-Op Plan:  ACL reconstruction with a bone-patellar tendon-bone autograft    SUBJECTIVE     Pt reports: knee feels good. Going on 5 hr drive after session for weekend in [place].     He was compliant with home exercise program.  Response to previous treatment: no issues  Functional change: improved squat and lunge    Pain: 0/10  Location: left knee      OBJECTIVE     11 wks and 2 days post-op    Observation:   no effusion noted L knee  Well healed scar  Atrophy in calf/quad on L     Range of Motion (extension/neutral/flexion):     Right Left   Knee PROM: 8-0-125/130 deg P:8-0-130 deg  A:7-0-125 deg      Strength:     Right Left Comment   Knee Extension: 5/5 4/5        Knee Flexion: 5/5 4/5        Hip Abduction: 4+/5 4-/5       HHD testing soon.     Functional tests(*=pain):   Gait/stairs: normalized  Quad activation: good     Joint Mobility:  hypomobile PFJ and tibiofemoral    TTP along MCL- minimal to none    Anterior reach Y balance R= 58 cm  L= 54 cm    Treatment     Billy received the treatments listed below:      therapeutic exercises to develop strength, endurance, ROM, flexibility, posture, and core stabilization for 30 minutes including:  Education/assessment  Elliptical x 15 min lv 3->6 (intervals after 2 min 30/60 sec)  Manual knee stretching flx and extension  SL leg press 4 x 8 linus R= 360, L= 180-260 lbs-NP  Seated soleus raise 4 x 8 70->100 lbs-NP  Heel prop x 5 min with 10 lbs  Knee extensions 4 x 8 R=45 lbs, L=20->25 lbs-NP    manual therapy techniques: Joint mobilizations and Soft tissue Mobilization were applied for 8 minutes, including:  PFJ gr 3-4 glides all planes  Tibial AP glide gr 3-4 with and without rotation bias on and off heel prop  Knee hyperextension gr 3-4  Fat pad mobility  Scar mobilization    neuromuscular re-education activities to improve: Coordination, Kinesthetic, Sense, and Proprioception for 35 minutes. The following activities were included:  Quad set into hyperextension x 20 x 3 sec  LE Y balance anterior reach testing  Standing clam hold with 8 lb med ball slams medial/lateral 3 rds 10 each ilnus, BTB at knees each leg  Med ball throws core OH in long sitting 3 x 12 12 lbs    Therapeutic activities to improve functional performance for 35 minutes, including:  SKTC, quad pull, HS scoop, SL DL, lateral band walk, monster walk fwd/back, fwd lunge, lateral lunge  Lunge cuing for tibial angle tapping box 3 x 10 linus  Sled push, pull, lateral x 3 laps each 90 lbs  Heel taps 3D 8-10 inch depth x 20 each way linus      Patient Education and Home Exercises     Home Exercises Provided and Patient Education Provided     Education provided:   - HEP update, precautions, activity pacing, goals, timeframes    Written Home Exercises Provided: yes. Exercises were reviewed and Billy was able to demonstrate them prior to the end of  the session.  Billy demonstrated good  understanding of the education provided. See EMR under Patient Instructions for exercises provided during therapy sessions.     ASSESSMENT     Billy did well with anterior Y balance reach today to be within 6 cm. His control and strength were good. He required some cuing for tibial angle/form of lunge and squat.  Pt reported/demonstrated no adverse response or exacerbation of symptoms during today's session.       Billy Is progressing well towards his goals.   Pt prognosis is Good.     Pt will continue to benefit from skilled outpatient physical therapy to address the deficits listed in the problem list box on initial evaluation, provide pt/family education and to maximize pt's level of independence in the home and community environment.     Pt's spiritual, cultural and educational needs considered and pt agreeable to plan of care and goals.     Anticipated barriers to physical therapy: none    Goals:  Short Term Goals: 8-10 weeks   1. Pt will be compliant with HEP 50% of prescribed amount. MET  2. The pt to demo improvement in left knee ROM to equal right knee ROM pain free MET  3.  The pt to demo good quad set with proper hyperextension moment of the Left knee MET  4. The pt to demo ambualting with elast restricitve AD witout major compensatory pattern left knee for at least 20 feet MET     Long Term Goals: 56 weeks (progressing, not met)   Pt will be compliant with % of prescribed amount.   The pt to demo pain free and uncompensated running mechanics x10 min on an indoor treadmill  The pt to demo tolerance to a squat at or bellow parallel with uncompensated mechanics x10   The pt to demo strength of L LE within 10% of R LE as demo'd on the biodex machine   The pt to demo a deficit of 10% or less on a triple hop, single leg broad jump and crossover hop compared to non operative LE.   The pt will report full participation in ADLs and IADLs without restrictions  related to L  knee.     PLAN     Progress strength. Work into Alter G next week for walk/jog.     Manjit Lazcano, PT, DPT

## 2022-12-12 ENCOUNTER — CLINICAL SUPPORT (OUTPATIENT)
Dept: REHABILITATION | Facility: HOSPITAL | Age: 27
End: 2022-12-12
Payer: COMMERCIAL

## 2022-12-12 DIAGNOSIS — M25.562 ACUTE PAIN OF LEFT KNEE: Primary | ICD-10-CM

## 2022-12-12 DIAGNOSIS — M25.662 DECREASED RANGE OF MOTION OF LEFT KNEE: ICD-10-CM

## 2022-12-12 DIAGNOSIS — M62.81 QUADRICEPS WEAKNESS: ICD-10-CM

## 2022-12-12 DIAGNOSIS — R26.2 DIFFICULTY WALKING: ICD-10-CM

## 2022-12-12 PROCEDURE — 97140 MANUAL THERAPY 1/> REGIONS: CPT

## 2022-12-12 PROCEDURE — 97530 THERAPEUTIC ACTIVITIES: CPT

## 2022-12-12 PROCEDURE — 97110 THERAPEUTIC EXERCISES: CPT

## 2022-12-12 NOTE — PROGRESS NOTES
[facility]   Physical Therapy Treatment Note     Name: Billy Holbrook  [facility] Number: [id number]    Therapy Diagnosis:   Encounter Diagnoses   Name Primary?    Acute pain of left knee Yes    Decreased range of motion of left knee     Quadriceps weakness     Difficulty walking      Physician: No ref. provider found    Visit Date: 12/12/2022    Physician Orders: PT Eval and Treat   Medical Diagnosis from Referral: M25.562 (ICD-10-CM) - Acute pain of left knee  Evaluation Date: 9/7/2022  Authorization Period Expiration: 9/6/2023  Plan of Care Expiration: 12/31/2022  Visit # / Visits authorized: 63     Precautions: Standard     Time In: 11:10 am  Time Out: 12:30  Total Billable Time: 80 minutes    Procedure: 9/20/2022  1. Left Knee Arthroscopy, anterior cruciate ligament reconstruction 89924  2.  Left Knee Arthroscopy, with meniscectomy (medial OR lateral) 72890  3.  Left Knee Arthroscopy, debridement/shaving of articular cartilage (chondroplasty) 90645  4.  Left Knee  platelet rich plasma injection to the bone-patellar tendon-bone harvest site    Post-Op Plan:  ACL reconstruction with a bone-patellar tendon-bone autograft    SUBJECTIVE     Pt reports: Trip went well. Did some stretching. Knee flees good. Ready to use alter-G. Felt good at exit.     He was compliant with home exercise program.  Response to previous treatment: no issues  Functional change: improved squat and lunge    Pain: 0/10  Location: left knee      OBJECTIVE     11 wks and 6 days post-op    Observation:   no effusion noted L knee  Well healed scar  Atrophy in calf/quad on L     Range of Motion (extension/neutral/flexion):     Right Left   Knee PROM: 8-0-125/130 deg P:8-0-130 deg  A:7-0-125 deg      Strength:     Right Left Comment   Knee Extension: 5/5 4/5        Knee Flexion: 5/5 4/5        Hip Abduction: 4+/5 4-/5       HHD testing soon.     Functional tests(*=pain):   Gait/stairs: normalized  Quad activation: good      Joint Mobility: hypomobile PFJ and tibiofemoral    TTP along MCL- minimal to none    Anterior reach Y balance R= 58 cm  L= 54 cm    Treatment     Billy received the treatments listed below:      therapeutic exercises to develop strength, endurance, ROM, flexibility, posture, and core stabilization for 20 minutes including:  Education/assessment  Manual knee stretching flx and extension  SL leg press 4 x 8 linus R= 360, L= 180-260 lbs-NP  Seated soleus raise 4 x 8 70->100 lbs-NP  Heel prop x 3 min  Knee extensions 4 x 8 R=45 lbs, L=20->25 lbs-NP    manual therapy techniques: Joint mobilizations and Soft tissue Mobilization were applied for 8 minutes, including:  PFJ gr 3-4 glides all planes  Tibial AP glide gr 3-4 with and without rotation bias on and off heel prop  Knee hyperextension gr 3-4  Fat pad mobility  Scar mobilization    neuromuscular re-education activities to improve: Coordination, Kinesthetic, Sense, and Proprioception for 2 minutes. The following activities were included:  Quad set into hyperextension x 20 x 3 sec  LE Y balance anterior reach testing-NP  Standing clam hold with 8 lb med ball slams medial/lateral 3 rds 10 each linus, BTB at knees each leg-NP  Med ball throws core OH in long sitting 3 x 12 12 lbs-NP    Therapeutic activities to improve functional performance for 50 minutes, including:  SKTC, quad pull, HS scoop, SL DL, lateral band walk, monster walk fwd/back, fwd lunge, lateral lunge  Alter G walk/jog (2.5-3.0 mph to 5.5->6 mph) 50% BW      Patient Education and Home Exercises     Home Exercises Provided and Patient Education Provided     Education provided:   - HEP update, precautions, activity pacing, goals, timeframes    Written Home Exercises Provided: yes. Exercises were reviewed and Billy was able to demonstrate them prior to the end of the session.  Billy demonstrated good  understanding of the education provided. See EMR under Patient Instructions for exercises provided during  therapy sessions.     ASSESSMENT     Billy progressed into Alter G walk/jog today at 50% BW for 30 minutes. Able to get feedback from device and camera to improve L stride length and stance time. No obvious impairments with jogging noted. Knee ROM is good even after TM use, still weaker on L, noted fatigue with TKE towards end of 30 minutes on TM. Pt reported/demonstrated no adverse response or exacerbation of symptoms during today's session.       Billy Is progressing well towards his goals.   Pt prognosis is Good.     Pt will continue to benefit from skilled outpatient physical therapy to address the deficits listed in the problem list box on initial evaluation, provide pt/family education and to maximize pt's level of independence in the home and community environment.     Pt's spiritual, cultural and educational needs considered and pt agreeable to plan of care and goals.     Anticipated barriers to physical therapy: none    Goals:  Short Term Goals: 8-10 weeks   1. Pt will be compliant with HEP 50% of prescribed amount. MET  2. The pt to demo improvement in left knee ROM to equal right knee ROM pain free MET  3.  The pt to demo good quad set with proper hyperextension moment of the Left knee MET  4. The pt to demo ambualting with elast restricitve AD witout major compensatory pattern left knee for at least 20 feet MET     Long Term Goals: 56 weeks (progressing, not met)   Pt will be compliant with % of prescribed amount.   The pt to demo pain free and uncompensated running mechanics x10 min on an indoor treadmill  The pt to demo tolerance to a squat at or bellow parallel with uncompensated mechanics x10   The pt to demo strength of L LE within 10% of R LE as demo'd on the biodex machine   The pt to demo a deficit of 10% or less on a triple hop, single leg broad jump and crossover hop compared to non operative LE.   The pt will report full participation in ADLs and IADLs without restrictions related to  L  knee.     PLAN     Progress strength. Work into Alter G next week for walk/jog.     Manjit Lazcano, PT, DPT

## 2022-12-13 ENCOUNTER — CLINICAL SUPPORT (OUTPATIENT)
Dept: REHABILITATION | Facility: HOSPITAL | Age: 27
End: 2022-12-13
Payer: COMMERCIAL

## 2022-12-13 DIAGNOSIS — R26.2 DIFFICULTY WALKING: ICD-10-CM

## 2022-12-13 DIAGNOSIS — M62.81 QUADRICEPS WEAKNESS: ICD-10-CM

## 2022-12-13 DIAGNOSIS — M25.662 DECREASED RANGE OF MOTION OF LEFT KNEE: ICD-10-CM

## 2022-12-13 DIAGNOSIS — M25.562 ACUTE PAIN OF LEFT KNEE: Primary | ICD-10-CM

## 2022-12-13 PROCEDURE — 97530 THERAPEUTIC ACTIVITIES: CPT

## 2022-12-13 PROCEDURE — 97110 THERAPEUTIC EXERCISES: CPT

## 2022-12-13 PROCEDURE — 97140 MANUAL THERAPY 1/> REGIONS: CPT

## 2022-12-13 PROCEDURE — 97112 NEUROMUSCULAR REEDUCATION: CPT

## 2022-12-13 NOTE — PROGRESS NOTES
[facility]   Physical Therapy Treatment Note     Name: Billy Holbrook  [facility] Number: [id number]    Therapy Diagnosis:   Encounter Diagnoses   Name Primary?    Acute pain of left knee Yes    Decreased range of motion of left knee     Quadriceps weakness     Difficulty walking        Physician: No ref. provider found    Visit Date: 12/13/2022    Physician Orders: PT Eval and Treat   Medical Diagnosis from Referral: M25.562 (ICD-10-CM) - Acute pain of left knee  Evaluation Date: 9/7/2022  Authorization Period Expiration: 9/6/2023  Plan of Care Expiration: 12/31/2022  Visit # / Visits authorized: 65     Precautions: Standard     Time In: 9:05 am  Time Out: 10:55  Total Billable Time: 100 minutes    Procedure: 9/20/2022  1. Left Knee Arthroscopy, anterior cruciate ligament reconstruction 58763  2.  Left Knee Arthroscopy, with meniscectomy (medial OR lateral) 05370  3.  Left Knee Arthroscopy, debridement/shaving of articular cartilage (chondroplasty) 30993  4.  Left Knee  platelet rich plasma injection to the bone-patellar tendon-bone harvest site    Post-Op Plan:  ACL reconstruction with a bone-patellar tendon-bone autograft    SUBJECTIVE     Pt reports: no issues from jigar medrano yesterday. Feeling good.     He was compliant with home exercise program.  Response to previous treatment: no issues  Functional change: improved squat and lunge    Pain: 0/10  Location: left knee      OBJECTIVE     12 wks post-op    Observation:   no effusion noted L knee  Well healed scar  Atrophy in calf/quad on L     Range of Motion (extension/neutral/flexion):     Right Left   Knee PROM: 8-0-125/130 deg P:8-0-130 deg  A:7-0-125 deg      Strength:     Right Left Comment   Knee Extension: 5/5 4/5        Knee Flexion: 5/5 4/5        Hip Abduction: 4+/5 4-/5       HHD testing soon.     Functional tests(*=pain):   Gait/stairs: normalized  Quad activation: good     Joint Mobility: hypomobile PFJ and tibiofemoral    TTP  along MCL- minimal to none    Anterior reach Y balance R= 58 cm  L= 54 cm    Treatment     Billy received the treatments listed below:      therapeutic exercises to develop strength, endurance, ROM, flexibility, posture, and core stabilization for 32 minutes including:  Education/assessment  Elliptical 8 min  Manual knee stretching flx and extension  Heel prop x 3 min  SL calf raise on press machine 4 x 12 linus 160->200 lbs  SL HS curl machine 4 x 8 linus 45 lbs    SL leg press 4 x 8 linus R= 360, L= 180-260 lbs-NP  Seated soleus raise 4 x 8 70->100 lbs-NP  Knee extensions 4 x 8 R=45 lbs, L=20->25 lbs-NP    manual therapy techniques: Joint mobilizations and Soft tissue Mobilization were applied for 8 minutes, including:  PFJ gr 3-4 glides all planes  Tibial AP glide gr 3-4 with and without rotation bias on and off heel prop  Knee hyperextension gr 3-4  Fat pad mobility  Scar mobilization    neuromuscular re-education activities to improve: Coordination, Kinesthetic, Sense, and Proprioception for 10 minutes. The following activities were included:  Quad set into hyperextension x 20 x 3 sec  Side steps BTB at feet and pressing purple sport band 3 laps 30 yds    Standing clam hold with 8 lb med ball slams medial/lateral 3 rds 10 each linus, BTB at knees each leg-NP  Med ball throws core OH in long sitting 3 x 12 12 lbs-NP    Therapeutic activities to improve functional performance for 50 minutes, including:  SKTC, quad pull, HS scoop, HS march, high knee march, walking butt kicks  RESS 5 x 5 30->45 lb Dbs  Trap bar squat DL 5 x 5 185->275(2 sets) after 145 lb x 15   Sled pull retro and lateral 90 lbs added 3 x each 60 yds    Alter G walk/jog (2.5-3.0 mph to 5.5->6 mph) 50% BW-NP      Patient Education and Home Exercises     Home Exercises Provided and Patient Education Provided     Education provided:   - HEP update, precautions, activity pacing, goals, timeframes    Written Home Exercises Provided: yes. Exercises were  reviewed and Billy was able to demonstrate them prior to the end of the session.  Billy demonstrated good  understanding of the education provided. See EMR under Patient Instructions for exercises provided during therapy sessions.     ASSESSMENT     Billy did well today. Noted some mild anterior knee pain that resolved with reps/cuing on lunge. Progressing loading with good tolerance. Circuit training utilized after heavy lifting for some added cardio. Pt reported/demonstrated no adverse response or exacerbation of symptoms during today's session.       Billy Is progressing well towards his goals.   Pt prognosis is Good.     Pt will continue to benefit from skilled outpatient physical therapy to address the deficits listed in the problem list box on initial evaluation, provide pt/family education and to maximize pt's level of independence in the home and community environment.     Pt's spiritual, cultural and educational needs considered and pt agreeable to plan of care and goals.     Anticipated barriers to physical therapy: none    Goals:  Short Term Goals: 8-10 weeks   1. Pt will be compliant with HEP 50% of prescribed amount. MET  2. The pt to demo improvement in left knee ROM to equal right knee ROM pain free MET  3.  The pt to demo good quad set with proper hyperextension moment of the Left knee MET  4. The pt to demo ambualting with elast restricitve AD witout major compensatory pattern left knee for at least 20 feet MET     Long Term Goals: 56 weeks (progressing, not met)   Pt will be compliant with % of prescribed amount.   The pt to demo pain free and uncompensated running mechanics x10 min on an indoor treadmill  The pt to demo tolerance to a squat at or bellow parallel with uncompensated mechanics x10   The pt to demo strength of L LE within 10% of R LE as demo'd on the biodex machine   The pt to demo a deficit of 10% or less on a triple hop, single leg broad jump and crossover hop compared to  non operative LE.   The pt will report full participation in ADLs and IADLs without restrictions related to L  knee.     PLAN     Progress strength as able. Alter G progression walk to jog.     Manjit Lazcano, PT, DPT

## 2022-12-14 ENCOUNTER — CLINICAL SUPPORT (OUTPATIENT)
Dept: REHABILITATION | Facility: HOSPITAL | Age: 27
End: 2022-12-14
Payer: COMMERCIAL

## 2022-12-14 DIAGNOSIS — M62.81 QUADRICEPS WEAKNESS: ICD-10-CM

## 2022-12-14 DIAGNOSIS — M25.662 DECREASED RANGE OF MOTION OF LEFT KNEE: ICD-10-CM

## 2022-12-14 DIAGNOSIS — R26.2 DIFFICULTY WALKING: ICD-10-CM

## 2022-12-14 DIAGNOSIS — M25.562 ACUTE PAIN OF LEFT KNEE: Primary | ICD-10-CM

## 2022-12-14 PROCEDURE — 97110 THERAPEUTIC EXERCISES: CPT

## 2022-12-14 PROCEDURE — 97530 THERAPEUTIC ACTIVITIES: CPT

## 2022-12-14 PROCEDURE — 97112 NEUROMUSCULAR REEDUCATION: CPT

## 2022-12-14 PROCEDURE — 97140 MANUAL THERAPY 1/> REGIONS: CPT

## 2022-12-14 NOTE — PROGRESS NOTES
[facility]   Physical Therapy Treatment Note     Name: Billy Holbrook  [facility] Number: [id number]    Therapy Diagnosis:   Encounter Diagnoses   Name Primary?    Acute pain of left knee Yes    Decreased range of motion of left knee     Quadriceps weakness     Difficulty walking        Physician: No ref. provider found    Visit Date: 12/14/2022    Physician Orders: PT Eval and Treat   Medical Diagnosis from Referral: M25.562 (ICD-10-CM) - Acute pain of left knee  Evaluation Date: 9/7/2022  Authorization Period Expiration: 9/6/2023  Plan of Care Expiration: 12/31/2022  Visit # / Visits authorized: 66     Precautions: Standard     Time In: 10:05 am  Time Out: 11:45  Total Billable Time: 88 minutes    Procedure: 9/20/2022  1. Left Knee Arthroscopy, anterior cruciate ligament reconstruction 64589  2.  Left Knee Arthroscopy, with meniscectomy (medial OR lateral) 83552  3.  Left Knee Arthroscopy, debridement/shaving of articular cartilage (chondroplasty) 94136  4.  Left Knee  platelet rich plasma injection to the bone-patellar tendon-bone harvest site    Post-Op Plan:  ACL reconstruction with a bone-patellar tendon-bone autograft    SUBJECTIVE     Pt reports: feeling good. Sore from yesterday, but no issues at his knee. No swelling. Mild stiffness noted.     He was compliant with home exercise program.  Response to previous treatment: no issues  Functional change: improved squat and lunge    Pain: 0/10  Location: left knee      OBJECTIVE     12 wks 1 day post-op    Observation:   no effusion noted L knee  Well healed scar  Atrophy in calf/quad on L     Range of Motion (extension/neutral/flexion):     Right Left   Knee PROM: 8-0-125/130 deg P:8-0-130 deg  A:7-0-125 deg      Strength:     Right Left Comment   Knee Extension: 5/5 4/5        Knee Flexion: 5/5 4/5        Hip Abduction: 4+/5 4-/5       HHD testing soon.     Functional tests(*=pain):   Gait/stairs: normalized  Quad activation: good      Joint Mobility: hypomobile PFJ and tibiofemoral    TTP along MCL- minimal to none    Anterior reach Y balance R= 58 cm  L= 54 cm    Treatment     Billy received the treatments listed below:      therapeutic exercises to develop strength, endurance, ROM, flexibility, posture, and core stabilization for 25 minutes including:  Education/assessment  Manual knee stretching flx and extension  Heel prop x 3 min  8 lb med ball core work 2 rds 10 each anterior and both sides    SL calf raise on press machine 4 x 12 linus 160->200 lbs-NP  SL HS curl machine 4 x 8 linus 45 lbs-NP  SL leg press 4 x 8 linus R= 360, L= 180-260 lbs-NP  Seated soleus raise 4 x 8 70->100 lbs-NP  Knee extensions 4 x 8 R=45 lbs, L=20->25 lbs-NP    manual therapy techniques: Joint mobilizations and Soft tissue Mobilization were applied for 8 minutes, including:  PFJ gr 3-4 glides all planes  Tibial AP glide gr 3-4 with and without rotation bias on and off heel prop  Knee hyperextension gr 3-4  Fat pad mobility  Scar mobilization    neuromuscular re-education activities to improve: Coordination, Kinesthetic, Sense, and Proprioception for 10 minutes. The following activities were included:  Quad set into hyperextension x 20 x 3 sec  SLS BOSU 2 rds 30 sec with 15 lb DB presses; each side     Side steps BTB at feet and pressing purple sport band 3 laps 30 yds-NP  Standing clam hold with 8 lb med ball slams medial/lateral 3 rds 10 each linus, BTB at knees each leg-NP  Med ball throws core OH in long sitting 3 x 12 12 lbs-NP    Therapeutic activities to improve functional performance for 45 minutes, including:  SKTC, quad pull, HS scoop, HS march, high knee march, walking butt kicks, lunge, lateral lunge, band walks, monster walks  Alter G walk/jog 4:1 min (2.5 mph to 6.0->7 mph) 60% BW for 30 min    RESS 5 x 5 30->45 lb Dbs-NP  Trap bar squat DL 5 x 5 185->275(2 sets) after 145 lb x 15 -NP  Sled pull retro and lateral 90 lbs added 3 x each 60  yds-NP          Patient Education and Home Exercises     Home Exercises Provided and Patient Education Provided     Education provided:   - HEP update, precautions, activity pacing, goals, timeframes    Written Home Exercises Provided: yes. Exercises were reviewed and Billy was able to demonstrate them prior to the end of the session.  Billy demonstrated good  understanding of the education provided. See EMR under Patient Instructions for exercises provided during therapy sessions.     ASSESSMENT     Focused session on alter G walk/jog with balance and core work afterwards. No knee irritation. Gait mechanics improved with cuing/feedback and increased BW%. Knee was slightly stiff today requiring a little more manual therapy to resolve. Advised on stretching more indep at home between visits. Pt reported/demonstrated no adverse response or exacerbation of symptoms during today's session.       Billy Is progressing well towards his goals.   Pt prognosis is Good.     Pt will continue to benefit from skilled outpatient physical therapy to address the deficits listed in the problem list box on initial evaluation, provide pt/family education and to maximize pt's level of independence in the home and community environment.     Pt's spiritual, cultural and educational needs considered and pt agreeable to plan of care and goals.     Anticipated barriers to physical therapy: none    Goals:  Short Term Goals: 8-10 weeks   1. Pt will be compliant with HEP 50% of prescribed amount. MET  2. The pt to demo improvement in left knee ROM to equal right knee ROM pain free MET  3.  The pt to demo good quad set with proper hyperextension moment of the Left knee MET  4. The pt to demo ambualting with elast restricitve AD witout major compensatory pattern left knee for at least 20 feet MET     Long Term Goals: 56 weeks (progressing, not met)   Pt will be compliant with % of prescribed amount.   The pt to demo pain free and  uncompensated running mechanics x10 min on an indoor treadmill  The pt to demo tolerance to a squat at or bellow parallel with uncompensated mechanics x10   The pt to demo strength of L LE within 10% of R LE as demo'd on the biodex machine   The pt to demo a deficit of 10% or less on a triple hop, single leg broad jump and crossover hop compared to non operative LE.   The pt will report full participation in ADLs and IADLs without restrictions related to L  knee.     PLAN     Progress strength as able. Alter G progression walk to jog.     Manjit Lazcano, PT, DPT

## 2022-12-15 ENCOUNTER — CLINICAL SUPPORT (OUTPATIENT)
Dept: REHABILITATION | Facility: HOSPITAL | Age: 27
End: 2022-12-15
Payer: COMMERCIAL

## 2022-12-15 DIAGNOSIS — M62.81 QUADRICEPS WEAKNESS: ICD-10-CM

## 2022-12-15 DIAGNOSIS — R26.2 DIFFICULTY WALKING: ICD-10-CM

## 2022-12-15 DIAGNOSIS — M25.562 ACUTE PAIN OF LEFT KNEE: Primary | ICD-10-CM

## 2022-12-15 DIAGNOSIS — M25.662 DECREASED RANGE OF MOTION OF LEFT KNEE: ICD-10-CM

## 2022-12-15 PROCEDURE — 97530 THERAPEUTIC ACTIVITIES: CPT

## 2022-12-15 PROCEDURE — 97140 MANUAL THERAPY 1/> REGIONS: CPT

## 2022-12-15 PROCEDURE — 97110 THERAPEUTIC EXERCISES: CPT

## 2022-12-15 NOTE — PROGRESS NOTES
[facility]   Physical Therapy Treatment Note     Name: Billy Holbrook  [facility] Number: [id number]    Therapy Diagnosis:   Encounter Diagnoses   Name Primary?    Acute pain of left knee Yes    Decreased range of motion of left knee     Quadriceps weakness     Difficulty walking      Physician: No ref. provider found    Visit Date: 12/15/2022    Physician Orders: PT Eval and Treat   Medical Diagnosis from Referral: M25.562 (ICD-10-CM) - Acute pain of left knee  Evaluation Date: 9/7/2022  Authorization Period Expiration: 9/6/2023  Plan of Care Expiration: 12/31/2022  Visit # / Visits authorized: 67     Precautions: Standard     Time In: 11:03 am  Time Out: 12:40  Total Billable Time: 97 minutes    Procedure: 9/20/2022  1. Left Knee Arthroscopy, anterior cruciate ligament reconstruction 68049  2.  Left Knee Arthroscopy, with meniscectomy (medial OR lateral) 06101  3.  Left Knee Arthroscopy, debridement/shaving of articular cartilage (chondroplasty) 20816  4.  Left Knee  platelet rich plasma injection to the bone-patellar tendon-bone harvest site    Post-Op Plan:  ACL reconstruction with a bone-patellar tendon-bone autograft    SUBJECTIVE     Pt reports: his knee is doing well. Ready to exercise.     He was compliant with home exercise program.  Response to previous treatment: no issues  Functional change: improved squat and lunge    Pain: 0/10  Location: left knee      OBJECTIVE     12 wks 2 days post-op    Observation:   no effusion noted L knee  Well healed scar  Atrophy in calf/quad on L     Range of Motion (extension/neutral/flexion):     Right Left   Knee PROM: 8-0-125/130 deg P:8-0-130 deg  A:7-0-125 deg      Strength:     Right Left Comment   Knee Extension: 5/5 4/5        Knee Flexion: 5/5 4/5        Hip Abduction: 4+/5 4-/5       HHD testing soon.     Functional tests(*=pain):   Gait/stairs: normalized  Quad activation: good     Joint Mobility: hypomobile PFJ and tibiofemoral    TTP  along MCL- minimal to none    Anterior reach Y balance R= 58 cm  L= 54 cm    Treatment     Billy received the treatments listed below:      therapeutic exercises to develop strength, endurance, ROM, flexibility, posture, and core stabilization for 50 minutes including:  Education/assessment  Manual knee stretching flx and extension  Heel prop x 3 min  SL eccentric HS from glute bridge x 20 for 5 sec linus slide disc  Knee extensions 4 x 10 R=45 lbs, L=20->30 lbs  SL leg press 5 x 8 linus R= 260->400 lbs, L= 200->300 lbs  SL calf raise on press machine 4 x 12 linus 160->200 lbs    8 lb med ball core work 2 rds 10 each anterior and both sides-NP  SL HS curl machine 4 x 8 linus 45 lbs-NP  Seated soleus raise 4 x 8 70->100 lbs-NP      manual therapy techniques: Joint mobilizations and Soft tissue Mobilization were applied for 8 minutes, including:  PFJ gr 3-4 glides all planes  Tibial AP glide gr 3-4 with and without rotation bias on and off heel prop  Knee hyperextension gr 3-4  Fat pad mobility  Scar mobilization    neuromuscular re-education activities to improve: Coordination, Kinesthetic, Sense, and Proprioception for 2 minutes. The following activities were included:  Quad set into hyperextension x 20 x 3 sec    SLS BOSU 2 rds 30 sec with 15 lb DB presses; each side -NP  Side steps BTB at feet and pressing purple sport band 3 laps 30 yds-NP  Standing clam hold with 8 lb med ball slams medial/lateral 3 rds 10 each linus, BTB at knees each leg-NP  Med ball throws core OH in long sitting 3 x 12 12 lbs-NP    Therapeutic activities to improve functional performance for 37 minutes, including:  SKTC, quad pull, HS scoop, HS march, high knee march, walking butt kicks, lunge, lateral lunge, band walks, monster walks  SL RDL with landmine row 3 rds 3 x 5 reps 25->35 lbs  Lunge press landmine 3 x 8 linus 25->35 lbs    Alter G walk/jog 4:1 min (2.5 mph to 6.0->7 mph) 60% BW for 30 min-NP  RESS 5 x 5 30->45 lb Dbs-NP  Trap bar squat DL 5  x 5 185->275(2 sets) after 145 lb x 15 -NP  Sled pull retro and lateral 90 lbs added 3 x each 60 yds-NP      Patient Education and Home Exercises     Home Exercises Provided and Patient Education Provided     Education provided:   - HEP update, precautions, activity pacing, goals, timeframes    Written Home Exercises Provided: yes. Exercises were reviewed and Billy was able to demonstrate them prior to the end of the session.  Billy demonstrated good  understanding of the education provided. See EMR under Patient Instructions for exercises provided during therapy sessions.     ASSESSMENT     Focusing on multi-joint functional tasks under load moving into single joint heavier load for strength. ROM is doing well. No swelling, reports good tolerance. Pt reported/demonstrated no adverse response or exacerbation of symptoms during today's session.       Billy Is progressing well towards his goals.   Pt prognosis is Good.     Pt will continue to benefit from skilled outpatient physical therapy to address the deficits listed in the problem list box on initial evaluation, provide pt/family education and to maximize pt's level of independence in the home and community environment.     Pt's spiritual, cultural and educational needs considered and pt agreeable to plan of care and goals.     Anticipated barriers to physical therapy: none    Goals:  Short Term Goals: 8-10 weeks   1. Pt will be compliant with HEP 50% of prescribed amount. MET  2. The pt to demo improvement in left knee ROM to equal right knee ROM pain free MET  3.  The pt to demo good quad set with proper hyperextension moment of the Left knee MET  4. The pt to demo ambualting with elast restricitve AD witout major compensatory pattern left knee for at least 20 feet MET     Long Term Goals: 56 weeks (progressing, not met)   Pt will be compliant with % of prescribed amount.   The pt to demo pain free and uncompensated running mechanics x10 min on an  indoor treadmill  The pt to demo tolerance to a squat at or bellow parallel with uncompensated mechanics x10   The pt to demo strength of L LE within 10% of R LE as demo'd on the biodex machine   The pt to demo a deficit of 10% or less on a triple hop, single leg broad jump and crossover hop compared to non operative LE.   The pt will report full participation in ADLs and IADLs without restrictions related to L  knee.     PLAN     Progress strength as able. Alter G progression walk to jog.     Manjit Lazcano, PT, DPT

## 2022-12-16 ENCOUNTER — CLINICAL SUPPORT (OUTPATIENT)
Dept: REHABILITATION | Facility: HOSPITAL | Age: 27
End: 2022-12-16
Payer: COMMERCIAL

## 2022-12-16 DIAGNOSIS — M25.562 ACUTE PAIN OF LEFT KNEE: Primary | ICD-10-CM

## 2022-12-16 DIAGNOSIS — R26.2 DIFFICULTY WALKING: ICD-10-CM

## 2022-12-16 DIAGNOSIS — M62.81 QUADRICEPS WEAKNESS: ICD-10-CM

## 2022-12-16 DIAGNOSIS — M25.662 DECREASED RANGE OF MOTION OF LEFT KNEE: ICD-10-CM

## 2022-12-16 PROCEDURE — 97140 MANUAL THERAPY 1/> REGIONS: CPT

## 2022-12-16 PROCEDURE — 97110 THERAPEUTIC EXERCISES: CPT

## 2022-12-16 PROCEDURE — 97530 THERAPEUTIC ACTIVITIES: CPT

## 2022-12-16 NOTE — PROGRESS NOTES
[facility]   Physical Therapy Treatment Note     Name: Billy Holbrook  [facility] Number: [id number]    Therapy Diagnosis:   Encounter Diagnoses   Name Primary?    Acute pain of left knee Yes    Decreased range of motion of left knee     Quadriceps weakness     Difficulty walking      Physician: No ref. provider found    Visit Date: 12/16/2022    Physician Orders: PT Eval and Treat   Medical Diagnosis from Referral: M25.562 (ICD-10-CM) - Acute pain of left knee  Evaluation Date: 9/7/2022  Authorization Period Expiration: 9/6/2023  Plan of Care Expiration: 12/31/2022  Visit # / Visits authorized: 68     Precautions: Standard     Time In: 6:08 am  Time Out: 7:30  Total Billable Time: 82 minutes    Procedure: 9/20/2022  1. Left Knee Arthroscopy, anterior cruciate ligament reconstruction 51533  2.  Left Knee Arthroscopy, with meniscectomy (medial OR lateral) 40734  3.  Left Knee Arthroscopy, debridement/shaving of articular cartilage (chondroplasty) 28272  4.  Left Knee  platelet rich plasma injection to the bone-patellar tendon-bone harvest site    Post-Op Plan:  ACL reconstruction with a bone-patellar tendon-bone autograft    SUBJECTIVE     Pt reports: sore from yesterday, but ready to jog on alter G. Travelling home this weekend.     He was compliant with home exercise program.  Response to previous treatment: no issues  Functional change: improved squat and lunge    Pain: 0/10  Location: left knee      OBJECTIVE     12 wks 3 days post-op    Observation:   no effusion noted L knee  Well healed scar  Atrophy in calf/quad on L     Range of Motion (extension/neutral/flexion):     Right Left   Knee PROM: 8-0-125/130 deg P:8-0-130 deg  A:7-0-125 deg      Strength:     Right Left Comment   Knee Extension: 5/5 4/5        Knee Flexion: 5/5 4/5        Hip Abduction: 4+/5 4-/5       HHD testing soon.     Functional tests(*=pain):   Gait/stairs: normalized  Quad activation: good     Joint Mobility:  hypomobile PFJ and tibiofemoral    TTP along MCL- minimal to none    Anterior reach Y balance R= 58 cm  L= 54 cm  Running wide base, slightly asymmetrical with decreased knee flexion at impact on L    Treatment     Billy received the treatments listed below:      therapeutic exercises to develop strength, endurance, ROM, flexibility, posture, and core stabilization for 10 minutes including:  Education/assessment  Manual knee stretching flx and extension  Heel prop x 3 min    SL eccentric HS from glute bridge x 20 for 5 sec linus slide disc-NP  Knee extensions 4 x 10 R=45 lbs, L=20->30 lbs-NP  SL leg press 5 x 8 linus R= 260->400 lbs, L= 200->300 lbs-NP  SL calf raise on press machine 4 x 12 linus 160->200 lbs-NP  8 lb med ball core work 2 rds 10 each anterior and both sides-NP  SL HS curl machine 4 x 8 linus 45 lbs-NP  Seated soleus raise 4 x 8 70->100 lbs-NP      manual therapy techniques: Joint mobilizations and Soft tissue Mobilization were applied for 10 minutes, including:  PFJ gr 3-4 glides all planes  Tibial AP glide gr 3-4 with and without rotation bias on and off heel prop  Knee hyperextension gr 3-4  Fat pad mobility  Scar mobilization    neuromuscular re-education activities to improve: Coordination, Kinesthetic, Sense, and Proprioception for 2 minutes. The following activities were included:  Quad set into hyperextension x 20 x 3 sec    SLS BOSU 2 rds 30 sec with 15 lb DB presses; each side -NP  Side steps BTB at feet and pressing purple sport band 3 laps 30 yds-NP  Standing clam hold with 8 lb med ball slams medial/lateral 3 rds 10 each linus, BTB at knees each leg-NP  Med ball throws core OH in long sitting 3 x 12 12 lbs-NP    Therapeutic activities to improve functional performance for 60 minutes, including:  SKTC, quad pull, HS scoop, HS march, high knee march, walking butt kicks, lunge, lateral lunge, band walks, monster walks  Alter G walk/jog 4:1 min (2.5 mph to 6.0->7 mph) 75% BW for 35 min: set up of  device      SL RDL with landmine row 3 rds 3 x 5 reps 25->35 lbs-NP  Lunge press landmine 3 x 8 linus 25->35 lbs-NP  RESS 5 x 5 30->45 lb Dbs-NP  Trap bar squat DL 5 x 5 185->275(2 sets) after 145 lb x 15 -NP  Sled pull retro and lateral 90 lbs added 3 x each 60 yds-NP      Patient Education and Home Exercises     Home Exercises Provided and Patient Education Provided     Education provided:   - HEP update, precautions, activity pacing, goals, timeframes    Written Home Exercises Provided: yes. Exercises were reviewed and Billy was able to demonstrate them prior to the end of the session.  Billy demonstrated good  understanding of the education provided. See EMR under Patient Instructions for exercises provided during therapy sessions.     ASSESSMENT     Focusing on alter G walk/jog progression today with increased BW %. He was pretty fatigued after session, unable to do cone taps due to fatigue and knee buckling moments. Denies pain. Pt reported/demonstrated no adverse response or exacerbation of symptoms during today's session.       Billy Is progressing well towards his goals.   Pt prognosis is Good.     Pt will continue to benefit from skilled outpatient physical therapy to address the deficits listed in the problem list box on initial evaluation, provide pt/family education and to maximize pt's level of independence in the home and community environment.     Pt's spiritual, cultural and educational needs considered and pt agreeable to plan of care and goals.     Anticipated barriers to physical therapy: none    Goals:  Short Term Goals: 8-10 weeks   1. Pt will be compliant with HEP 50% of prescribed amount. MET  2. The pt to demo improvement in left knee ROM to equal right knee ROM pain free MET  3.  The pt to demo good quad set with proper hyperextension moment of the Left knee MET  4. The pt to demo ambualting with elast restricitve AD witout major compensatory pattern left knee for at least 20 feet MET      Long Term Goals: 56 weeks (progressing, not met)   Pt will be compliant with % of prescribed amount.   The pt to demo pain free and uncompensated running mechanics x10 min on an indoor treadmill  The pt to demo tolerance to a squat at or bellow parallel with uncompensated mechanics x10   The pt to demo strength of L LE within 10% of R LE as demo'd on the biodex machine   The pt to demo a deficit of 10% or less on a triple hop, single leg broad jump and crossover hop compared to non operative LE.   The pt will report full participation in ADLs and IADLs without restrictions related to L  knee.     PLAN     Progress strength as able. Alter G progression walk to jog.     Manjit Lazcano, PT, DPT

## 2022-12-19 ENCOUNTER — CLINICAL SUPPORT (OUTPATIENT)
Dept: REHABILITATION | Facility: HOSPITAL | Age: 27
End: 2022-12-19
Payer: COMMERCIAL

## 2022-12-19 DIAGNOSIS — R26.2 DIFFICULTY WALKING: ICD-10-CM

## 2022-12-19 DIAGNOSIS — M25.662 DECREASED RANGE OF MOTION OF LEFT KNEE: ICD-10-CM

## 2022-12-19 DIAGNOSIS — M25.562 ACUTE PAIN OF LEFT KNEE: Primary | ICD-10-CM

## 2022-12-19 DIAGNOSIS — M62.81 QUADRICEPS WEAKNESS: ICD-10-CM

## 2022-12-19 PROCEDURE — 97110 THERAPEUTIC EXERCISES: CPT

## 2022-12-19 PROCEDURE — 97112 NEUROMUSCULAR REEDUCATION: CPT

## 2022-12-19 PROCEDURE — 97140 MANUAL THERAPY 1/> REGIONS: CPT

## 2022-12-19 PROCEDURE — 97530 THERAPEUTIC ACTIVITIES: CPT

## 2022-12-19 NOTE — PROGRESS NOTES
[facility]   Physical Therapy Treatment Note     Name: Billy Holbrook  [facility] Number: [id number]    Therapy Diagnosis:   Encounter Diagnoses   Name Primary?    Acute pain of left knee Yes    Decreased range of motion of left knee     Quadriceps weakness     Difficulty walking        Physician: PEACE Dempsey MD    Visit Date: 12/19/2022    Physician Orders: PT Eval and Treat   Medical Diagnosis from Referral: M25.562 (ICD-10-CM) - Acute pain of left knee  Evaluation Date: 9/7/2022  Authorization Period Expiration: 9/6/2023  Plan of Care Expiration: 12/31/2022  Visit # / Visits authorized: 68     Precautions: Standard     Time In: 11:00 am  Time Out: 12:38 pm  Total Billable Time: 98 minutes    Procedure: 9/20/2022  1. Left Knee Arthroscopy, anterior cruciate ligament reconstruction 93906  2.  Left Knee Arthroscopy, with meniscectomy (medial OR lateral) 00375  3.  Left Knee Arthroscopy, debridement/shaving of articular cartilage (chondroplasty) 52242  4.  Left Knee  platelet rich plasma injection to the bone-patellar tendon-bone harvest site    Post-Op Plan:  ACL reconstruction with a bone-patellar tendon-bone autograft    SUBJECTIVE     Pt reports: felt sore after Alter G last session    He was compliant with home exercise program.  Response to previous treatment: no issues  Functional change: improved squat and lunge    Pain: 0/10  Location: left knee      OBJECTIVE     13 wks post-op    Observation:   no effusion noted L knee  Well healed scar  Atrophy in calf/quad on L     Range of Motion (extension/neutral/flexion):     Right Left   Knee PROM: 8-0-125/130 deg P:8-0-130 deg  A:7-0-125 deg      Strength:     Right Left Comment   Knee Extension: 5/5 4/5        Knee Flexion: 5/5 4/5        Hip Abduction: 4+/5 4-/5       HHD testing soon.     Functional tests(*=pain):   Gait/stairs: normalized  Quad activation: good     Joint Mobility: hypomobile PFJ and tibiofemoral    TTP along MCL-  "minimal to none    Anterior reach Y balance R= 58 cm  L= 54 cm  Running wide base, slightly asymmetrical with decreased knee flexion at impact on L    Treatment     Billy received the treatments listed below:      therapeutic exercises to develop strength, endurance, ROM, flexibility, posture, and core stabilization for 15 minutes including:  Education/assessment  Manual knee stretching flx and extension  Heel prop x 3 min  Side plank clamshell; 3 x 6 w/ 5" hold B; BTB  Swiss ball stir the pot; 5 x 15"     SL eccentric HS from glute bridge x 20 for 5 sec linus slide disc-NP  Knee extensions 4 x 10 R=45 lbs, L=20->30 lbs-NP  SL leg press 5 x 8 linus R= 260->400 lbs, L= 200->300 lbs-NP  SL calf raise on press machine 4 x 12 linus 160->200 lbs-NP  8 lb med ball core work 2 rds 10 each anterior and both sides-NP  SL HS curl machine 4 x 8 linus 45 lbs-NP  Seated soleus raise 4 x 8 70->100 lbs-NP      manual therapy techniques: Joint mobilizations and Soft tissue Mobilization were applied for 10 minutes, including:  PFJ gr 3-4 glides all planes  Tibial AP glide gr 3-4 with and without rotation bias on and off heel prop  Knee hyperextension gr 3-4  Fat pad mobility  Scar mobilization    neuromuscular re-education activities to improve: Coordination, Kinesthetic, Sense, and Proprioception for 18 minutes. The following activities were included:  Quad set into hyperextension x 20 x 3 sec  Y balance cone taps on foam; 4 rds of 8 ea      SLS BOSU 2 rds 30 sec with 15 lb DB presses; each side -NP  Side steps BTB at feet and pressing purple sport band 3 laps 30 yds-NP  Standing clam hold with 8 lb med ball slams medial/lateral 3 rds 10 each linus, BTB at knees each leg-NP  Med ball throws core OH in long sitting 3 x 12 12 lbs-NP    Therapeutic activities to improve functional performance for 55 minutes, including:  SKTC, quad pull, HS scoop, HS march, high knee march, walking butt kicks, lunge, lateral lunge, band walks, monster " walks  Alter G walk/jog 4:1 min (2.5 mph to 6.0->7 mph) 75% BW for 35 min: set up of device      SL RDL with landmine row 3 rds 3 x 5 reps 25->35 lbs-NP  Lunge press landmine 3 x 8 linus 25->35 lbs-NP  RESS 5 x 5 30->45 lb Dbs-NP  Trap bar squat DL 5 x 5 185->275(2 sets) after 145 lb x 15 -NP  Sled pull retro and lateral 90 lbs added 3 x each 60 yds-NP      Patient Education and Home Exercises     Home Exercises Provided and Patient Education Provided     Education provided:   - HEP update, precautions, activity pacing, goals, timeframes    Written Home Exercises Provided: yes. Exercises were reviewed and Billy was able to demonstrate them prior to the end of the session.  Billy demonstrated good  understanding of the education provided. See EMR under Patient Instructions for exercises provided during therapy sessions.     ASSESSMENT     Alter G walk/jog progression was continued at same intensity as last session with good results and less complaints of fatigue. He was able to complete cone taps in all directions  without buckling or issue but had to be cued to increase anterior knee translation. Will continue to progress walk/jog program as tolerated,    Billy Is progressing well towards his goals.   Pt prognosis is Good.     Pt will continue to benefit from skilled outpatient physical therapy to address the deficits listed in the problem list box on initial evaluation, provide pt/family education and to maximize pt's level of independence in the home and community environment.     Pt's spiritual, cultural and educational needs considered and pt agreeable to plan of care and goals.     Anticipated barriers to physical therapy: none    Goals:  Short Term Goals: 8-10 weeks   1. Pt will be compliant with HEP 50% of prescribed amount. MET  2. The pt to demo improvement in left knee ROM to equal right knee ROM pain free MET  3.  The pt to demo good quad set with proper hyperextension moment of the Left knee MET  4. The  pt to demo ambualting with elast restricitve AD witout major compensatory pattern left knee for at least 20 feet MET     Long Term Goals: 56 weeks (progressing, not met)   Pt will be compliant with % of prescribed amount.   The pt to demo pain free and uncompensated running mechanics x10 min on an indoor treadmill  The pt to demo tolerance to a squat at or bellow parallel with uncompensated mechanics x10   The pt to demo strength of L LE within 10% of R LE as demo'd on the biodex machine   The pt to demo a deficit of 10% or less on a triple hop, single leg broad jump and crossover hop compared to non operative LE.   The pt will report full participation in ADLs and IADLs without restrictions related to L  knee.     PLAN     Progress strength as able. Alter G progression walk to jog.     Joycelyn Willson, PT, DPT

## 2022-12-20 ENCOUNTER — CLINICAL SUPPORT (OUTPATIENT)
Dept: REHABILITATION | Facility: HOSPITAL | Age: 27
End: 2022-12-20
Payer: COMMERCIAL

## 2022-12-20 DIAGNOSIS — M62.81 QUADRICEPS WEAKNESS: ICD-10-CM

## 2022-12-20 DIAGNOSIS — M25.662 DECREASED RANGE OF MOTION OF LEFT KNEE: ICD-10-CM

## 2022-12-20 DIAGNOSIS — M25.562 ACUTE PAIN OF LEFT KNEE: Primary | ICD-10-CM

## 2022-12-20 DIAGNOSIS — R26.2 DIFFICULTY WALKING: ICD-10-CM

## 2022-12-20 PROCEDURE — 97140 MANUAL THERAPY 1/> REGIONS: CPT

## 2022-12-20 PROCEDURE — 97110 THERAPEUTIC EXERCISES: CPT

## 2022-12-20 PROCEDURE — 97530 THERAPEUTIC ACTIVITIES: CPT

## 2022-12-20 NOTE — PROGRESS NOTES
[facility]   Physical Therapy Treatment Note     Name: Billy Holbrook  [facility] Number: [id number]    Therapy Diagnosis:   Encounter Diagnoses   Name Primary?    Acute pain of left knee Yes    Decreased range of motion of left knee     Quadriceps weakness     Difficulty walking      Physician: PEACE Dempsey MD    Visit Date: 12/20/2022    Physician Orders: PT Eval and Treat   Medical Diagnosis from Referral: M25.562 (ICD-10-CM) - Acute pain of left knee  Evaluation Date: 9/7/2022  Authorization Period Expiration: 9/6/2023  Plan of Care Expiration: 12/31/2022  Visit # / Visits authorized: 70     Precautions: Standard     Time In: 10:50 am  Time Out: 12:07  Total Billable Time: 97 minutes    Procedure: 9/20/2022  1. Left Knee Arthroscopy, anterior cruciate ligament reconstruction 28531  2.  Left Knee Arthroscopy, with meniscectomy (medial OR lateral) [id number]  3.  Left Knee Arthroscopy, debridement/shaving of articular cartilage (chondroplasty) 83949  4.  Left Knee  platelet rich plasma injection to the bone-patellar tendon-bone harvest site    Post-Op Plan:  ACL reconstruction with a bone-patellar tendon-bone autograft    SUBJECTIVE     Pt reports: no issues from yesterday. Knee is feeling good. He is pleased with progress and happy to jump some today.     He was compliant with home exercise program.  Response to previous treatment: no issues  Functional change: improved squat and lunge    Pain: 0/10  Location: left knee      OBJECTIVE     13 wks post-op    Observation:   no effusion noted L knee  Well healed scar  Atrophy in calf/quad on L     Range of Motion (extension/neutral/flexion):     Right Left   Knee PROM: 8-0-125/130 deg P:8-0-130 deg  A:7-0-125 deg      Strength:     Right Left Comment   Knee Extension: 5/5 4/5        Knee Flexion: 5/5 4/5        Hip Abduction: 4+/5 4-/5       HHD testing soon.     Functional tests(*=pain):   Gait/stairs: normalized  Quad activation: good      Joint Mobility: hypomobile PFJ and tibiofemoral    TTP along MCL- minimal to none    Anterior reach Y balance R= 58 cm  L= 54 cm  Running wide base, slightly asymmetrical with decreased knee flexion at impact on L    Treatment     Billy received the treatments listed below:      therapeutic exercises to develop strength, endurance, ROM, flexibility, posture, and core stabilization for 25 minutes including:  Education/assessment  Manual knee stretching flx and extension  Heel prop x 3 min  Standing barbell DL heel raises 4 x 10 145->185 lbs  12 lb med ball core work 3 rds 10 each anterior and both sides      manual therapy techniques: Joint mobilizations and Soft tissue Mobilization were applied for 10 minutes, including:  PFJ gr 3-4 glides all planes  Tibial AP glide gr 3-4 with and without rotation bias on and off heel prop  Knee hyperextension gr 3-4  Fat pad mobility  Scar mobilization    neuromuscular re-education activities to improve: Coordination, Kinesthetic, Sense, and Proprioception for 2 minutes. The following activities were included:  Quad set into hyperextension x 20 x 3 sec      Therapeutic activities to improve functional performance for 60 minutes, including:  SKTC, quad pull, HS scoop, HS march, high knee march, walking butt kicks, lunge, lateral lunge, band walks, monster walks  Alter G walk/jog 4:1 min (2.5 mph to 6.0->7 mph) 75% BW for 35 min: set up of device-NP  DL RDL 4 x 8 40-60 lb DBs  Step up barbell 4 x 5 linus 135->185 lbs  Trap bar squat DL 3 x 5 185->275(2 sets) after 145 lb x 15; 2 x 3 295 lbs  2 x 30 DL light ankle plyos in place, frontal, and sagittal planes      Patient Education and Home Exercises     Home Exercises Provided and Patient Education Provided     Education provided:   - HEP update, precautions, activity pacing, goals, timeframes    Written Home Exercises Provided: yes. Exercises were reviewed and Billy was able to demonstrate them prior to the end of the session.   Billy demonstrated good  understanding of the education provided. See EMR under Patient Instructions for exercises provided during therapy sessions.     ASSESSMENT     Focusing on strength today with progression in SL and DL functional strength. His ROM is doing well and full. Progressed into DL light BW plyos in session. No irritability noted. Pt reported/demonstrated no adverse response or exacerbation of symptoms during today's session.       Billy Is progressing well towards his goals.   Pt prognosis is Good.     Pt will continue to benefit from skilled outpatient physical therapy to address the deficits listed in the problem list box on initial evaluation, provide pt/family education and to maximize pt's level of independence in the home and community environment.     Pt's spiritual, cultural and educational needs considered and pt agreeable to plan of care and goals.     Anticipated barriers to physical therapy: none    Goals:  Short Term Goals: 8-10 weeks   1. Pt will be compliant with HEP 50% of prescribed amount. MET  2. The pt to demo improvement in left knee ROM to equal right knee ROM pain free MET  3.  The pt to demo good quad set with proper hyperextension moment of the Left knee MET  4. The pt to demo ambualting with elast restricitve AD witout major compensatory pattern left knee for at least 20 feet MET     Long Term Goals: 56 weeks (progressing, not met)   Pt will be compliant with % of prescribed amount.   The pt to demo pain free and uncompensated running mechanics x10 min on an indoor treadmill  The pt to demo tolerance to a squat at or bellow parallel with uncompensated mechanics x10   The pt to demo strength of L LE within 10% of R LE as demo'd on the biodex machine   The pt to demo a deficit of 10% or less on a triple hop, single leg broad jump and crossover hop compared to non operative LE.   The pt will report full participation in ADLs and IADLs without restrictions related to L   knee.     PLAN     Progress strength as able. Alter G progression walk to jog.     Manjit Lazcano, PT, DPT

## 2022-12-21 ENCOUNTER — CLINICAL SUPPORT (OUTPATIENT)
Dept: REHABILITATION | Facility: HOSPITAL | Age: 27
End: 2022-12-21
Payer: COMMERCIAL

## 2022-12-21 DIAGNOSIS — R26.2 DIFFICULTY WALKING: ICD-10-CM

## 2022-12-21 DIAGNOSIS — M25.562 ACUTE PAIN OF LEFT KNEE: Primary | ICD-10-CM

## 2022-12-21 DIAGNOSIS — M62.81 QUADRICEPS WEAKNESS: ICD-10-CM

## 2022-12-21 DIAGNOSIS — M25.662 DECREASED RANGE OF MOTION OF LEFT KNEE: ICD-10-CM

## 2022-12-21 PROCEDURE — 97140 MANUAL THERAPY 1/> REGIONS: CPT

## 2022-12-21 PROCEDURE — 97530 THERAPEUTIC ACTIVITIES: CPT

## 2022-12-21 PROCEDURE — 97110 THERAPEUTIC EXERCISES: CPT

## 2022-12-22 ENCOUNTER — CLINICAL SUPPORT (OUTPATIENT)
Dept: REHABILITATION | Facility: HOSPITAL | Age: 27
End: 2022-12-22
Payer: COMMERCIAL

## 2022-12-22 DIAGNOSIS — M25.562 ACUTE PAIN OF LEFT KNEE: Primary | ICD-10-CM

## 2022-12-22 DIAGNOSIS — M62.81 QUADRICEPS WEAKNESS: ICD-10-CM

## 2022-12-22 DIAGNOSIS — R26.2 DIFFICULTY WALKING: ICD-10-CM

## 2022-12-22 DIAGNOSIS — M25.662 DECREASED RANGE OF MOTION OF LEFT KNEE: ICD-10-CM

## 2022-12-22 PROCEDURE — 97110 THERAPEUTIC EXERCISES: CPT

## 2022-12-22 PROCEDURE — 97140 MANUAL THERAPY 1/> REGIONS: CPT

## 2022-12-22 PROCEDURE — 97530 THERAPEUTIC ACTIVITIES: CPT

## 2022-12-22 NOTE — PROGRESS NOTES
[facility]   Physical Therapy Treatment Note     Name: Billy Holbrook  [facility] Number: [id number]    Therapy Diagnosis:   Encounter Diagnoses   Name Primary?    Acute pain of left knee Yes    Decreased range of motion of left knee     Quadriceps weakness     Difficulty walking      Physician: No ref. provider found    Visit Date: 12/22/2022    Physician Orders: PT Eval and Treat   Medical Diagnosis from Referral: M25.562 (ICD-10-CM) - Acute pain of left knee  Evaluation Date: 9/7/2022  Authorization Period Expiration: 9/6/2023  Plan of Care Expiration: 12/31/2022  Visit # / Visits authorized: 71     Precautions: Standard     Time In: 10:55 am  Time Out: 12:30  Total Billable Time: 90 minutes    Procedure: 9/20/2022  1. Left Knee Arthroscopy, anterior cruciate ligament reconstruction 44509  2.  Left Knee Arthroscopy, with meniscectomy (medial OR lateral) 56334  3.  Left Knee Arthroscopy, debridement/shaving of articular cartilage (chondroplasty) 62766  4.  Left Knee  platelet rich plasma injection to the bone-patellar tendon-bone harvest site    Post-Op Plan:  ACL reconstruction with a bone-patellar tendon-bone autograft    SUBJECTIVE     Pt reports: feeling good today. Fearful of SL jumps, but confidence improved with reps.     He was compliant with home exercise program.  Response to previous treatment: no issues  Functional change: improved squat and lunge    Pain: 0/10  Location: left knee      OBJECTIVE     13 wks 2 days post-op    Observation:   no effusion noted L knee  Well healed scar  Atrophy in calf/quad on L     Range of Motion (extension/neutral/flexion):     Right Left   Knee PROM: 8-0-125/130 deg P:8-0-130 deg  A:7-0-125 deg      Strength:     Right Left Comment   Knee Extension: 5/5 4/5        Knee Flexion: 5/5 4/5        Hip Abduction: 4+/5 4-/5       HHD knee extension at 60 deg flx: R= 181.6 lbs, L= 120.3 lbs no pain: LSI= 66%    Anterior reach Y balance R= 58 cm  L= 54  cm  Running wide base, slightly asymmetrical with decreased knee flexion at impact on L    Treatment     Billy received the treatments listed below:      therapeutic exercises to develop strength, endurance, ROM, flexibility, posture, and core stabilization for 20 minutes including:  Education/assessment  Manual knee stretching flx and extension  Heel prop x 3 min  SL leg press x 10, x 8, 4 x 5 linus 200-320 lbs    manual therapy techniques: Joint mobilizations and Soft tissue Mobilization were applied for 8 minutes, including:  PFJ gr 3-4 glides all planes  Tibial AP glide gr 3-4 with and without rotation bias on and off heel prop  Knee hyperextension gr 3-4  Fat pad mobility  Scar mobilization    neuromuscular re-education activities to improve: Coordination, Kinesthetic, Sense, and Proprioception for 2 minutes. The following activities were included:  Quad set into hyperextension x 20 x 3 sec    Therapeutic activities to improve functional performance for 60 minutes, including:  SKTC, quad pull, HS scoop, HS march, high knee march, walking butt kicks, lunge, lateral lunge, band walks, monster walks  Alter G walk/jog 2.5/30 sec (2.5 mph to 7->8 mph) 80% BW for 35 min: set up of device-NP  Safety bar DL squat x 10 no weight, x 8 160 lbs,  x 5 205 3 x 5 225 lbs 0-80 deg depth  DL 3 way ankle jumps 2 x 30 each  SL 2 x 20 in place ankle hops: reduced knee flexion moment and height on L v R  Sled push/pull 3 laps each 30 yds up to 4 x 45 plates  Led lateral pull x 2 laps 30 yds up to 2 x 45 and 1 x 25 plate      Patient Education and Home Exercises     Home Exercises Provided and Patient Education Provided     Education provided:   - HEP update, precautions, activity pacing, goals, timeframes    Written Home Exercises Provided: yes. Exercises were reviewed and Billy was able to demonstrate them prior to the end of the session.  Billy demonstrated good  understanding of the education provided. See EMR under Patient  Instructions for exercises provided during therapy sessions.     ASSESSMENT     Focused session on strength and loading with safety bar to reduce external stability with squat. Seeing steady improvements as expected. Pt reported/demonstrated no adverse response or exacerbation of symptoms during today's session.       Billy Is progressing well towards his goals.   Pt prognosis is Good.     Pt will continue to benefit from skilled outpatient physical therapy to address the deficits listed in the problem list box on initial evaluation, provide pt/family education and to maximize pt's level of independence in the home and community environment.     Pt's spiritual, cultural and educational needs considered and pt agreeable to plan of care and goals.     Anticipated barriers to physical therapy: none    Goals:  Short Term Goals: 8-10 weeks   1. Pt will be compliant with HEP 50% of prescribed amount. MET  2. The pt to demo improvement in left knee ROM to equal right knee ROM pain free MET  3.  The pt to demo good quad set with proper hyperextension moment of the Left knee MET  4. The pt to demo ambualting with elast restricitve AD witout major compensatory pattern left knee for at least 20 feet MET     Long Term Goals: 56 weeks (progressing, not met)   Pt will be compliant with % of prescribed amount.   The pt to demo pain free and uncompensated running mechanics x10 min on an indoor treadmill  The pt to demo tolerance to a squat at or bellow parallel with uncompensated mechanics x10   The pt to demo strength of L LE within 10% of R LE as demo'd on the biodex machine   The pt to demo a deficit of 10% or less on a triple hop, single leg broad jump and crossover hop compared to non operative LE.   The pt will report full participation in ADLs and IADLs without restrictions related to L  knee.     PLAN     Progress strength as able. Alter G progression walk to jog.     Manjit Lazcano, PT,  DPT

## 2022-12-22 NOTE — PROGRESS NOTES
[facility]   Physical Therapy Treatment Note     Name: Billy Holbrook  [facility] Number: [id number]    Therapy Diagnosis:   Encounter Diagnoses   Name Primary?    Acute pain of left knee Yes    Decreased range of motion of left knee     Quadriceps weakness     Difficulty walking      Physician: No ref. provider found    Visit Date: 12/21/2022    Physician Orders: PT Eval and Treat   Medical Diagnosis from Referral: M25.562 (ICD-10-CM) - Acute pain of left knee  Evaluation Date: 9/7/2022  Authorization Period Expiration: 9/6/2023  Plan of Care Expiration: 12/31/2022  Visit # / Visits authorized: 71     Precautions: Standard     Time In: 11:05 am  Time Out: 12:30  Total Billable Time: 85 minutes    Procedure: 9/20/2022  1. Left Knee Arthroscopy, anterior cruciate ligament reconstruction 62987  2.  Left Knee Arthroscopy, with meniscectomy (medial OR lateral) 14109  3.  Left Knee Arthroscopy, debridement/shaving of articular cartilage (chondroplasty) 79026  4.  Left Knee  platelet rich plasma injection to the bone-patellar tendon-bone harvest site    Post-Op Plan:  ACL reconstruction with a bone-patellar tendon-bone autograft    SUBJECTIVE     Pt reports: feeling good today, ready to run. Wants to increase speed.     He was compliant with home exercise program.  Response to previous treatment: no issues  Functional change: improved squat and lunge    Pain: 0/10  Location: left knee      OBJECTIVE     13 wks 1 day post-op    Observation:   no effusion noted L knee  Well healed scar  Atrophy in calf/quad on L     Range of Motion (extension/neutral/flexion):     Right Left   Knee PROM: 8-0-125/130 deg P:8-0-130 deg  A:7-0-125 deg      Strength:     Right Left Comment   Knee Extension: 5/5 4/5        Knee Flexion: 5/5 4/5        Hip Abduction: 4+/5 4-/5       HHD knee extension at 60 deg flx: R= 181.6 lbs, L= 120.3 lbs no pain: LSI= 66%    Anterior reach Y balance R= 58 cm  L= 54 cm  Running wide  base, slightly asymmetrical with decreased knee flexion at impact on L    Treatment     Billy received the treatments listed below:      therapeutic exercises to develop strength, endurance, ROM, flexibility, posture, and core stabilization for 27 minutes including:  Education/assessment  Manual knee stretching flx and extension  Heel prop x 3 min  LAQ 5 x 10 linus R= 45->70 lbs; L=27.5 lbs    manual therapy techniques: Joint mobilizations and Soft tissue Mobilization were applied for 8 minutes, including:  PFJ gr 3-4 glides all planes  Tibial AP glide gr 3-4 with and without rotation bias on and off heel prop  Knee hyperextension gr 3-4  Fat pad mobility  Scar mobilization    neuromuscular re-education activities to improve: Coordination, Kinesthetic, Sense, and Proprioception for 2 minutes. The following activities were included:  Quad set into hyperextension x 20 x 3 sec    Therapeutic activities to improve functional performance for 48 minutes, including:  SKTC, quad pull, HS scoop, HS march, high knee march, walking butt kicks, lunge, lateral lunge, band walks, monster walks  Chartboost walk/jog 2.5/30 sec (2.5 mph to 7->8 mph) 80% BW for 35 min: set up of device        Patient Education and Home Exercises     Home Exercises Provided and Patient Education Provided     Education provided:   - HEP update, precautions, activity pacing, goals, timeframes    Written Home Exercises Provided: yes. Exercises were reviewed and Billy was able to demonstrate them prior to the end of the session.  Billy demonstrated good  understanding of the education provided. See EMR under Patient Instructions for exercises provided during therapy sessions.     ASSESSMENT     Progressing speed on Chartboost TM today at 80% BW, His running form looks good with some mild deficits in width and knee loading stiffness. The knee stiffness in loading improves with cuing and time on TM as well as speed. His LSI knee extension put him at 34% deficit  today with max effort and no pain. Pt reported/demonstrated no adverse response or exacerbation of symptoms during today's session.       Billy Is progressing well towards his goals.   Pt prognosis is Good.     Pt will continue to benefit from skilled outpatient physical therapy to address the deficits listed in the problem list box on initial evaluation, provide pt/family education and to maximize pt's level of independence in the home and community environment.     Pt's spiritual, cultural and educational needs considered and pt agreeable to plan of care and goals.     Anticipated barriers to physical therapy: none    Goals:  Short Term Goals: 8-10 weeks   1. Pt will be compliant with HEP 50% of prescribed amount. MET  2. The pt to demo improvement in left knee ROM to equal right knee ROM pain free MET  3.  The pt to demo good quad set with proper hyperextension moment of the Left knee MET  4. The pt to demo ambualting with elast restricitve AD witout major compensatory pattern left knee for at least 20 feet MET     Long Term Goals: 56 weeks (progressing, not met)   Pt will be compliant with % of prescribed amount.   The pt to demo pain free and uncompensated running mechanics x10 min on an indoor treadmill  The pt to demo tolerance to a squat at or bellow parallel with uncompensated mechanics x10   The pt to demo strength of L LE within 10% of R LE as demo'd on the biodex machine   The pt to demo a deficit of 10% or less on a triple hop, single leg broad jump and crossover hop compared to non operative LE.   The pt will report full participation in ADLs and IADLs without restrictions related to L  knee.     PLAN     Progress strength as able. Alter G progression walk to jog.     Manjit Lazcano, PT, DPT

## 2022-12-23 ENCOUNTER — CLINICAL SUPPORT (OUTPATIENT)
Dept: REHABILITATION | Facility: HOSPITAL | Age: 27
End: 2022-12-23
Payer: COMMERCIAL

## 2022-12-23 DIAGNOSIS — M25.662 DECREASED RANGE OF MOTION OF LEFT KNEE: ICD-10-CM

## 2022-12-23 DIAGNOSIS — R26.2 DIFFICULTY WALKING: ICD-10-CM

## 2022-12-23 DIAGNOSIS — M62.81 QUADRICEPS WEAKNESS: ICD-10-CM

## 2022-12-23 DIAGNOSIS — M25.562 ACUTE PAIN OF LEFT KNEE: Primary | ICD-10-CM

## 2022-12-23 PROCEDURE — 97140 MANUAL THERAPY 1/> REGIONS: CPT

## 2022-12-23 PROCEDURE — 97110 THERAPEUTIC EXERCISES: CPT

## 2022-12-23 PROCEDURE — 97530 THERAPEUTIC ACTIVITIES: CPT

## 2022-12-23 NOTE — PROGRESS NOTES
[facility] OUTPATIENT THERAPY AND WELLNESS   Physical Therapy Treatment Note     Name: Billy Holbrook  [facility] Number: [id number]    Therapy Diagnosis:   Encounter Diagnoses   Name Primary?    Acute pain of left knee Yes    Decreased range of motion of left knee     Quadriceps weakness     Difficulty walking      Physician: PEACE Dempsey MD    Visit Date: 12/23/2022    Physician Orders: PT Eval and Treat   Medical Diagnosis from Referral: M25.562 (ICD-10-CM) - Acute pain of left knee  Evaluation Date: 9/7/2022  Authorization Period Expiration: 9/6/2023  Plan of Care Expiration: 12/31/2022  Visit # / Visits authorized: 72     Precautions: Standard     Time In: 7:00 am  Time Out: 8:35  Total Billable Time: 92 minutes    Procedure: 9/20/2022  1. Left Knee Arthroscopy, anterior cruciate ligament reconstruction 68366  2.  Left Knee Arthroscopy, with meniscectomy (medial OR lateral) 66065  3.  Left Knee Arthroscopy, debridement/shaving of articular cartilage (chondroplasty) 35824  4.  Left Knee  platelet rich plasma injection to the bone-patellar tendon-bone harvest site    Post-Op Plan:  ACL reconstruction with a bone-patellar tendon-bone autograft    SUBJECTIVE     Pt reports: SL jumps much better. Denies any pain. Enjoying running faster under more BW. Has lost about 8 lbs in last couple weeks; down to 332. Sticking to his diet better.     He was compliant with home exercise program.  Response to previous treatment: no issues  Functional change: improved squat and lunge    Pain: 0/10  Location: left knee      OBJECTIVE     13 wks 3 days post-op    Observation:   no effusion noted L knee  Well healed scar  Atrophy in calf/quad on L     Range of Motion (extension/neutral/flexion):     Right Left   Knee PROM: 8-0-125/130 deg P:8-0-130 deg  A:7-0-125 deg      Strength:     Right Left Comment   Knee Extension: 5/5 4/5        Knee Flexion: 5/5 4/5        Hip Abduction: 4+/5 4-/5       HHD knee extension at 60 deg flx: R=  181.6 lbs, L= 120.3 lbs no pain: LSI= 66%    Anterior reach Y balance R= 58 cm  L= 54 cm  Running wide base, slightly asymmetrical with decreased knee flexion at impact on L    Treatment     Billy received the treatments listed below:      therapeutic exercises to develop strength, endurance, ROM, flexibility, posture, and core stabilization for 20 minutes including:  Education/assessment  Manual knee stretching flx and extension  Heel prop 2 x 3 min with 10 lbs below, 7.5 lbs above  Prone quad stretching manually    manual therapy techniques: Joint mobilizations and Soft tissue Mobilization were applied for 10 minutes, including:  PFJ gr 3-4 glides all planes  Tibial AP glide gr 3-4 with and without rotation bias on and off heel prop  Knee hyperextension gr 3-4  Fat pad mobility  Scar mobilization    neuromuscular re-education activities to improve: Coordination, Kinesthetic, Sense, and Proprioception for 2 minutes. The following activities were included:  Quad set into hyperextension x 20 x 3 sec    Therapeutic activities to improve functional performance for 60 minutes, including:  SKTC, quad pull, HS scoop, HS march, high knee march, walking butt kicks, lunge, lateral lunge, band walks, monster walks  Alter G walk/jog 2 min/30 sec (2.5 mph to 7-10.5- mph) 90% BW for 10 rds with walking pre/post: set up of device  DL 3 way ankle jumps 3 x 30 each  SL 3 x 20 in place ankle hops  Squat 14 lb med ball OH throws with catch/deceleration into squat 3 x 15 reps- focus on knee flexion moment under some load with speed- softness      Patient Education and Home Exercises     Home Exercises Provided and Patient Education Provided     Education provided:   - HEP update, precautions, activity pacing, goals, timeframes    Written Home Exercises Provided: yes. Exercises were reviewed and Billy was able to demonstrate them prior to the end of the session.  Billy demonstrated good  understanding of the education provided. See  EMR under Patient Instructions for exercises provided during therapy sessions.     ASSESSMENT     Focused session on progression of running under more BW and higher speed. His form looked good with added elements. No complaints of pain. SL hops looking much better, verbalized less fear associated compared to earlier in week. Pt reported/demonstrated no adverse response or exacerbation of symptoms during today's session.       Billy Is progressing well towards his goals.   Pt prognosis is Good.     Pt will continue to benefit from skilled outpatient physical therapy to address the deficits listed in the problem list box on initial evaluation, provide pt/family education and to maximize pt's level of independence in the home and community environment.     Pt's spiritual, cultural and educational needs considered and pt agreeable to plan of care and goals.     Anticipated barriers to physical therapy: none    Goals:  Short Term Goals: 8-10 weeks   1. Pt will be compliant with HEP 50% of prescribed amount. MET  2. The pt to demo improvement in left knee ROM to equal right knee ROM pain free MET  3.  The pt to demo good quad set with proper hyperextension moment of the Left knee MET  4. The pt to demo ambualting with elast restricitve AD witout major compensatory pattern left knee for at least 20 feet MET     Long Term Goals: 56 weeks (progressing, not met)   Pt will be compliant with % of prescribed amount.   The pt to demo pain free and uncompensated running mechanics x10 min on an indoor treadmill  The pt to demo tolerance to a squat at or bellow parallel with uncompensated mechanics x10   The pt to demo strength of L LE within 10% of R LE as demo'd on the biodex machine   The pt to demo a deficit of 10% or less on a triple hop, single leg broad jump and crossover hop compared to non operative LE.   The pt will report full participation in ADLs and IADLs without restrictions related to L  knee.     PLAN      Progress strength, balance, mobility, into light plyometrics as able. Move to overground running progression next week if indicated.     Manjit Lazcano, PT, DPT

## 2022-12-27 ENCOUNTER — CLINICAL SUPPORT (OUTPATIENT)
Dept: REHABILITATION | Facility: HOSPITAL | Age: 27
End: 2022-12-27
Payer: COMMERCIAL

## 2022-12-27 DIAGNOSIS — M62.81 QUADRICEPS WEAKNESS: ICD-10-CM

## 2022-12-27 DIAGNOSIS — M25.662 DECREASED RANGE OF MOTION OF LEFT KNEE: ICD-10-CM

## 2022-12-27 DIAGNOSIS — M25.562 ACUTE PAIN OF LEFT KNEE: Primary | ICD-10-CM

## 2022-12-27 DIAGNOSIS — R26.2 DIFFICULTY WALKING: ICD-10-CM

## 2022-12-27 PROCEDURE — 97530 THERAPEUTIC ACTIVITIES: CPT

## 2022-12-27 PROCEDURE — 97140 MANUAL THERAPY 1/> REGIONS: CPT

## 2022-12-27 PROCEDURE — 97110 THERAPEUTIC EXERCISES: CPT

## 2022-12-28 ENCOUNTER — CLINICAL SUPPORT (OUTPATIENT)
Dept: REHABILITATION | Facility: HOSPITAL | Age: 27
End: 2022-12-28
Payer: COMMERCIAL

## 2022-12-28 DIAGNOSIS — M62.81 QUADRICEPS WEAKNESS: ICD-10-CM

## 2022-12-28 DIAGNOSIS — M25.662 DECREASED RANGE OF MOTION OF LEFT KNEE: ICD-10-CM

## 2022-12-28 DIAGNOSIS — R26.2 DIFFICULTY WALKING: ICD-10-CM

## 2022-12-28 DIAGNOSIS — M25.562 ACUTE PAIN OF LEFT KNEE: Primary | ICD-10-CM

## 2022-12-28 PROCEDURE — 97140 MANUAL THERAPY 1/> REGIONS: CPT

## 2022-12-28 PROCEDURE — 97530 THERAPEUTIC ACTIVITIES: CPT

## 2022-12-28 PROCEDURE — 97112 NEUROMUSCULAR REEDUCATION: CPT

## 2022-12-28 PROCEDURE — 97110 THERAPEUTIC EXERCISES: CPT

## 2022-12-28 NOTE — PROGRESS NOTES
[facility]PAT[facility] THERAPY AND [facility]   Physical Therapy Treatment Note     Name: Billy Holbrook  [facility] Number: [id number]    Therapy Diagnosis:   Encounter Diagnoses   Name Primary?    Acute pain of left knee Yes    Decreased range of motion of left knee     Quadriceps weakness     Difficulty walking      Physician: PEACE Dempsey MD    Visit Date: 12/27/2022    Physician Orders: PT Eval and Treat   Medical Diagnosis from Referral: M25.562 (ICD-10-CM) - Acute pain of left knee  Evaluation Date: 9/7/2022  Authorization Period Expiration: 9/6/2023  Plan of Care Expiration: 12/31/2022  Visit # / Visits authorized: 74     Precautions: Standard     Time In: 0300 pm  Time Out: 0428 pm  Total Billable Time: 82 minutes    Procedure: 9/20/2022  1. Left Knee Arthroscopy, anterior cruciate ligament reconstruction 44450  2.  Left Knee Arthroscopy, with meniscectomy (medial OR lateral) 99932  3.  Left Knee Arthroscopy, debridement/shaving of articular cartilage (chondroplasty) 68050  4.  Left Knee  platelet rich plasma injection to the bone-patellar tendon-bone harvest site    Post-Op Plan:  ACL reconstruction with a bone-patellar tendon-bone autograft    SUBJECTIVE     Pt reports: he felt great after last session with no knee pain or soreness after running with increased body weight and speed    He was compliant with home exercise program.  Response to previous treatment: no issues  Functional change: improved squat and lunge    Pain: 0/10  Location: left knee      OBJECTIVE     14 wks 1 days post-op    Observation:   no effusion noted L knee  Well healed scar  Atrophy in calf/quad on L     Range of Motion (extension/neutral/flexion):     Right Left   Knee PROM: 8-0-125/130 deg P:8-0-130 deg  A:7-0-125 deg      Strength:     Right Left Comment   Knee Extension: 5/5 4/5        Knee Flexion: 5/5 4/5        Hip Abduction: 4+/5 4-/5       HHD knee extension at 60 deg flx: R= 181.6 lbs, L= 120.3 lbs no pain: LSI=  "66%    Anterior reach Y balance R= 58 cm  L= 54 cm  Running wide base, slightly asymmetrical with decreased knee flexion at impact on L    Treatment     Billy received the treatments listed below:      therapeutic exercises to develop strength, endurance, ROM, flexibility, posture, and core stabilization for 15 minutes including:  Education/assessment  Manual knee stretching flx and extension  Heel prop 2 x 3 min with 10 lbs below, 7.5 lbs above  Prone quad stretching manually  Russian twist; 14# soft med ball; 2 x 10 B  Plank shoulder taps; 2 x 10 B  Plan hold; 3 x 20"   Hand-heel rocks kneeling on Airex; 3 min     manual therapy techniques: Joint mobilizations and Soft tissue Mobilization were applied for 12 minutes, including:  PFJ gr 3-4 glides all planes  Tibial AP glide gr 3-4 with and without rotation bias on and off heel prop  Knee hyperextension gr 3-4  Fat pad mobility  Scar mobilization    neuromuscular re-education activities to improve: Coordination, Kinesthetic, Sense, and Proprioception for 00 minutes. The following activities were included:  Quad set into hyperextension x 20 x 3 sec    Therapeutic activities to improve functional performance for 55 minutes, including:  SKTC, quad pull, HS scoop, HS march, high knee march, walking butt kicks, lunge, lateral lunge, band walks, monster walks  SL Y balance reach w/ slider; 2 x 8 B  DL 3 way ankle jumps 3 x 30 each  SL 3 x 20 in place ankle hops  Phase 1 Return to Sprint Progression overground   - 20 yd x 3, 40 yd x 2; 60 yd x 2; 80 yd x 2, 100 yd x 1, 80 yd x 2, 60 yd x 2, 40 yd x 2, 20 yd x 3; 1:3 work:rest ratio; 50% intensity      Patient Education and Home Exercises     Home Exercises Provided and Patient Education Provided     Education provided:   - HEP update, precautions, activity pacing, goals, timeframes    Written Home Exercises Provided: yes. Exercises were reviewed and Billy was able to demonstrate them prior to the end of the session.  " Billy demonstrated good  understanding of the education provided. See EMR under Patient Instructions for exercises provided during therapy sessions.     ASSESSMENT   Billy showed good dynamic balance in slider reach when standing on L LE, but struggled to avoid valgus moment on uninvolved LE. He demonstrated good form in all hopping and running. He was advised to slow down excessively and turn with R LE as plant leg during run to avoid any cutting-like activity on operative LE. He demonstrated increased fatigued after this but maintained good form throughout.     Billy Is progressing well towards his goals.   Pt prognosis is Good.     Pt will continue to benefit from skilled outpatient physical therapy to address the deficits listed in the problem list box on initial evaluation, provide pt/family education and to maximize pt's level of independence in the home and community environment.     Pt's spiritual, cultural and educational needs considered and pt agreeable to plan of care and goals.     Anticipated barriers to physical therapy: none    Goals:  Short Term Goals: 8-10 weeks   1. Pt will be compliant with HEP 50% of prescribed amount. MET  2. The pt to demo improvement in left knee ROM to equal right knee ROM pain free MET  3.  The pt to demo good quad set with proper hyperextension moment of the Left knee MET  4. The pt to demo ambualting with elast restricitve AD witout major compensatory pattern left knee for at least 20 feet MET     Long Term Goals: 56 weeks (progressing, not met)   Pt will be compliant with % of prescribed amount.   The pt to demo pain free and uncompensated running mechanics x10 min on an indoor treadmill  The pt to demo tolerance to a squat at or bellow parallel with uncompensated mechanics x10   The pt to demo strength of L LE within 10% of R LE as demo'd on the biodex machine   The pt to demo a deficit of 10% or less on a triple hop, single leg broad jump and crossover hop  compared to non operative LE.   The pt will report full participation in ADLs and IADLs without restrictions related to L  knee.     PLAN     Progress strength, balance, mobility, into light plyometrics as able. Move to overground running progression next week if indicated.     Joycelyn Willson, PT, DPT

## 2022-12-28 NOTE — PROGRESS NOTES
[facility]   Physical Therapy Treatment Note     Name: Billy Holbrook  [facility] Number: [id number]    Therapy Diagnosis:   Encounter Diagnoses   Name Primary?    Acute pain of left knee Yes    Decreased range of motion of left knee     Quadriceps weakness     Difficulty walking      Physician: PEACE Dempsey MD    Visit Date: 12/28/2022    Physician Orders: PT Eval and Treat   Medical Diagnosis from Referral: M25.562 (ICD-10-CM) - Acute pain of left knee  Evaluation Date: 9/7/2022  Authorization Period Expiration: 9/6/2023  Plan of Care Expiration: 12/31/2022  Visit # / Visits authorized: 75     Precautions: Standard     Time In: 11:06 am  Time Out: 12:40  Total Billable Time: 94 minutes    Procedure: 9/20/2022  1. Left Knee Arthroscopy, anterior cruciate ligament reconstruction 36009  2.  Left Knee Arthroscopy, with meniscectomy (medial OR lateral) 26622  3.  Left Knee Arthroscopy, debridement/shaving of articular cartilage (chondroplasty) 23605  4.  Left Knee  platelet rich plasma injection to the bone-patellar tendon-bone harvest site    Post-Op Plan:  ACL reconstruction with a bone-patellar tendon-bone autograft    SUBJECTIVE     Pt reports: he felt great running, just cardio limitations noted. No issues at knee. Back and hips are bothering him somewhat; better after foam rolling and stretching, but would like to do dry needling today.     He was compliant with home exercise program.  Response to previous treatment: no issues  Functional change: improved squat and lunge    Pain: 0/10  Location: left knee      OBJECTIVE     14 wks 1 days post-op    Observation:   trace effusion noted L knee with sweep test  Well healed scar  Atrophy in calf/quad on L     Range of Motion (extension/neutral/flexion):     Right Left   Knee PROM: 8-0-125/130 deg P:8-0-130 deg  A:7-0-125 deg      Strength:     Right Left Comment   Knee Extension: 5/5 4/5        Knee Flexion: 5/5 4/5        Hip Abduction:  4+/5 4-/5       HHD knee extension at 60 deg flx: R= 181.6 lbs, L= 120.3 lbs no pain: LSI= 66%    Anterior reach Y balance R= 58 cm  L= 54 cm  Running wide base, slightly asymmetrical with decreased knee flexion at impact on L    Treatment     Billy received the treatments listed below:      therapeutic exercises to develop strength, endurance, ROM, flexibility, posture, and core stabilization for 45 minutes including:  Education/assessment  Manual knee stretching flx and extension  Heel prop 2 x 3 min with 10 lbs below, 7.5 lbs above  Prone quad stretching manually  Bike upright 15 min lv 4-6  HS curls seated 4 x 15 DL 35 lbs  SL clamshells BTB 3 x 20 linus  LAQ 4 x 15 15 lbs  Biodex R LE 3 speed: peak torque at 60 deg/sec 309 lbs  SL heel raises 4 x 15 off step: linus    manual therapy techniques: Joint mobilizations and Soft tissue Mobilization were applied for 25 minutes, including:  PFJ gr 3-4 glides all planes  Tibial AP glide gr 3-4 with and without rotation bias on and off heel prop  Knee hyperextension gr 3-4  Fat pad mobility  Scar mobilization  Dry needling to bilateral glute max, QL, lumbar PVM/multifidi- set up/assessment and re-assessment billed, not tx    neuromuscular re-education activities to improve: Coordination, Kinesthetic, Sense, and Proprioception for  8 minutes. The following activities were included:  Quad set into hyperextension x 20 x 3 sec  SLR from quad set 2 x 20    Therapeutic activities to improve functional performance for 16 minutes, including:  SKTC, quad pull, HS scoop, HS march, high knee march, walking butt kicks, lunge, lateral lunge, band walks, monster walks  Standing TKE 3 x 20 x 5 sec green sport band      Patient Education and Home Exercises     Home Exercises Provided and Patient Education Provided     Education provided:   - HEP update, precautions, activity pacing, goals, timeframes    Written Home Exercises Provided: yes. Exercises were reviewed and Billy was able to  demonstrate them prior to the end of the session.  Billy demonstrated good  understanding of the education provided. See EMR under Patient Instructions for exercises provided during therapy sessions.     ASSESSMENT     Billy arrived with no symptoms, but did have mild effusion which is not unexpected. Focused session on recovery work without issue. Testing to uninvolved LE via biodex today and dry needling to lumbar/hip regions due to soreness from running. Advised to gameready later today. Pt reported/demonstrated no adverse response or exacerbation of symptoms during today's session.       Billy Is progressing well towards his goals.   Pt prognosis is Good.     Pt will continue to benefit from skilled outpatient physical therapy to address the deficits listed in the problem list box on initial evaluation, provide pt/family education and to maximize pt's level of independence in the home and community environment.     Pt's spiritual, cultural and educational needs considered and pt agreeable to plan of care and goals.     Anticipated barriers to physical therapy: none    Goals:  Short Term Goals: 8-10 weeks   1. Pt will be compliant with HEP 50% of prescribed amount. MET  2. The pt to demo improvement in left knee ROM to equal right knee ROM pain free MET  3.  The pt to demo good quad set with proper hyperextension moment of the Left knee MET  4. The pt to demo ambualting with elast restricitve AD witout major compensatory pattern left knee for at least 20 feet MET     Long Term Goals: 56 weeks (progressing, not met)   Pt will be compliant with % of prescribed amount.   The pt to demo pain free and uncompensated running mechanics x10 min on an indoor treadmill  The pt to demo tolerance to a squat at or bellow parallel with uncompensated mechanics x10   The pt to demo strength of L LE within 10% of R LE as demo'd on the biodex machine   The pt to demo a deficit of 10% or less on a triple hop, single leg  broad jump and crossover hop compared to non operative LE.   The pt will report full participation in ADLs and IADLs without restrictions related to L  knee.     PLAN     Progress into running phase 1 again tomorrow if effusion managed.     Manjit Lazcano, PT, DPT

## 2022-12-29 ENCOUNTER — CLINICAL SUPPORT (OUTPATIENT)
Dept: REHABILITATION | Facility: HOSPITAL | Age: 27
End: 2022-12-29
Payer: COMMERCIAL

## 2022-12-29 DIAGNOSIS — M62.81 QUADRICEPS WEAKNESS: ICD-10-CM

## 2022-12-29 DIAGNOSIS — M25.562 ACUTE PAIN OF LEFT KNEE: Primary | ICD-10-CM

## 2022-12-29 DIAGNOSIS — M25.662 DECREASED RANGE OF MOTION OF LEFT KNEE: ICD-10-CM

## 2022-12-29 DIAGNOSIS — R26.2 DIFFICULTY WALKING: ICD-10-CM

## 2022-12-29 PROCEDURE — 97110 THERAPEUTIC EXERCISES: CPT

## 2022-12-29 PROCEDURE — 97140 MANUAL THERAPY 1/> REGIONS: CPT

## 2022-12-29 PROCEDURE — 97530 THERAPEUTIC ACTIVITIES: CPT

## 2022-12-29 NOTE — PROGRESS NOTES
[facility]   Physical Therapy Treatment Note     Name: Billy Holbrook  [facility] Number: [id number]    Therapy Diagnosis:   Encounter Diagnoses   Name Primary?    Acute pain of left knee Yes    Decreased range of motion of left knee     Quadriceps weakness     Difficulty walking      Physician: No ref. provider found    Visit Date: 12/29/2022    Physician Orders: PT Eval and Treat   Medical Diagnosis from Referral: M25.562 (ICD-10-CM) - Acute pain of left knee  Evaluation Date: 9/7/2022  Authorization Period Expiration: 9/6/2023  Plan of Care Expiration: 12/31/2022  Visit # / Visits authorized: 76     Precautions: Standard     Time In: 11:08 am  Time Out: 12:30  Total Billable Time: 82 minutes    Procedure: 9/20/2022  1. Left Knee Arthroscopy, anterior cruciate ligament reconstruction 84876  2.  Left Knee Arthroscopy, with meniscectomy (medial OR lateral) 49858  3.  Left Knee Arthroscopy, debridement/shaving of articular cartilage (chondroplasty) 34992  4.  Left Knee  platelet rich plasma injection to the bone-patellar tendon-bone harvest site    Post-Op Plan:  ACL reconstruction with a bone-patellar tendon-bone autograft    SUBJECTIVE     Pt reports: feeling good. Used gameready yesterday 1 time.      He was compliant with home exercise program.  Response to previous treatment: no issues  Functional change: improved squat and lunge    Pain: 0/10  Location: left knee      OBJECTIVE     14 wks 2 days post-op    Observation:   trace effusion noted L knee with sweep test  Well healed scar  Atrophy in calf/quad on L     Range of Motion (extension/neutral/flexion):     Right Left   Knee PROM: 8-0-125/130 deg P:8-0-130 deg  A:7-0-125 deg      Strength:     Right Left Comment   Knee Extension: 5/5 4/5        Knee Flexion: 5/5 4/5        Hip Abduction: 4+/5 4-/5       HHD knee extension at 60 deg flx: R= 181.6 lbs, L= 120.3 lbs no pain: LSI= 66%    Anterior reach Y balance R= 58 cm  L= 54 cm  Running  wide base, slightly asymmetrical with decreased knee flexion at impact on L    Treatment     Billy received the treatments listed below:      therapeutic exercises to develop strength, endurance, ROM, flexibility, posture, and core stabilization for 29 minutes including:  Education/assessment  Manual knee stretching flx and extension  Heel prop 2 x 3 min with 10 lbs below, 7.5 lbs above  Prone quad stretching manually  BFR 70% LOP  -LAQ 10 lbs  -Standing HS curls 10 lbs  Quadruped rock backs x 20 x 10 sec    manual therapy techniques: Joint mobilizations and Soft tissue Mobilization were applied for 10 minutes, including:  PFJ gr 3-4 glides all planes  Tibial AP glide gr 3-4 with and without rotation bias on and off heel prop  Knee hyperextension gr 3-4  Fat pad mobility  Scar mobilization    neuromuscular re-education activities to improve: Coordination, Kinesthetic, Sense, and Proprioception for  8 minutes. The following activities were included:  Quad set into hyperextension x 20 x 3 sec  SLR from quad set 2 x 20    Therapeutic activities to improve functional performance for 35 minutes, including:  SKTC, quad pull, HS scoop, HS march, high knee march, walking butt kicks, lunge, lateral lunge, band walks, monster walks  Standing TKE 4 x 15 x 5 sec green sport band  50% intensity jogging 10 x 30 yds: 20 sec rest  DL ->SL 3D jumps to hops 2 x 30 DL; 2 x 15 SL each      Patient Education and Home Exercises     Home Exercises Provided and Patient Education Provided     Education provided:   - HEP update, precautions, activity pacing, goals, timeframes    Written Home Exercises Provided: yes. Exercises were reviewed and Billy was able to demonstrate them prior to the end of the session.  Billy demonstrated good  understanding of the education provided. See EMR under Patient Instructions for exercises provided during therapy sessions.     ASSESSMENT     Billy arrived feeling good, but still had trace effusion.  Slowed progression of running flat ground/straight line with no increase in effusion in session. Utilized BFR to help as well with low load. ROM still looks good. Pt reported/demonstrated no adverse response or exacerbation of symptoms during today's session.       Billy Is progressing well towards his goals.   Pt prognosis is Good.     Pt will continue to benefit from skilled outpatient physical therapy to address the deficits listed in the problem list box on initial evaluation, provide pt/family education and to maximize pt's level of independence in the home and community environment.     Pt's spiritual, cultural and educational needs considered and pt agreeable to plan of care and goals.     Anticipated barriers to physical therapy: none    Goals:  Short Term Goals: 8-10 weeks   1. Pt will be compliant with HEP 50% of prescribed amount. MET  2. The pt to demo improvement in left knee ROM to equal right knee ROM pain free MET  3.  The pt to demo good quad set with proper hyperextension moment of the Left knee MET  4. The pt to demo ambualting with elast restricitve AD witout major compensatory pattern left knee for at least 20 feet MET     Long Term Goals: 56 weeks (progressing, not met)   Pt will be compliant with % of prescribed amount.   The pt to demo pain free and uncompensated running mechanics x10 min on an indoor treadmill  The pt to demo tolerance to a squat at or bellow parallel with uncompensated mechanics x10   The pt to demo strength of L LE within 10% of R LE as demo'd on the biodex machine   The pt to demo a deficit of 10% or less on a triple hop, single leg broad jump and crossover hop compared to non operative LE.   The pt will report full participation in ADLs and IADLs without restrictions related to L  knee.     PLAN     Progress into running phase 1 again if effusion managed.     Manjit Lazcano, PT, DPT

## 2022-12-30 ENCOUNTER — CLINICAL SUPPORT (OUTPATIENT)
Dept: REHABILITATION | Facility: HOSPITAL | Age: 27
End: 2022-12-30
Payer: COMMERCIAL

## 2022-12-30 DIAGNOSIS — M25.562 ACUTE PAIN OF LEFT KNEE: Primary | ICD-10-CM

## 2022-12-30 DIAGNOSIS — M62.81 QUADRICEPS WEAKNESS: ICD-10-CM

## 2022-12-30 DIAGNOSIS — M25.662 DECREASED RANGE OF MOTION OF LEFT KNEE: ICD-10-CM

## 2022-12-30 DIAGNOSIS — R26.2 DIFFICULTY WALKING: ICD-10-CM

## 2022-12-30 PROCEDURE — 97112 NEUROMUSCULAR REEDUCATION: CPT

## 2022-12-30 PROCEDURE — 97110 THERAPEUTIC EXERCISES: CPT

## 2022-12-30 PROCEDURE — 97140 MANUAL THERAPY 1/> REGIONS: CPT

## 2022-12-30 PROCEDURE — 97530 THERAPEUTIC ACTIVITIES: CPT

## 2022-12-30 NOTE — PROGRESS NOTES
[facility]   Physical Therapy Treatment Note     Name: Billy Holbrook  [facility] Number: [id number]    Therapy Diagnosis:   Encounter Diagnoses   Name Primary?    Acute pain of left knee Yes    Decreased range of motion of left knee     Quadriceps weakness     Difficulty walking      Physician: No ref. provider found    Visit Date: 12/30/2022    Physician Orders: PT Eval and Treat   Medical Diagnosis from Referral: M25.562 (ICD-10-CM) - Acute pain of left knee  Evaluation Date: 9/7/2022  Authorization Period Expiration: 9/6/2023  Plan of Care Expiration: 12/31/2022  Visit # / Visits authorized: 77     Precautions: Standard     Time In: 6:15 am  Time Out: 7:30  Total Billable Time: 75 minutes    Procedure: 9/20/2022  1. Left Knee Arthroscopy, anterior cruciate ligament reconstruction 48516  2.  Left Knee Arthroscopy, with meniscectomy (medial OR lateral) 80514  3.  Left Knee Arthroscopy, debridement/shaving of articular cartilage (chondroplasty) 04448  4.  Left Knee  platelet rich plasma injection to the bone-patellar tendon-bone harvest site    Post-Op Plan:  ACL reconstruction with a bone-patellar tendon-bone autograft    SUBJECTIVE     Pt reports: feeling good. Used gameready yesterday 2 times. No pain.     He was compliant with home exercise program.  Response to previous treatment: no issues  Functional change: improved squat and lunge    Pain: 0/10  Location: left knee      OBJECTIVE     14 wks 3 days post-op    Observation:   trace effusion noted L knee with sweep test  Well healed scar  Atrophy in calf/quad on L     Range of Motion (extension/neutral/flexion):     Right Left   Knee PROM: 8-0-125/130 deg P:8-0-130 deg  A:7-0-125 deg      Strength:     Right Left Comment   Knee Extension: 5/5 4/5        Knee Flexion: 5/5 4/5        Hip Abduction: 4+/5 4-/5       HHD knee extension at 60 deg flx: R= 181.6 lbs, L= 120.3 lbs no pain: LSI= 66%    Anterior reach Y balance R= 58 cm  L= 54  cm  Running wide base, slightly asymmetrical with decreased knee flexion at impact on L    Treatment     Billy received the treatments listed below:      therapeutic exercises to develop strength, endurance, ROM, flexibility, posture, and core stabilization for 20 minutes including:  Education/assessment  Manual knee stretching flx and extension  Heel prop 2 x 3 min with 10 lbs below, 7.5 lbs above  Prone quad stretching manually    manual therapy techniques: Joint mobilizations and Soft tissue Mobilization were applied for 10 minutes, including:  PFJ gr 3-4 glides all planes  Tibial AP glide gr 3-4 with and without rotation bias on and off heel prop  Knee hyperextension gr 3-4  Fat pad mobility  Scar mobilization    neuromuscular re-education activities to improve: Coordination, Kinesthetic, Sense, and Proprioception for 8 minutes. The following activities were included:  Quad set into hyperextension x 20 x 3 sec  SLR from quad set 2 x 20    Therapeutic activities to improve functional performance for 37 minutes, including:  SKTC, quad pull, HS scoop, HS march, high knee march, walking butt kicks, lunge, lateral lunge, band walks, monster walks  Standing TKE 4 x 15 x 5 sec green sport band  50% intensity jogging 20 x 40 yds: 20 sec rest  Trap bar squat x 10 185, 3 x 8 225  Step up 12 inch box 3 x 5 linus 120 lbs safety bar      Patient Education and Home Exercises     Home Exercises Provided and Patient Education Provided     Education provided:   - HEP update, precautions, activity pacing, goals, timeframes    Written Home Exercises Provided: yes. Exercises were reviewed and Billy was able to demonstrate them prior to the end of the session.  Billy demonstrated good  understanding of the education provided. See EMR under Patient Instructions for exercises provided during therapy sessions.     ASSESSMENT     Billy continues to have some mild effusion that does not worsen with session. Continued straight line  running today with some LE strength.  Advised on resting through weekend with gameready use to see response of effusion over 3 rest days. Pt reported/demonstrated no adverse response or exacerbation of symptoms during today's session.       Billy Is progressing well towards his goals.   Pt prognosis is Good.     Pt will continue to benefit from skilled outpatient physical therapy to address the deficits listed in the problem list box on initial evaluation, provide pt/family education and to maximize pt's level of independence in the home and community environment.     Pt's spiritual, cultural and educational needs considered and pt agreeable to plan of care and goals.     Anticipated barriers to physical therapy: none    Goals:  Short Term Goals: 8-10 weeks   1. Pt will be compliant with HEP 50% of prescribed amount. MET  2. The pt to demo improvement in left knee ROM to equal right knee ROM pain free MET  3.  The pt to demo good quad set with proper hyperextension moment of the Left knee MET  4. The pt to demo ambualting with elast restricitve AD witout major compensatory pattern left knee for at least 20 feet MET     Long Term Goals: 56 weeks (progressing, not met)   Pt will be compliant with % of prescribed amount.   The pt to demo pain free and uncompensated running mechanics x10 min on an indoor treadmill  The pt to demo tolerance to a squat at or bellow parallel with uncompensated mechanics x10   The pt to demo strength of L LE within 10% of R LE as demo'd on the biodex machine   The pt to demo a deficit of 10% or less on a triple hop, single leg broad jump and crossover hop compared to non operative LE.   The pt will report full participation in ADLs and IADLs without restrictions related to L  knee.     PLAN     Progress into running phase 1 again if effusion managed.     Manjit Lazcano, PT, DPT

## 2023-01-03 ENCOUNTER — CLINICAL SUPPORT (OUTPATIENT)
Dept: REHABILITATION | Facility: HOSPITAL | Age: 28
End: 2023-01-03
Payer: COMMERCIAL

## 2023-01-03 DIAGNOSIS — M62.81 QUADRICEPS WEAKNESS: ICD-10-CM

## 2023-01-03 DIAGNOSIS — R26.2 DIFFICULTY WALKING: ICD-10-CM

## 2023-01-03 DIAGNOSIS — M25.662 DECREASED RANGE OF MOTION OF LEFT KNEE: ICD-10-CM

## 2023-01-03 DIAGNOSIS — M25.562 ACUTE PAIN OF LEFT KNEE: Primary | ICD-10-CM

## 2023-01-03 PROCEDURE — 97110 THERAPEUTIC EXERCISES: CPT

## 2023-01-03 PROCEDURE — 97530 THERAPEUTIC ACTIVITIES: CPT

## 2023-01-03 PROCEDURE — 97112 NEUROMUSCULAR REEDUCATION: CPT

## 2023-01-03 PROCEDURE — 97140 MANUAL THERAPY 1/> REGIONS: CPT

## 2023-01-03 NOTE — PROGRESS NOTES
[facility]   Physical Therapy Treatment Note     Name: Billy Holbrook  [facility] Number: [id number]    Therapy Diagnosis:   No diagnosis found.    Physician: No ref. provider found    Visit Date: 1/3/2023    Physician Orders: PT Eval and Treat   Medical Diagnosis from Referral: M25.562 (ICD-10-CM) - Acute pain of left knee  Evaluation Date: 9/7/2022  Authorization Period Expiration: 9/6/2023  Plan of Care Expiration: 12/31/2022  Visit # / Visits authorized: 77     Precautions: Standard     Time In: 1332  Time Out: 1500  Total Billable Time: 60 minutes    Procedure: 9/20/2022  1. Left Knee Arthroscopy, anterior cruciate ligament reconstruction 50085  2.  Left Knee Arthroscopy, with meniscectomy (medial OR lateral) 61254  3.  Left Knee Arthroscopy, debridement/shaving of articular cartilage (chondroplasty) 24958  4.  Left Knee  platelet rich plasma injection to the bone-patellar tendon-bone harvest site    Post-Op Plan:  ACL reconstruction with a bone-patellar tendon-bone autograft    SUBJECTIVE     Pt reports: no pain, swelling is down since last running attempt. Feels ready to try again today.     He was compliant with home exercise program.  Response to previous treatment: no issues  Functional change: improved squat and lunge    Pain: 0/10  Location: left knee      OBJECTIVE     15 wks 0 days post-op    Observation:   trace effusion noted L knee with sweep test  Well healed scar  Atrophy in calf/quad on L     Range of Motion (extension/neutral/flexion):     Right Left   Knee PROM: 8-0-125/130 deg P:8-0-130 deg  A:7-0-125 deg      Strength:     Right Left Comment   Knee Extension: 5/5 4/5        Knee Flexion: 5/5 4/5        Hip Abduction: 4+/5 4-/5       Not Today:   HHD knee extension at 60 deg flx: R= 181.6 lbs, L= 120.3 lbs no pain: LSI= 66%    Anterior reach Y balance R= 58 cm  L= 54 cm  Running wide base, slightly asymmetrical with decreased knee flexion at impact on L    Treatment      Billy received the treatments listed below:      therapeutic exercises to develop strength, endurance, ROM, flexibility, posture, and core stabilization for 20 minutes including:  Education/assessment  SL Calf Raise leaning on wall 4x12 ea   Heel prop 2 x 3 min with 10 lbs below, 7.5 lbs above    manual therapy techniques: Joint mobilizations and Soft tissue Mobilization were applied for 10 minutes, including:  PFJ gr 3-4 glides all planes  Tibial AP glide gr 3-4 with and without rotation bias on and off heel prop  Knee hyperextension gr 3-4  Fat pad mobility  Scar mobilization    neuromuscular re-education activities to improve: Coordination, Kinesthetic, Sense, and Proprioception for 8 minutes. The following activities were included:  LAQ 5# 2x20     Therapeutic activities to improve functional performance for 37 minutes, including:  SKTC, quad pull, HS scoop, HS march, high knee march, walking butt kicks, lunge, lateral lunge, band walks, monster walks  Standing TKE 4 x 15 x 5 sec green sport band  50% intensity jogging 3x40 yds; [id number]; [id number]; [id number] (twice) 30s-1min recovery between each round   Trap bar squat x 10 185, 3x10-12 205#      Patient Education and Home Exercises     Home Exercises Provided and Patient Education Provided     Education provided:   - HEP update, precautions, activity pacing, goals, timeframes    Written Home Exercises Provided: yes. Exercises were reviewed and Billy was able to demonstrate them prior to the end of the session.  Billy demonstrated good  understanding of the education provided. See EMR under Patient Instructions for exercises provided during therapy sessions.     ASSESSMENT     Pt with minimal to absent effusion at PFJ today that did not get worse with activity. Cont mild dec in terminal knee extension that improves with brief manual intervention. Cont with jogging progression and some LE CKC strengthening following.     Billy Is progressing well towards his  goals.   Pt prognosis is Good.     Pt will continue to benefit from skilled outpatient physical therapy to address the deficits listed in the problem list box on initial evaluation, provide pt/family education and to maximize pt's level of independence in the home and community environment.     Pt's spiritual, cultural and educational needs considered and pt agreeable to plan of care and goals.     Anticipated barriers to physical therapy: none    Goals:  Short Term Goals: 8-10 weeks   1. Pt will be compliant with HEP 50% of prescribed amount. MET  2. The pt to demo improvement in left knee ROM to equal right knee ROM pain free MET  3.  The pt to demo good quad set with proper hyperextension moment of the Left knee MET  4. The pt to demo ambualting with elast restricitve AD witout major compensatory pattern left knee for at least 20 feet MET     Long Term Goals: 56 weeks (progressing, not met)   Pt will be compliant with % of prescribed amount.   The pt to demo pain free and uncompensated running mechanics x10 min on an indoor treadmill  The pt to demo tolerance to a squat at or bellow parallel with uncompensated mechanics x10   The pt to demo strength of L LE within 10% of R LE as demo'd on the biodex machine   The pt to demo a deficit of 10% or less on a triple hop, single leg broad jump and crossover hop compared to non operative LE.   The pt will report full participation in ADLs and IADLs without restrictions related to L  knee.     PLAN     Progress into running phase 2    Odessa Mendes, PT, DPT

## 2023-01-04 ENCOUNTER — CLINICAL SUPPORT (OUTPATIENT)
Dept: REHABILITATION | Facility: HOSPITAL | Age: 28
End: 2023-01-04
Payer: COMMERCIAL

## 2023-01-04 DIAGNOSIS — R26.2 DIFFICULTY WALKING: ICD-10-CM

## 2023-01-04 DIAGNOSIS — M62.81 QUADRICEPS WEAKNESS: ICD-10-CM

## 2023-01-04 DIAGNOSIS — M25.562 ACUTE PAIN OF LEFT KNEE: Primary | ICD-10-CM

## 2023-01-04 DIAGNOSIS — M25.662 DECREASED RANGE OF MOTION OF LEFT KNEE: ICD-10-CM

## 2023-01-04 PROCEDURE — 97140 MANUAL THERAPY 1/> REGIONS: CPT

## 2023-01-04 PROCEDURE — 97530 THERAPEUTIC ACTIVITIES: CPT

## 2023-01-04 PROCEDURE — 97110 THERAPEUTIC EXERCISES: CPT

## 2023-01-05 ENCOUNTER — CLINICAL SUPPORT (OUTPATIENT)
Dept: REHABILITATION | Facility: HOSPITAL | Age: 28
End: 2023-01-05
Payer: COMMERCIAL

## 2023-01-05 DIAGNOSIS — M25.562 ACUTE PAIN OF LEFT KNEE: Primary | ICD-10-CM

## 2023-01-05 DIAGNOSIS — R26.2 DIFFICULTY WALKING: ICD-10-CM

## 2023-01-05 DIAGNOSIS — M62.81 QUADRICEPS WEAKNESS: ICD-10-CM

## 2023-01-05 DIAGNOSIS — M25.662 DECREASED RANGE OF MOTION OF LEFT KNEE: ICD-10-CM

## 2023-01-05 PROCEDURE — 97140 MANUAL THERAPY 1/> REGIONS: CPT

## 2023-01-05 PROCEDURE — 97530 THERAPEUTIC ACTIVITIES: CPT

## 2023-01-05 PROCEDURE — 97110 THERAPEUTIC EXERCISES: CPT

## 2023-01-05 NOTE — PROGRESS NOTES
[facility]   Physical Therapy Treatment Note     Name: Billy Holbrook  [facility] Number: [id number]    Therapy Diagnosis:   Encounter Diagnoses   Name Primary?    Acute pain of left knee Yes    Decreased range of motion of left knee     Quadriceps weakness     Difficulty walking      Physician: No ref. provider found    Visit Date: 1/5/2023    Physician Orders: PT Eval and Treat   Medical Diagnosis from Referral: M25.562 (ICD-10-CM) - Acute pain of left knee  Evaluation Date: 9/7/2022  Authorization Period Expiration: 9/6/2023  Plan of Care Expiration: 12/31/2022  Visit # / Visits authorized: 79     Precautions: Standard     Time In: 11:00 am  Time Out: 12:30  Total Billable Time: 90 minutes    Procedure: 9/20/2022  1. Left Knee Arthroscopy, anterior cruciate ligament reconstruction 52172  2.  Left Knee Arthroscopy, with meniscectomy (medial OR lateral) 81307  3.  Left Knee Arthroscopy, debridement/shaving of articular cartilage (chondroplasty) 43880  4.  Left Knee  platelet rich plasma injection to the bone-patellar tendon-bone harvest site    Post-Op Plan:  ACL reconstruction with a bone-patellar tendon-bone autograft    SUBJECTIVE     Pt reports: tired from yesterday. Knee feels good. Can tell his strength is not back in L LE with SL tasks/hopping. Cardiovascular fatigue reported, no knee issues.     He was compliant with home exercise program.  Response to previous treatment: no issues  Functional change: running progressing    Pain: 0/10  Location: left knee      OBJECTIVE     15 wks 2 days post-op    Observation:   trace effusion noted L knee with sweep test  Well healed scar  Atrophy in calf/quad on L     Range of Motion (extension/neutral/flexion):     Right Left   Knee PROM: 8-0-125/130 deg P:8-0-130 deg  A:7-0-125 deg      Strength:     Right Left Comment   Knee Extension: 5/5 4/5        Knee Flexion: 5/5 4/5        Hip Abduction: 4+/5 4-/5       Not Today:   HHD knee extension at  "60 deg flx: R= 181.6 lbs, L= 120.3 lbs no pain: LSI= 66%    Anterior reach Y balance R= 58 cm  L= 54 cm  Running wide base, slightly asymmetrical with decreased knee flexion at impact on L    Treatment     Billy received the treatments listed below:      therapeutic exercises to develop strength, endurance, ROM, flexibility, posture, and core stabilization for 20 minutes including:  Education/assessment  Heel prop 2 x 3 min with 10 lbs below, 7.5 lbs above  Quad set 20x 5" hold - decreased effusion     manual therapy techniques: Joint mobilizations and Soft tissue Mobilization were applied for 10 minutes, including:  PFJ gr 3-4 glides all planes  Tibial AP glide gr 3-4 with and without rotation bias on and off heel prop  Knee hyperextension gr 3-4  Fat pad mobility  Scar mobilization    neuromuscular re-education activities to improve: Coordination, Kinesthetic, Sense, and Proprioception for 5 minutes. The following activities were included:  Palloff press in 1/2 squat with band at knees 2 x 15 linus black sport band    Therapeutic activities to improve functional performance for 55 minutes, including:  SKTC, quad pull, HS scoop, HS march, high knee march, walking butt kicks, lunge, lateral lunge, band walks, monster walks  DL 3D hops 2 x 30 reps each; SL 3D 2 x 20 reps each  Return to sprint step 2 phase 1  Banded eccentric lunge 3 x 8 x 5 sec lower with red sport band linus    Patient Education and Home Exercises     Home Exercises Provided and Patient Education Provided     Education provided:   - HEP update, precautions, activity pacing, goals, timeframes    Written Home Exercises Provided: yes. Exercises were reviewed and Billy was able to demonstrate them prior to the end of the session.  Billy demonstrated good  understanding of the education provided. See EMR under Patient Instructions for exercises provided during therapy sessions.     ASSESSMENT     Billy's trace effusion remains, but does not worsen, " actually improves with exercise.  Progressing to step 2 phase 1 return to sprinting without issue; trace stiffness in L knee at landing with deceleration that improves with cuing. Acceleration and steady state running look good without obvious compensations noted. SL hops he has difficulty with height and staying on toes creating compensation due to lack of strength in LE. Pt reported/demonstrated no adverse response or exacerbation of symptoms during today's session.      Billy Is progressing well towards his goals.   Pt prognosis is Good.     Pt will continue to benefit from skilled outpatient physical therapy to address the deficits listed in the problem list box on initial evaluation, provide pt/family education and to maximize pt's level of independence in the home and community environment.     Pt's spiritual, cultural and educational needs considered and pt agreeable to plan of care and goals.     Anticipated barriers to physical therapy: none    Goals:  Short Term Goals: 8-10 weeks   1. Pt will be compliant with HEP 50% of prescribed amount. MET  2. The pt to demo improvement in left knee ROM to equal right knee ROM pain free MET  3.  The pt to demo good quad set with proper hyperextension moment of the Left knee MET  4. The pt to demo ambualting with elast restricitve AD witout major compensatory pattern left knee for at least 20 feet MET     Long Term Goals: 56 weeks (progressing, not met)   Pt will be compliant with % of prescribed amount.   The pt to demo pain free and uncompensated running mechanics x10 min on an indoor treadmill  The pt to demo tolerance to a squat at or bellow parallel with uncompensated mechanics x10   The pt to demo strength of L LE within 10% of R LE as demo'd on the biodex machine   The pt to demo a deficit of 10% or less on a triple hop, single leg broad jump and crossover hop compared to non operative LE.   The pt will report full participation in ADLs and IADLs without  restrictions related to L  knee.     PLAN     Progress SL plyos and landing mechanics as able. Progress through return to running phases. Strength as able.     Manjit Lazcano, PT, DPT

## 2023-01-05 NOTE — PROGRESS NOTES
[facility]   Physical Therapy Treatment Note     Name: Billy Holbrook  [facility] Number: [id number]    Therapy Diagnosis:   Encounter Diagnoses   Name Primary?    Acute pain of left knee Yes    Decreased range of motion of left knee     Quadriceps weakness     Difficulty walking        Physician: No ref. provider found    Visit Date: 1/4/2023    Physician Orders: PT Eval and Treat   Medical Diagnosis from Referral: M25.562 (ICD-10-CM) - Acute pain of left knee  Evaluation Date: 9/7/2022  Authorization Period Expiration: 9/6/2023  Plan of Care Expiration: 12/31/2022  Visit # / Visits authorized: 78     Precautions: Standard     Time In: 1200 pm  Time Out: 0125 pm  Total Billable Time: 85 minutes    Procedure: 9/20/2022  1. Left Knee Arthroscopy, anterior cruciate ligament reconstruction 55644  2.  Left Knee Arthroscopy, with meniscectomy (medial OR lateral) 29173  3.  Left Knee Arthroscopy, debridement/shaving of articular cartilage (chondroplasty) 41399  4.  Left Knee  platelet rich plasma injection to the bone-patellar tendon-bone harvest site    Post-Op Plan:  ACL reconstruction with a bone-patellar tendon-bone autograft    SUBJECTIVE     Pt reports: he has a small amount of swelling after running yesterday but no pain     He was compliant with home exercise program.  Response to previous treatment: no issues  Functional change: improved squat and lunge    Pain: 0/10  Location: left knee      OBJECTIVE     15 wks 1 days post-op    Observation:   trace effusion noted L knee with sweep test  Well healed scar  Atrophy in calf/quad on L     Range of Motion (extension/neutral/flexion):     Right Left   Knee PROM: 8-0-125/130 deg P:8-0-130 deg  A:7-0-125 deg      Strength:     Right Left Comment   Knee Extension: 5/5 4/5        Knee Flexion: 5/5 4/5        Hip Abduction: 4+/5 4-/5       Not Today:   HHD knee extension at 60 deg flx: R= 181.6 lbs, L= 120.3 lbs no pain: LSI= 66%    Anterior reach  "Y balance R= 58 cm  L= 54 cm  Running wide base, slightly asymmetrical with decreased knee flexion at impact on L    Treatment     Billy received the treatments listed below:      therapeutic exercises to develop strength, endurance, ROM, flexibility, posture, and core stabilization for 35 minutes including:  Education/assessment  Heel prop 2 x 3 min with 10 lbs below, 7.5 lbs above  Quad set 20x 5" hold - decreased effusion   [facility] SS: 35# KB B; 5 x 5 B    manual therapy techniques: Joint mobilizations and Soft tissue Mobilization were applied for 10 minutes, including:  PFJ gr 3-4 glides all planes  Tibial AP glide gr 3-4 with and without rotation bias on and off heel prop  Knee hyperextension gr 3-4  Fat pad mobility  Scar mobilization    neuromuscular re-education activities to improve: Coordination, Kinesthetic, Sense, and Proprioception for 00 minutes. The following activities were included:    Therapeutic activities to improve functional performance for 40 minutes, including:  SKTC, quad pull, HS scoop, HS march, high knee march, walking butt kicks, lunge, lateral lunge, band walks, monster walks  Safety bar squat; 106# 1 x 10 - d/c due to poor form  Superset:  - Trap bar squat; 1 x 10 #225; 2 x 10 #275; 1 x 10 #295   - SL RDL; 30#; 4 x 8  Decline DL mini squat 3 x fatigue   LAQ 10# 3 x burn        Patient Education and Home Exercises     Home Exercises Provided and Patient Education Provided     Education provided:   - HEP update, precautions, activity pacing, goals, timeframes    Written Home Exercises Provided: yes. Exercises were reviewed and Billy was able to demonstrate them prior to the end of the session.  Billy demonstrated good  understanding of the education provided. See EMR under Patient Instructions for exercises provided during therapy sessions.     ASSESSMENT   Billy presented with minimal joint effusion after run progression yesterday. This improved somewhat with quat isometrics and " did not get worse with activity. He was challenged by increased SL strengthening focus, particularly split squats. He reported increased soreness and fatigue at knee after burn out at end of session but this improved with extended rest at end of session. Will monitor response to increased loading today and progress as able.     Billy Is progressing well towards his goals.   Pt prognosis is Good.     Pt will continue to benefit from skilled outpatient physical therapy to address the deficits listed in the problem list box on initial evaluation, provide pt/family education and to maximize pt's level of independence in the home and community environment.     Pt's spiritual, cultural and educational needs considered and pt agreeable to plan of care and goals.     Anticipated barriers to physical therapy: none    Goals:  Short Term Goals: 8-10 weeks   1. Pt will be compliant with HEP 50% of prescribed amount. MET  2. The pt to demo improvement in left knee ROM to equal right knee ROM pain free MET  3.  The pt to demo good quad set with proper hyperextension moment of the Left knee MET  4. The pt to demo ambualting with elast restricitve AD witout major compensatory pattern left knee for at least 20 feet MET     Long Term Goals: 56 weeks (progressing, not met)   Pt will be compliant with % of prescribed amount.   The pt to demo pain free and uncompensated running mechanics x10 min on an indoor treadmill  The pt to demo tolerance to a squat at or bellow parallel with uncompensated mechanics x10   The pt to demo strength of L LE within 10% of R LE as demo'd on the biodex machine   The pt to demo a deficit of 10% or less on a triple hop, single leg broad jump and crossover hop compared to non operative LE.   The pt will report full participation in ADLs and IADLs without restrictions related to L  knee.     PLAN     Progress into running phase 2    Joycelyn Willson, PT, DPT

## 2023-01-06 ENCOUNTER — CLINICAL SUPPORT (OUTPATIENT)
Dept: REHABILITATION | Facility: HOSPITAL | Age: 28
End: 2023-01-06
Payer: COMMERCIAL

## 2023-01-06 DIAGNOSIS — M25.562 ACUTE PAIN OF LEFT KNEE: Primary | ICD-10-CM

## 2023-01-06 DIAGNOSIS — R26.2 DIFFICULTY WALKING: ICD-10-CM

## 2023-01-06 DIAGNOSIS — M25.662 DECREASED RANGE OF MOTION OF LEFT KNEE: ICD-10-CM

## 2023-01-06 DIAGNOSIS — M62.81 QUADRICEPS WEAKNESS: ICD-10-CM

## 2023-01-06 PROCEDURE — 97530 THERAPEUTIC ACTIVITIES: CPT

## 2023-01-06 PROCEDURE — 97110 THERAPEUTIC EXERCISES: CPT

## 2023-01-06 PROCEDURE — 97140 MANUAL THERAPY 1/> REGIONS: CPT

## 2023-01-06 NOTE — PROGRESS NOTES
[facility]   Physical Therapy Treatment Note     Name: Billy Holbrook  [facility] Number: [id number]    Therapy Diagnosis:   Encounter Diagnoses   Name Primary?    Acute pain of left knee Yes    Decreased range of motion of left knee     Quadriceps weakness     Difficulty walking      Physician: No ref. provider found    Visit Date: 1/6/2023    Physician Orders: PT Eval and Treat   Medical Diagnosis from Referral: M25.562 (ICD-10-CM) - Acute pain of left knee  Evaluation Date: 9/7/2022  Authorization Period Expiration: 9/6/2023  Plan of Care Expiration: 12/31/2022  Visit # / Visits authorized: 80     Precautions: Standard     Time In: 7:50 am  Time Out: 9:30  Total Billable Time: 80 minutes    Procedure: 9/20/2022  1. Left Knee Arthroscopy, anterior cruciate ligament reconstruction 51431  2.  Left Knee Arthroscopy, with meniscectomy (medial OR lateral) 64508  3.  Left Knee Arthroscopy, debridement/shaving of articular cartilage (chondroplasty) 43375  4.  Left Knee  platelet rich plasma injection to the bone-patellar tendon-bone harvest site    Post-Op Plan:  ACL reconstruction with a bone-patellar tendon-bone autograft    SUBJECTIVE     Pt reports: he is not feeling the best today. Very sore from last 2 days mainly in L hip. Agrees to BFR/recovery session today.     He was compliant with home exercise program.  Response to previous treatment: no issues  Functional change: running progressing    Pain: 0/10  Location: left knee      OBJECTIVE     15 wks 3 days post-op    Observation:   trace effusion noted L knee with sweep test  Well healed scar  Atrophy in calf/quad on L     Range of Motion (extension/neutral/flexion):     Right Left   Knee PROM: 8-0-125/130 deg P:8-0-130 deg  A:7-0-125 deg      Strength:     Right Left Comment   Knee Extension: 5/5 4/5        Knee Flexion: 5/5 4/5        Hip Abduction: 4+/5 4-/5       Not Today:   HHD knee extension at 60 deg flx: R= 181.6 lbs, L= 120.3 lbs  "no pain: LSI= 66%    Anterior reach Y balance R= 58 cm  L= 54 cm  Running wide base, slightly asymmetrical with decreased knee flexion at impact on L    Treatment     Billy received the treatments listed below:      therapeutic exercises to develop strength, endurance, ROM, flexibility, posture, and core stabilization for 60 minutes including:  Education/assessment  Bike 10 min lv 3-5 RPM 70-90  Heel prop 2 x 3 min with 10 lbs below, 7.5 lbs above  Quad set 20x 5" hold - decreased effusion   BFR 70% LOP  -LAQ 15 lbs  -seated heel raise 65 lbs  -prone HS curl red sport band  Manual stretching to L HS, glute, adductors, quad, hip rotators      manual therapy techniques: Joint mobilizations and Soft tissue Mobilization were applied for 10 minutes, including:  PFJ gr 3-4 glides all planes  Tibial AP glide gr 3-4 with and without rotation bias on and off heel prop  Knee hyperextension gr 3-4  Fat pad mobility  Scar mobilization    neuromuscular re-education activities to improve: Coordination, Kinesthetic, Sense, and Proprioception for 5 minutes. The following activities were included:  NP    Therapeutic activities to improve functional performance for 10 minutes, including:  SKTC, quad pull, HS scoop, HS march, high knee march, walking butt kicks, lunge, lateral lunge, band walks, monster walks    Patient Education and Home Exercises     Home Exercises Provided and Patient Education Provided     Education provided:   - HEP update, precautions, activity pacing, goals, timeframes    Written Home Exercises Provided: yes. Exercises were reviewed and Billy was able to demonstrate them prior to the end of the session.  Billy demonstrated good  understanding of the education provided. See EMR under Patient Instructions for exercises provided during therapy sessions.     ASSESSMENT     Billy continues to have very minimal effusion that does not fluctuate. Focused session of BFR for LE after general exercise due to soreness " from past 2 days. Pt reported/demonstrated no adverse response or exacerbation of symptoms during today's session.      Billy Is progressing well towards his goals.   Pt prognosis is Good.     Pt will continue to benefit from skilled outpatient physical therapy to address the deficits listed in the problem list box on initial evaluation, provide pt/family education and to maximize pt's level of independence in the home and community environment.     Pt's spiritual, cultural and educational needs considered and pt agreeable to plan of care and goals.     Anticipated barriers to physical therapy: none    Goals:  Short Term Goals: 8-10 weeks   1. Pt will be compliant with HEP 50% of prescribed amount. MET  2. The pt to demo improvement in left knee ROM to equal right knee ROM pain free MET  3.  The pt to demo good quad set with proper hyperextension moment of the Left knee MET  4. The pt to demo ambualting with elast restricitve AD witout major compensatory pattern left knee for at least 20 feet MET     Long Term Goals: 56 weeks (progressing, not met)   Pt will be compliant with % of prescribed amount.   The pt to demo pain free and uncompensated running mechanics x10 min on an indoor treadmill  The pt to demo tolerance to a squat at or bellow parallel with uncompensated mechanics x10   The pt to demo strength of L LE within 10% of R LE as demo'd on the biodex machine   The pt to demo a deficit of 10% or less on a triple hop, single leg broad jump and crossover hop compared to non operative LE.   The pt will report full participation in ADLs and IADLs without restrictions related to L  knee.     PLAN     Progress SL plyos and landing mechanics as able. Progress through return to running phases. Strength as able.     Manjit Lazcano, PT, DPT

## 2023-01-09 ENCOUNTER — CLINICAL SUPPORT (OUTPATIENT)
Dept: REHABILITATION | Facility: HOSPITAL | Age: 28
End: 2023-01-09
Payer: COMMERCIAL

## 2023-01-09 DIAGNOSIS — M25.662 DECREASED RANGE OF MOTION OF LEFT KNEE: ICD-10-CM

## 2023-01-09 DIAGNOSIS — R26.2 DIFFICULTY WALKING: ICD-10-CM

## 2023-01-09 DIAGNOSIS — M25.562 ACUTE PAIN OF LEFT KNEE: Primary | ICD-10-CM

## 2023-01-09 DIAGNOSIS — M62.81 QUADRICEPS WEAKNESS: ICD-10-CM

## 2023-01-09 PROCEDURE — 97140 MANUAL THERAPY 1/> REGIONS: CPT

## 2023-01-09 PROCEDURE — 97110 THERAPEUTIC EXERCISES: CPT

## 2023-01-09 PROCEDURE — 97530 THERAPEUTIC ACTIVITIES: CPT

## 2023-01-09 PROCEDURE — 97112 NEUROMUSCULAR REEDUCATION: CPT

## 2023-01-09 NOTE — PROGRESS NOTES
[facility] OUTPATIENT THERAPY AND WELLNESS   Physical Therapy Treatment Note     Name: Billy Holbrook  [facility] Number: [id number]    Therapy Diagnosis:   Encounter Diagnoses   Name Primary?    Acute pain of left knee Yes    Decreased range of motion of left knee     Quadriceps weakness     Difficulty walking        Physician: PEACE Dempsey MD    Visit Date: 1/9/2023    Physician Orders: PT Eval and Treat   Medical Diagnosis from Referral: M25.562 (ICD-10-CM) - Acute pain of left knee  Evaluation Date: 9/7/2022  Authorization Period Expiration: 9/6/2023  Plan of Care Expiration: 12/31/2022  Visit # / Visits authorized: 81     Precautions: Standard     Time In: 12:25 pm  Time Out: 2:00  Total Billable Time: 95 minutes    Procedure: 9/20/2022  1. Left Knee Arthroscopy, anterior cruciate ligament reconstruction 30659  2.  Left Knee Arthroscopy, with meniscectomy (medial OR lateral) 20048  3.  Left Knee Arthroscopy, debridement/shaving of articular cartilage (chondroplasty) 25556  4.  Left Knee  platelet rich plasma injection to the bone-patellar tendon-bone harvest site    Post-Op Plan:  ACL reconstruction with a bone-patellar tendon-bone autograft    SUBJECTIVE     Pt reports: knee is feeling good. Thinking 1 more week here for rehab, then team may bring him back in to continue. Spoke with Dr. Castaneda at facility who advised him 3-4 more months of therapy at least.     He was compliant with home exercise program.  Response to previous treatment: no issues  Functional change: running progressing    Pain: 0/10  Location: left knee      OBJECTIVE     15 wks 6 days post-op    Observation:   trace effusion noted L knee with sweep test  Well healed scar  Atrophy in calf/quad on L     Range of Motion (extension/neutral/flexion):     Right Left   Knee PROM: 8-0-125/130 deg P:8-0-130 deg  A:7-0-125 deg      Strength:     Right Left Comment   Knee Extension: 5/5 4/5        Knee Flexion: 5/5 4/5        Hip Abduction: 4+/5 4-/5    "    Not Today:   HHD knee extension at 60 deg flx: R= 181.6 lbs, L= 120.3 lbs no pain: LSI= 66%    Anterior reach Y balance R= 58 cm  L= 54 cm  Running wide base, slightly asymmetrical with decreased knee flexion at impact on L    Treatment     Billy received the treatments listed below:      therapeutic exercises to develop strength, endurance, ROM, flexibility, posture, and core stabilization for 25 minutes including:  Education/assessment  Heel prop 2 x 3 min with 10 lbs below, 7.5 lbs above  Quad set 20x 5" hold - decreased effusion   Manual stretching to L HS, glute, adductors, quad, hip rotators  HS eccentrics from glute bridge 3 x 5 x 5 sec- SL  Med ball sit ups 3 x 10 8->14 lbs      manual therapy techniques: Joint mobilizations and Soft tissue Mobilization were applied for 10 minutes, including:  PFJ gr 3-4 glides all planes  Tibial AP glide gr 3-4 with and without rotation bias on and off heel prop  Knee hyperextension gr 3-4  Fat pad mobility  Scar mobilization    neuromuscular re-education activities to improve: Coordination, Kinesthetic, Sense, and Proprioception for 10 minutes. The following activities were included:  Palloff press black sport band 3 x 15 linus with partial squat and BTB at knees  Plank up/downs 3 x 30 seconds    Therapeutic activities to improve functional performance for 50 minutes, including:  SKTC, quad pull, HS scoop, HS march, high knee march, walking butt kicks, lunge, lateral lunge, band walks, monster walks  DL jumps 3D x 30 each  SL hops 3D 2 x 20 each  Step 3 stage 1 return to sprint program  Step ups 12 inch 4 x 8 0->45 lbs each side added  Shuttle or banded assisted SL hops next session    Patient Education and Home Exercises     Home Exercises Provided and Patient Education Provided     Education provided:   - HEP update, precautions, activity pacing, goals, timeframes    Written Home Exercises Provided: yes. Exercises were reviewed and Billy was able to demonstrate them " prior to the end of the session.  Billy demonstrated good  understanding of the education provided. See EMR under Patient Instructions for exercises provided during therapy sessions.     ASSESSMENT     Billy did well progressing into step 3 of return to sprint program. Working into more SL strength post running.  He still shows a deficit in SL strength for plyometric power development and force absorption. Pt reported/demonstrated no adverse response or exacerbation of symptoms during today's session.      Billy Is progressing well towards his goals.   Pt prognosis is Good.     Pt will continue to benefit from skilled outpatient physical therapy to address the deficits listed in the problem list box on initial evaluation, provide pt/family education and to maximize pt's level of independence in the home and community environment.     Pt's spiritual, cultural and educational needs considered and pt agreeable to plan of care and goals.     Anticipated barriers to physical therapy: none    Goals:  Short Term Goals: 8-10 weeks   1. Pt will be compliant with HEP 50% of prescribed amount. MET  2. The pt to demo improvement in left knee ROM to equal right knee ROM pain free MET  3.  The pt to demo good quad set with proper hyperextension moment of the Left knee MET  4. The pt to demo ambualting with elast restricitve AD witout major compensatory pattern left knee for at least 20 feet MET     Long Term Goals: 56 weeks (progressing, not met)   Pt will be compliant with % of prescribed amount.   The pt to demo pain free and uncompensated running mechanics x10 min on an indoor treadmill  The pt to demo tolerance to a squat at or bellow parallel with uncompensated mechanics x10   The pt to demo strength of L LE within 10% of R LE as demo'd on the biodex machine   The pt to demo a deficit of 10% or less on a triple hop, single leg broad jump and crossover hop compared to non operative LE.   The pt will report full  participation in ADLs and IADLs without restrictions related to L  knee.     PLAN     Progress SL plyos and landing mechanics as able. Progress through return to running phases. Strength as able.     Manjit Lazcano, PT, DPT

## 2023-01-10 ENCOUNTER — CLINICAL SUPPORT (OUTPATIENT)
Dept: REHABILITATION | Facility: HOSPITAL | Age: 28
End: 2023-01-10
Payer: COMMERCIAL

## 2023-01-10 DIAGNOSIS — R26.2 DIFFICULTY WALKING: ICD-10-CM

## 2023-01-10 DIAGNOSIS — M25.562 ACUTE PAIN OF LEFT KNEE: Primary | ICD-10-CM

## 2023-01-10 DIAGNOSIS — M25.662 DECREASED RANGE OF MOTION OF LEFT KNEE: ICD-10-CM

## 2023-01-10 DIAGNOSIS — M62.81 QUADRICEPS WEAKNESS: ICD-10-CM

## 2023-01-10 PROCEDURE — 97530 THERAPEUTIC ACTIVITIES: CPT

## 2023-01-10 PROCEDURE — 97140 MANUAL THERAPY 1/> REGIONS: CPT

## 2023-01-10 PROCEDURE — 97110 THERAPEUTIC EXERCISES: CPT

## 2023-01-10 NOTE — PROGRESS NOTES
[facility]   Physical Therapy Treatment Note     Name: Billy Holbrook  [facility] Number: [id number]    Therapy Diagnosis:   Encounter Diagnoses   Name Primary?    Acute pain of left knee Yes    Decreased range of motion of left knee     Quadriceps weakness     Difficulty walking        Physician: PEACE Dempsey MD    Visit Date: 1/10/2023    Physician Orders: PT Eval and Treat   Medical Diagnosis from Referral: M25.562 (ICD-10-CM) - Acute pain of left knee  Evaluation Date: 9/7/2022  Authorization Period Expiration: 9/6/2023  Plan of Care Expiration: 12/31/2022  Visit # / Visits authorized: 82     Precautions: Standard     Time In: 11:20 am  Time Out: 12:40  Total Billable Time: 100 minutes    Procedure: 9/20/2022  1. Left Knee Arthroscopy, anterior cruciate ligament reconstruction 22872  2.  Left Knee Arthroscopy, with meniscectomy (medial OR lateral) 67154  3.  Left Knee Arthroscopy, debridement/shaving of articular cartilage (chondroplasty) 17666  4.  Left Knee  platelet rich plasma injection to the bone-patellar tendon-bone harvest site    Post-Op Plan:  ACL reconstruction with a bone-patellar tendon-bone autograft    SUBJECTIVE     Pt reports: feeling good today. Felt better with SL hops after session.     He was compliant with home exercise program.  Response to previous treatment: no issues  Functional change: running progressing    Pain: 0/10  Location: left knee      OBJECTIVE     16 wks post-op    Observation:   trace effusion noted L knee with sweep test  Well healed scar  Atrophy in calf/quad on L     Range of Motion (extension/neutral/flexion):     Right Left   Knee PROM: 8-0-125/130 deg P:8-0-130 deg  A:7-0-125 deg      Strength:     Right Left Comment   Knee Extension: 5/5 4/5        Knee Flexion: 5/5 4/5        Hip Abduction: 4+/5 4-/5       Not Today:   HHD knee extension at 60 deg flx: R= 181.6 lbs, L= 120.3 lbs no pain: LSI= 66%    Anterior reach Y balance R= 58 cm   "L= 54 cm  Running wide base, slightly asymmetrical with decreased knee flexion at impact on L    Treatment     Billy received the treatments listed below:      therapeutic exercises to develop strength, endurance, ROM, flexibility, posture, and core stabilization for 30 minutes including:  Education/assessment  Heel prop 2 x 3 min with 10 lbs below, 7.5 lbs above  Quad set 20x 5" hold - decreased effusion   Manual stretching to L HS, glute, adductors, quad, hip rotators  LAQ 5 x 8->5 R=70 lbs, L= 20->42.5 lbs  Standing clamshells BTB 3 x 15 linus    manual therapy techniques: Joint mobilizations and Soft tissue Mobilization were applied for 10 minutes, including:  PFJ gr 3-4 glides all planes  Tibial AP glide gr 3-4 with and without rotation bias on and off heel prop  Knee hyperextension gr 3-4  Fat pad mobility  Scar mobilization    neuromuscular re-education activities to improve: Coordination, Kinesthetic, Sense, and Proprioception for 0 minutes. The following activities were included:  NP    Therapeutic activities to improve functional performance for 45 minutes, including:  SKTC, quad pull, HS scoop, HS march, high knee march, walking butt kicks, lunge, lateral lunge, band walks, monster walks  Sled push, pull, lateral pull 90 l;bs added 1x each 30 yds  Step 3 stage 1 return to sprint program-NP  Banded assisted SL hops next session green sport band 3 x 20 reps  12 inch SL landings 3 x 10 linus: working on increased knee flexion moment  SL squat vail 2 x 60 sec linus    Patient Education and Home Exercises     Home Exercises Provided and Patient Education Provided     Education provided:   - HEP update, precautions, activity pacing, goals, timeframes    Written Home Exercises Provided: yes. Exercises were reviewed and Billy was able to demonstrate them prior to the end of the session.  Billy demonstrated good  understanding of the education provided. See EMR under Patient Instructions for exercises provided during " therapy sessions.     ASSESSMENT     Billy did well with SL eccentric/force acceptance today. Improving soft knee landings into more knee flexion. Pt reported/demonstrated no adverse response or exacerbation of symptoms during today's session.      Billy Is progressing well towards his goals.   Pt prognosis is Good.     Pt will continue to benefit from skilled outpatient physical therapy to address the deficits listed in the problem list box on initial evaluation, provide pt/family education and to maximize pt's level of independence in the home and community environment.     Pt's spiritual, cultural and educational needs considered and pt agreeable to plan of care and goals.     Anticipated barriers to physical therapy: none    Goals:  Short Term Goals: 8-10 weeks   1. Pt will be compliant with HEP 50% of prescribed amount. MET  2. The pt to demo improvement in left knee ROM to equal right knee ROM pain free MET  3.  The pt to demo good quad set with proper hyperextension moment of the Left knee MET  4. The pt to demo ambualting with elast restricitve AD witout major compensatory pattern left knee for at least 20 feet MET     Long Term Goals: 56 weeks (progressing, not met)   Pt will be compliant with % of prescribed amount.   The pt to demo pain free and uncompensated running mechanics x10 min on an indoor treadmill  The pt to demo tolerance to a squat at or bellow parallel with uncompensated mechanics x10   The pt to demo strength of L LE within 10% of R LE as demo'd on the biodex machine   The pt to demo a deficit of 10% or less on a triple hop, single leg broad jump and crossover hop compared to non operative LE.   The pt will report full participation in ADLs and IADLs without restrictions related to L  knee.     PLAN     Progress SL plyos and landing mechanics as able. Progress through return to running phases. Strength as able.     Manjit Lazcano, PT, DPT

## 2023-01-11 ENCOUNTER — CLINICAL SUPPORT (OUTPATIENT)
Dept: REHABILITATION | Facility: HOSPITAL | Age: 28
End: 2023-01-11
Payer: COMMERCIAL

## 2023-01-11 DIAGNOSIS — R26.2 DIFFICULTY WALKING: ICD-10-CM

## 2023-01-11 DIAGNOSIS — M25.662 DECREASED RANGE OF MOTION OF LEFT KNEE: ICD-10-CM

## 2023-01-11 DIAGNOSIS — M62.81 QUADRICEPS WEAKNESS: ICD-10-CM

## 2023-01-11 DIAGNOSIS — M25.562 ACUTE PAIN OF LEFT KNEE: Primary | ICD-10-CM

## 2023-01-11 PROCEDURE — 97140 MANUAL THERAPY 1/> REGIONS: CPT

## 2023-01-11 PROCEDURE — 97530 THERAPEUTIC ACTIVITIES: CPT

## 2023-01-11 PROCEDURE — 97110 THERAPEUTIC EXERCISES: CPT

## 2023-01-11 PROCEDURE — 97112 NEUROMUSCULAR REEDUCATION: CPT

## 2023-01-11 NOTE — PROGRESS NOTES
[facility]   Physical Therapy Treatment Note     Name: Billy Holbrook  [facility] Number: [id number]    Therapy Diagnosis:   Encounter Diagnoses   Name Primary?    Acute pain of left knee Yes    Decreased range of motion of left knee     Quadriceps weakness     Difficulty walking      Physician: PEACE Dempsey MD    Visit Date: 1/11/2023    Physician Orders: PT Eval and Treat   Medical Diagnosis from Referral: M25.562 (ICD-10-CM) - Acute pain of left knee  Evaluation Date: 9/7/2022  Authorization Period Expiration: 9/6/2023  Plan of Care Expiration: 12/31/2022  Visit # / Visits authorized: 83     Precautions: Standard     Time In: 12:10 pm  Time Out: 1:40  Total Billable Time: 90 minutes    Procedure: 9/20/2022  1. Left Knee Arthroscopy, anterior cruciate ligament reconstruction [id number]  2.  Left Knee Arthroscopy, with meniscectomy (medial OR lateral) 87423  3.  Left Knee Arthroscopy, debridement/shaving of articular cartilage (chondroplasty) 07810  4.  Left Knee  platelet rich plasma injection to the bone-patellar tendon-bone harvest site    Post-Op Plan:  ACL reconstruction with a bone-patellar tendon-bone autograft    SUBJECTIVE     Pt reports: no complaints. Ready to run today.     He was compliant with home exercise program.  Response to previous treatment: no issues  Functional change: running progressing    Pain: 0/10  Location: left knee      OBJECTIVE     16 wks and 1 day post-op    Observation:   trace effusion noted L knee with sweep test  Well healed scar  Atrophy in calf/quad on L     Range of Motion (extension/neutral/flexion):     Right Left   Knee PROM: 8-0-125/130 deg P:8-0-130 deg  A:7-0-125 deg      Strength:     Right Left Comment   Knee Extension: 5/5 4/5        Knee Flexion: 5/5 4/5        Hip Abduction: 4+/5 4-/5       Not Today:   HHD knee extension at 60 deg flx: R= 181.6 lbs, L= 120.3 lbs no pain: LSI= 66%    Anterior reach Y balance R= 58 cm  L= 54 cm  Running  "wide base, slightly asymmetrical with decreased knee flexion at impact on L    Treatment     Billy received the treatments listed below:      therapeutic exercises to develop strength, endurance, ROM, flexibility, posture, and core stabilization for 28 minutes including:  Education/assessment  Heel prop 2 x 3 min with 10 lbs below, 7.5 lbs above  Quad set 20x 5" hold - decreased effusion   Manual stretching to L HS, glute, adductors, quad, hip rotators  LAQ 5 x 8->5 R=70 lbs, L= 20->42.5 lbs-NP  Standing clamshells BTB 3 x 15 linus-NP  SL bridge with pull down x 15 x 5 sec linus    manual therapy techniques: Joint mobilizations and Soft tissue Mobilization were applied for 10 minutes, including:  PFJ gr 3-4 glides all planes  Tibial AP glide gr 3-4 with and without rotation bias on and off heel prop  Knee hyperextension gr 3-4  Fat pad mobility  Scar mobilization    neuromuscular re-education activities to improve: Coordination, Kinesthetic, Sense, and Proprioception for 12 minutes. The following activities were included:  3 way med ball core 2 x 10 each 14 lbs  Power rotational med ball throws 14 lbs x 5 min: walking between throws  Mtn climbers on bosu 2 x 30 sec    Therapeutic activities to improve functional performance for 50 minutes, including:  SKTC, quad pull, HS scoop, HS march, high knee march, walking butt kicks, lunge, lateral lunge, band walks, monster walks  Sled push, pull, lateral pull 90 lbs added 1x each 30 yds  Sneaky lunge 3 x 10 linus  Step 3 stage 1 return to sprint program  Banded assisted SL hops green sport band 3 x 20 reps-NP  12 inch SL landings x 15 linus: working on increased knee flexion moment  SL squat vail 2 x 60 sec linus-NP    Patient Education and Home Exercises     Home Exercises Provided and Patient Education Provided     Education provided:   - HEP update, precautions, activity pacing, goals, timeframes    Written Home Exercises Provided: yes. Exercises were reviewed and Billy was able " to demonstrate them prior to the end of the session.  Billy demonstrated good  understanding of the education provided. See EMR under Patient Instructions for exercises provided during therapy sessions.     ASSESSMENT     Billy continues to improve his SL landing mechanics and acceptance of loading. His running form is looking much better. Maintaining full ROM well. Continues to have trace effusion with sweep test. Reports no pain.  Pt reported/demonstrated no adverse response or exacerbation of symptoms during today's session.      Billy Is progressing well towards his goals.   Pt prognosis is Good.     Pt will continue to benefit from skilled outpatient physical therapy to address the deficits listed in the problem list box on initial evaluation, provide pt/family education and to maximize pt's level of independence in the home and community environment.     Pt's spiritual, cultural and educational needs considered and pt agreeable to plan of care and goals.     Anticipated barriers to physical therapy: none    Goals:  Short Term Goals: 8-10 weeks   1. Pt will be compliant with HEP 50% of prescribed amount. MET  2. The pt to demo improvement in left knee ROM to equal right knee ROM pain free MET  3.  The pt to demo good quad set with proper hyperextension moment of the Left knee MET  4. The pt to demo ambualting with elast restricitve AD witout major compensatory pattern left knee for at least 20 feet MET     Long Term Goals: 56 weeks (progressing, not met)   Pt will be compliant with % of prescribed amount.   The pt to demo pain free and uncompensated running mechanics x10 min on an indoor treadmill  The pt to demo tolerance to a squat at or bellow parallel with uncompensated mechanics x10   The pt to demo strength of L LE within 10% of R LE as demo'd on the biodex machine   The pt to demo a deficit of 10% or less on a triple hop, single leg broad jump and crossover hop compared to non operative LE.    The pt will report full participation in ADLs and IADLs without restrictions related to L  knee.     PLAN     Progress SL plyos and landing mechanics as able. Progress through return to running phases. Strength as able.     Manjit Lazcano, PT, DPT

## 2023-01-12 ENCOUNTER — CLINICAL SUPPORT (OUTPATIENT)
Dept: REHABILITATION | Facility: HOSPITAL | Age: 28
End: 2023-01-12
Payer: COMMERCIAL

## 2023-01-12 DIAGNOSIS — M25.662 DECREASED RANGE OF MOTION OF LEFT KNEE: ICD-10-CM

## 2023-01-12 DIAGNOSIS — R26.2 DIFFICULTY WALKING: ICD-10-CM

## 2023-01-12 DIAGNOSIS — M25.562 ACUTE PAIN OF LEFT KNEE: Primary | ICD-10-CM

## 2023-01-12 DIAGNOSIS — M62.81 QUADRICEPS WEAKNESS: ICD-10-CM

## 2023-01-12 PROCEDURE — 97110 THERAPEUTIC EXERCISES: CPT

## 2023-01-12 PROCEDURE — 97140 MANUAL THERAPY 1/> REGIONS: CPT

## 2023-01-12 PROCEDURE — 97530 THERAPEUTIC ACTIVITIES: CPT

## 2023-01-12 NOTE — PROGRESS NOTES
[facility]   Physical Therapy Treatment Note     Name: Billy Holbrook  [facility] Number: [id number]    Therapy Diagnosis:   Encounter Diagnoses   Name Primary?    Acute pain of left knee Yes    Decreased range of motion of left knee     Quadriceps weakness     Difficulty walking      Physician: PEACE Dempsey MD    Visit Date: 1/12/2023    Physician Orders: PT Eval and Treat   Medical Diagnosis from Referral: M25.562 (ICD-10-CM) - Acute pain of left knee  Evaluation Date: 9/7/2022  Authorization Period Expiration: 9/6/2023  Plan of Care Expiration: 12/31/2022  Visit # / Visits authorized: 84     Precautions: Standard     Time In: 9:00 am  Time Out: 10:39  Total Billable Time: 99 minutes    Procedure: 9/20/2022  1. Left Knee Arthroscopy, anterior cruciate ligament reconstruction 28588  2.  Left Knee Arthroscopy, with meniscectomy (medial OR lateral) 43638  3.  Left Knee Arthroscopy, debridement/shaving of articular cartilage (chondroplasty) 43059  4.  Left Knee  platelet rich plasma injection to the bone-patellar tendon-bone harvest site    Post-Op Plan:  ACL reconstruction with a bone-patellar tendon-bone autograft    SUBJECTIVE     Pt reports: no complaints other than feeling a little stiff at first. Ready to exercise.     He was compliant with home exercise program.  Response to previous treatment: no issues  Functional change: running progressing    Pain: 0/10  Location: left knee      OBJECTIVE     16 wks and 2 days post-op    Observation:   trace effusion noted L knee with sweep test  Well healed scar  Atrophy in calf/quad on L     Range of Motion (extension/neutral/flexion):     Right Left   Knee PROM: 8-0-125/130 deg P:8-0-130 deg  A:7-0-125 deg      Strength:     Right Left Comment   Knee Extension: 5/5 4/5        Knee Flexion: 5/5 4/5        Hip Abduction: 4+/5 4-/5       Not Today:   HHD knee extension at 60 deg flx: R= 181.6 lbs, L= 120.3 lbs no pain: LSI= 66%    Anterior  "reach Y balance R= 58 cm  L= 54 cm  Running wide base, slightly asymmetrical with decreased knee flexion at impact on L    Treatment     Billy received the treatments listed below:      therapeutic exercises to develop strength, endurance, ROM, flexibility, posture, and core stabilization for 35 minutes including:  Education/assessment  Heel prop 2 x 3 min with 10 lbs below, 7.5 lbs above  Quad set 20x 5" hold - decreased effusion   Manual stretching to L HS, glute, adductors, quad, hip rotators  LAQ 5 x 8->5 R=55-65 lbs, L= 25->40 lbs  Standing clamshells BTB 3 x 15 linus  SL bridge with pull down x 15 x 5 sec linus-NP    manual therapy techniques: Joint mobilizations and Soft tissue Mobilization were applied for 15 minutes, including:  PFJ gr 3-4 glides all planes  Tibial AP glide gr 3-4 with and without rotation bias on and off heel prop  Knee hyperextension gr 3-4  Fat pad mobility  Scar mobilization    neuromuscular re-education activities to improve: Coordination, Kinesthetic, Sense, and Proprioception for 0 minutes. The following activities were included:    NP:  3 way med ball core 2 x 10 each 14 lbs  Power rotational med ball throws 14 lbs x 5 min: walking between throws  Mtn climbers on bosu 2 x 30 sec    Therapeutic activities to improve functional performance for 49 minutes, including:  SKTC, quad pull, HS scoop, HS march, high knee march, walking butt kicks, lunge, lateral lunge, band walks, monster walks  Sled push, pull, lateral pull 90 lbs added 1x each 30 yds-NP  Sneaky lunge 3 x 10 linus  Step 3 stage 1 return to sprint program-NP  Shuttle SL jumps; eccentric control focus 6-7 bands 3 x 10 linus  12 inch SL landings 2 x 8 linus: working on increased knee flexion moment  SL squat vail 2 x 60 sec linus  Shuttle SL extensions 4 x 6 linus 3-4 bands  RDLs 4 x 10 60-70 lb DBs    Patient Education and Home Exercises     Home Exercises Provided and Patient Education Provided     Education provided:   - HEP update, " precautions, activity pacing, goals, timeframes    Written Home Exercises Provided: yes. Exercises were reviewed and Billy was able to demonstrate them prior to the end of the session.  Billy demonstrated good  understanding of the education provided. See EMR under Patient Instructions for exercises provided during therapy sessions.     ASSESSMENT     Billy did well with strength focused session. Remains trace effusion that does not fluctuate. ROM full after some quick manual therapy.  Improved eccentric control with PWB jumps on shuttle today. Pt reported/demonstrated no adverse response or exacerbation of symptoms during today's session.      Billy Is progressing well towards his goals.   Pt prognosis is Good.     Pt will continue to benefit from skilled outpatient physical therapy to address the deficits listed in the problem list box on initial evaluation, provide pt/family education and to maximize pt's level of independence in the home and community environment.     Pt's spiritual, cultural and educational needs considered and pt agreeable to plan of care and goals.     Anticipated barriers to physical therapy: none    Goals:  Short Term Goals: 8-10 weeks   1. Pt will be compliant with HEP 50% of prescribed amount. MET  2. The pt to demo improvement in left knee ROM to equal right knee ROM pain free MET  3.  The pt to demo good quad set with proper hyperextension moment of the Left knee MET  4. The pt to demo ambualting with elast restricitve AD witout major compensatory pattern left knee for at least 20 feet MET     Long Term Goals: 56 weeks (progressing, not met)   Pt will be compliant with % of prescribed amount.   The pt to demo pain free and uncompensated running mechanics x10 min on an indoor treadmill  The pt to demo tolerance to a squat at or bellow parallel with uncompensated mechanics x10   The pt to demo strength of L LE within 10% of R LE as demo'd on the [employer] machine   The pt to demo  a deficit of 10% or less on a triple hop, single leg broad jump and crossover hop compared to non operative LE.   The pt will report full participation in ADLs and IADLs without restrictions related to L  knee.     PLAN     Progress SL plyos and landing mechanics as able. Progress through return to running phases. Strength as able.     Manjit Lazcano, PT, DPT

## 2023-01-13 ENCOUNTER — CLINICAL SUPPORT (OUTPATIENT)
Dept: REHABILITATION | Facility: HOSPITAL | Age: 28
End: 2023-01-13
Payer: COMMERCIAL

## 2023-01-13 DIAGNOSIS — M25.662 DECREASED RANGE OF MOTION OF LEFT KNEE: ICD-10-CM

## 2023-01-13 DIAGNOSIS — R26.2 DIFFICULTY WALKING: ICD-10-CM

## 2023-01-13 DIAGNOSIS — M25.562 ACUTE PAIN OF LEFT KNEE: Primary | ICD-10-CM

## 2023-01-13 DIAGNOSIS — M62.81 QUADRICEPS WEAKNESS: ICD-10-CM

## 2023-01-13 PROCEDURE — 97530 THERAPEUTIC ACTIVITIES: CPT

## 2023-01-13 PROCEDURE — 97110 THERAPEUTIC EXERCISES: CPT

## 2023-01-13 PROCEDURE — 97140 MANUAL THERAPY 1/> REGIONS: CPT

## 2023-01-13 NOTE — PROGRESS NOTES
[facility]   Physical Therapy Treatment Note     Name: Billy Holbrook  [facility] Number: [id number]    Therapy Diagnosis:   Encounter Diagnoses   Name Primary?    Acute pain of left knee Yes    Decreased range of motion of left knee     Quadriceps weakness     Difficulty walking      Physician: PEACE Dempsey MD    Visit Date: 1/13/2023    Physician Orders: PT Eval and Treat   Medical Diagnosis from Referral: M25.562 (ICD-10-CM) - Acute pain of left knee  Evaluation Date: 9/7/2022  Authorization Period Expiration: 9/6/2023  Plan of Care Expiration: 12/31/2022  Visit # / Visits authorized: 85     Precautions: Standard     Time In: 6:00 am  Time Out: 7:20  Total Billable Time: 60 minutes    Procedure: 9/20/2022  1. Left Knee Arthroscopy, anterior cruciate ligament reconstruction 95358  2.  Left Knee Arthroscopy, with meniscectomy (medial OR lateral) 16350  3.  Left Knee Arthroscopy, debridement/shaving of articular cartilage (chondroplasty) 96112  4.  Left Knee  platelet rich plasma injection to the bone-patellar tendon-bone harvest site    Post-Op Plan:  ACL reconstruction with a bone-patellar tendon-bone autograft    SUBJECTIVE     Pt reports: no complaints. Ready to exercise/run. Noted a painless click running in [place] extension that was fairly consistent. Denies any buckling or instability episodes/audible pops. He is more fatigued this week as he has been in clinic more; clinic closures for holiday.     He was compliant with home exercise program.  Response to previous treatment: no issues  Functional change: running progressing    Pain: 0/10  Location: left knee      OBJECTIVE     16 wks and 3 days post-op    Observation:   trace effusion noted L knee with sweep test  Well healed scar  Atrophy in calf/quad on L     Range of Motion (extension/neutral/flexion):     Right Left   Knee PROM: 8-0-125/130 deg P:8-0-130 deg  A:7-0-125 deg      Strength:     Right Left Comment   Knee  "Extension: 5/5 4/5        Knee Flexion: 5/5 4/5        Hip Abduction: 4+/5 4-/5       Not Today:   HHD knee extension at 60 deg flx: R= 181.6 lbs, L= 120.3 lbs no pain: LSI= 66%    Anterior reach Y balance R= 58 cm  L= 54 cm  Running wide base, slightly asymmetrical with decreased knee flexion at impact on L    Treatment     Billy received the treatments listed below:      therapeutic exercises to develop strength, endurance, ROM, flexibility, posture, and core stabilization for 25 minutes including:  Education/assessment  Heel prop 2 x 3 min with 10 lbs below, 7.5 lbs above  Quad set 20x 5" hold - decreased effusion   Manual stretching to L HS, glute, adductors, quad, hip rotators  LAQ 5 x 8->5 R=55-65 lbs, L= 25->40 lbs-NP  Standing clamshells BTB 3 x 15 linus-NP  SL bridge with pull down x 15 x 5 sec linus-NP    manual therapy techniques: Joint mobilizations and Soft tissue Mobilization were applied for 15 minutes, including:  PFJ gr 3-4 glides all planes  Tibial AP glide gr 3-4 with and without rotation bias on and off heel prop  Knee hyperextension gr 3-4  Fat pad mobility  Scar mobilization    neuromuscular re-education activities to improve: Coordination, Kinesthetic, Sense, and Proprioception for 0 minutes. The following activities were included:  NP:  3 way med ball core 2 x 10 each 14 lbs  Power rotational med ball throws 14 lbs x 5 min: walking between throws  Mtn climbers on bosu 2 x 30 sec    Therapeutic activities to improve functional performance for 40 minutes, including:  SKTC, quad pull, HS scoop, HS march, high knee march, walking butt kicks, lunge, lateral lunge, band walks, monster walks; skipping, butt kicks, high knees, hip fig 4 pulls  Sled push, pull, lateral pull 90 lbs added 1x each 30 yds-NP  Sneaky lunge 3 x 10 linus-NP  Step 4 stage 1 return to sprint program  Shuttle SL jumps; eccentric control focus 6-7 bands 3 x 10 linus-NP  12 inch SL landings 2 x 8 linus: working on increased knee flexion " moment-NP  SL squat vail 2 x 60 sec linus-NP  Shuttle SL hip/knee extensions 4 x 6 linus 3-4 bands-NP  RDLs 4 x 10 60-70 lb Dbs-NP    Patient Education and Home Exercises     Home Exercises Provided and Patient Education Provided     Education provided:   - HEP update, precautions, activity pacing, goals, timeframes    Written Home Exercises Provided: yes. Exercises were reviewed and Billy was able to demonstrate them prior to the end of the session.  Billy demonstrated good  understanding of the education provided. See EMR under Patient Instructions for exercises provided during therapy sessions.     ASSESSMENT     Billy noted a consistent painless click with running today that was not limiting. Full ROM noted and no click with unloaded ROM. Lachman's had very mild laxity compared to R, but solid end-feel; I was unable to get a good grasp on his R leg due to muscle mass for a good comparison. Anterior drawer was solid linus. He denies any buckling or instability feeling.  The skipping today felt weird at first. He did have some swelling ongoing today. Advised on weekend recovery, will re-assess knee next week and likely de-load the week. Will discuss with MD and ATC today. Pt reported/demonstrated no adverse response or exacerbation of symptoms during today's session.      Billy Is progressing well towards his goals.   Pt prognosis is Good.     Pt will continue to benefit from skilled outpatient physical therapy to address the deficits listed in the problem list box on initial evaluation, provide pt/family education and to maximize pt's level of independence in the home and community environment.     Pt's spiritual, cultural and educational needs considered and pt agreeable to plan of care and goals.     Anticipated barriers to physical therapy: none    Goals:  Short Term Goals: 8-10 weeks   1. Pt will be compliant with HEP 50% of prescribed amount. MET  2. The pt to demo improvement in left knee ROM to equal right  knee ROM pain free MET  3.  The pt to demo good quad set with proper hyperextension moment of the Left knee MET  4. The pt to demo ambualting with elast restricitve AD witout major compensatory pattern left knee for at least 20 feet MET     Long Term Goals: 56 weeks (progressing, not met)   Pt will be compliant with % of prescribed amount.   The pt to demo pain free and uncompensated running mechanics x10 min on an indoor treadmill  The pt to demo tolerance to a squat at or bellow parallel with uncompensated mechanics x10   The pt to demo strength of L LE within 10% of R LE as demo'd on the biodex machine   The pt to demo a deficit of 10% or less on a triple hop, single leg broad jump and crossover hop compared to non operative LE.   The pt will report full participation in ADLs and IADLs without restrictions related to L  knee.     PLAN     Assess knee and progress/de-load as indicated.     Manjit Lazcano, PT, DPT

## 2023-01-17 ENCOUNTER — CLINICAL SUPPORT (OUTPATIENT)
Dept: REHABILITATION | Facility: HOSPITAL | Age: 28
End: 2023-01-17
Payer: COMMERCIAL

## 2023-01-17 DIAGNOSIS — M25.562 ACUTE PAIN OF LEFT KNEE: Primary | ICD-10-CM

## 2023-01-17 DIAGNOSIS — M25.662 DECREASED RANGE OF MOTION OF LEFT KNEE: ICD-10-CM

## 2023-01-17 DIAGNOSIS — R26.2 DIFFICULTY WALKING: ICD-10-CM

## 2023-01-17 DIAGNOSIS — M62.81 QUADRICEPS WEAKNESS: ICD-10-CM

## 2023-01-17 PROCEDURE — 97140 MANUAL THERAPY 1/> REGIONS: CPT

## 2023-01-17 PROCEDURE — 97110 THERAPEUTIC EXERCISES: CPT

## 2023-01-17 PROCEDURE — 97530 THERAPEUTIC ACTIVITIES: CPT

## 2023-01-17 NOTE — PROGRESS NOTES
[facility]   Physical Therapy Treatment Note     Name: Billy Holbrook  [facility] Number: [id number]    Therapy Diagnosis:   Encounter Diagnoses   Name Primary?    Acute pain of left knee Yes    Decreased range of motion of left knee     Quadriceps weakness     Difficulty walking        Physician: PEACE Dempsey MD    Visit Date: 1/17/2023    Physician Orders: PT Eval and Treat   Medical Diagnosis from Referral: M25.562 (ICD-10-CM) - Acute pain of left knee  Evaluation Date: 9/7/2022  Authorization Period Expiration: 9/6/2023  Plan of Care Expiration: 12/31/2022  Visit # / Visits authorized: 87     Precautions: Standard     Time In: 11:00 am  Time Out: 12:20  Total Billable Time: 80 minutes    Procedure: 9/20/2022  1. Left Knee Arthroscopy, anterior cruciate ligament reconstruction [id number]  2.  Left Knee Arthroscopy, with meniscectomy (medial OR lateral) 08912  3.  Left Knee Arthroscopy, debridement/shaving of articular cartilage (chondroplasty) 44230  4.  Left Knee  platelet rich plasma injection to the bone-patellar tendon-bone harvest site    Post-Op Plan:  ACL reconstruction with a bone-patellar tendon-bone autograft    SUBJECTIVE     Pt reports: feeling good today. Denies any buckling or instability still. No clicking in knee over weekend. Did miss his game chair over the weekend leading to a fall; denies any injury. Mild click with loaded TKE motion in session- but not consistent or as significant. He is not worried about the symptoms. Denies any pain. States he had his physical on 1/6 or 1/7 with [facility] who said his knee looks great.     He was compliant with home exercise program.  Response to previous treatment: no issues  Functional change: running progressing    Pain: 0/10  Location: left knee      OBJECTIVE     18 wks post-op    Observation:   trace effusion noted L knee with sweep test  Well healed scar  Atrophy in calf/quad on L     Range of Motion  "(extension/neutral/flexion):     Right Left   Knee PROM: 8-0-125/130 deg P:8-0-130 deg  A:7-0-125 deg      Strength:     Right Left Comment   Knee Extension: 5/5 4/5        Knee Flexion: 5/5 4/5        Hip Abduction: 4+/5 4-/5       Not Today:   HHD knee extension at 60 deg flx: R= 181.6 lbs, L= 120.3 lbs no pain: LSI= 66%    Anterior reach Y balance R= 58 cm  L= 54 cm  Running wide base, slightly asymmetrical with decreased knee flexion at impact on L    Treatment     Billy received the treatments listed below:      therapeutic exercises to develop strength, endurance, ROM, flexibility, posture, and core stabilization for 45 minutes including:  Education/assessment  Heel prop 2 x 3 min with 10 lbs below, 7.5 lbs above  Quad set 2 x20x 5" hold - decreased effusion   Manual stretching to L HS, glute, adductors, quad, hip rotators  SL clamshells 3 x 15 bl black band  DL glute bridge 3 x 15 x 5 sec black band  Standing SL heel raise 3 x 15 linus  Standing hip abd GTB at feet 3 x 15 linus  BFR 60% LOP  -LAQ 15->20 lbs  -standing HS curls 10 lbs  -DL squat to 24 inch box      manual therapy techniques: Joint mobilizations and Soft tissue Mobilization were applied for 15 minutes, including:  PFJ gr 3-4 glides all planes  Tibial AP glide gr 3-4 with and without rotation bias on and off heel prop  Knee hyperextension gr 3-4  Fat pad mobility  Scar mobilization    neuromuscular re-education activities to improve: Coordination, Kinesthetic, Sense, and Proprioception for 5 minutes. The following activities were included:  3 way cone taps on foam x 15 each way    Therapeutic activities to improve functional performance for 15 minutes, including:  SKTC, quad pull, HS scoop, HS march, high knee march, walking butt kicks, lunge, lateral lunge, band walks, monster walks  Jogging x 2 laps 30 yds  Sneaky lunge 3 x 10 linus-NP    Patient Education and Home Exercises     Home Exercises Provided and Patient Education Provided     Education " provided:   - HEP update, precautions, activity pacing, goals, timeframes    Written Home Exercises Provided: yes. Exercises were reviewed and Billy was able to demonstrate them prior to the end of the session.  Billy demonstrated good  understanding of the education provided. See EMR under Patient Instructions for exercises provided during therapy sessions.     ASSESSMENT     Billy presented today with some clicking noted with LAQ and TKE under load. Much improved compared to Friday last week. Seems to be some mild PF type symptoms with some mild swelling and fat pad inflammation. He denies pain or instability. Able to jog today a short distance without clicking. Focusing session on light BFR and non-BFR tasks without irritability.  Pt reported/demonstrated no adverse response or exacerbation of symptoms during today's session.      Billy Is progressing well towards his goals.   Pt prognosis is Good.     Pt will continue to benefit from skilled outpatient physical therapy to address the deficits listed in the problem list box on initial evaluation, provide pt/family education and to maximize pt's level of independence in the home and community environment.     Pt's spiritual, cultural and educational needs considered and pt agreeable to plan of care and goals.     Anticipated barriers to physical therapy: none    Goals:  Short Term Goals: 8-10 weeks   1. Pt will be compliant with HEP 50% of prescribed amount. MET  2. The pt to demo improvement in left knee ROM to equal right knee ROM pain free MET  3.  The pt to demo good quad set with proper hyperextension moment of the Left knee MET  4. The pt to demo ambualting with elast restricitve AD witout major compensatory pattern left knee for at least 20 feet MET     Long Term Goals: 56 weeks (progressing, not met)   Pt will be compliant with % of prescribed amount.   The pt to demo pain free and uncompensated running mechanics x10 min on an indoor  treadmill  The pt to demo tolerance to a squat at or bellow parallel with uncompensated mechanics x10   The pt to demo strength of L LE within 10% of R LE as demo'd on the biodex machine   The pt to demo a deficit of 10% or less on a triple hop, single leg broad jump and crossover hop compared to non operative LE.   The pt will report full participation in ADLs and IADLs without restrictions related to L  knee.     PLAN     Assess knee and progress/de-load as indicated.     Manjit Lazcano, PT, DPT

## 2023-01-18 ENCOUNTER — CLINICAL SUPPORT (OUTPATIENT)
Dept: REHABILITATION | Facility: HOSPITAL | Age: 28
End: 2023-01-18
Payer: COMMERCIAL

## 2023-01-18 DIAGNOSIS — R26.2 DIFFICULTY WALKING: ICD-10-CM

## 2023-01-18 DIAGNOSIS — M25.562 ACUTE PAIN OF LEFT KNEE: Primary | ICD-10-CM

## 2023-01-18 DIAGNOSIS — M25.662 DECREASED RANGE OF MOTION OF LEFT KNEE: ICD-10-CM

## 2023-01-18 DIAGNOSIS — M62.81 QUADRICEPS WEAKNESS: ICD-10-CM

## 2023-01-18 PROCEDURE — 97140 MANUAL THERAPY 1/> REGIONS: CPT

## 2023-01-18 PROCEDURE — 97530 THERAPEUTIC ACTIVITIES: CPT

## 2023-01-18 PROCEDURE — 97110 THERAPEUTIC EXERCISES: CPT

## 2023-01-18 NOTE — PROGRESS NOTES
[facility]   Physical Therapy Treatment Note     Name: Billy Holbrook  [facility] Number: [id number]    Therapy Diagnosis:   Encounter Diagnoses   Name Primary?    Acute pain of left knee Yes    Decreased range of motion of left knee     Quadriceps weakness     Difficulty walking        Physician: PEACE Dempsey MD    Visit Date: 1/18/2023    Physician Orders: PT Eval and Treat   Medical Diagnosis from Referral: M25.562 (ICD-10-CM) - Acute pain of left knee  Evaluation Date: 9/7/2022  Authorization Period Expiration: 9/6/2023  Plan of Care Expiration: 12/31/2022  Visit # / Visits authorized: 88     Precautions: Standard     Time In: 10:30 am  Time Out: 12:10  Total Billable Time: 100 minutes    Procedure: 9/20/2022  1. Left Knee Arthroscopy, anterior cruciate ligament reconstruction 36160  2.  Left Knee Arthroscopy, with meniscectomy (medial OR lateral) 12833  3.  Left Knee Arthroscopy, debridement/shaving of articular cartilage (chondroplasty) 29075  4.  Left Knee  platelet rich plasma injection to the bone-patellar tendon-bone harvest site    Post-Op Plan:  ACL reconstruction with a bone-patellar tendon-bone autograft    SUBJECTIVE     Pt reports: his knee feels good. No instability or clicking. No pain. Some low back soreness from hip exercises yesterday, but not bad. Denies any prior injury post-surgery.     He was compliant with home exercise program.  Response to previous treatment: no issues  Functional change: running progressing    Pain: 0/10  Location: left knee      OBJECTIVE     18 wks 1 wk post-op    Observation:   trace effusion noted L knee with sweep test  Well healed scar  Atrophy in calf/quad on L     Range of Motion (extension/neutral/flexion):     Right Left   Knee PROM: 8-0-125/130 deg P:8-0-130 deg  A:7-0-125 deg      Strength:     Right Left Comment   Knee Extension: 5/5 4/5        Knee Flexion: 5/5 4/5        Hip Abduction: 4+/5 4-/5       Not Today:   HHD knee  "extension at 60 deg flx: R= 181.6 lbs, L= 120.3 lbs no pain: LSI= 66%    Anterior reach Y balance R= 58 cm  L= 54 cm  Running wide base, slightly asymmetrical with decreased knee flexion at impact on L    Treatment     Billy received the treatments listed below:      therapeutic exercises to develop strength, endurance, ROM, flexibility, posture, and core stabilization for 25 minutes including:  Education/assessment  Heel prop 2 x 3 min with 10 lbs below, 7.5 lbs above  Quad set 2 x20x 5" hold - decreased effusion   Manual stretching to L HS, glute, adductors, quad, hip rotators      manual therapy techniques: Joint mobilizations and Soft tissue Mobilization were applied for 15 minutes, including:  PFJ gr 3-4 glides all planes  Tibial AP glide gr 3-4 with and without rotation bias on and off heel prop  Knee hyperextension gr 3-4  Fat pad mobility  Scar mobilization    neuromuscular re-education activities to improve: Coordination, Kinesthetic, Sense, and Proprioception for 0 minutes. The following activities were included:  NP    Therapeutic activities to improve functional performance for 60 minutes, including:  SKTC, quad pull, HS scoop, HS march, high knee march, walking butt kicks, lunge, lateral lunge, band walks, monster walks  Jogging x 2 laps 30 yds  Sneaky lunge 4 x 10 linus  Jerome ropes 4 x 30 sec in mini squat  SL RDL landmine 4 x 10 linus 50->75 added lbs  Bilateral row landmine with towel  4 x 15 75 lbs added  12 inch step ups 4 x 8 linus 50 added lbs  Retro sled pull 4 x 30 yds 90 added lbs    Patient Education and Home Exercises     Home Exercises Provided and Patient Education Provided     Education provided:   - HEP update, precautions, activity pacing, goals, timeframes    Written Home Exercises Provided: yes. Exercises were reviewed and Billy was able to demonstrate them prior to the end of the session.  Billy demonstrated good  understanding of the education provided. See EMR under Patient " Instructions for exercises provided during therapy sessions.     ASSESSMENT     Billy presented today with only 1-3 times of a click sensation in session. He had a much better tolerance to end range extension work. He did well with circuit training today with good cardiovascular and strength effect. Mild effusion decreased in session, but remains mild.  Discussed case with MD in detail. No significant laxity with Lachman noted today. Fat pad remains inflamed, but improves with manual therapy. Pt reported/demonstrated no adverse response or exacerbation of symptoms during today's session.      Billy Is progressing well towards his goals.   Pt prognosis is Good.     Pt will continue to benefit from skilled outpatient physical therapy to address the deficits listed in the problem list box on initial evaluation, provide pt/family education and to maximize pt's level of independence in the home and community environment.     Pt's spiritual, cultural and educational needs considered and pt agreeable to plan of care and goals.     Anticipated barriers to physical therapy: none    Goals:  Short Term Goals: 8-10 weeks   1. Pt will be compliant with HEP 50% of prescribed amount. MET  2. The pt to demo improvement in left knee ROM to equal right knee ROM pain free MET  3.  The pt to demo good quad set with proper hyperextension moment of the Left knee MET  4. The pt to demo ambualting with elast restricitve AD witout major compensatory pattern left knee for at least 20 feet MET     Long Term Goals: 56 weeks (progressing, not met)   Pt will be compliant with % of prescribed amount.   The pt to demo pain free and uncompensated running mechanics x10 min on an indoor treadmill  The pt to demo tolerance to a squat at or bellow parallel with uncompensated mechanics x10   The pt to demo strength of L LE within 10% of R LE as demo'd on the biodex machine   The pt to demo a deficit of 10% or less on a triple hop, single leg  broad jump and crossover hop compared to non operative LE.   The pt will report full participation in ADLs and IADLs without restrictions related to L  knee.     PLAN     Assess knee and progress/de-load as indicated.     Manjit Lazcano, PT, DPT

## 2023-01-19 ENCOUNTER — CLINICAL SUPPORT (OUTPATIENT)
Dept: REHABILITATION | Facility: HOSPITAL | Age: 28
End: 2023-01-19
Payer: COMMERCIAL

## 2023-01-19 DIAGNOSIS — M62.81 QUADRICEPS WEAKNESS: ICD-10-CM

## 2023-01-19 DIAGNOSIS — M25.562 ACUTE PAIN OF LEFT KNEE: Primary | ICD-10-CM

## 2023-01-19 DIAGNOSIS — M25.662 DECREASED RANGE OF MOTION OF LEFT KNEE: ICD-10-CM

## 2023-01-19 DIAGNOSIS — R26.2 DIFFICULTY WALKING: ICD-10-CM

## 2023-01-19 PROCEDURE — 97530 THERAPEUTIC ACTIVITIES: CPT

## 2023-01-19 PROCEDURE — 97110 THERAPEUTIC EXERCISES: CPT

## 2023-01-19 PROCEDURE — 97140 MANUAL THERAPY 1/> REGIONS: CPT

## 2023-01-19 PROCEDURE — 97112 NEUROMUSCULAR REEDUCATION: CPT

## 2023-01-20 ENCOUNTER — CLINICAL SUPPORT (OUTPATIENT)
Dept: REHABILITATION | Facility: HOSPITAL | Age: 28
End: 2023-01-20
Payer: COMMERCIAL

## 2023-01-20 DIAGNOSIS — R26.2 DIFFICULTY WALKING: ICD-10-CM

## 2023-01-20 DIAGNOSIS — M25.562 ACUTE PAIN OF LEFT KNEE: Primary | ICD-10-CM

## 2023-01-20 DIAGNOSIS — M62.81 QUADRICEPS WEAKNESS: ICD-10-CM

## 2023-01-20 DIAGNOSIS — M25.662 DECREASED RANGE OF MOTION OF LEFT KNEE: ICD-10-CM

## 2023-01-20 PROCEDURE — 97530 THERAPEUTIC ACTIVITIES: CPT

## 2023-01-20 PROCEDURE — 97140 MANUAL THERAPY 1/> REGIONS: CPT

## 2023-01-20 PROCEDURE — 97110 THERAPEUTIC EXERCISES: CPT

## 2023-01-20 PROCEDURE — 97112 NEUROMUSCULAR REEDUCATION: CPT

## 2023-01-20 NOTE — PROGRESS NOTES
[facility]   Physical Therapy Treatment Note     Name: Billy Holbrook  [facility] Number: [id number]    Therapy Diagnosis:   Encounter Diagnoses   Name Primary?    Acute pain of left knee Yes    Decreased range of motion of left knee     Quadriceps weakness     Difficulty walking        Physician: PEACE Dempsey MD    Visit Date: 1/19/2023    Physician Orders: PT Eval and Treat   Medical Diagnosis from Referral: M25.562 (ICD-10-CM) - Acute pain of left knee  Evaluation Date: 9/7/2022  Authorization Period Expiration: 9/6/2023  Plan of Care Expiration: 12/31/2022  Visit # / Visits authorized: 89     Precautions: Standard     Time In: 11:00 am  Time Out: 12:40  Total Billable Time: 100 minutes    Procedure: 9/20/2022  1. Left Knee Arthroscopy, anterior cruciate ligament reconstruction 15126  2.  Left Knee Arthroscopy, with meniscectomy (medial OR lateral) 07698  3.  Left Knee Arthroscopy, debridement/shaving of articular cartilage (chondroplasty) 97505  4.  Left Knee  platelet rich plasma injection to the bone-patellar tendon-bone harvest site    Post-Op Plan:  ACL reconstruction with a bone-patellar tendon-bone autograft    SUBJECTIVE     Pt reports: his knee continues to feel better this week. Noted 1-2 mild clicks initially jogging, but once warmed up no mechanical symptoms. Mild swelling noted, but not fluctuating. Denies any buckling or instability.     He was compliant with home exercise program.  Response to previous treatment: no issues  Functional change: running progressing    Pain: 0/10  Location: left knee      OBJECTIVE     18 wks 2 days post-op    Observation:   trace effusion noted L knee with sweep test  Well healed scar  Atrophy in calf/quad on L     Range of Motion (extension/neutral/flexion):     Right Left   Knee PROM: 8-0-125/130 deg P:8-0-130 deg  A:7-0-125 deg      Strength:     Right Left Comment   Knee Extension: 181.6 lbs 120.3 lbs     LSI 66%   Knee Flexion: 5/5  "4/5        Hip Abduction: 4+/5 4-/5       Anterior reach Y balance R= 58 cm  L= 54 cm  Running wide base, slightly asymmetrical with decreased knee flexion at impact on L    Treatment     Billy received the treatments listed below:      therapeutic exercises to develop strength, endurance, ROM, flexibility, posture, and core stabilization for 65 minutes including:  Education/assessment  Bike intervals 2 min easy 30/30 lv 10->15 RPM >110 for 12 minutes then 2 min easy  Heel prop 2 x 3 min with 10 lbs below, 7.5 lbs above  Quad set 2 x20x 5" hold - decreased effusion   Manual stretching to L HS, glute, adductors, quad, hip rotators  SL HS eccentric 3 x 8 linus on slide disk from glute bridge  BFR 70% LOP  -LAQ 15 lbs  -HS curl 25 lbs  -Leg press  lbs      manual therapy techniques: Joint mobilizations and Soft tissue Mobilization were applied for 10 minutes, including:  PFJ gr 3-4 glides all planes  Tibial AP glide gr 3-4 with and without rotation bias on and off heel prop  Knee hyperextension gr 3-4  Fat pad mobility  Scar mobilization    neuromuscular re-education activities to improve: Coordination, Kinesthetic, Sense, and Proprioception for 10 minutes. The following activities were included:  Up/down planks 3 x 20 sec  Side plank clams 3 x 20 linus Black TB    Therapeutic activities to improve functional performance for 15 minutes, including:  SKTC, quad pull, HS scoop, HS march, high knee march, walking butt kicks, lunge, lateral lunge, band walks, monster walks  Jogging x 2 laps 30 yds      Patient Education and Home Exercises     Home Exercises Provided and Patient Education Provided     Education provided:   - HEP update, precautions, activity pacing, goals, timeframes    Written Home Exercises Provided: yes. Exercises were reviewed and Billy was able to demonstrate them prior to the end of the session.  Billy demonstrated good  understanding of the education provided. See EMR under Patient Instructions " for exercises provided during therapy sessions.     ASSESSMENT     Billy continues to report less mechanical symptoms with jogging. Focused session on some unloaded cardio into some core work/BFR strengthening. Pt reported/demonstrated no adverse response or exacerbation of symptoms during today's session.      Billy Is progressing well towards his goals.   Pt prognosis is Good.     Pt will continue to benefit from skilled outpatient physical therapy to address the deficits listed in the problem list box on initial evaluation, provide pt/family education and to maximize pt's level of independence in the home and community environment.     Pt's spiritual, cultural and educational needs considered and pt agreeable to plan of care and goals.     Anticipated barriers to physical therapy: none    Goals:  Short Term Goals: 8-10 weeks   1. Pt will be compliant with HEP 50% of prescribed amount. MET  2. The pt to demo improvement in left knee ROM to equal right knee ROM pain free MET  3.  The pt to demo good quad set with proper hyperextension moment of the Left knee MET  4. The pt to demo ambualting with elast restricitve AD witout major compensatory pattern left knee for at least 20 feet MET     Long Term Goals: 56 weeks (progressing, not met)   Pt will be compliant with % of prescribed amount.   The pt to demo pain free and uncompensated running mechanics x10 min on an indoor treadmill  The pt to demo tolerance to a squat at or bellow parallel with uncompensated mechanics x10   The pt to demo strength of L LE within 10% of R LE as demo'd on the biodex machine   The pt to demo a deficit of 10% or less on a triple hop, single leg broad jump and crossover hop compared to non operative LE.   The pt will report full participation in ADLs and IADLs without restrictions related to L  knee.     PLAN     Assess knee and progress/de-load as indicated.     Manjit Lazcano, PT, DPT

## 2023-01-21 NOTE — PROGRESS NOTES
[facility]   Physical Therapy Treatment Note     Name: Billy Holbrook  [facility] Number: [id number]    Therapy Diagnosis:   Encounter Diagnoses   Name Primary?    Acute pain of left knee Yes    Decreased range of motion of left knee     Quadriceps weakness     Difficulty walking        Physician: PEACE Dempsey MD    Visit Date: 1/20/2023    Physician Orders: PT Eval and Treat   Medical Diagnosis from Referral: M25.562 (ICD-10-CM) - Acute pain of left knee  Evaluation Date: 9/7/2022  Authorization Period Expiration: 9/6/2023  Plan of Care Expiration: 12/31/2022  Visit # / Visits authorized: 90     Precautions: Standard     Time In: 6:45 am  Time Out: 8:15  Total Billable Time: 90 minutes    Procedure: 9/20/2022  1. Left Knee Arthroscopy, anterior cruciate ligament reconstruction 95046  2.  Left Knee Arthroscopy, with meniscectomy (medial OR lateral) 92631  3.  Left Knee Arthroscopy, debridement/shaving of articular cartilage (chondroplasty) 56389  4.  Left Knee  platelet rich plasma injection to the bone-patellar tendon-bone harvest site    Post-Op Plan:  ACL reconstruction with a bone-patellar tendon-bone autograft    SUBJECTIVE     Pt reports: his knee is feeling good, ready to exercise. Still weaker with SL tasks on L v R. No pain, instability, or buckling.      He was compliant with home exercise program.  Response to previous treatment: no issues  Functional change: running progressing    Pain: 0/10  Location: left knee      OBJECTIVE     18 wks 3 days post-op    Observation:   trace effusion noted L knee with sweep test  Well healed scar  Atrophy in calf/quad on L     Range of Motion (extension/neutral/flexion):     Right Left   Knee PROM: 8-0-125/130 deg P:8-0-130 deg  A:7-0-125 deg      Strength:     Right Left Comment   Knee Extension: 181.6 lbs 120.3 lbs     LSI 66%   Knee Flexion: 5/5 4/5        Hip Abduction: 4+/5 4-/5       Anterior reach Y balance R= 58 cm  L= 54  "cm  Running wide base, slightly asymmetrical with decreased knee flexion at impact on L    Treatment     Billy received the treatments listed below:      therapeutic exercises to develop strength, endurance, ROM, flexibility, posture, and core stabilization for 25 minutes including:  Education/assessment  Heel prop 2 x 3 min with 10 lbs below, 7.5 lbs above  Quad set 2 x20x 5" hold - decreased effusion   Manual stretching to L HS, glute, adductors, quad, hip rotators  HS curl up from glute bridge on swiss ball 3 x 12  Seated soleus raise 3 x 20 70->100 lbs      manual therapy techniques: Joint mobilizations and Soft tissue Mobilization were applied for 10 minutes, including:  PFJ gr 3-4 glides all planes  Tibial AP glide gr 3-4 with and without rotation bias on and off heel prop  Knee hyperextension gr 3-4  Fat pad mobility  Scar mobilization    neuromuscular re-education activities to improve: Coordination, Kinesthetic, Sense, and Proprioception for 25 minutes. The following activities were included:  Sled pull in long sitting with 135 lbs added x 2 15 yds 3 rds  Isometric knee extensions at 60 deg 8 x 30 sec  Wall sit 3 x 30 sec  Suitcase carry 3 x 10 yds eau UE with 2 sec march pause    Therapeutic activities to improve functional performance for 40 minutes, including:  SKTC, quad pull, HS scoop, HS march, high knee march, walking butt kicks, lunge, lateral lunge, band walks, monster walks  Squat throw 14 lb MB for distance walking between to ball x 5 min  Split squat 3 x 8 linus 70->100 lbs safety bar to 6 inch box  Elliptical x 15 min lv 3->5      Patient Education and Home Exercises     Home Exercises Provided and Patient Education Provided     Education provided:   - HEP update, precautions, activity pacing, goals, timeframes    Written Home Exercises Provided: yes. Exercises were reviewed and Billy was able to demonstrate them prior to the end of the session.  Billy demonstrated good  understanding of the " education provided. See EMR under Patient Instructions for exercises provided during therapy sessions.     ASSESSMENT     Billy continues to have some very mild effusion that is not fluctuating and reduces in session. Circuit training utilized for some cardiovascular effect. No complaints other than expected with exercises. L LE remains weaker than R as expected, ROM and joint mobility are good. No clicking at all today. Pt reported/demonstrated no adverse response or exacerbation of symptoms during today's session.      Billy Is progressing well towards his goals.   Pt prognosis is Good.     Pt will continue to benefit from skilled outpatient physical therapy to address the deficits listed in the problem list box on initial evaluation, provide pt/family education and to maximize pt's level of independence in the home and community environment.     Pt's spiritual, cultural and educational needs considered and pt agreeable to plan of care and goals.     Anticipated barriers to physical therapy: none    Goals:  Short Term Goals: 8-10 weeks   1. Pt will be compliant with HEP 50% of prescribed amount. MET  2. The pt to demo improvement in left knee ROM to equal right knee ROM pain free MET  3.  The pt to demo good quad set with proper hyperextension moment of the Left knee MET  4. The pt to demo ambualting with elast restricitve AD witout major compensatory pattern left knee for at least 20 feet MET     Long Term Goals: 56 weeks (progressing, not met)   Pt will be compliant with % of prescribed amount.   The pt to demo pain free and uncompensated running mechanics x10 min on an indoor treadmill  The pt to demo tolerance to a squat at or bellow parallel with uncompensated mechanics x10   The pt to demo strength of L LE within 10% of R LE as demo'd on the biodex machine   The pt to demo a deficit of 10% or less on a triple hop, single leg broad jump and crossover hop compared to non operative LE.   The pt will  report full participation in ADLs and IADLs without restrictions related to L  knee.     PLAN     Assess knee and progress/de-load as indicated.     Manjit Lazcano, PT, DPT

## 2023-01-23 ENCOUNTER — CLINICAL SUPPORT (OUTPATIENT)
Dept: REHABILITATION | Facility: HOSPITAL | Age: 28
End: 2023-01-23
Payer: COMMERCIAL

## 2023-01-23 DIAGNOSIS — R26.2 DIFFICULTY WALKING: ICD-10-CM

## 2023-01-23 DIAGNOSIS — M62.81 QUADRICEPS WEAKNESS: ICD-10-CM

## 2023-01-23 DIAGNOSIS — M25.562 ACUTE PAIN OF LEFT KNEE: Primary | ICD-10-CM

## 2023-01-23 DIAGNOSIS — M25.662 DECREASED RANGE OF MOTION OF LEFT KNEE: ICD-10-CM

## 2023-01-23 PROCEDURE — 97112 NEUROMUSCULAR REEDUCATION: CPT

## 2023-01-23 PROCEDURE — 97110 THERAPEUTIC EXERCISES: CPT

## 2023-01-23 PROCEDURE — 97530 THERAPEUTIC ACTIVITIES: CPT

## 2023-01-23 PROCEDURE — 97140 MANUAL THERAPY 1/> REGIONS: CPT

## 2023-01-23 NOTE — PROGRESS NOTES
[facility]   Physical Therapy Treatment Note     Name: Billy Holbrook  [facility] Number: [id number]    Therapy Diagnosis:   Encounter Diagnoses   Name Primary?    Acute pain of left knee Yes    Decreased range of motion of left knee     Quadriceps weakness     Difficulty walking      Physician: PEACE Dempsey MD    Visit Date: 1/23/2023    Physician Orders: PT Eval and Treat   Medical Diagnosis from Referral: M25.562 (ICD-10-CM) - Acute pain of left knee  Evaluation Date: 9/7/2022  Authorization Period Expiration: 9/6/2023  Plan of Care Expiration: 12/31/2022  Visit # / Visits authorized: 91     Precautions: Standard     Time In: 11:00 am  Time Out: 12:30  Total Billable Time: 90 minutes    Procedure: 9/20/2022  1. Left Knee Arthroscopy, anterior cruciate ligament reconstruction 04886  2.  Left Knee Arthroscopy, with meniscectomy (medial OR lateral) 93141  3.  Left Knee Arthroscopy, debridement/shaving of articular cartilage (chondroplasty) 00625  4.  Left Knee  platelet rich plasma injection to the bone-patellar tendon-bone harvest site    Post-Op Plan:  ACL reconstruction with a bone-patellar tendon-bone autograft    SUBJECTIVE     Pt reports: no issues with running today. No clicking at all. Felt great. Better kneeling as well.     He was compliant with home exercise program.  Response to previous treatment: no issues  Functional change: no click running today    Pain: 0/10  Location: left knee      OBJECTIVE     4 mo 3 days post-op    Observation:   trace effusion noted L knee with sweep test  Well healed scar  Atrophy in calf/quad on L     Range of Motion (extension/neutral/flexion):     Right Left   Knee PROM: 8-0-125/130 deg P:8-0-130 deg  A:7-0-125 deg      Strength:     Right Left Comment   Knee Extension: 181.6 lbs 120.3 lbs     LSI 66%   Knee Flexion: 5/5 4/5        Hip Abduction: 4+/5 4-/5       Anterior reach Y balance R= 58 cm  L= 54 cm  Running wide base, slightly  "asymmetrical with decreased knee flexion at impact on L    Treatment     Billy received the treatments listed below:      therapeutic exercises to develop strength, endurance, ROM, flexibility, posture, and core stabilization for 32 minutes including:  Education/assessment  Heel prop 2 x 3 min with 10 lbs below, 7.5 lbs above  Quad set 2 x20x 5" hold - decreased effusion   Manual stretching to L HS, glute, adductors, quad, hip rotators  SL hip abd at wall 3 x 15 5 lbs  SL glute bridge with band pull down 3 x 10 x 5 sec linus    manual therapy techniques: Joint mobilizations and Soft tissue Mobilization were applied for 10 minutes, including:  PFJ gr 3-4 glides all planes  Tibial AP glide gr 3-4 with and without rotation bias on and off heel prop  Knee hyperextension gr 3-4  Fat pad mobility  Scar mobilization    neuromuscular re-education activities to improve: Coordination, Kinesthetic, Sense, and Proprioception for 8 minutes. The following activities were included:  Isometric knee extensions at 60 deg 8 x 30 sec    Therapeutic activities to improve functional performance for 40 minutes, including:  SKTC, quad pull, HS scoop, HS march, high knee march, walking butt kicks, lunge, lateral lunge, band walks, monster walks  20 x 30 yds running at 50-75%: x 8 every 60 sec; x 12 every 30 sec  DL jumps 6 x 10 in place  24 inch box squat 5 x 8 45->55 lbs added each side safety bar    Patient Education and Home Exercises     Home Exercises Provided and Patient Education Provided     Education provided:   - HEP update, precautions, activity pacing, goals, timeframes    Written Home Exercises Provided: yes. Exercises were reviewed and Billy was able to demonstrate them prior to the end of the session.  Billy demonstrated good  understanding of the education provided. See EMR under Patient Instructions for exercises provided during therapy sessions.     ASSESSMENT     Billy did very well today. Reporting no clicking with " running in clinic. Running form is looking good with minimal eccentric stiffness noted on L v R with deceleration. No issues with LE strength progressions. Swelling is gone today. Pt reported/demonstrated no adverse response or exacerbation of symptoms during today's session.      Billy Is progressing well towards his goals.   Pt prognosis is Good.     Pt will continue to benefit from skilled outpatient physical therapy to address the deficits listed in the problem list box on initial evaluation, provide pt/family education and to maximize pt's level of independence in the home and community environment.     Pt's spiritual, cultural and educational needs considered and pt agreeable to plan of care and goals.     Anticipated barriers to physical therapy: none    Goals:  Short Term Goals: 8-10 weeks   1. Pt will be compliant with HEP 50% of prescribed amount. MET  2. The pt to demo improvement in left knee ROM to equal right knee ROM pain free MET  3.  The pt to demo good quad set with proper hyperextension moment of the Left knee MET  4. The pt to demo ambualting with elast restricitve AD witout major compensatory pattern left knee for at least 20 feet MET     Long Term Goals: 56 weeks (progressing, not met)   Pt will be compliant with % of prescribed amount.   The pt to demo pain free and uncompensated running mechanics x10 min on an indoor treadmill  The pt to demo tolerance to a squat at or bellow parallel with uncompensated mechanics x10   The pt to demo strength of L LE within 10% of R LE as demo'd on the biodex machine   The pt to demo a deficit of 10% or less on a triple hop, single leg broad jump and crossover hop compared to non operative LE.   The pt will report full participation in ADLs and IADLs without restrictions related to L  knee.     PLAN     Assess knee and progress/de-load as indicated.     Manjit Lazcano, PT, DPT

## 2023-01-24 ENCOUNTER — CLINICAL SUPPORT (OUTPATIENT)
Dept: REHABILITATION | Facility: HOSPITAL | Age: 28
End: 2023-01-24
Payer: COMMERCIAL

## 2023-01-24 DIAGNOSIS — M25.662 DECREASED RANGE OF MOTION OF LEFT KNEE: ICD-10-CM

## 2023-01-24 DIAGNOSIS — M25.562 ACUTE PAIN OF LEFT KNEE: Primary | ICD-10-CM

## 2023-01-24 DIAGNOSIS — M62.81 QUADRICEPS WEAKNESS: ICD-10-CM

## 2023-01-24 DIAGNOSIS — R26.2 DIFFICULTY WALKING: ICD-10-CM

## 2023-01-24 PROCEDURE — 97112 NEUROMUSCULAR REEDUCATION: CPT

## 2023-01-24 PROCEDURE — 97140 MANUAL THERAPY 1/> REGIONS: CPT

## 2023-01-24 PROCEDURE — 97530 THERAPEUTIC ACTIVITIES: CPT

## 2023-01-24 PROCEDURE — 97110 THERAPEUTIC EXERCISES: CPT

## 2023-01-24 NOTE — PROGRESS NOTES
[facility]   Physical Therapy Treatment Note     Name: Billy Holbrook  [facility] Number: [id number]    Therapy Diagnosis:   Encounter Diagnoses   Name Primary?    Acute pain of left knee Yes    Decreased range of motion of left knee     Quadriceps weakness     Difficulty walking      Physician: PEACE Dempsey MD    Visit Date: 1/24/2023    Physician Orders: PT Eval and Treat   Medical Diagnosis from Referral: M25.562 (ICD-10-CM) - Acute pain of left knee  Evaluation Date: 9/7/2022  Authorization Period Expiration: 9/6/2023  Plan of Care Expiration: 12/31/2022  Visit # / Visits authorized: 92     Precautions: Standard     Time In: 10:30 am  Time Out: 11:50  Total Billable Time: 80 minutes    Procedure: 9/20/2022  1. Left Knee Arthroscopy, anterior cruciate ligament reconstruction 70793  2.  Left Knee Arthroscopy, with meniscectomy (medial OR lateral) 85474  3.  Left Knee Arthroscopy, debridement/shaving of articular cartilage (chondroplasty) 57492  4.  Left Knee  platelet rich plasma injection to the bone-patellar tendon-bone harvest site    Post-Op Plan:  ACL reconstruction with a bone-patellar tendon-bone autograft    SUBJECTIVE     Pt reports: still feeling great. No issues and ready to exercise.     He was compliant with home exercise program.  Response to previous treatment: no issues  Functional change: no click running today    Pain: 0/10  Location: left knee      OBJECTIVE     4 mo 4 days post-op    Observation:   trace effusion noted L knee with sweep test  Well healed scar  Atrophy in calf/quad on L     Range of Motion (extension/neutral/flexion):     Right Left   Knee PROM: 8-0-125/130 deg P:8-0-130 deg  A:7-0-125 deg      Strength:     Right Left Comment   Knee Extension: 181.6 lbs 120.3 lbs     LSI 66%   Knee Flexion: 5/5 4/5        Hip Abduction: 4+/5 4-/5       Anterior reach Y balance R= 58 cm  L= 54 cm  Running wide base, slightly asymmetrical with decreased knee flexion  "at impact on L    Treatment     Billy received the treatments listed below:      therapeutic exercises to develop strength, endurance, ROM, flexibility, posture, and core stabilization for 20 minutes including:  Education/assessment  Heel prop 2 x 3 min with 10 lbs below, 7.5 lbs above  Quad set 2 x20x 5" hold - decreased effusion   Manual stretching to L HS, glute, adductors, quad, hip rotators  SL glute bridge with walk out 4 x 5 reps    manual therapy techniques: Joint mobilizations and Soft tissue Mobilization were applied for 10 minutes, including:  PFJ gr 3-4 glides all planes  Tibial AP glide gr 3-4 with and without rotation bias on and off heel prop  Knee hyperextension gr 3-4  Fat pad mobility  Scar mobilization    neuromuscular re-education activities to improve: Coordination, Kinesthetic, Sense, and Proprioception for 25 minutes. The following activities were included:  Isometric knee extensions at 60 deg 8 x 30 sec  SLS on solid Bosu with 3 way cone taps 3 x 5 each way on each LE  High plank pull through 38 lbs 4 x 30 sec    Therapeutic activities to improve functional performance for 25 minutes, including:  SKTC, quad pull, HS scoop, HS march, high knee march, walking butt kicks, lunge, lateral lunge, band walks, monster walks  Lateral heel taps 6 inch on R with 38 lbs 3 x 15; L using TRX assistance 5 x 10 in mirror with focus on mechanics of knee flexion and reducing hip drop    Patient Education and Home Exercises     Home Exercises Provided and Patient Education Provided     Education provided:   - HEP update, precautions, activity pacing, goals, timeframes    Written Home Exercises Provided: yes. Exercises were reviewed and Billy was able to demonstrate them prior to the end of the session.  Billy demonstrated good  understanding of the education provided. See EMR under Patient Instructions for exercises provided during therapy sessions.     ASSESSMENT     Billy is doing well. Focusing session " of SL squat mechanics and allowing knee flexion moment without any dynamic valgum. This was successful with TRX assistance today as well as significant cuing. Pt reported/demonstrated no adverse response or exacerbation of symptoms during today's session.      Billy Is progressing well towards his goals.   Pt prognosis is Good.     Pt will continue to benefit from skilled outpatient physical therapy to address the deficits listed in the problem list box on initial evaluation, provide pt/family education and to maximize pt's level of independence in the home and community environment.     Pt's spiritual, cultural and educational needs considered and pt agreeable to plan of care and goals.     Anticipated barriers to physical therapy: none    Goals:  Short Term Goals: 8-10 weeks   1. Pt will be compliant with HEP 50% of prescribed amount. MET  2. The pt to demo improvement in left knee ROM to equal right knee ROM pain free MET  3.  The pt to demo good quad set with proper hyperextension moment of the Left knee MET  4. The pt to demo ambualting with elast restricitve AD witout major compensatory pattern left knee for at least 20 feet MET     Long Term Goals: 56 weeks (progressing, not met)   Pt will be compliant with % of prescribed amount.   The pt to demo pain free and uncompensated running mechanics x10 min on an indoor treadmill  The pt to demo tolerance to a squat at or bellow parallel with uncompensated mechanics x10   The pt to demo strength of L LE within 10% of R LE as demo'd on the biodex machine   The pt to demo a deficit of 10% or less on a triple hop, single leg broad jump and crossover hop compared to non operative LE.   The pt will report full participation in ADLs and IADLs without restrictions related to L  knee.     PLAN     Running day tomorrow if indicated.     Manjit Lazcano, PT, DPT

## 2023-01-25 ENCOUNTER — CLINICAL SUPPORT (OUTPATIENT)
Dept: REHABILITATION | Facility: HOSPITAL | Age: 28
End: 2023-01-25
Payer: COMMERCIAL

## 2023-01-25 DIAGNOSIS — M62.81 QUADRICEPS WEAKNESS: ICD-10-CM

## 2023-01-25 DIAGNOSIS — M25.662 DECREASED RANGE OF MOTION OF LEFT KNEE: ICD-10-CM

## 2023-01-25 DIAGNOSIS — M25.562 ACUTE PAIN OF LEFT KNEE: Primary | ICD-10-CM

## 2023-01-25 DIAGNOSIS — R26.2 DIFFICULTY WALKING: ICD-10-CM

## 2023-01-25 PROCEDURE — 97140 MANUAL THERAPY 1/> REGIONS: CPT

## 2023-01-25 PROCEDURE — 97530 THERAPEUTIC ACTIVITIES: CPT

## 2023-01-25 PROCEDURE — 97112 NEUROMUSCULAR REEDUCATION: CPT

## 2023-01-25 PROCEDURE — 97110 THERAPEUTIC EXERCISES: CPT

## 2023-01-25 NOTE — PROGRESS NOTES
[facility]   Physical Therapy Treatment Note     Name: Billy Holbrook  [facility] Number: [id number]    Therapy Diagnosis:   Encounter Diagnoses   Name Primary?    Acute pain of left knee Yes    Decreased range of motion of left knee     Quadriceps weakness     Difficulty walking      Physician: No ref. provider found    Visit Date: 1/25/2023    Physician Orders: PT Eval and Treat   Medical Diagnosis from Referral: M25.562 (ICD-10-CM) - Acute pain of left knee  Evaluation Date: 9/7/2022  Authorization Period Expiration: 9/6/2023  Plan of Care Expiration: 12/31/2022  Visit # / Visits authorized: 93     Precautions: Standard     Time In: 11:00 am  Time Out: 12:30  Total Billable Time: 90 minutes    Procedure: 9/20/2022  1. Left Knee Arthroscopy, anterior cruciate ligament reconstruction 31651  2.  Left Knee Arthroscopy, with meniscectomy (medial OR lateral) 75244  3.  Left Knee Arthroscopy, debridement/shaving of articular cartilage (chondroplasty) 84796  4.  Left Knee  platelet rich plasma injection to the bone-patellar tendon-bone harvest site    Post-Op Plan:  ACL reconstruction with a bone-patellar tendon-bone autograft    SUBJECTIVE     Pt reports: R knee sore today from kneeling in metal surface. Surgical knee is feeling good. Ready to run today. No symptoms in L knee with running today. Fatigued as exit today cardiovascularly.     He was compliant with home exercise program.  Response to previous treatment: no issues  Functional change: no issues running today    Pain: 0/10  Location: left knee      OBJECTIVE     4 mo 5 days post-op    Observation:   trace effusion noted L knee with sweep test  Well healed scar  Atrophy in calf/quad on L     Range of Motion (extension/neutral/flexion):     Right Left   Knee PROM: 8-0-125/130 deg P:8-0-130 deg  A:7-0-125 deg      Strength:     Right Left Comment   Knee Extension: 181.6 lbs 120.3 lbs     LSI 66%   Knee Flexion: 5/5 4/5        Hip  "Abduction: 4+/5 4-/5       Anterior reach Y balance R= 58 cm  L= 54 cm  Running wide base, slightly asymmetrical with decreased knee flexion at impact on L    Treatment     Billy received the treatments listed below:      therapeutic exercises to develop strength, endurance, ROM, flexibility, posture, and core stabilization for 20 minutes including:  Education/assessment  Heel prop 2 x 3 min with 10 lbs below, 7.5 lbs above  Quad set 2 x20x 5" hold - decreased effusion   Manual stretching to L HS, glute, adductors, quad, hip rotators  Standing HS curl black band 2 x 15 linus    manual therapy techniques: Joint mobilizations and Soft tissue Mobilization were applied for 10 minutes, including:  PFJ gr 3-4 glides all planes  Tibial AP glide gr 3-4 with and without rotation bias on and off heel prop  Knee hyperextension gr 3-4  Fat pad mobility  Scar mobilization    neuromuscular re-education activities to improve: Coordination, Kinesthetic, Sense, and Proprioception for 20 minutes. The following activities were included:  Isometric knee extensions at 60 deg 8 x 30 sec  Plalloff press black sport band 2 x 10 linus in mini squat hold  Med ball slams 40 lbs 3D  4 x 30 sec: from full triple extension    Therapeutic activities to improve functional performance for 40 minutes, including:  SKTC, quad pull, HS scoop, HS march, high knee march, walking butt kicks, lunge, lateral lunge, band walks, monster walks  Lateral heel taps 6 inch with light TRX assistance 2 x 15 in mirror with focus on mechanics of knee flexion and reducing hip drop: L only  Running 15 x 30 yds 50% effort; 2 min rest; 10 x 30 yds at 75% effort    Patient Education and Home Exercises     Home Exercises Provided and Patient Education Provided     Education provided:   - HEP update, precautions, activity pacing, goals, timeframes    Written Home Exercises Provided: yes. Exercises were reviewed and Billy was able to demonstrate them prior to the end of the " session.  Billy demonstrated good  understanding of the education provided. See EMR under Patient Instructions for exercises provided during therapy sessions.     ASSESSMENT     Billy did very well running today. Kept length short at 30 yds with 5 yds deceleration. His form looks good from acceleration to steady state, but favors R LE with deceleration. His L LE remains weaker, especially noticeable with SL tasks. His BW is definitely a significant variable regarding this. Pt reported/demonstrated no adverse response or exacerbation of symptoms during today's session.      Billy Is progressing well towards his goals.   Pt prognosis is Good.     Pt will continue to benefit from skilled outpatient physical therapy to address the deficits listed in the problem list box on initial evaluation, provide pt/family education and to maximize pt's level of independence in the home and community environment.     Pt's spiritual, cultural and educational needs considered and pt agreeable to plan of care and goals.     Anticipated barriers to physical therapy: none    Goals:  Short Term Goals: 8-10 weeks   1. Pt will be compliant with HEP 50% of prescribed amount. MET  2. The pt to demo improvement in left knee ROM to equal right knee ROM pain free MET  3.  The pt to demo good quad set with proper hyperextension moment of the Left knee MET  4. The pt to demo ambualting with elast restricitve AD witout major compensatory pattern left knee for at least 20 feet MET     Long Term Goals: 56 weeks (progressing, not met)   Pt will be compliant with % of prescribed amount.   The pt to demo pain free and uncompensated running mechanics x10 min on an indoor treadmill  The pt to demo tolerance to a squat at or bellow parallel with uncompensated mechanics x10   The pt to demo strength of L LE within 10% of R LE as demo'd on the biodex machine   The pt to demo a deficit of 10% or less on a triple hop, single leg broad jump and  crossover hop compared to non operative LE.   The pt will report full participation in ADLs and IADLs without restrictions related to L  knee.     PLAN     Focus on SL knee flexion moment tasks with partial to full BW as able. Running progression continued with modifications to avoid turns. Need to find longer area for his to run on.     Manjit Lazcano, PT, DPT

## 2023-01-26 ENCOUNTER — CLINICAL SUPPORT (OUTPATIENT)
Dept: REHABILITATION | Facility: HOSPITAL | Age: 28
End: 2023-01-26
Payer: COMMERCIAL

## 2023-01-26 DIAGNOSIS — R26.2 DIFFICULTY WALKING: ICD-10-CM

## 2023-01-26 DIAGNOSIS — M62.81 QUADRICEPS WEAKNESS: ICD-10-CM

## 2023-01-26 DIAGNOSIS — M25.662 DECREASED RANGE OF MOTION OF LEFT KNEE: ICD-10-CM

## 2023-01-26 DIAGNOSIS — M25.562 ACUTE PAIN OF LEFT KNEE: Primary | ICD-10-CM

## 2023-01-26 PROCEDURE — 97140 MANUAL THERAPY 1/> REGIONS: CPT

## 2023-01-26 PROCEDURE — 97530 THERAPEUTIC ACTIVITIES: CPT

## 2023-01-26 PROCEDURE — 97112 NEUROMUSCULAR REEDUCATION: CPT

## 2023-01-26 PROCEDURE — 97110 THERAPEUTIC EXERCISES: CPT

## 2023-01-26 NOTE — PROGRESS NOTES
[facility] OUTPATIENT THERAPY AND WELLNESS   Physical Therapy Treatment Note     Name: Billy Holbrook  [facility] Number: [id number]    Therapy Diagnosis:   Encounter Diagnoses   Name Primary?    Acute pain of left knee Yes    Decreased range of motion of left knee     Quadriceps weakness     Difficulty walking        Physician: PEACE Dempsey MD    Visit Date: 1/26/2023    Physician Orders: PT Eval and Treat   Medical Diagnosis from Referral: M25.562 (ICD-10-CM) - Acute pain of left knee  Evaluation Date: 9/7/2022  Authorization Period Expiration: 9/6/2023  Plan of Care Expiration: 12/31/2022  Visit # / Visits authorized: 94; 14/20     Precautions: Standard     Time In: 10:50 am  Time Out: 12:50  Total Billable Time: 120 minutes    Procedure: 9/20/2022  1. Left Knee Arthroscopy, anterior cruciate ligament reconstruction 08406  2.  Left Knee Arthroscopy, with meniscectomy (medial OR lateral) 55756  3.  Left Knee Arthroscopy, debridement/shaving of articular cartilage (chondroplasty) 84796  4.  Left Knee  platelet rich plasma injection to the bone-patellar tendon-bone harvest site    Post-Op Plan:  ACL reconstruction with a bone-patellar tendon-bone autograft    SUBJECTIVE     Pt reports: feeling good. R knee still a little sore. Feeling stronger. Still no symptoms in L knee with session.     He was compliant with home exercise program.  Response to previous treatment: no issues  Functional change: no issues running today    Pain: 0/10  Location: left knee      OBJECTIVE     4 mo 6 days post-op    Observation:   no effusion noted L knee with sweep test  Well healed scar  Atrophy in calf/quad on L     Range of Motion (extension/neutral/flexion):     Right Left   Knee PROM: 8-0-125/130 deg P:8-0-130 deg  A:7-0-125 deg      Strength:     Right Left Comment   Knee Extension: 181.6 lbs 120.3 lbs     LSI 66%   Knee Flexion: 5/5 4/5        Hip Abduction: 4+/5 4-/5       Anterior reach Y balance R= 58 cm  L= 54 cm  Running wide  "base, slightly asymmetrical with decreased knee flexion at impact on L    Treatment     Billy received the treatments listed below:      therapeutic exercises to develop strength, endurance, ROM, flexibility, posture, and core stabilization for 15 minutes including:  Education/assessment  Heel prop 2 x 3 min with 10 lbs below, 7.5 lbs above  Quad set 2 x20x 5" hold - decreased effusion   Manual stretching to L HS, glute, adductors, quad, hip rotators    manual therapy techniques: Joint mobilizations and Soft tissue Mobilization were applied for 10 minutes, including:  PFJ gr 3-4 glides all planes  Tibial AP glide gr 3-4 with and without rotation bias on and off heel prop  Knee hyperextension gr 3-4  Fat pad mobility  Scar mobilization    neuromuscular re-education activities to improve: Coordination, Kinesthetic, Sense, and Proprioception for 30 minutes. The following activities were included:  Isometric knee extensions at 60 deg 8 x 30 sec  Landmine rotations 4 x 10 0->20 lbs added  Reverse curl ups 4 x 45 sec  Med ball power throws with sit up 4 x 10    Therapeutic activities to improve functional performance for 75 minutes, including:  SKTC, quad pull, HS scoop, HS march, high knee march, walking butt kicks, lunge, lateral lunge, band walks, monster walks  Lateral heel taps 6 inch with light TRX assistance 2 x 15 in mirror with focus on mechanics of knee flexion and reducing hip drop: L only  TRX SL squats 2 x 15 linus  Step ups 12 inch 50 lbs on safety bar 4 x 8 linus  Barbel DL from 12 inch surface: cuing weight shifting/movement mechanics 4 x 10 95->135 lbs  Bike 15 min lv 6+; HR >140    Patient Education and Home Exercises     Home Exercises Provided and Patient Education Provided     Education provided:   - HEP update, precautions, activity pacing, goals, timeframes    Written Home Exercises Provided: yes. Exercises were reviewed and Billy was able to demonstrate them prior to the end of the session.  Billy " demonstrated good  understanding of the education provided. See EMR under Patient Instructions for exercises provided during therapy sessions.     ASSESSMENT     Billy had no swelling noted today. Good ROM after 2-3 min manual therapy. Focusing on SL LE pushing strength and dead lift mechanics. Cuing required for set up, foot placement, and weight shift with much improved movement patterns. Knee not getting as stiff during exercise. Pt reported/demonstrated no adverse response or exacerbation of symptoms during today's session.      Billy Is progressing well towards his goals.   Pt prognosis is Good.     Pt will continue to benefit from skilled outpatient physical therapy to address the deficits listed in the problem list box on initial evaluation, provide pt/family education and to maximize pt's level of independence in the home and community environment.     Pt's spiritual, cultural and educational needs considered and pt agreeable to plan of care and goals.     Anticipated barriers to physical therapy: none    Goals:  Short Term Goals: 8-10 weeks   1. Pt will be compliant with HEP 50% of prescribed amount. MET  2. The pt to demo improvement in left knee ROM to equal right knee ROM pain free MET  3.  The pt to demo good quad set with proper hyperextension moment of the Left knee MET  4. The pt to demo ambualting with elast restricitve AD witout major compensatory pattern left knee for at least 20 feet MET     Long Term Goals: 56 weeks (progressing, not met)   Pt will be compliant with % of prescribed amount.   The pt to demo pain free and uncompensated running mechanics x10 min on an indoor treadmill  The pt to demo tolerance to a squat at or bellow parallel with uncompensated mechanics x10   The pt to demo strength of L LE within 10% of R LE as demo'd on the biodex machine   The pt to demo a deficit of 10% or less on a triple hop, single leg broad jump and crossover hop compared to non operative LE.    The pt will report full participation in ADLs and IADLs without restrictions related to L  knee.     PLAN     Focus on SL knee flexion moment tasks with partial to full BW as able. Running progression continued with modifications to avoid turns. Need to find longer area for his to run on.     Manjit Lazcano, PT, DPT

## 2023-01-27 ENCOUNTER — CLINICAL SUPPORT (OUTPATIENT)
Dept: REHABILITATION | Facility: HOSPITAL | Age: 28
End: 2023-01-27
Payer: COMMERCIAL

## 2023-01-27 DIAGNOSIS — M25.562 ACUTE PAIN OF LEFT KNEE: Primary | ICD-10-CM

## 2023-01-27 DIAGNOSIS — M25.662 DECREASED RANGE OF MOTION OF LEFT KNEE: ICD-10-CM

## 2023-01-27 DIAGNOSIS — M62.81 QUADRICEPS WEAKNESS: ICD-10-CM

## 2023-01-27 DIAGNOSIS — R26.2 DIFFICULTY WALKING: ICD-10-CM

## 2023-01-27 PROCEDURE — 97530 THERAPEUTIC ACTIVITIES: CPT

## 2023-01-27 PROCEDURE — 97110 THERAPEUTIC EXERCISES: CPT

## 2023-01-27 PROCEDURE — 97140 MANUAL THERAPY 1/> REGIONS: CPT

## 2023-01-27 PROCEDURE — 97112 NEUROMUSCULAR REEDUCATION: CPT

## 2023-01-27 NOTE — PROGRESS NOTES
[facility]   Physical Therapy Treatment Note     Name: Billy Holbrook  [facility] Number: [id number]    Therapy Diagnosis:   Encounter Diagnoses   Name Primary?    Acute pain of left knee Yes    Decreased range of motion of left knee     Quadriceps weakness     Difficulty walking        Physician: PEACE Dempsey MD    Visit Date: 1/27/2023    Physician Orders: PT Eval and Treat   Medical Diagnosis from Referral: M25.562 (ICD-10-CM) - Acute pain of left knee  Evaluation Date: 9/7/2022  Authorization Period Expiration: 9/6/2023  Plan of Care Expiration: 12/31/2022  Visit # / Visits authorized: 95; 15/20     Precautions: Standard     Time In: 6:25 am  Time Out: 7:45  Total Billable Time: 80 minutes    Procedure: 9/20/2022  1. Left Knee Arthroscopy, anterior cruciate ligament reconstruction 36410  2.  Left Knee Arthroscopy, with meniscectomy (medial OR lateral) 18097  3.  Left Knee Arthroscopy, debridement/shaving of articular cartilage (chondroplasty) 46566  4.  Left Knee  platelet rich plasma injection to the bone-patellar tendon-bone harvest site    Post-Op Plan:  ACL reconstruction with a bone-patellar tendon-bone autograft    SUBJECTIVE     Pt reports: feeling good. Ready to run. Denies any s/sx in session.     He was compliant with home exercise program.  Response to previous treatment: no issues  Functional change: no issues running today    Pain: 0/10  Location: left knee      OBJECTIVE     4 mo 7 days post-op    Wt. 338lbs    Observation:   no effusion noted L knee with sweep test  Well healed scar  Atrophy in calf/quad on L     Range of Motion (extension/neutral/flexion):     Right Left   Knee PROM: 8-0-125/130 deg P:8-0-130 deg  A:7-0-125 deg      Strength:     Right Left Comment   Knee Extension: 181.6 lbs 120.3 lbs     LSI 66%   Knee Flexion: 5/5 4/5        Hip Abduction: 4+/5 4-/5       Anterior reach Y balance R= 58 cm  L= 54 cm  Running wide base, slightly asymmetrical with  "decreased knee flexion at impact on L    Treatment     Billy received the treatments listed below:      therapeutic exercises to develop strength, endurance, ROM, flexibility, posture, and core stabilization for 12 minutes including:  Education/assessment  Heel prop 2 x 3 min with 10 lbs below, 7.5 lbs above  Quad set 2 x20x 5" hold - decreased effusion   Manual stretching to L HS, glute, adductors, quad, hip rotators    manual therapy techniques: Joint mobilizations and Soft tissue Mobilization were applied for 8 minutes, including:  PFJ gr 3-4 glides all planes  Tibial AP glide gr 3-4 with and without rotation bias on and off heel prop  Knee hyperextension gr 3-4  Fat pad mobility  Scar mobilization    neuromuscular re-education activities to improve: Coordination, Kinesthetic, Sense, and Proprioception for 8 minutes. The following activities were included:  Isometric knee extensions at 60 deg 8 x 30 sec    Therapeutic activities to improve functional performance for 52 minutes, including:  SKTC, quad pull, HS scoop, HS march, high knee march, walking butt kicks, lunge, lateral lunge, band walks, monster walks  Lateral heel taps 6 inch with light TRX assistance 3 x 10 linus in mirror with focus on mechanics of knee flexion and reducing hip drop: keeping foot planted; avoid heel lift- ankle mobility good  TRX SL squats 2 x 15 linus  Trap bar squat 4 x 10 x 3 sec lower: working on foot placement/weight shift 135 lbs  Running 50% effort x 10; 2 x 10 at 75% effort 30 yds    Patient Education and Home Exercises     Home Exercises Provided and Patient Education Provided     Education provided:   - HEP update, precautions, activity pacing, goals, timeframes    Written Home Exercises Provided: yes. Exercises were reviewed and Billy was able to demonstrate them prior to the end of the session.  Billy demonstrated good  understanding of the education provided. See EMR under Patient Instructions for exercises provided during " therapy sessions.     ASSESSMENT     Billy showed no swelling today. Focusing on knee flexion moment under load DL and SL. Progressing running to more reps at 75% effort. Deceleration remains favored due to weakness/reduced confidence in L LE as expected. Pt reported/demonstrated no adverse response or exacerbation of symptoms during today's session.      Billy Is progressing well towards his goals.   Pt prognosis is Good.     Pt will continue to benefit from skilled outpatient physical therapy to address the deficits listed in the problem list box on initial evaluation, provide pt/family education and to maximize pt's level of independence in the home and community environment.     Pt's spiritual, cultural and educational needs considered and pt agreeable to plan of care and goals.     Anticipated barriers to physical therapy: none    Goals:  Short Term Goals: 8-10 weeks   1. Pt will be compliant with HEP 50% of prescribed amount. MET  2. The pt to demo improvement in left knee ROM to equal right knee ROM pain free MET  3.  The pt to demo good quad set with proper hyperextension moment of the Left knee MET  4. The pt to demo ambualting with elast restricitve AD witout major compensatory pattern left knee for at least 20 feet MET     Long Term Goals: 56 weeks (progressing, not met)   Pt will be compliant with % of prescribed amount.   The pt to demo pain free and uncompensated running mechanics x10 min on an indoor treadmill  The pt to demo tolerance to a squat at or bellow parallel with uncompensated mechanics x10   The pt to demo strength of L LE within 10% of R LE as demo'd on the biodex machine   The pt to demo a deficit of 10% or less on a triple hop, single leg broad jump and crossover hop compared to non operative LE.   The pt will report full participation in ADLs and IADLs without restrictions related to L  knee.     PLAN     Focus on SL knee flexion moment tasks with partial to full BW as able.  Running progression continued with modifications to avoid turns. Need to find longer area for his to run on.     Manjit Lazcano, PT, DPT

## 2023-01-30 ENCOUNTER — CLINICAL SUPPORT (OUTPATIENT)
Dept: REHABILITATION | Facility: HOSPITAL | Age: 28
End: 2023-01-30
Payer: COMMERCIAL

## 2023-01-30 DIAGNOSIS — R26.2 DIFFICULTY WALKING: ICD-10-CM

## 2023-01-30 DIAGNOSIS — M25.562 ACUTE PAIN OF LEFT KNEE: Primary | ICD-10-CM

## 2023-01-30 DIAGNOSIS — M62.81 QUADRICEPS WEAKNESS: ICD-10-CM

## 2023-01-30 DIAGNOSIS — M25.662 DECREASED RANGE OF MOTION OF LEFT KNEE: ICD-10-CM

## 2023-01-30 PROCEDURE — 97140 MANUAL THERAPY 1/> REGIONS: CPT

## 2023-01-30 PROCEDURE — 97530 THERAPEUTIC ACTIVITIES: CPT

## 2023-01-30 PROCEDURE — 97110 THERAPEUTIC EXERCISES: CPT

## 2023-01-30 PROCEDURE — 97112 NEUROMUSCULAR REEDUCATION: CPT

## 2023-01-30 NOTE — PROGRESS NOTES
[facility]   Physical Therapy Treatment Note     Name: Billy Holbrook  [facility] Number: [id number]    Therapy Diagnosis:   Encounter Diagnoses   Name Primary?    Acute pain of left knee Yes    Decreased range of motion of left knee     Quadriceps weakness     Difficulty walking        Physician: PEACE Dempsey MD    Visit Date: 1/30/2023    Physician Orders: PT Eval and Treat   Medical Diagnosis from Referral: M25.562 (ICD-10-CM) - Acute pain of left knee  Evaluation Date: 9/7/2022  Authorization Period Expiration: 9/6/2023  Plan of Care Expiration: 12/31/2022  Visit # / Visits authorized: 96; 16/20     Precautions: Standard     Time In: 9:00 am  Time Out: 10:30  Total Billable Time: 90 minutes    Procedure: 9/20/2022  1. Left Knee Arthroscopy, anterior cruciate ligament reconstruction 09729  2.  Left Knee Arthroscopy, with meniscectomy (medial OR lateral) 64335  3.  Left Knee Arthroscopy, debridement/shaving of articular cartilage (chondroplasty) 41499  4.  Left Knee  platelet rich plasma injection to the bone-patellar tendon-bone harvest site    Post-Op Plan:  ACL reconstruction with a bone-patellar tendon-bone autograft    SUBJECTIVE     Pt reports: feeling good. Agrees to exercise as able today.     He was compliant with home exercise program.  Response to previous treatment: no issues  Functional change: no issues running today    Pain: 0/10  Location: left knee      OBJECTIVE     4 mo 10 days post-op    Wt. 338lbs    Observation:   no effusion noted L knee with sweep test  Well healed scar  Atrophy in calf/quad on L     Range of Motion (extension/neutral/flexion):     Right Left   Knee PROM: 8-0-125/130 deg P:8-0-130 deg  A:7-0-125 deg      Strength:     Right Left Comment   Knee Extension: 181.6 lbs 120.3 lbs     LSI 66%   Knee Flexion: 5/5 4/5        Hip Abduction: 4+/5 4-/5       Anterior reach Y balance R= 58 cm  L= 54 cm  Running wide base, slightly asymmetrical with  "decreased knee flexion at impact on L    Treatment     Billy received the treatments listed below:      therapeutic exercises to develop strength, endurance, ROM, flexibility, posture, and core stabilization for 12 minutes including:  Education/assessment  Heel prop 2 x 3 min with 10 lbs below, 7.5 lbs above  Quad set 2 x20x 5" hold  Manual stretching to L HS, glute, adductors, quad, hip rotators    manual therapy techniques: Joint mobilizations and Soft tissue Mobilization were applied for 8 minutes, including:  PFJ gr 3-4 glides all planes  Tibial AP glide gr 3-4 with and without rotation bias on and off heel prop  Knee hyperextension gr 3-4  Fat pad mobility  Scar mobilization    neuromuscular re-education activities to improve: Coordination, Kinesthetic, Sense, and Proprioception for 15 minutes. The following activities were included:  Isometric knee extensions at 60 deg 8 x 30 sec  Sled pull long sitting 4 rds 2 pulls 3->4 45 lb plates added    Therapeutic activities to improve functional performance for 55 minutes, including:  SKTC, quad pull, HS scoop, HS march, high knee march, walking butt kicks, lunge, lateral lunge, band walks, monster walks  Lateral heel taps 6 inch with light TRX assistance 3 x 10 linus in mirror with focus on mechanics of knee flexion and reducing hip drop: keeping foot planted; avoid heel lift- ankle mobility good  TRX SL squats 2 x 15 linus  Trap bar squat 5 sets 15->8 reps 135->225 lbs  Step ups 12 inch 5 sets 10->6 reps linus 10->25 lbs each side added to safety bar  Landmine SL RDL 3 x 10 linus 25->50 lbs  Flat bench press 4 x 15->8 135-> 225 lbs    Patient Education and Home Exercises     Home Exercises Provided and Patient Education Provided     Education provided:   - HEP update, precautions, activity pacing, goals, timeframes    Written Home Exercises Provided: yes. Exercises were reviewed and Billy was able to demonstrate them prior to the end of the session.  Billy demonstrated " good  understanding of the education provided. See EMR under Patient Instructions for exercises provided during therapy sessions.     ASSESSMENT     Billy did well with strength focused session. Mechanics improving with his squat, hinge, pull and push movements. No swelling noted with no symptoms reported.  Adding in UE lifts at this time. Pt reported/demonstrated no adverse response or exacerbation of symptoms during today's session.      Billy Is progressing well towards his goals.   Pt prognosis is Good.     Pt will continue to benefit from skilled outpatient physical therapy to address the deficits listed in the problem list box on initial evaluation, provide pt/family education and to maximize pt's level of independence in the home and community environment.     Pt's spiritual, cultural and educational needs considered and pt agreeable to plan of care and goals.     Anticipated barriers to physical therapy: none    Goals:  Short Term Goals: 8-10 weeks   1. Pt will be compliant with HEP 50% of prescribed amount. MET  2. The pt to demo improvement in left knee ROM to equal right knee ROM pain free MET  3.  The pt to demo good quad set with proper hyperextension moment of the Left knee MET  4. The pt to demo ambualting with elast restricitve AD witout major compensatory pattern left knee for at least 20 feet MET     Long Term Goals: 56 weeks (progressing, not met)   Pt will be compliant with % of prescribed amount.   The pt to demo pain free and uncompensated running mechanics x10 min on an indoor treadmill  The pt to demo tolerance to a squat at or bellow parallel with uncompensated mechanics x10   The pt to demo strength of L LE within 10% of R LE as demo'd on the biodex machine   The pt to demo a deficit of 10% or less on a triple hop, single leg broad jump and crossover hop compared to non operative LE.   The pt will report full participation in ADLs and IADLs without restrictions related to L  knee.      PLAN     Focus on SL knee flexion moment tasks with partial to full BW as able. Running progression continued with modifications to avoid turns. Need to find longer area for his to run on.     Manjit Lazcano, PT, DPT

## 2023-01-31 ENCOUNTER — CLINICAL SUPPORT (OUTPATIENT)
Dept: REHABILITATION | Facility: HOSPITAL | Age: 28
End: 2023-01-31
Payer: COMMERCIAL

## 2023-01-31 DIAGNOSIS — M25.562 ACUTE PAIN OF LEFT KNEE: Primary | ICD-10-CM

## 2023-01-31 DIAGNOSIS — M25.662 DECREASED RANGE OF MOTION OF LEFT KNEE: ICD-10-CM

## 2023-01-31 DIAGNOSIS — M62.81 QUADRICEPS WEAKNESS: ICD-10-CM

## 2023-01-31 DIAGNOSIS — R26.2 DIFFICULTY WALKING: ICD-10-CM

## 2023-01-31 PROCEDURE — 97140 MANUAL THERAPY 1/> REGIONS: CPT

## 2023-01-31 PROCEDURE — 97530 THERAPEUTIC ACTIVITIES: CPT

## 2023-01-31 PROCEDURE — 97112 NEUROMUSCULAR REEDUCATION: CPT

## 2023-01-31 PROCEDURE — 97110 THERAPEUTIC EXERCISES: CPT

## 2023-01-31 NOTE — PROGRESS NOTES
[facility]   Physical Therapy Treatment Note     Name: Billy Holbrook  [facility] Number: [id number]    Therapy Diagnosis:   Encounter Diagnoses   Name Primary?    Acute pain of left knee Yes    Decreased range of motion of left knee     Quadriceps weakness     Difficulty walking        Physician: PEACE Dempsey MD    Visit Date: 1/31/2023    Physician Orders: PT Eval and Treat   Medical Diagnosis from Referral: M25.562 (ICD-10-CM) - Acute pain of left knee  Evaluation Date: 9/7/2022  Authorization Period Expiration: 9/6/2023  Plan of Care Expiration: 12/31/2022  Visit # / Visits authorized: 97; 17/20     Precautions: Standard     Time In: 11:00 am  Time Out: 12:30  Total Billable Time: 90 minutes    Procedure: 9/20/2022  1. Left Knee Arthroscopy, anterior cruciate ligament reconstruction 19990  2.  Left Knee Arthroscopy, with meniscectomy (medial OR lateral) 98591  3.  Left Knee Arthroscopy, debridement/shaving of articular cartilage (chondroplasty) 31935  4.  Left Knee  platelet rich plasma injection to the bone-patellar tendon-bone harvest site    Post-Op Plan:  ACL reconstruction with a bone-patellar tendon-bone autograft    SUBJECTIVE     Pt reports: feeling good. Agrees to exercise as able today. Can make his knee click with active hyperextension when stiff; no associated pain and does not occur at any other point. No clicking in session.     He was compliant with home exercise program.  Response to previous treatment: no issues; mild soreness  Functional change: no issues running today    Pain: 0/10  Location: left knee      OBJECTIVE     4 mo 11 days post-op    Wt. 338lbs    Observation:   no effusion noted L knee with sweep test  Well healed scar  Atrophy in calf/quad on L     Range of Motion (extension/neutral/flexion):     Right Left   Knee PROM: 8-0-125/130 deg P:8-0-130 deg  A:7-0-125 deg      Strength:     Right Left Comment   Knee Extension: 181.6 lbs 120.3 lbs     LSI 66%  "  Knee Flexion: 5/5 4/5        Hip Abduction: 4+/5 4-/5       Anterior reach Y balance R= 58 cm  L= 54 cm  Running wide base, slightly asymmetrical with decreased knee flexion at impact on L    Treatment     Billy received the treatments listed below:      therapeutic exercises to develop strength, endurance, ROM, flexibility, posture, and core stabilization for 14 minutes including:  Education/assessment  Heel prop 2 x 3 min with 10 lbs below, 7.5 lbs above  Quad set 2 x20x 5" hold  Manual stretching to L HS, glute, adductors, quad, hip rotators    manual therapy techniques: Joint mobilizations and Soft tissue Mobilization were applied for 8 minutes, including:  PFJ gr 3-4 glides all planes  Tibial AP glide gr 3-4 with and without rotation bias on and off heel prop  Knee hyperextension gr 3-4  Fat pad mobility  Scar mobilization    neuromuscular re-education activities to improve: Coordination, Kinesthetic, Sense, and Proprioception for 23 minutes. The following activities were included:  Isometric knee extensions at 60 deg 8 x 30 sec  Landmine core rotation in slight lunge 3 rds 6 linus 25 lbs added  Med ball slams 20 lbs 3 rds 45 sec    Therapeutic activities to improve functional performance for 45 minutes, including:  SKTC, quad pull, HS scoop, HS march, high knee march, walking butt kicks, lunge, lateral lunge, band walks, monster walks  Running 10 x 30 yds at 50%; x 20 30 yds 75% on 30 seconds  Incline bench 3 sets 15->10 reps 60-70 lbs DB's  quadruped row 3 sets 15->10 reps linus 53->70 lb KB      Patient Education and Home Exercises     Home Exercises Provided and Patient Education Provided     Education provided:   - HEP update, precautions, activity pacing, goals, timeframes    Written Home Exercises Provided: yes. Exercises were reviewed and Billy was able to demonstrate them prior to the end of the session.  Billy demonstrated good  understanding of the education provided. See EMR under Patient " Instructions for exercises provided during therapy sessions.     ASSESSMENT     Billy did well today with an UE/core circuit moving into running. No clicking in session; reports he notes it when stiff and he moves into locking out knee. No mechanical symptoms or pain in session. Pt reported/demonstrated no adverse response or exacerbation of symptoms during today's session.      Billy Is progressing well towards his goals.   Pt prognosis is Good.     Pt will continue to benefit from skilled outpatient physical therapy to address the deficits listed in the problem list box on initial evaluation, provide pt/family education and to maximize pt's level of independence in the home and community environment.     Pt's spiritual, cultural and educational needs considered and pt agreeable to plan of care and goals.     Anticipated barriers to physical therapy: none    Goals:  Short Term Goals: 8-10 weeks   1. Pt will be compliant with HEP 50% of prescribed amount. MET  2. The pt to demo improvement in left knee ROM to equal right knee ROM pain free MET  3.  The pt to demo good quad set with proper hyperextension moment of the Left knee MET  4. The pt to demo ambualting with elast restricitve AD witout major compensatory pattern left knee for at least 20 feet MET     Long Term Goals: 56 weeks (progressing, not met)   Pt will be compliant with % of prescribed amount.   The pt to demo pain free and uncompensated running mechanics x10 min on an indoor treadmill  The pt to demo tolerance to a squat at or bellow parallel with uncompensated mechanics x10   The pt to demo strength of L LE within 10% of R LE as demo'd on the biodex machine   The pt to demo a deficit of 10% or less on a triple hop, single leg broad jump and crossover hop compared to non operative LE.   The pt will report full participation in ADLs and IADLs without restrictions related to L  knee.     PLAN     Focus on SL knee flexion moment tasks with  partial to full BW as able. Running progression continued with modifications to avoid turns. Need to find longer area for his to run on.     Manjit Lazcano, PT, DPT

## 2023-02-01 ENCOUNTER — CLINICAL SUPPORT (OUTPATIENT)
Dept: REHABILITATION | Facility: HOSPITAL | Age: 28
End: 2023-02-01
Payer: COMMERCIAL

## 2023-02-01 DIAGNOSIS — R26.2 DIFFICULTY WALKING: ICD-10-CM

## 2023-02-01 DIAGNOSIS — M25.662 DECREASED RANGE OF MOTION OF LEFT KNEE: ICD-10-CM

## 2023-02-01 DIAGNOSIS — M25.562 ACUTE PAIN OF LEFT KNEE: Primary | ICD-10-CM

## 2023-02-01 DIAGNOSIS — M62.81 QUADRICEPS WEAKNESS: ICD-10-CM

## 2023-02-01 PROCEDURE — 97140 MANUAL THERAPY 1/> REGIONS: CPT

## 2023-02-01 PROCEDURE — 97112 NEUROMUSCULAR REEDUCATION: CPT

## 2023-02-01 PROCEDURE — 97530 THERAPEUTIC ACTIVITIES: CPT

## 2023-02-01 PROCEDURE — 97110 THERAPEUTIC EXERCISES: CPT

## 2023-02-01 NOTE — PROGRESS NOTES
[facility]   Physical Therapy Treatment Note     Name: Billy Holbrook  [facility] Number: [id number]    Therapy Diagnosis:   Encounter Diagnoses   Name Primary?    Acute pain of left knee Yes    Decreased range of motion of left knee     Quadriceps weakness     Difficulty walking        Physician: No ref. provider found    Visit Date: 2/1/2023    Physician Orders: PT Eval and Treat   Medical Diagnosis from Referral: M25.562 (ICD-10-CM) - Acute pain of left knee  Evaluation Date: 9/7/2022  Authorization Period Expiration: 9/6/2023  Plan of Care Expiration: 12/31/2022  Visit # / Visits authorized: 98; 18/20     Precautions: Standard     Time In: 11:00 am  Time Out: 12:20  Total Billable Time: 80 minutes    Procedure: 9/20/2022  1. Left Knee Arthroscopy, anterior cruciate ligament reconstruction 44545  2.  Left Knee Arthroscopy, with meniscectomy (medial OR lateral) 71151  3.  Left Knee Arthroscopy, debridement/shaving of articular cartilage (chondroplasty) 82819  4.  Left Knee  platelet rich plasma injection to the bone-patellar tendon-bone harvest site    Post-Op Plan:  ACL reconstruction with a bone-patellar tendon-bone autograft    SUBJECTIVE     Pt reports: feeling good, no clicking today. Denies any soreness from yesterday.     He was compliant with home exercise program.  Response to previous treatment: no issues; mild soreness  Functional change: no issues running today    Pain: 0/10  Location: left knee      OBJECTIVE     4 mo 12 days post-op    Wt. 338lbs    Observation:   no effusion noted L knee with sweep test  Well healed scar  Atrophy in calf/quad on L     Range of Motion (extension/neutral/flexion):     Right Left   Knee PROM: 8-0-125/130 deg P:8-0-130 deg  A:7-0-125 deg      Strength:     Right Left Comment   Knee Extension: 181.6 lbs 120.3 lbs     LSI 66%   Knee Flexion: 5/5 4/5        Hip Abduction: 4+/5 4-/5       Anterior reach Y balance R= 58 cm  L= 54 cm  Running wide  "base, slightly asymmetrical with decreased knee flexion at impact on L    Treatment     Billy received the treatments listed below:      therapeutic exercises to develop strength, endurance, ROM, flexibility, posture, and core stabilization for 18 minutes including:  Education/assessment  Heel prop 2 x 3 min with 10 lbs below, 7.5 lbs above  Quad set 2 x20x 5" hold  Manual stretching to L HS, glute, adductors, quad, hip rotators  Prone HS curl black sport band 4 x 10 linus    manual therapy techniques: Joint mobilizations and Soft tissue Mobilization were applied for 8 minutes, including:  PFJ gr 3-4 glides all planes  Tibial AP glide gr 3-4 with and without rotation bias on and off heel prop  Knee hyperextension gr 3-4  Fat pad mobility  Scar mobilization    neuromuscular re-education activities to improve: Coordination, Kinesthetic, Sense, and Proprioception for 8 minutes. The following activities were included:  Isometric knee extensions at 60 deg 8 x 30 sec    Therapeutic activities to improve functional performance for 46 minutes, including:  SKTC, quad pull, HS scoop, HS march, high knee march, walking butt kicks, lunge, lateral lunge, band walks, monster walks x 2 laps each  Upright bike 15 min intervals lv 5-10  Bent over row barbell 4 x 10 95->135 lbs  TRX lateral heel taps 4 x 15; reducing UE assistance as able  OH press DB's 4 x 10 40->70 lbs  SL calf raise off step 4 x 15       Patient Education and Home Exercises     Home Exercises Provided and Patient Education Provided     Education provided:   - HEP update, precautions, activity pacing, goals, timeframes    Written Home Exercises Provided: yes. Exercises were reviewed and Billy was able to demonstrate them prior to the end of the session.  Billy demonstrated good  understanding of the education provided. See EMR under Patient Instructions for exercises provided during therapy sessions.     ASSESSMENT     Kept today's session light/recovery with a " circuit whole body workout. ROM is good without swelling. No issues reported from yesterdays workout. Pt reported/demonstrated no adverse response or exacerbation of symptoms during today's session.      Billy Is progressing well towards his goals.   Pt prognosis is Good.     Pt will continue to benefit from skilled outpatient physical therapy to address the deficits listed in the problem list box on initial evaluation, provide pt/family education and to maximize pt's level of independence in the home and community environment.     Pt's spiritual, cultural and educational needs considered and pt agreeable to plan of care and goals.     Anticipated barriers to physical therapy: none    Goals:  Short Term Goals: 8-10 weeks   1. Pt will be compliant with HEP 50% of prescribed amount. MET  2. The pt to demo improvement in left knee ROM to equal right knee ROM pain free MET  3.  The pt to demo good quad set with proper hyperextension moment of the Left knee MET  4. The pt to demo ambualting with elast restricitve AD witout major compensatory pattern left knee for at least 20 feet MET     Long Term Goals: 56 weeks (progressing, not met)   Pt will be compliant with % of prescribed amount.   The pt to demo pain free and uncompensated running mechanics x10 min on an indoor treadmill  The pt to demo tolerance to a squat at or bellow parallel with uncompensated mechanics x10   The pt to demo strength of L LE within 10% of R LE as demo'd on the biodex machine   The pt to demo a deficit of 10% or less on a triple hop, single leg broad jump and crossover hop compared to non operative LE.   The pt will report full participation in ADLs and IADLs without restrictions related to L  knee.     PLAN     Focus on SL knee flexion moment tasks with partial to full BW as able. Running progression continued with modifications to avoid turns. Need to find longer area for his to run on.     Strength as tolerated.     Manjit Lazcano,  PT, DPT

## 2023-02-02 ENCOUNTER — CLINICAL SUPPORT (OUTPATIENT)
Dept: REHABILITATION | Facility: HOSPITAL | Age: 28
End: 2023-02-02
Payer: COMMERCIAL

## 2023-02-02 DIAGNOSIS — M25.562 ACUTE PAIN OF LEFT KNEE: Primary | ICD-10-CM

## 2023-02-02 DIAGNOSIS — R26.2 DIFFICULTY WALKING: ICD-10-CM

## 2023-02-02 DIAGNOSIS — M25.662 DECREASED RANGE OF MOTION OF LEFT KNEE: ICD-10-CM

## 2023-02-02 DIAGNOSIS — M62.81 QUADRICEPS WEAKNESS: ICD-10-CM

## 2023-02-02 PROCEDURE — 97140 MANUAL THERAPY 1/> REGIONS: CPT

## 2023-02-02 PROCEDURE — 97530 THERAPEUTIC ACTIVITIES: CPT

## 2023-02-02 PROCEDURE — 97112 NEUROMUSCULAR REEDUCATION: CPT

## 2023-02-02 PROCEDURE — 97110 THERAPEUTIC EXERCISES: CPT

## 2023-02-02 NOTE — PROGRESS NOTES
[facility]   Physical Therapy Treatment Note     Name: Billy Holbrook  [facility] Number: [id number]    Therapy Diagnosis:   Encounter Diagnoses   Name Primary?    Acute pain of left knee Yes    Decreased range of motion of left knee     Quadriceps weakness     Difficulty walking        Physician: PEACE Dempsey MD    Visit Date: 2/2/2023    Physician Orders: PT Eval and Treat   Medical Diagnosis from Referral: M25.562 (ICD-10-CM) - Acute pain of left knee  Evaluation Date: 9/7/2022  Authorization Period Expiration: 9/6/2023  Plan of Care Expiration: 12/31/2022  Visit # / Visits authorized: 99; 19/20     Precautions: Standard     Time In: 11:00 am  Time Out: 12:25  Total Billable Time: 85 minutes    Procedure: 9/20/2022  1. Left Knee Arthroscopy, anterior cruciate ligament reconstruction 48518  2.  Left Knee Arthroscopy, with meniscectomy (medial OR lateral) 25687  3.  Left Knee Arthroscopy, debridement/shaving of articular cartilage (chondroplasty) 86829  4.  Left Knee  platelet rich plasma injection to the bone-patellar tendon-bone harvest site    Post-Op Plan:  ACL reconstruction with a bone-patellar tendon-bone autograft    SUBJECTIVE     Pt reports: feeling good. Ready to run today. Still does not want to workout at [facility].    He was compliant with home exercise program.  Response to previous treatment: no issues; mild soreness  Functional change: no issues running today    Pain: 0/10  Location: left knee      OBJECTIVE     4 mo 13 days post-op    Wt. 338lbs    Observation:   no effusion noted L knee with sweep test  Well healed scar  Atrophy in calf/quad on L     Range of Motion (extension/neutral/flexion):     Right Left   Knee PROM: 8-0-125/130 deg P:8-0-130 deg  A:7-0-125 deg      Strength:     Right Left Comment   Knee Extension: NT today lbs 180 lbs     LSI Unknown%   Knee Flexion: 5/5 4/5        Hip Abduction: 4+/5 4-/5       Anterior reach Y balance R= 58 cm  L= 54 cm  Running  "wide base, slightly asymmetrical with decreased knee flexion at impact on L    Treatment     Billy received the treatments listed below:      therapeutic exercises to develop strength, endurance, ROM, flexibility, posture, and core stabilization for 19 minutes including:  Education/assessment  Heel prop 2 x 2 min with 10 lbs above/below  Quad set 30x 5" hold  Manual stretching to L HS, glute, adductors, quad, hip rotators  Seated HS curl 3 x 15 100->125 lbs: linus pull    manual therapy techniques: Joint mobilizations and Soft tissue Mobilization were applied for 8 minutes, including:  PFJ gr 3-4 glides all planes  Tibial AP glide gr 3-4 with and without rotation bias on and off heel prop  Knee hyperextension gr 3-4  Fat pad mobility  Scar mobilization    neuromuscular re-education activities to improve: Coordination, Kinesthetic, Sense, and Proprioception for 8 minutes. The following activities were included:  Isometric knee extensions at 60 deg 8 x 30 sec    Therapeutic activities to improve functional performance for 50 minutes, including:  SKTC, quad pull, HS scoop, HS march, high knee march, walking butt kicks, lunge, lateral lunge, band walks, monster walks   Marching with unilateral KB pauses 3 x 15 yds 38 lbs  Running 50% effort x 10 reps at 30 yds; 75% x 26 reps at 30 yds on 30 seconds  TRX heel taps 6 inch box- lateral 3 x 15 linus    Patient Education and Home Exercises     Home Exercises Provided and Patient Education Provided     Education provided:   - HEP update, precautions, activity pacing, goals, timeframes    Written Home Exercises Provided: yes. Exercises were reviewed and Billy was able to demonstrate them prior to the end of the session.  Billy demonstrated good  understanding of the education provided. See EMR under Patient Instructions for exercises provided during therapy sessions.     ASSESSMENT     Billy did very well with progression of running today. Improved SL eccentric control, but " still favors deceleration. His acceleration and steady state run look good. ROM is full after minimal work.  Waiting to hear back from [employer] about using 100 yd space for running v using assault runner at [facility]. Pt reported/demonstrated no adverse response or exacerbation of symptoms during today's session.      Billy Is progressing well towards his goals.   Pt prognosis is Good.     Pt will continue to benefit from skilled outpatient physical therapy to address the deficits listed in the problem list box on initial evaluation, provide pt/family education and to maximize pt's level of independence in the home and community environment.     Pt's spiritual, cultural and educational needs considered and pt agreeable to plan of care and goals.     Anticipated barriers to physical therapy: none    Goals:  Short Term Goals: 8-10 weeks   1. Pt will be compliant with HEP 50% of prescribed amount. MET  2. The pt to demo improvement in left knee ROM to equal right knee ROM pain free MET  3.  The pt to demo good quad set with proper hyperextension moment of the Left knee MET  4. The pt to demo ambualting with elast restricitve AD witout major compensatory pattern left knee for at least 20 feet MET     Long Term Goals: 56 weeks (progressing, not met)   Pt will be compliant with % of prescribed amount.   The pt to demo pain free and uncompensated running mechanics x10 min on an indoor treadmill  The pt to demo tolerance to a squat at or bellow parallel with uncompensated mechanics x10   The pt to demo strength of L LE within 10% of R LE as demo'd on the biodex machine   The pt to demo a deficit of 10% or less on a triple hop, single leg broad jump and crossover hop compared to non operative LE.   The pt will report full participation in ADLs and IADLs without restrictions related to L  knee.     PLAN     Focus on SL knee flexion moment tasks with partial to full BW as able. Running progression continued with  modifications to avoid turns. Need to find longer area for his to run on.     Strength as tolerated.     Manjit Lazcano, PT, DPT

## 2023-02-03 ENCOUNTER — CLINICAL SUPPORT (OUTPATIENT)
Dept: REHABILITATION | Facility: HOSPITAL | Age: 28
End: 2023-02-03
Payer: COMMERCIAL

## 2023-02-03 DIAGNOSIS — R26.2 DIFFICULTY WALKING: ICD-10-CM

## 2023-02-03 DIAGNOSIS — M25.662 DECREASED RANGE OF MOTION OF LEFT KNEE: ICD-10-CM

## 2023-02-03 DIAGNOSIS — M62.81 QUADRICEPS WEAKNESS: ICD-10-CM

## 2023-02-03 DIAGNOSIS — M25.562 ACUTE PAIN OF LEFT KNEE: Primary | ICD-10-CM

## 2023-02-03 PROCEDURE — 97140 MANUAL THERAPY 1/> REGIONS: CPT

## 2023-02-03 PROCEDURE — 97112 NEUROMUSCULAR REEDUCATION: CPT

## 2023-02-03 PROCEDURE — 97110 THERAPEUTIC EXERCISES: CPT

## 2023-02-03 PROCEDURE — 97530 THERAPEUTIC ACTIVITIES: CPT

## 2023-02-03 NOTE — PROGRESS NOTES
[facility]   Physical Therapy Treatment Note     Name: Billy Holbrook  [facility] Number: [id number]    Therapy Diagnosis:   Encounter Diagnoses   Name Primary?    Acute pain of left knee Yes    Decreased range of motion of left knee     Quadriceps weakness     Difficulty walking        Physician: No ref. provider found    Visit Date: 2/3/2023    Physician Orders: PT Eval and Treat   Medical Diagnosis from Referral: M25.562 (ICD-10-CM) - Acute pain of left knee  Evaluation Date: 9/7/2022  Authorization Period Expiration: 9/6/2023  Plan of Care Expiration: 12/31/2022  Visit # / Visits authorized: 100; 20/20     Precautions: Standard     Time In: 6:25 am  Time Out: 8:40  Total Billable Time: 135 minutes    Procedure: 9/20/2022  1. Left Knee Arthroscopy, anterior cruciate ligament reconstruction [id number]  2.  Left Knee Arthroscopy, with meniscectomy (medial OR lateral) 32085  3.  Left Knee Arthroscopy, debridement/shaving of articular cartilage (chondroplasty) 78561  4.  Left Knee  platelet rich plasma injection to the bone-patellar tendon-bone harvest site    Post-Op Plan:  ACL reconstruction with a bone-patellar tendon-bone autograft    SUBJECTIVE     Pt reports: feeling good. Ready to workout today. Using compression sleeve less. No swelling or any other symptoms.     He was compliant with home exercise program.  Response to previous treatment: no issues; mild soreness  Functional change: no issues running today    Pain: 0/10  Location: left knee      OBJECTIVE     4 mo 14 days post-op    Wt. 338lbs    Observation:   no effusion noted L knee with sweep test  Well healed scar  Atrophy in calf/quad on L     Range of Motion (extension/neutral/flexion):     Right Left   Knee PROM: 8-0-125/130 deg P:8-0-130 deg  A:7-0-125 deg      Strength:     Right Left Comment   Knee Extension: NT today lbs 180 lbs     LSI Unknown%   Knee Flexion: 5/5 4/5        Hip Abduction: 4+/5 4-/5       Anterior reach Y  "balance R= 58 cm  L= 54 cm  Running wide base, slightly asymmetrical with decreased knee flexion at impact on L    Treatment     Billy received the treatments listed below:      therapeutic exercises to develop strength, endurance, ROM, flexibility, posture, and core stabilization for 25 minutes including:  Education/assessment  Heel prop 2 x 2 min with 10 lbs above/below  Quad set 30x 5" hold  Manual stretching to L HS, glute, adductors, quad, hip rotators    manual therapy techniques: Joint mobilizations and Soft tissue Mobilization were applied for 10 minutes, including:  PFJ gr 3-4 glides all planes  Tibial AP glide gr 3-4 with and without rotation bias on and off heel prop  Knee hyperextension gr 3-4  Fat pad mobility  Scar mobilization    neuromuscular re-education activities to improve: Coordination, Kinesthetic, Sense, and Proprioception for 30 minutes. The following activities were included:  Isometric knee extensions at 60 deg 8 x 30 sec  Plank pull through's 4 x 30 sec 30 lb KB  Standing clams BTB 4 x 15 linus    Therapeutic activities to improve functional performance for 70 minutes, including:  SKTC, quad pull, HS scoop, HS march, high knee march, walking butt kicks, lunge, lateral lunge, band walks, monster walks   4 inch heel tap 4 x 15 linus using mirror and bar across pelvis to limit compensations  BOSU decel step 4 x 15 linus  Landmine OH press from iso lunge 4 x 8 linus 50->80 lbs  Landmine row; linus pull from iso lunge using towel 4 x 10 90->145 lbs  Upright bike 15 min lv 5->8      Patient Education and Home Exercises     Home Exercises Provided and Patient Education Provided     Education provided:   - HEP update, precautions, activity pacing, goals, timeframes    Written Home Exercises Provided: yes. Exercises were reviewed and Billy was able to demonstrate them prior to the end of the session.  Billy demonstrated good  understanding of the education provided. See EMR under Patient Instructions for " exercises provided during therapy sessions.     ASSESSMENT     Billy did well with circuit training for global workout with progressive loading. Focusing on loaded knee flexion under BW SL without hip compensations.He did well on lower box and with tactile/verbal cuing and demonstration. Some knee stiffness after workout, but resolved with manual and heel prop. Pt reported/demonstrated no adverse response or exacerbation of symptoms during today's session.      Billy Is progressing well towards his goals.   Pt prognosis is Good.     Pt will continue to benefit from skilled outpatient physical therapy to address the deficits listed in the problem list box on initial evaluation, provide pt/family education and to maximize pt's level of independence in the home and community environment.     Pt's spiritual, cultural and educational needs considered and pt agreeable to plan of care and goals.     Anticipated barriers to physical therapy: none    Goals:  Short Term Goals: 8-10 weeks   1. Pt will be compliant with HEP 50% of prescribed amount. MET  2. The pt to demo improvement in left knee ROM to equal right knee ROM pain free MET  3.  The pt to demo good quad set with proper hyperextension moment of the Left knee MET  4. The pt to demo ambualting with elast restricitve AD witout major compensatory pattern left knee for at least 20 feet MET     Long Term Goals: 56 weeks (progressing, not met)   Pt will be compliant with % of prescribed amount.   The pt to demo pain free and uncompensated running mechanics x10 min on an indoor treadmill  The pt to demo tolerance to a squat at or bellow parallel with uncompensated mechanics x10   The pt to demo strength of L LE within 10% of R LE as demo'd on the biodex machine   The pt to demo a deficit of 10% or less on a triple hop, single leg broad jump and crossover hop compared to non operative LE.   The pt will report full participation in ADLs and IADLs without  restrictions related to L  knee.     PLAN     Focus on SL knee flexion moment tasks with partial to full BW as able. Running progression continued with modifications to avoid turns. Need to find longer area for his to run on.     Strength as tolerated.     Manjit Lazcano, PT, DPT

## 2023-02-06 ENCOUNTER — CLINICAL SUPPORT (OUTPATIENT)
Dept: REHABILITATION | Facility: HOSPITAL | Age: 28
End: 2023-02-06
Payer: COMMERCIAL

## 2023-02-06 DIAGNOSIS — M25.662 DECREASED RANGE OF MOTION OF LEFT KNEE: ICD-10-CM

## 2023-02-06 DIAGNOSIS — R26.2 DIFFICULTY WALKING: ICD-10-CM

## 2023-02-06 DIAGNOSIS — M25.562 ACUTE PAIN OF LEFT KNEE: Primary | ICD-10-CM

## 2023-02-06 DIAGNOSIS — M62.81 QUADRICEPS WEAKNESS: ICD-10-CM

## 2023-02-06 PROCEDURE — 97140 MANUAL THERAPY 1/> REGIONS: CPT

## 2023-02-06 PROCEDURE — 97530 THERAPEUTIC ACTIVITIES: CPT

## 2023-02-06 PROCEDURE — 97110 THERAPEUTIC EXERCISES: CPT

## 2023-02-06 PROCEDURE — 97112 NEUROMUSCULAR REEDUCATION: CPT

## 2023-02-06 NOTE — PROGRESS NOTES
[facility]   Physical Therapy Treatment Note     Name: Billy Holbrook  [facility] Number: [id number]    Therapy Diagnosis:   Encounter Diagnoses   Name Primary?    Acute pain of left knee Yes    Decreased range of motion of left knee     Quadriceps weakness     Difficulty walking        Physician: PEACE eDmpsey MD    Visit Date: 2/6/2023    Physician Orders: PT Eval and Treat   Medical Diagnosis from Referral: M25.562 (ICD-10-CM) - Acute pain of left knee  Evaluation Date: 9/7/2022  Authorization Period Expiration: 9/6/2023  Plan of Care Expiration: 12/31/2022  Visit # / Visits authorized: 101     Precautions: Standard     Time In: 10:00 am  Time Out: 11:24  Total Billable Time: 84 minutes    Procedure: 9/20/2022  1. Left Knee Arthroscopy, anterior cruciate ligament reconstruction [id number]  2.  Left Knee Arthroscopy, with meniscectomy (medial OR lateral) 76230  3.  Left Knee Arthroscopy, debridement/shaving of articular cartilage (chondroplasty) 99216  4.  Left Knee  platelet rich plasma injection to the bone-patellar tendon-bone harvest site    Post-Op Plan:  ACL reconstruction with a bone-patellar tendon-bone autograft    SUBJECTIVE     Pt reports: feeling stiff today. Did not recover much this weekend.     He was compliant with home exercise program.  Response to previous treatment: no issues; mild soreness  Functional change: no issues running today    Pain: 0/10  Location: left knee      OBJECTIVE     4 mo 17 days post-op    Wt. 332 lbs    Observation:   no effusion noted L knee with sweep test  Well healed scar  Atrophy in calf/quad on L     Range of Motion (extension/neutral/flexion):     Right Left   Knee PROM: 8-0-125/130 deg P:8-0-130 deg  A:7-0-125 deg      Strength:     Right Left Comment   Knee Extension: NT today lbs 180 lbs     LSI Unknown%   Knee Flexion: 5/5 4/5        Hip Abduction: 4+/5 4-/5       Anterior reach Y balance R= 58 cm  L= 54 cm  Running wide base, slightly  "asymmetrical with decreased knee flexion at impact on L    Treatment     Billy received the treatments listed below:      therapeutic exercises to develop strength, endurance, ROM, flexibility, posture, and core stabilization for 19 minutes including:  Education/assessment  Heel prop 2 x 5 min with 10 lbs above/below  Quad set 30x 5" hold  Manual stretching to L HS, glute, adductors, quad, hip rotators  HS eccentric slide disc 3 x 10 x 5 sec linus from glute bridge    manual therapy techniques: Joint mobilizations and Soft tissue Mobilization were applied for 8 minutes, including:  PFJ gr 3-4 glides all planes  Tibial AP glide gr 3-4 with and without rotation bias on and off heel prop  Knee hyperextension gr 3-4  Fat pad mobility  Scar mobilization    neuromuscular re-education activities to improve: Coordination, Kinesthetic, Sense, and Proprioception for 12 minutes. The following activities were included:  Isometric knee extensions at 60 deg 8 x 30 sec  Supine UE/LE core work in hollow hold 25 lb plate 3 x 10    Therapeutic activities to improve functional performance for 45 minutes, including:  SKTC, quad pull, HS scoop, HS march, high knee march, walking butt kicks, lunge, lateral lunge, band walks, monster walks, jogging x 2 laps each  4 inch heel tap 2 x 15 linus using mirror and bar across pelvis to limit compensations  6 inch TRX heel tap 2 x 15 linus  Sport cord decel step->lunge 3 x 15 linus  Trap bar squat eccentric 3 x 10 185->205 3 sec lower  Med ball triple extension slams 40 lb 3 x 30 sec      Patient Education and Home Exercises     Home Exercises Provided and Patient Education Provided     Education provided:   - HEP update, precautions, activity pacing, goals, timeframes    Written Home Exercises Provided: yes. Exercises were reviewed and Billy was able to demonstrate them prior to the end of the session.  Billy demonstrated good  understanding of the education provided. See EMR under Patient " Instructions for exercises provided during therapy sessions.     ASSESSMENT     Billy required a little more manual therapy today for knee extension restoration. He was stiff at entry. Session was good with circuit training global LE and core work. Loading L LE more with less cuing required. Mechanics of movement being closer to normal. Pt reported/demonstrated no adverse response or exacerbation of symptoms during today's session.      Billy Is progressing well towards his goals.   Pt prognosis is Good.     Pt will continue to benefit from skilled outpatient physical therapy to address the deficits listed in the problem list box on initial evaluation, provide pt/family education and to maximize pt's level of independence in the home and community environment.     Pt's spiritual, cultural and educational needs considered and pt agreeable to plan of care and goals.     Anticipated barriers to physical therapy: none    Goals:  Short Term Goals: 8-10 weeks   1. Pt will be compliant with HEP 50% of prescribed amount. MET  2. The pt to demo improvement in left knee ROM to equal right knee ROM pain free MET  3.  The pt to demo good quad set with proper hyperextension moment of the Left knee MET  4. The pt to demo ambualting with elast restricitve AD witout major compensatory pattern left knee for at least 20 feet MET     Long Term Goals: 56 weeks (progressing, not met)   Pt will be compliant with % of prescribed amount.   The pt to demo pain free and uncompensated running mechanics x10 min on an indoor treadmill  The pt to demo tolerance to a squat at or bellow parallel with uncompensated mechanics x10   The pt to demo strength of L LE within 10% of R LE as demo'd on the biodex machine   The pt to demo a deficit of 10% or less on a triple hop, single leg broad jump and crossover hop compared to non operative LE.   The pt will report full participation in ADLs and IADLs without restrictions related to L  knee.      PLAN     Focus on SL knee flexion moment tasks with partial to full BW as able. Running progression continued with modifications to avoid turns. Working to find space to work of running progression.     Strength as tolerated.     Manjit Lazcano, PT, DPT

## 2023-02-07 ENCOUNTER — CLINICAL SUPPORT (OUTPATIENT)
Dept: REHABILITATION | Facility: HOSPITAL | Age: 28
End: 2023-02-07
Payer: COMMERCIAL

## 2023-02-07 DIAGNOSIS — M62.81 QUADRICEPS WEAKNESS: ICD-10-CM

## 2023-02-07 DIAGNOSIS — R26.2 DIFFICULTY WALKING: ICD-10-CM

## 2023-02-07 DIAGNOSIS — M25.662 DECREASED RANGE OF MOTION OF LEFT KNEE: ICD-10-CM

## 2023-02-07 DIAGNOSIS — M25.562 ACUTE PAIN OF LEFT KNEE: Primary | ICD-10-CM

## 2023-02-07 PROCEDURE — 97140 MANUAL THERAPY 1/> REGIONS: CPT

## 2023-02-07 PROCEDURE — 97112 NEUROMUSCULAR REEDUCATION: CPT

## 2023-02-07 PROCEDURE — 97110 THERAPEUTIC EXERCISES: CPT

## 2023-02-07 PROCEDURE — 97530 THERAPEUTIC ACTIVITIES: CPT

## 2023-02-08 ENCOUNTER — CLINICAL SUPPORT (OUTPATIENT)
Dept: REHABILITATION | Facility: HOSPITAL | Age: 28
End: 2023-02-08
Payer: COMMERCIAL

## 2023-02-08 DIAGNOSIS — M62.81 QUADRICEPS WEAKNESS: ICD-10-CM

## 2023-02-08 DIAGNOSIS — M25.562 ACUTE PAIN OF LEFT KNEE: Primary | ICD-10-CM

## 2023-02-08 DIAGNOSIS — R26.2 DIFFICULTY WALKING: ICD-10-CM

## 2023-02-08 DIAGNOSIS — M25.662 DECREASED RANGE OF MOTION OF LEFT KNEE: ICD-10-CM

## 2023-02-08 PROCEDURE — 97530 THERAPEUTIC ACTIVITIES: CPT

## 2023-02-08 PROCEDURE — 97110 THERAPEUTIC EXERCISES: CPT

## 2023-02-08 PROCEDURE — 97112 NEUROMUSCULAR REEDUCATION: CPT

## 2023-02-08 PROCEDURE — 97140 MANUAL THERAPY 1/> REGIONS: CPT

## 2023-02-08 NOTE — PROGRESS NOTES
[facility]   Physical Therapy Treatment Note     Name: Billy Holbrook  [facility] Number: [id number]    Therapy Diagnosis:   Encounter Diagnoses   Name Primary?    Acute pain of left knee Yes    Decreased range of motion of left knee     Quadriceps weakness     Difficulty walking        Physician: PEACE Dempsey MD    Visit Date: 2/7/2023    Physician Orders: PT Eval and Treat   Medical Diagnosis from Referral: M25.562 (ICD-10-CM) - Acute pain of left knee  Evaluation Date: 9/7/2022  Authorization Period Expiration: 9/6/2023  Plan of Care Expiration: 12/31/2022  Visit # / Visits authorized: 102     Precautions: Standard     Time In: 10:00 am  Time Out: 11:20  Total Billable Time: 80 minutes    Procedure: 9/20/2022  1. Left Knee Arthroscopy, anterior cruciate ligament reconstruction 26445  2.  Left Knee Arthroscopy, with meniscectomy (medial OR lateral) 66704  3.  Left Knee Arthroscopy, debridement/shaving of articular cartilage (chondroplasty) 06817  4.  Left Knee  platelet rich plasma injection to the bone-patellar tendon-bone harvest site    Post-Op Plan:  ACL reconstruction with a bone-patellar tendon-bone autograft    SUBJECTIVE     Pt reports: feeling good today. Mild soreness from yesterday.     He was compliant with home exercise program.  Response to previous treatment: no issues; mild soreness  Functional change: no issues running today    Pain: 0/10  Location: left knee      OBJECTIVE     4 mo 18 days post-op    Wt. 332 lbs    Observation:   no effusion noted L knee with sweep test  Well healed scar  Atrophy in calf/quad on L     Range of Motion (extension/neutral/flexion):     Right Left   Knee PROM: 8-0-125/130 deg P:8-0-130 deg  A:7-0-125 deg      Strength:     Right Left Comment   Knee Extension: NT today lbs 180 lbs     LSI Unknown%   Knee Flexion: 5/5 4/5        Hip Abduction: 4+/5 4-/5       Anterior reach Y balance R= 58 cm  L= 54 cm  Running wide base, slightly  "asymmetrical with decreased knee flexion at impact on L    Treatment     Billy received the treatments listed below:      therapeutic exercises to develop strength, endurance, ROM, flexibility, posture, and core stabilization for 19 minutes including:  Education/assessment  Heel prop 2 x 5 min with 10 lbs above/below  Quad set 30x 5" hold  Manual stretching to L HS, glute, adductors, quad, hip rotators  Seated HS curl 4 x 10 70-90 lbs- linus  pull    manual therapy techniques: Joint mobilizations and Soft tissue Mobilization were applied for 8 minutes, including:  PFJ gr 3-4 glides all planes  Tibial AP glide gr 3-4 with and without rotation bias on and off heel prop  Knee hyperextension gr 3-4  Fat pad mobility  Scar mobilization    neuromuscular re-education activities to improve: Coordination, Kinesthetic, Sense, and Proprioception for 8 minutes. The following activities were included:  Isometric knee extensions at 60 deg 8 x 30 sec    Therapeutic activities to improve functional performance for 45 minutes, including:  SKTC, quad pull, HS scoop, HS march, high knee march, walking butt kicks, lunge, lateral lunge, band walks, monster walks, jogging x 2 laps each  4 inch heel tap 2 x 15 linus using mirror and bar across pelvis to limit compensations  6 inch TRX heel tap 2 x 15 linus  10 runs of 30 yds at 50% effort; 25 runs at 30 yds of 75% intensity      Patient Education and Home Exercises     Home Exercises Provided and Patient Education Provided     Education provided:   - HEP update, precautions, activity pacing, goals, timeframes    Written Home Exercises Provided: yes. Exercises were reviewed and Billy was able to demonstrate them prior to the end of the session.  Billy demonstrated good  understanding of the education provided. See EMR under Patient Instructions for exercises provided during therapy sessions.     ASSESSMENT     Billy demonstrates improvement sin his SL squat mechanics, less cuing required. " Improving shin angle in relation to trunk with SL squat. Using video analysis for feedback. Running is looking great, still some compensations with decelerating. Denied any s/sx in session. Pt reported/demonstrated no adverse response or exacerbation of symptoms during today's session.      Billy Is progressing well towards his goals.   Pt prognosis is Good.     Pt will continue to benefit from skilled outpatient physical therapy to address the deficits listed in the problem list box on initial evaluation, provide pt/family education and to maximize pt's level of independence in the home and community environment.     Pt's spiritual, cultural and educational needs considered and pt agreeable to plan of care and goals.     Anticipated barriers to physical therapy: none    Goals:  Short Term Goals: 8-10 weeks   1. Pt will be compliant with HEP 50% of prescribed amount. MET  2. The pt to demo improvement in left knee ROM to equal right knee ROM pain free MET  3.  The pt to demo good quad set with proper hyperextension moment of the Left knee MET  4. The pt to demo ambualting with elast restricitve AD witout major compensatory pattern left knee for at least 20 feet MET     Long Term Goals: 56 weeks (progressing, not met)   Pt will be compliant with % of prescribed amount.   The pt to demo pain free and uncompensated running mechanics x10 min on an indoor treadmill  The pt to demo tolerance to a squat at or bellow parallel with uncompensated mechanics x10   The pt to demo strength of L LE within 10% of R LE as demo'd on the biodex machine   The pt to demo a deficit of 10% or less on a triple hop, single leg broad jump and crossover hop compared to non operative LE.   The pt will report full participation in ADLs and IADLs without restrictions related to L  knee.     PLAN     Focus on SL knee flexion moment tasks with partial to full BW as able. Running progression continued with modifications to avoid turns.  Working to find space to work of running progression.     Strength as tolerated.     Manjit Lazcano, PT, DPT

## 2023-02-08 NOTE — PROGRESS NOTES
[facility]   Physical Therapy Treatment Note     Name: Billy Holbrook  [facility] Number: [id number]    Therapy Diagnosis:   Encounter Diagnoses   Name Primary?    Acute pain of left knee Yes    Decreased range of motion of left knee     Quadriceps weakness     Difficulty walking        Physician: PEACE Dempsey MD    Visit Date: 2/8/2023    Physician Orders: PT Eval and Treat   Medical Diagnosis from Referral: M25.562 (ICD-10-CM) - Acute pain of left knee  Evaluation Date: 9/7/2022  Authorization Period Expiration: 9/6/2023  Plan of Care Expiration: 12/31/2022  Visit # / Visits authorized: 103     Precautions: Standard     Time In: 10:00 am  Time Out: 11:40  Total Billable Time: 100 minutes    Procedure: 9/20/2022  1. Left Knee Arthroscopy, anterior cruciate ligament reconstruction 36087  2.  Left Knee Arthroscopy, with meniscectomy (medial OR lateral) 87004  3.  Left Knee Arthroscopy, debridement/shaving of articular cartilage (chondroplasty) 81124  4.  Left Knee  platelet rich plasma injection to the bone-patellar tendon-bone harvest site    Post-Op Plan:  ACL reconstruction with a bone-patellar tendon-bone autograft    SUBJECTIVE     Pt reports: some anterior knee tension noted today- non residual. HS sore today from yesterday. Continues to have no symptoms.     He was compliant with home exercise program.  Response to previous treatment: no issues; mild soreness  Functional change: no issues running today    Pain: 0/10  Location: left knee      OBJECTIVE     4 mo 19 days post-op    Wt. 332 lbs    Observation:   no effusion noted L knee with sweep test  Well healed scar  Atrophy in calf/quad on L     Range of Motion (extension/neutral/flexion):     Right Left   Knee PROM: 8-0-125/130 deg P:8-0-130 deg  A:7-0-125 deg      Strength:     Right Left Comment   Knee Extension: NT today lbs 180 lbs     LSI Unknown%   Knee Flexion: 5/5 4/5        Hip Abduction: 4+/5 4-/5       Anterior reach  "Y balance R= 58 cm  L= 54 cm  Running wide base, slightly asymmetrical with decreased knee flexion at impact on L    Treatment     Billy received the treatments listed below:      therapeutic exercises to develop strength, endurance, ROM, flexibility, posture, and core stabilization for 40 minutes including:  Education/assessment  Heel prop 2 x 5 min with 10 lbs above/below  Quad set 30x 5" hold  Manual stretching to L HS, glute, adductors, quad, hip rotators  SL leg press 4 x 8 linus 180-280 lbs  Bent over row 4 x 10 linus 60-70 lb DB  Inclined bench press 5 x 5 135->205 lbs      manual therapy techniques: Joint mobilizations and Soft tissue Mobilization were applied for 8 minutes, including:  PFJ gr 3-4 glides all planes  Tibial AP glide gr 3-4 with and without rotation bias on and off heel prop  Knee hyperextension gr 3-4  Fat pad mobility  Scar mobilization    neuromuscular re-education activities to improve: Coordination, Kinesthetic, Sense, and Proprioception for 22 minutes. The following activities were included:  Isometric knee extensions at 60 deg 8 x 30 sec  Landmine rotations 5 x 20 25-35 lbs added 45 lb bar  Rope linus slams x 20 sec; alt 15 sec 5 rds in iso squat    Therapeutic activities to improve functional performance for 30 minutes, including:  SKTC, quad pull, HS scoop, HS march, high knee march, walking butt kicks, lunge, lateral lunge, band walks, monster walks, jogging x 2 laps each  4 inch heel tap 2 x 15 linus using mirror and bar across pelvis to limit compensations  6 inch TRX heel tap 2 x 15 linus      Patient Education and Home Exercises     Home Exercises Provided and Patient Education Provided     Education provided:   - HEP update, precautions, activity pacing, goals, timeframes    Written Home Exercises Provided: yes. Exercises were reviewed and Billy was able to demonstrate them prior to the end of the session.  Billy demonstrated good  understanding of the education provided. See EMR under " Patient Instructions for exercises provided during therapy sessions.     ASSESSMENT     Billy did well with strength session for UE/LE. Required cuing with video analysis for SL squat mechanics; some tactile/demo as well. Seeing progress with SL press strength. Discussed running space with Shone Gibson- Head [employer] as well as Mei Smith- [employer]. We were looking for a 100 yd space for running as well; we are limited in clinic to 30 yds with walls at both ends. This limits acceleration, steady state, and deceleration distances. He needs some conditioning work as well for weight management.  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 Pt reported/demonstrated no adverse response or exacerbation of symptoms during today's session.      Billy Is progressing well towards his goals.   Pt prognosis is Good.     Pt will continue to benefit from skilled outpatient physical therapy to address the deficits listed in the problem list box on initial evaluation, provide pt/family education and to maximize pt's level of independence in the home and community environment.     Pt's spiritual, cultural and educational needs considered and pt agreeable to plan of care and goals.     Anticipated barriers to physical therapy: none    Goals:  Short Term Goals: 8-10 weeks   1. Pt will be compliant with HEP 50% of prescribed amount. MET  2. The pt to demo improvement in left knee ROM to equal right knee ROM pain free MET  3.  The pt to demo good quad set with proper hyperextension moment of the Left knee MET  4. The pt to demo ambualting with elast restricitve AD witout major compensatory pattern left knee for at least 20 feet MET     Long Term Goals: 56 weeks (progressing, not met)   Pt will be compliant with % of prescribed amount.   The pt  to demo pain free and uncompensated running mechanics x10 min on an indoor treadmill  The pt to demo tolerance to a squat at or bellow parallel with uncompensated mechanics x10   The pt to demo strength of L LE within 10% of R LE as demo'd on the biodex machine   The pt to demo a deficit of 10% or less on a triple hop, single leg broad jump and crossover hop compared to non operative LE.   The pt will report full participation in ADLs and IADLs without restrictions related to L  knee.     PLAN     Focus on SL knee flexion moment tasks with partial to full BW as able. Running progression continued with modifications to avoid turns. Working to find space to work of running progression.     Strength as tolerated.     Manjit Lazcano, PT, DPT

## 2023-02-09 ENCOUNTER — CLINICAL SUPPORT (OUTPATIENT)
Dept: REHABILITATION | Facility: HOSPITAL | Age: 28
End: 2023-02-09
Payer: COMMERCIAL

## 2023-02-09 DIAGNOSIS — M25.562 ACUTE PAIN OF LEFT KNEE: Primary | ICD-10-CM

## 2023-02-09 DIAGNOSIS — M62.81 QUADRICEPS WEAKNESS: ICD-10-CM

## 2023-02-09 DIAGNOSIS — M25.662 DECREASED RANGE OF MOTION OF LEFT KNEE: ICD-10-CM

## 2023-02-09 DIAGNOSIS — S83.512A TEARS OF MENISCUS AND ACL OF LEFT KNEE, INITIAL ENCOUNTER: ICD-10-CM

## 2023-02-09 DIAGNOSIS — R26.2 DIFFICULTY WALKING: ICD-10-CM

## 2023-02-09 DIAGNOSIS — S83.207A TEARS OF MENISCUS AND ACL OF LEFT KNEE, INITIAL ENCOUNTER: ICD-10-CM

## 2023-02-09 PROCEDURE — 97110 THERAPEUTIC EXERCISES: CPT

## 2023-02-09 PROCEDURE — 97112 NEUROMUSCULAR REEDUCATION: CPT

## 2023-02-09 PROCEDURE — 97140 MANUAL THERAPY 1/> REGIONS: CPT

## 2023-02-09 PROCEDURE — 97530 THERAPEUTIC ACTIVITIES: CPT

## 2023-02-09 NOTE — PROGRESS NOTES
[facility]   Physical Therapy Treatment Note     Name: Billy Holbrook  [facility] Number: [id number]    Therapy Diagnosis:   Encounter Diagnoses   Name Primary?    Acute pain of left knee Yes    Decreased range of motion of left knee     Quadriceps weakness     Difficulty walking        Physician: PEACE Dempsey MD    Visit Date: 2/9/2023    Physician Orders: PT Eval and Treat   Medical Diagnosis from Referral: M25.562 (ICD-10-CM) - Acute pain of left knee  Evaluation Date: 9/7/2022  Authorization Period Expiration: 9/6/2023  Plan of Care Expiration: 12/31/2022  Visit # / Visits authorized: 104     Precautions: Standard     Time In: 11:11 am  Time Out: 12:45  Total Billable Time: 94 minutes    Procedure: 9/20/2022  1. Left Knee Arthroscopy, anterior cruciate ligament reconstruction [id number]  2.  Left Knee Arthroscopy, with meniscectomy (medial OR lateral) 90274  3.  Left Knee Arthroscopy, debridement/shaving of articular cartilage (chondroplasty) 46293  4.  Left Knee  platelet rich plasma injection to the bone-patellar tendon-bone harvest site    Post-Op Plan:  ACL reconstruction with a bone-patellar tendon-bone autograft    SUBJECTIVE     Pt reports: mild soreness today. No knee pain or swelling. No mechanical symptoms. Ready to exercise.     He was compliant with home exercise program.  Response to previous treatment: no issues; mild soreness  Functional change: no issues running today    Pain: 0/10  Location: left knee      OBJECTIVE     4 mo 20 days post-op    Wt. 332 lbs    Observation:   no effusion noted L knee with sweep test  Well healed scar  Atrophy in calf/quad on L     Range of Motion (extension/neutral/flexion):     Right Left   Knee PROM: 8-0-125/130 deg P:8-0-130 deg  A:7-0-125 deg      Strength:     Right Left Comment   Knee Extension: NT today lbs 180 lbs     LSI Unknown%   Knee Flexion: 5/5 4/5        Hip Abduction: 4+/5 4-/5       Anterior reach Y balance R= 58 cm  L= 54  "cm  Running wide base, slightly asymmetrical with decreased knee flexion at impact on L    Treatment     Billy received the treatments listed below:      therapeutic exercises to develop strength, endurance, ROM, flexibility, posture, and core stabilization for 26 minutes including:  Education/assessment  Heel prop 2 x 5 min with 10 lbs above/below  Quad set into hyperextension 30x 5" hold  Manual stretching to L HS, glute, adductors, quad, hip rotators  SL hip abd at wall 3 x 15 linus 5 lbs    manual therapy techniques: Joint mobilizations and Soft tissue Mobilization were applied for 8 minutes, including:  PFJ gr 3-4 glides all planes  Tibial AP glide gr 3-4 with and without rotation bias on and off heel prop  Knee hyperextension gr 3-4  Fat pad mobility  Scar mobilization    neuromuscular re-education activities to improve: Coordination, Kinesthetic, Sense, and Proprioception for 15 minutes. The following activities were included:  Isometric knee extensions at 60 deg 8 x 30 sec  Palloff press purple sport band 2 x 15 linus  Sit up med ball toss 2 x 15 14 lb ball    Therapeutic activities to improve functional performance for 45 minutes, including:  SKTC, quad pull, HS scoop, HS march, high knee march, walking butt kicks, lunge, lateral lunge, band walks, monster walks, jogging x 2 laps each  6 inch TRX heel tap 3 x 15 linus: almost weaned from UE assistance  Lateral lunge 40lb KB 3 x 10 linus: alternating  Sled push/pull 4 x 30 yds each 160->205 lbs      Patient Education and Home Exercises     Home Exercises Provided and Patient Education Provided     Education provided:   - HEP update, precautions, activity pacing, goals, timeframes    Written Home Exercises Provided: yes. Exercises were reviewed and Billy was able to demonstrate them prior to the end of the session.  Billy demonstrated good  understanding of the education provided. See EMR under Patient Instructions for exercises provided during therapy sessions. "     ASSESSMENT     Billy did well with lighter accessory work session today. Knee ROM and joint mobility looking great. Progressing as expected. Spoke with AT on phone about running at [place] tomorrow and given green light. Discussed POC with MD as well. Pt reported/demonstrated no adverse response or exacerbation of symptoms during today's session.      Billy Is progressing well towards his goals.   Pt prognosis is Good.     Pt will continue to benefit from skilled outpatient physical therapy to address the deficits listed in the problem list box on initial evaluation, provide pt/family education and to maximize pt's level of independence in the home and community environment.     Pt's spiritual, cultural and educational needs considered and pt agreeable to plan of care and goals.     Anticipated barriers to physical therapy: none    Goals:  Short Term Goals: 8-10 weeks   1. Pt will be compliant with HEP 50% of prescribed amount. MET  2. The pt to demo improvement in left knee ROM to equal right knee ROM pain free MET  3.  The pt to demo good quad set with proper hyperextension moment of the Left knee MET  4. The pt to demo ambualting with elast restricitve AD witout major compensatory pattern left knee for at least 20 feet MET     Long Term Goals: 56 weeks (progressing, not met)   Pt will be compliant with % of prescribed amount.   The pt to demo pain free and uncompensated running mechanics x10 min on an indoor treadmill  The pt to demo tolerance to a squat at or bellow parallel with uncompensated mechanics x10   The pt to demo strength of L LE within 10% of R LE as demo'd on the biodex machine   The pt to demo a deficit of 10% or less on a triple hop, single leg broad jump and crossover hop compared to non operative LE.   The pt will report full participation in ADLs and IADLs without restrictions related to L  knee.     PLAN     Progress SL strength as able. Running program to start  tomorrow at [facility].     Manjit Lazcano, PT, DPT

## 2023-02-10 ENCOUNTER — DOCUMENTATION ONLY (OUTPATIENT)
Dept: REHABILITATION | Facility: HOSPITAL | Age: 28
End: 2023-02-10

## 2023-02-10 NOTE — PROGRESS NOTES
Billy did well with step 1 stage 2 of return to sprint program at [facility]. We were given approval by [employer] AT and [employer] AD of the school to do this. [employer] ATC Beata Hull was present as well today. He did a dynamic warm up with running today. Some manual stretching to linus LE's after running. Entire session took 65 minutes. No problems reported. Fatigue noted with some stiff knee compensations noted last 4 runs. ROM remains full.

## 2023-02-13 ENCOUNTER — CLINICAL SUPPORT (OUTPATIENT)
Dept: REHABILITATION | Facility: HOSPITAL | Age: 28
End: 2023-02-13
Payer: COMMERCIAL

## 2023-02-13 DIAGNOSIS — M25.662 DECREASED RANGE OF MOTION OF LEFT KNEE: ICD-10-CM

## 2023-02-13 DIAGNOSIS — R26.2 DIFFICULTY WALKING: ICD-10-CM

## 2023-02-13 DIAGNOSIS — M25.562 ACUTE PAIN OF LEFT KNEE: Primary | ICD-10-CM

## 2023-02-13 DIAGNOSIS — M62.81 QUADRICEPS WEAKNESS: ICD-10-CM

## 2023-02-13 PROCEDURE — 97110 THERAPEUTIC EXERCISES: CPT

## 2023-02-13 PROCEDURE — 97530 THERAPEUTIC ACTIVITIES: CPT

## 2023-02-13 PROCEDURE — 97112 NEUROMUSCULAR REEDUCATION: CPT

## 2023-02-13 PROCEDURE — 97140 MANUAL THERAPY 1/> REGIONS: CPT

## 2023-02-13 NOTE — PROGRESS NOTES
[facility]   Physical Therapy Treatment Note     Name: Billy Holbrook  [facility] Number: [id number]    Therapy Diagnosis:   Encounter Diagnoses   Name Primary?    Acute pain of left knee Yes    Decreased range of motion of left knee     Quadriceps weakness     Difficulty walking        Physician: PEACE Dempsey MD    Visit Date: 2/13/2023    Physician Orders: PT Eval and Treat   Medical Diagnosis from Referral: M25.562 (ICD-10-CM) - Acute pain of left knee  Evaluation Date: 9/7/2022  Authorization Period Expiration: 9/6/2023  Plan of Care Expiration: 12/31/2022  Visit # / Visits authorized: 105     Precautions: Standard     Time In: 11:30 am  Time Out: 1:00 pm  Total Billable Time: 90 minutes    Procedure: 9/20/2022  1. Left Knee Arthroscopy, anterior cruciate ligament reconstruction 94337  2.  Left Knee Arthroscopy, with meniscectomy (medial OR lateral) 72191  3.  Left Knee Arthroscopy, debridement/shaving of articular cartilage (chondroplasty) 55860  4.  Left Knee  platelet rich plasma injection to the bone-patellar tendon-bone harvest site    Post-Op Plan:  ACL reconstruction with a bone-patellar tendon-bone autograft    SUBJECTIVE     Pt reports: feeling good. No issues after running on field Friday. No s/sx reported.     He was compliant with home exercise program.  Response to previous treatment: no issues; mild soreness  Functional change: no issues running today    Pain: 0/10  Location: left knee      OBJECTIVE     4 mo 24 days post-op    Wt. 332 lbs    Observation:   no effusion noted L knee with sweep test  Well healed scar  Atrophy in calf/quad on L     Range of Motion (extension/neutral/flexion):     Right Left   Knee PROM: 8-0-125/130 deg P:8-0-130 deg  A:7-0-125 deg      Strength:     Right Left Comment   Knee Extension: NT today lbs 180 lbs     LSI Unknown%   Knee Flexion: 5/5 4/5        Hip Abduction: 4+/5 4-/5       Anterior reach Y balance R= 58 cm  L= 54 cm  Running  "wide base, slightly asymmetrical with decreased knee flexion at impact on L  L hip ER limited 25% compared to R_ he states chronic issue    Treatment     Billy received the treatments listed below:      therapeutic exercises to develop strength, endurance, ROM, flexibility, posture, and core stabilization for 20 minutes including:  Education/assessment  Heel prop 2 x 5 min with 10 lbs above/below  Quad set into hyperextension 30x 5" hold  Manual stretching to L HS, glute, adductors, quad, hip rotators    manual therapy techniques: Joint mobilizations and Soft tissue Mobilization were applied for 8 minutes, including:  PFJ gr 3-4 glides all planes  Tibial AP glide gr 3-4 with and without rotation bias on and off heel prop  Knee hyperextension gr 3-4  Fat pad mobility  Scar mobilization    neuromuscular re-education activities to improve: Coordination, Kinesthetic, Sense, and Proprioception for 12 minutes. The following activities were included:  Isometric knee extensions at 60 deg 8 x 30 sec  KB pull through's 40 lbs 4 x 30 sec    Therapeutic activities to improve functional performance for 50 minutes, including:  SKTC, quad pull, HS scoop, HS march, high knee march, walking butt kicks, lunge, lateral lunge, band walks, monster walks, jogging x 2 laps each  6 inch TRX heel tap 3 x 15 linus: almost weaned from UE assistance  4 inch heel tap no UE assistance 2 x 10  Step up 12 inch 10 lbs each side on safety bar 4 x 8  BB deadlift from rack 4 x 10 135->205 lbs      Patient Education and Home Exercises     Home Exercises Provided and Patient Education Provided     Education provided:   - HEP update, precautions, activity pacing, goals, timeframes    Written Home Exercises Provided: yes. Exercises were reviewed and Billy was able to demonstrate them prior to the end of the session.  Billy demonstrated good  understanding of the education provided. See EMR under Patient Instructions for exercises provided during therapy " sessions.     ASSESSMENT     Billy did well with strength focused session. Adding weight with tasks with improved mechanics of movement and less cuing required. Shin angle improving with SL heel tap and step ups. Pt reported/demonstrated no adverse response or exacerbation of symptoms during today's session.      Billy Is progressing well towards his goals.   Pt prognosis is Good.     Pt will continue to benefit from skilled outpatient physical therapy to address the deficits listed in the problem list box on initial evaluation, provide pt/family education and to maximize pt's level of independence in the home and community environment.     Pt's spiritual, cultural and educational needs considered and pt agreeable to plan of care and goals.     Anticipated barriers to physical therapy: none    Goals:  Short Term Goals: 8-10 weeks   1. Pt will be compliant with HEP 50% of prescribed amount. MET  2. The pt to demo improvement in left knee ROM to equal right knee ROM pain free MET  3.  The pt to demo good quad set with proper hyperextension moment of the Left knee MET  4. The pt to demo ambualting with elast restricitve AD witout major compensatory pattern left knee for at least 20 feet MET     Long Term Goals: 56 weeks (progressing, not met)   Pt will be compliant with % of prescribed amount.   The pt to demo pain free and uncompensated running mechanics x10 min on an indoor treadmill  The pt to demo tolerance to a squat at or bellow parallel with uncompensated mechanics x10   The pt to demo strength of L LE within 10% of R LE as demo'd on the biodex machine   The pt to demo a deficit of 10% or less on a triple hop, single leg broad jump and crossover hop compared to non operative LE.   The pt will report full participation in ADLs and IADLs without restrictions related to L  knee.     PLAN     Progress SL strength as able. Running program to start tomorrow at [facility] [employer].     Manjit Lazcano, PT,  DPT

## 2023-02-14 ENCOUNTER — CLINICAL SUPPORT (OUTPATIENT)
Dept: REHABILITATION | Facility: HOSPITAL | Age: 28
End: 2023-02-14
Payer: COMMERCIAL

## 2023-02-14 DIAGNOSIS — M25.562 ACUTE PAIN OF LEFT KNEE: Primary | ICD-10-CM

## 2023-02-14 DIAGNOSIS — M25.662 DECREASED RANGE OF MOTION OF LEFT KNEE: ICD-10-CM

## 2023-02-14 DIAGNOSIS — R26.2 DIFFICULTY WALKING: ICD-10-CM

## 2023-02-14 DIAGNOSIS — M62.81 QUADRICEPS WEAKNESS: ICD-10-CM

## 2023-02-14 PROCEDURE — 97110 THERAPEUTIC EXERCISES: CPT

## 2023-02-14 PROCEDURE — 97140 MANUAL THERAPY 1/> REGIONS: CPT

## 2023-02-14 PROCEDURE — 97112 NEUROMUSCULAR REEDUCATION: CPT

## 2023-02-14 PROCEDURE — 97530 THERAPEUTIC ACTIVITIES: CPT

## 2023-02-14 NOTE — PROGRESS NOTES
[facility]   Physical Therapy Treatment Note     Name: Billy Holbrook  [facility] Number: [id number]    Therapy Diagnosis:   Encounter Diagnoses   Name Primary?    Acute pain of left knee Yes    Decreased range of motion of left knee     Quadriceps weakness     Difficulty walking        Physician: PEACE Dempsey MD    Visit Date: 2/14/2023    Physician Orders: PT Eval and Treat   Medical Diagnosis from Referral: M25.562 (ICD-10-CM) - Acute pain of left knee  Evaluation Date: 9/7/2022  Authorization Period Expiration: 9/6/2023  Plan of Care Expiration: 12/31/2022  Visit # / Visits authorized: 106     Precautions: Standard     Time In: 2:00 pm  Time Out: 3:45  Total Billable Time: 105 minutes    Procedure: 9/20/2022  1. Left Knee Arthroscopy, anterior cruciate ligament reconstruction 27563  2.  Left Knee Arthroscopy, with meniscectomy (medial OR lateral) 21787  3.  Left Knee Arthroscopy, debridement/shaving of articular cartilage (chondroplasty) 55693  4.  Left Knee  platelet rich plasma injection to the bone-patellar tendon-bone harvest site    Post-Op Plan:  ACL reconstruction with a bone-patellar tendon-bone autograft    SUBJECTIVE     Pt reports: feeling good. No issues to report.     He was compliant with home exercise program.  Response to previous treatment: no issues; mild soreness  Functional change: no issues running today    Pain: 0/10  Location: left knee      OBJECTIVE     4 mo 25 days post-op    Wt. 332 lbs    Observation:   no effusion noted L knee with sweep test  Well healed scar  Atrophy in calf/quad on L     Range of Motion (extension/neutral/flexion):     Right Left   Knee PROM: 8-0-125/130 deg P:8-0-130 deg  A:7-0-125 deg      Strength:     Right Left Comment   Knee Extension: NT today lbs 180 lbs     LSI Unknown%   Knee Flexion: 5/5 4/5        Hip Abduction: 4+/5 4-/5       Anterior reach Y balance R= 58 cm  L= 54 cm  Running wide base, slightly asymmetrical with  "decreased knee flexion at impact on L  L hip ER limited 25% compared to R_ he states chronic issue    Treatment     Billy received the treatments listed below:      therapeutic exercises to develop strength, endurance, ROM, flexibility, posture, and core stabilization for 25 minutes including:  Education/assessment  Heel prop 2 x 5 min with 10 lbs above/below  Quad set into hyperextension 30x 5" hold  Manual stretching to L HS, glute, adductors, quad, hip rotators  LAQ 5 x 8 35->50 lbs    manual therapy techniques: Joint mobilizations and Soft tissue Mobilization were applied for 8 minutes, including:  PFJ gr 3-4 glides all planes  Tibial AP glide gr 3-4 with and without rotation bias on and off heel prop  Knee hyperextension gr 3-4  Fat pad mobility  Scar mobilization    neuromuscular re-education activities to improve: Coordination, Kinesthetic, Sense, and Proprioception for 12 minutes. The following activities were included:  Med ball OH and lateral work for core 14 lbs 3 x 12 each way  Standing clams BTB 3 x 15 linus: foot on 20 inch step    Therapeutic activities to improve functional performance for 60 minutes, including:  SKTC, quad pull, HS scoop, HS march, high knee march, walking butt kicks, lunge, lateral lunge, band walks, monster walks, jogging x 2 laps each  6 inch TRX heel tap 3 x 15 linus: almost weaned from UE assistance  4 inch heel tap no UE assistance 2 x 10 linus  Trap bar DL squat 5 x 10->5 185-> 275 lbs  BB flat bench 5 x 15->5 135-> 315lbs    Patient Education and Home Exercises     Home Exercises Provided and Patient Education Provided     Education provided:   - HEP update, precautions, activity pacing, goals, timeframes    Written Home Exercises Provided: yes. Exercises were reviewed and Billy was able to demonstrate them prior to the end of the session.  Billy demonstrated good  understanding of the education provided. See EMR under Patient Instructions for exercises provided during therapy " sessions.     ASSESSMENT     Billy did well with strength focused session. Increasing resistance with isolated and global movements today. Some mild cuing for weight shift on squat. SL BW strength and control improving. Pt reported/demonstrated no adverse response or exacerbation of symptoms during today's session.      Billy Is progressing well towards his goals.   Pt prognosis is Good.     Pt will continue to benefit from skilled outpatient physical therapy to address the deficits listed in the problem list box on initial evaluation, provide pt/family education and to maximize pt's level of independence in the home and community environment.     Pt's spiritual, cultural and educational needs considered and pt agreeable to plan of care and goals.     Anticipated barriers to physical therapy: none    Goals:  Short Term Goals: 8-10 weeks   1. Pt will be compliant with HEP 50% of prescribed amount. MET  2. The pt to demo improvement in left knee ROM to equal right knee ROM pain free MET  3.  The pt to demo good quad set with proper hyperextension moment of the Left knee MET  4. The pt to demo ambualting with elast restricitve AD witout major compensatory pattern left knee for at least 20 feet MET     Long Term Goals: 56 weeks (progressing, not met)   Pt will be compliant with % of prescribed amount.   The pt to demo pain free and uncompensated running mechanics x10 min on an indoor treadmill  The pt to demo tolerance to a squat at or bellow parallel with uncompensated mechanics x10   The pt to demo strength of L LE within 10% of R LE as demo'd on the biodex machine   The pt to demo a deficit of 10% or less on a triple hop, single leg broad jump and crossover hop compared to non operative LE.   The pt will report full participation in ADLs and IADLs without restrictions related to L  knee.     PLAN     Progress SL strength as able. Running program to start tomorrow at [facility] [employer].     Manjit Lazcano, PT,  DPT

## 2023-02-15 ENCOUNTER — DOCUMENTATION ONLY (OUTPATIENT)
Dept: REHABILITATION | Facility: HOSPITAL | Age: 28
End: 2023-02-15

## 2023-02-15 NOTE — PROGRESS NOTES
Billy did well with step 2 of stage 2 with return to sprint program at [facility]. We were given approval by [employer] and [place] AD of the [employer] to do this. [place]'s ATC Beata Hull was present as well today. He did a dynamic warm up with running today. Some manual stretching to linus LE's after running. Entire session took 60 minutes. No problems reported. He did not fatigue as much today and demonstrated no significant compensations compared to last run day. Deceleration is still favored as expected due to strength compared to BW.  ROM remains full.

## 2023-02-16 ENCOUNTER — CLINICAL SUPPORT (OUTPATIENT)
Dept: REHABILITATION | Facility: HOSPITAL | Age: 28
End: 2023-02-16
Payer: COMMERCIAL

## 2023-02-16 DIAGNOSIS — M25.662 DECREASED RANGE OF MOTION OF LEFT KNEE: ICD-10-CM

## 2023-02-16 DIAGNOSIS — R26.2 DIFFICULTY WALKING: ICD-10-CM

## 2023-02-16 DIAGNOSIS — M62.81 QUADRICEPS WEAKNESS: ICD-10-CM

## 2023-02-16 DIAGNOSIS — M25.562 ACUTE PAIN OF LEFT KNEE: Primary | ICD-10-CM

## 2023-02-16 PROCEDURE — 97140 MANUAL THERAPY 1/> REGIONS: CPT

## 2023-02-16 PROCEDURE — 97530 THERAPEUTIC ACTIVITIES: CPT

## 2023-02-16 PROCEDURE — 97110 THERAPEUTIC EXERCISES: CPT

## 2023-02-16 NOTE — PROGRESS NOTES
[facility]   Physical Therapy Treatment Note     Name: Billy Holbrook  [facility] Number: [id number]    Therapy Diagnosis:   Encounter Diagnoses   Name Primary?    Acute pain of left knee Yes    Decreased range of motion of left knee     Quadriceps weakness     Difficulty walking        Physician: PEACE Dempsey MD    Visit Date: 2/16/2023    Physician Orders: PT Eval and Treat   Medical Diagnosis from Referral: M25.562 (ICD-10-CM) - Acute pain of left knee  Evaluation Date: 9/7/2022  Authorization Period Expiration: 9/6/2023  Plan of Care Expiration: 12/31/2022  Visit # / Visits authorized: 107     Precautions: Standard     Time In: 11:00 am  Time Out: 12:30  Total Billable Time: 90 minutes    Procedure: 9/20/2022  1. Left Knee Arthroscopy, anterior cruciate ligament reconstruction [id number]  2.  Left Knee Arthroscopy, with meniscectomy (medial OR lateral) 47116  3.  Left Knee Arthroscopy, debridement/shaving of articular cartilage (chondroplasty) 35079  4.  Left Knee  platelet rich plasma injection to the bone-patellar tendon-bone harvest site    Post-Op Plan:  ACL reconstruction with a bone-patellar tendon-bone autograft    SUBJECTIVE     Pt reports: feeling good. No issues to report. Running went well yesterday.     He was compliant with home exercise program.  Response to previous treatment: no issues; mild soreness  Functional change: no issues running today    Pain: 0/10  Location: left knee      OBJECTIVE     4 mo 27 days post-op    Wt. 332 lbs    Observation:   no effusion noted L knee with sweep test  Well healed scar  Atrophy in calf/quad on L     Range of Motion (extension/neutral/flexion):     Right Left   Knee ROM: 8-0-125/130 deg P:8-0-130 deg  A:7-0-125 deg      Strength:     Right Left Comment   Knee Extension: NT today lbs 180 lbs     LSI Unknown%   Knee Flexion: 5/5 4/5        Hip Abduction: 4+/5 4-/5       Anterior reach Y balance R= 58 cm  L= 54 cm  Running wide base,  "slightly asymmetrical with decreased knee flexion at impact on L  L hip ER limited 25% compared to R_ he states chronic issue    Treatment     Billy received the treatments listed below:      therapeutic exercises to develop strength, endurance, ROM, flexibility, posture, and core stabilization for 42 minutes including:  Education/assessment  Heel prop 2 x 5 min with 10 lbs above/below  Quad set into hyperextension 30x 5" hold  Manual stretching to L HS, glute, adductors, quad, hip rotators  LAQ 5 x 8 35->59 lbs  Standing HS curl matrix 4 x 8 x 3 sec 30-59 lbs unilateral  Bent over row landmine 4 x 10->6 90->135 lbs  Lateral raise Dbs 4 x 8 20->30 lbs    manual therapy techniques: Joint mobilizations and Soft tissue Mobilization were applied for 8 minutes, including:  PFJ gr 3-4 glides all planes  Tibial AP glide gr 3-4 with and without rotation bias on and off heel prop  Knee hyperextension gr 3-4  Fat pad mobility  Scar mobilization    neuromuscular re-education activities to improve: Coordination, Kinesthetic, Sense, and Proprioception for 0 minutes. The following activities were included:  Med ball OH and lateral work for core 14 lbs 3 x 12 each way-NP  Standing clams BTB 3 x 15 linus: foot on 20 inch step-NP    Therapeutic activities to improve functional performance for 40 minutes, including:  SKTC, quad pull, HS scoop, HS march, high knee march, walking butt kicks, lunge, lateral lunge, band walks, monster walks, jogging x 2 laps each  6 inch TRX heel tap 3 x 15 linus: almost weaned from UE assistance  4 inch heel tap no UE assistance x 10 linus  5 inch heel tap 2 x 10 linus no UE assist  Lateral lunge 4 x 10->5 linus 40->70 lb KB      Patient Education and Home Exercises     Home Exercises Provided and Patient Education Provided     Education provided:   - HEP update, precautions, activity pacing, goals, timeframes    Written Home Exercises Provided: yes. Exercises were reviewed and Billy was able to demonstrate " them prior to the end of the session.  Billy demonstrated good  understanding of the education provided. See EMR under Patient Instructions for exercises provided during therapy sessions.     ASSESSMENT     Billy did well with strength session. Focusing on isolation exercise without irritability or issues.  Pt reported/demonstrated no adverse response or exacerbation of symptoms during today's session.      Billy Is progressing well towards his goals.   Pt prognosis is Good.     Pt will continue to benefit from skilled outpatient physical therapy to address the deficits listed in the problem list box on initial evaluation, provide pt/family education and to maximize pt's level of independence in the home and community environment.     Pt's spiritual, cultural and educational needs considered and pt agreeable to plan of care and goals.     Anticipated barriers to physical therapy: none    Goals:  Short Term Goals: 8-10 weeks   1. Pt will be compliant with HEP 50% of prescribed amount. MET  2. The pt to demo improvement in left knee ROM to equal right knee ROM pain free MET  3.  The pt to demo good quad set with proper hyperextension moment of the Left knee MET  4. The pt to demo ambualting with elast restricitve AD witout major compensatory pattern left knee for at least 20 feet MET     Long Term Goals: 56 weeks (progressing, not met)   Pt will be compliant with % of prescribed amount.   The pt to demo pain free and uncompensated running mechanics x10 min on an indoor treadmill  The pt to demo tolerance to a squat at or bellow parallel with uncompensated mechanics x10   The pt to demo strength of L LE within 10% of R LE as demo'd on the biodex machine   The pt to demo a deficit of 10% or less on a triple hop, single leg broad jump and crossover hop compared to non operative LE.   The pt will report full participation in ADLs and IADLs without restrictions related to L  knee.     PLAN     Progress SL  strength as able. Running program tomorrow at [facility].     Measure LSI via [facility] next week.     Manjit Lazcano, PT, DPT

## 2023-02-17 ENCOUNTER — DOCUMENTATION ONLY (OUTPATIENT)
Dept: REHABILITATION | Facility: HOSPITAL | Age: 28
End: 2023-02-17
Payer: COMMERCIAL

## 2023-02-17 NOTE — PROGRESS NOTES
Billy did well with step 2 of stage 2 with return to sprint program at [facility]. We were given approval by [employer] and [place] AD of the school to do this. [place]'s ATC Beata Hull was present as well today. He did a dynamic warm up with his running today. Some manual stretching to linus LE's after running. Entire session took 60 minutes. No problems reported. His form is looking much better even with deceleration. No compensations noted with acceleration or steady state running.

## 2023-02-20 ENCOUNTER — CLINICAL SUPPORT (OUTPATIENT)
Dept: REHABILITATION | Facility: HOSPITAL | Age: 28
End: 2023-02-20
Payer: COMMERCIAL

## 2023-02-20 DIAGNOSIS — M25.562 ACUTE PAIN OF LEFT KNEE: Primary | ICD-10-CM

## 2023-02-20 DIAGNOSIS — R26.2 DIFFICULTY WALKING: ICD-10-CM

## 2023-02-20 DIAGNOSIS — M25.662 DECREASED RANGE OF MOTION OF LEFT KNEE: ICD-10-CM

## 2023-02-20 DIAGNOSIS — M62.81 QUADRICEPS WEAKNESS: ICD-10-CM

## 2023-02-20 PROCEDURE — 97110 THERAPEUTIC EXERCISES: CPT

## 2023-02-20 PROCEDURE — 97140 MANUAL THERAPY 1/> REGIONS: CPT

## 2023-02-20 PROCEDURE — 97530 THERAPEUTIC ACTIVITIES: CPT

## 2023-02-20 PROCEDURE — 97112 NEUROMUSCULAR REEDUCATION: CPT

## 2023-02-20 NOTE — PROGRESS NOTES
[facility]   Physical Therapy Treatment Note     Name: Billy Holbrook  [facility] Number: [id number]    Therapy Diagnosis:   Encounter Diagnoses   Name Primary?    Acute pain of left knee Yes    Decreased range of motion of left knee     Quadriceps weakness     Difficulty walking        Physician: PEACE Dempsey MD    Visit Date: 2/20/2023    Physician Orders: PT Eval and Treat   Medical Diagnosis from Referral: M25.562 (ICD-10-CM) - Acute pain of left knee  Evaluation Date: 9/7/2022  Authorization Period Expiration: 9/6/2023  Plan of Care Expiration: 12/31/2022  Visit # / Visits authorized: 108     Precautions: Standard     Time In: 11:22 am  Time Out: 12:45  Total Billable Time: 83 minutes    Procedure: 9/20/2022  1. Left Knee Arthroscopy, anterior cruciate ligament reconstruction 87888  2.  Left Knee Arthroscopy, with meniscectomy (medial OR lateral) 74987  3.  Left Knee Arthroscopy, debridement/shaving of articular cartilage (chondroplasty) 29916  4.  Left Knee  platelet rich plasma injection to the bone-patellar tendon-bone harvest site    Post-Op Plan:  ACL reconstruction with a bone-patellar tendon-bone autograft    SUBJECTIVE     Pt reports: knee is good. Stomach not feeling good today. Can tell he is getting stronger. Still weaker with leg press and knee extension compared to uninvolved, but knee flexion strength feels better on involved.     He was compliant with home exercise program.  Response to previous treatment: no issues; mild soreness  Functional change: no issues running today    Pain: 0/10  Location: left knee      OBJECTIVE     5 mo post-op    Wt. 332 lbs    Observation:   trace effusion noted L knee with sweep test  Well healed scar  Atrophy in calf/quad on L     Range of Motion (extension/neutral/flexion):     Right Left   Knee ROM: 8-0-125/130 deg P:8-0-130 deg  A:7-0-125 deg      Strength:     Right Left Comment   Knee Extension: NT today lbs 180 lbs     LSI  "Unknown%   Knee Flexion: 5/5 4/5        Hip Abduction: 4+/5 4-/5       Anterior reach Y balance R= 58 cm  L= 54 cm  Running wide base, slightly asymmetrical with decreased knee flexion at impact on L  L hip ER limited 25% compared to R_ he states chronic issue    Girth measures next session mid patellar, quad/calf.     Treatment     Billy received the treatments listed below:      therapeutic exercises to develop strength, endurance, ROM, flexibility, posture, and core stabilization for 37 minutes including:  Education/assessment  Heel prop 2 x 3 min with 10 lbs above/below  Quad set into hyperextension 30x 5" hold  Manual stretching to L HS, glute, adductors, quad, hip rotators  LAQ 5 x 8 35->85 lbs unilateral: R to 95 lbs  Standing HS curl matrix 5 x 8 x 3 sec 45->75 lbs unilateral  SL leg press 4 x 6-8; 200->320 lbs      manual therapy techniques: Joint mobilizations and Soft tissue Mobilization were applied for 8 minutes, including:  PFJ gr 3-4 glides all planes  Tibial AP glide gr 3-4 with and without rotation bias on and off heel prop  Knee hyperextension gr 3-4  Fat pad mobility  Scar mobilization    neuromuscular re-education activities to improve: Coordination, Kinesthetic, Sense, and Proprioception for 10 minutes. The following activities were included:  SL hip abd at wall 10 lbs on foot 3 x 15 linus  V up swiss ball alt UE/LE 4 x 25 sec    Therapeutic activities to improve functional performance for 28 minutes, including:  SKTC, quad pull, HS scoop, HS march, high knee march, walking butt kicks, lunge, lateral lunge, band walks, monster walks, jogging x 2 laps each  6 inch TRX heel tap 3 x 15 linus: almost weaned from UE assistance  5 inch heel tap 2 x 10 linus no UE assist  Plank step up/over with push ups 4 x 5 linus      Patient Education and Home Exercises     Home Exercises Provided and Patient Education Provided     Education provided:   - HEP update, precautions, activity pacing, goals, " timeframes    Written Home Exercises Provided: yes. Exercises were reviewed and Billy was able to demonstrate them prior to the end of the session.  Billy demonstrated good  understanding of the education provided. See EMR under Patient Instructions for exercises provided during therapy sessions.     ASSESSMENT     Billy did well with isolated leg strength today. Machine based pressing and extensions are getting much closer to uninvolved LE, but do remain at deficit. HS strength is equal via exercise/subjective report. Plan to test max isometric strength this week.  Pt reported/demonstrated no adverse response or exacerbation of symptoms during today's session.      Billy Is progressing well towards his goals.   Pt prognosis is Good.     Pt will continue to benefit from skilled outpatient physical therapy to address the deficits listed in the problem list box on initial evaluation, provide pt/family education and to maximize pt's level of independence in the home and community environment.     Pt's spiritual, cultural and educational needs considered and pt agreeable to plan of care and goals.     Anticipated barriers to physical therapy: none    Goals:  Short Term Goals: 8-10 weeks   1. Pt will be compliant with HEP 50% of prescribed amount. MET  2. The pt to demo improvement in left knee ROM to equal right knee ROM pain free MET  3.  The pt to demo good quad set with proper hyperextension moment of the Left knee MET  4. The pt to demo ambualting with elast restricitve AD witout major compensatory pattern left knee for at least 20 feet MET     Long Term Goals: 56 weeks (progressing, not met)   Pt will be compliant with % of prescribed amount.   The pt to demo pain free and uncompensated running mechanics x10 min on an indoor treadmill  The pt to demo tolerance to a squat at or bellow parallel with uncompensated mechanics x10   The pt to demo strength of L LE within 10% of R LE as demo'd on the [employer]  machine   The pt to demo a deficit of 10% or less on a triple hop, single leg broad jump and crossover hop compared to non operative LE.   The pt will report full participation in ADLs and IADLs without restrictions related to L  knee.     PLAN     Progress SL strength as able. Running program tomorrow at [facility].     Measure LSI via HHD/Biodex as isometric Wednesday    Manjit Lazcano, PT, DPT

## 2023-02-22 ENCOUNTER — CLINICAL SUPPORT (OUTPATIENT)
Dept: REHABILITATION | Facility: HOSPITAL | Age: 28
End: 2023-02-22
Payer: COMMERCIAL

## 2023-02-22 DIAGNOSIS — M62.81 QUADRICEPS WEAKNESS: ICD-10-CM

## 2023-02-22 DIAGNOSIS — R26.2 DIFFICULTY WALKING: ICD-10-CM

## 2023-02-22 DIAGNOSIS — M25.562 ACUTE PAIN OF LEFT KNEE: Primary | ICD-10-CM

## 2023-02-22 DIAGNOSIS — M25.662 DECREASED RANGE OF MOTION OF LEFT KNEE: ICD-10-CM

## 2023-02-22 PROCEDURE — 97110 THERAPEUTIC EXERCISES: CPT

## 2023-02-22 PROCEDURE — 97140 MANUAL THERAPY 1/> REGIONS: CPT

## 2023-02-22 PROCEDURE — 97530 THERAPEUTIC ACTIVITIES: CPT

## 2023-02-22 PROCEDURE — 97750 PHYSICAL PERFORMANCE TEST: CPT | Mod: 59

## 2023-02-22 NOTE — PROGRESS NOTES
[facility]   Physical Therapy Treatment Note     Name: Billy Holbrook  [facility] Number: [id number]    Therapy Diagnosis:   Encounter Diagnoses   Name Primary?    Acute pain of left knee Yes    Decreased range of motion of left knee     Quadriceps weakness     Difficulty walking          Physician: PEACE Dempsey MD    Visit Date: 2/22/2023    Physician Orders: PT Eval and Treat   Medical Diagnosis from Referral: M25.562 (ICD-10-CM) - Acute pain of left knee  Evaluation Date: 9/7/2022  Authorization Period Expiration: 9/6/2023  Plan of Care Expiration: 12/31/2022  Visit # / Visits authorized: 109     Precautions: Standard     Time In: 0630 am  Time Out: 0800 am  Total Billable Time: 90 minutes    Procedure: 9/20/2022  1. Left Knee Arthroscopy, anterior cruciate ligament reconstruction 73533  2.  Left Knee Arthroscopy, with meniscectomy (medial OR lateral) 68004  3.  Left Knee Arthroscopy, debridement/shaving of articular cartilage (chondroplasty) 30606  4.  Left Knee  platelet rich plasma injection to the bone-patellar tendon-bone harvest site    Post-Op Plan:  ACL reconstruction with a bone-patellar tendon-bone autograft    SUBJECTIVE     Pt reports: he's tired but otherwise feeling good     He was compliant with home exercise program.  Response to previous treatment: no issues; mild soreness  Functional change: no issues running today    Pain: 0/10  Location: left knee      OBJECTIVE     5 mo post-op    Wt. 332 lbs    Observation:   trace effusion noted L knee with sweep test  Well healed scar  Atrophy in calf/quad on L     Range of Motion (extension/neutral/flexion):     Right Left   Knee ROM: 8-0-125/130 deg P:8-0-130 deg  A:7-0-125 deg      Strength: (flex/ext measured at 60 deg isometric on Biodex machine)    Right Left Comment   Knee Extension: 248.9 lbs 261 lbs    4.8% deficit on R compared to L   Knee Flexion: 184.0 lbs 167.9 lbs     8.8% deficit on L compared to R   Hip  "Abduction: 4+/5 4-/5       Anterior reach Y balance R= 58 cm  L= 54 cm  Running wide base, slightly asymmetrical with decreased knee flexion at impact on L  L hip ER limited 25% compared to R_ he states chronic issue    Girth measures next session mid patellar, quad/calf.     Treatment     Billy received the treatments listed below:      Physical Perf Test Performed to test HS/Quad strength of L LE compared to uninvolved R LE for 20m    therapeutic exercises to develop strength, endurance, ROM, flexibility, posture, and core stabilization for 15 minutes including:  Education/assessment  Heel prop 2 x 3 min with 10 lbs above/below  Quad set into hyperextension 30x 5" hold      NP Today:   Manual stretching to L HS, glute, adductors, quad, hip rotators  LAQ 5 x 8 35->85 lbs unilateral: R to 95 lbs  Standing HS curl matrix 5 x 8 x 3 sec 45->75 lbs unilateral  SL leg press 4 x 6-8; 200->320 lbs      manual therapy techniques: Joint mobilizations and Soft tissue Mobilization were applied for  09 minutes, including:  PFJ gr 3-4 glides all planes  Tibial AP glide gr 3-4 with and without rotation bias on and off heel prop  Knee hyperextension gr 3-4  Fat pad mobility  Scar mobilization    neuromuscular re-education activities to improve: Coordination, Kinesthetic, Sense, and Proprioception for 00 minutes. The following activities were included:  SL hip abd at wall 10 lbs on foot 3 x 15 linus  V up swiss ball alt UE/LE 4 x 25 sec    Therapeutic activities to improve functional performance for 45 minutes, including:  SKTC, quad pull, HS scoop, HS march, high knee march, walking butt kicks, lunge, lateral lunge, band walks, monster walks, jogging x 2 laps each  6 inch TRX heel tap 3 x 15 linus: almost weaned from UE assistance  5 inch heel tap 2 x 10 linus no UE assist  Hex bar squat 2 x 10 135#; 4 x 5 225# (tempo 3 count ecc, 2 count pause, 1 count up)  Landmine bent over row; 4 x 10 100# (tempo 3 count ecc, 2 count pause, 1 count " up)    Russian twist toss; 15# medball; 2 x 10  OH sit up ball toss; 15# medball 2 x 10      Patient Education and Home Exercises     Home Exercises Provided and Patient Education Provided     Education provided:   - HEP update, precautions, activity pacing, goals, timeframes    Written Home Exercises Provided: yes. Exercises were reviewed and Billy was able to demonstrate them prior to the end of the session.  Billy demonstrated good  understanding of the education provided. See EMR under Patient Instructions for exercises provided during therapy sessions.     ASSESSMENT     Billy did well with isometric testing today with near full symmetry side to side. He struggled with increased time under tension during lifting today but was able to complete all sets and reps without compromising form. Will continue to progress strengthening and functional tasks toward sport goals. He is progressing well for this stage post-operatively.     Billy Is progressing well towards his goals.   Pt prognosis is Good.     Pt will continue to benefit from skilled outpatient physical therapy to address the deficits listed in the problem list box on initial evaluation, provide pt/family education and to maximize pt's level of independence in the home and community environment.     Pt's spiritual, cultural and educational needs considered and pt agreeable to plan of care and goals.     Anticipated barriers to physical therapy: none    Goals:  Short Term Goals: 8-10 weeks   1. Pt will be compliant with HEP 50% of prescribed amount. MET  2. The pt to demo improvement in left knee ROM to equal right knee ROM pain free MET  3.  The pt to demo good quad set with proper hyperextension moment of the Left knee MET  4. The pt to demo ambualting with elast restricitve AD witout major compensatory pattern left knee for at least 20 feet MET     Long Term Goals: 56 weeks (progressing, not met)   Pt will be compliant with % of prescribed  amount.   The pt to demo pain free and uncompensated running mechanics x10 min on an indoor treadmill  The pt to demo tolerance to a squat at or bellow parallel with uncompensated mechanics x10   The pt to demo strength of L LE within 10% of R LE as demo'd on the biodex machine   The pt to demo a deficit of 10% or less on a triple hop, single leg broad jump and crossover hop compared to non operative LE.   The pt will report full participation in ADLs and IADLs without restrictions related to L  knee.     PLAN     Progress SL strength as able. Running program tomorrow at [facility]     Measure LSI via HHD/Biodex as isometric Wednesday    Joycelyn Willson, PT, DPT

## 2023-02-23 ENCOUNTER — CLINICAL SUPPORT (OUTPATIENT)
Dept: REHABILITATION | Facility: HOSPITAL | Age: 28
End: 2023-02-23
Payer: COMMERCIAL

## 2023-02-23 DIAGNOSIS — M25.562 ACUTE PAIN OF LEFT KNEE: Primary | ICD-10-CM

## 2023-02-23 DIAGNOSIS — M25.662 DECREASED RANGE OF MOTION OF LEFT KNEE: ICD-10-CM

## 2023-02-23 DIAGNOSIS — R26.2 DIFFICULTY WALKING: ICD-10-CM

## 2023-02-23 DIAGNOSIS — M62.81 QUADRICEPS WEAKNESS: ICD-10-CM

## 2023-02-23 PROCEDURE — 97112 NEUROMUSCULAR REEDUCATION: CPT

## 2023-02-23 PROCEDURE — 97110 THERAPEUTIC EXERCISES: CPT

## 2023-02-23 PROCEDURE — 97530 THERAPEUTIC ACTIVITIES: CPT

## 2023-02-23 PROCEDURE — 97140 MANUAL THERAPY 1/> REGIONS: CPT

## 2023-02-23 NOTE — PROGRESS NOTES
[facility]   Physical Therapy Treatment Note     Name: Billy Holbrook  [facility] Number: [id number]    Therapy Diagnosis:   Encounter Diagnoses   Name Primary?    Acute pain of left knee Yes    Decreased range of motion of left knee     Quadriceps weakness     Difficulty walking      Physician: PEACE Dempsey MD    Visit Date: 2/23/2023    Physician Orders: PT Eval and Treat   Medical Diagnosis from Referral: M25.562 (ICD-10-CM) - Acute pain of left knee  Evaluation Date: 9/7/2022  Authorization Period Expiration: 9/6/2023  Plan of Care Expiration: 12/31/2022  Visit # / Visits authorized: 110     Precautions: Standard     Time In: 12:30 pm  Time Out: 1:50  Total Billable Time: 80 minutes    Procedure: 9/20/2022  1. Left Knee Arthroscopy, anterior cruciate ligament reconstruction 10800  2.  Left Knee Arthroscopy, with meniscectomy (medial OR lateral) 18022  3.  Left Knee Arthroscopy, debridement/shaving of articular cartilage (chondroplasty) 12928  4.  Left Knee  platelet rich plasma injection to the bone-patellar tendon-bone harvest site    Post-Op Plan:  ACL reconstruction with a bone-patellar tendon-bone autograft    SUBJECTIVE     Pt reports: he feels great. Pleased his numbers were good yesterday, but feels he should be able to generate more force on both legs.     He was compliant with home exercise program.  Response to previous treatment: no issues; mild soreness  Functional change: no issues running: sprinting more    Pain: 0/10  Location: left knee      OBJECTIVE     5 mo 3 days post-op    Wt. 332 lbs    Observation:   no effusion noted L knee with sweep test  Well healed scar  Atrophy in calf/quad on L     Range of Motion (extension/neutral/flexion):     Right Left   Knee ROM: 8-0-125/130 deg P:8-0-130 deg  A:7-0-125 deg      Strength: (flex/ext measured at 60 deg isometric on Biodex machine)    Right Left Comment   Knee Extension: 248.9 lbs 261 lbs    4.8% deficit on R  "compared to L   Knee Flexion: 184.0 lbs 167.9 lbs     8.8% deficit on L compared to R   Hip Abduction: 4+/5 4-/5       Anterior reach Y balance R= 58 cm  L= 54 cm  Running wide base, slightly asymmetrical with decreased knee flexion at impact on L  L hip ER limited 25% compared to R_ he states chronic issue    Girth measures next session mid patellar, quad/calf.     Treatment     Billy received the treatments listed below:      therapeutic exercises to develop strength, endurance, ROM, flexibility, posture, and core stabilization for 10 minutes including:  Education/assessment  Heel prop 2 x 3 min with 10 lbs above/below    manual therapy techniques: Joint mobilizations and Soft tissue Mobilization were applied for  08 minutes, including:  PFJ gr 3-4 glides all planes  Tibial AP glide gr 3-4 with and without rotation bias on and off heel prop  Knee hyperextension gr 3-4  Fat pad mobility  Scar mobilization    neuromuscular re-education activities to improve: Coordination, Kinesthetic, Sense, and Proprioception for 10 minutes. The following activities were included:  Cone taps stable to unstable:no weight to 14 lb med ball SLS 4 rds 5-8 each cone- anterior to lateral/medial  Quad set into hyperextension 30x 5" hold    Therapeutic activities to improve functional performance for 52 minutes, including:  SKTC, quad pull, HS scoop, HS march, high knee march, walking butt kicks, lunge, lateral lunge, band walks, monster walks, jogging x 2 laps each  6 inch TRX heel tap 3 x 15 john: almost weaned from UE assistance  5 inch heel tap 2 x 10 john no UE assist  Landmine lunge to press 2 x 8 john 25 lbs on 45 lb bar  Modified pull up 3 x 12  Flat bench press 3 x 8: John, then unilateral 60 lb DBs  Safetly bar step up 12 inch 4 x 8->5 john: 0->30 lbs added on bar    Patient Education and Home Exercises     Home Exercises Provided and Patient Education Provided     Education provided:   - HEP update, precautions, activity pacing, " goals, timeframes    Written Home Exercises Provided: yes. Exercises were reviewed and Billy was able to demonstrate them prior to the end of the session.  Billy demonstrated good  understanding of the education provided. See EMR under Patient Instructions for exercises provided during therapy sessions.     ASSESSMENT     Billy is doing well with progressive loading UE and LE. Moving into more advanced dynamic balance stable to unstable with improved control. Pt reported/demonstrated no adverse response or exacerbation of symptoms during today's session.      Billy Is progressing well towards his goals.   Pt prognosis is Good.     Pt will continue to benefit from skilled outpatient physical therapy to address the deficits listed in the problem list box on initial evaluation, provide pt/family education and to maximize pt's level of independence in the home and community environment.     Pt's spiritual, cultural and educational needs considered and pt agreeable to plan of care and goals.     Anticipated barriers to physical therapy: none    Goals:  Short Term Goals: 8-10 weeks   1. Pt will be compliant with HEP 50% of prescribed amount. MET  2. The pt to demo improvement in left knee ROM to equal right knee ROM pain free MET  3.  The pt to demo good quad set with proper hyperextension moment of the Left knee MET  4. The pt to demo ambualting with elast restricitve AD witout major compensatory pattern left knee for at least 20 feet MET     Long Term Goals: 56 weeks (progressing, not met)   Pt will be compliant with % of prescribed amount.   The pt to demo pain free and uncompensated running mechanics x10 min on an indoor treadmill  The pt to demo tolerance to a squat at or bellow parallel with uncompensated mechanics x10   The pt to demo strength of L LE within 10% of R LE as demo'd on the biodex machine   The pt to demo a deficit of 10% or less on a triple hop, single leg broad jump and crossover hop  compared to non operative LE.   The pt will report full participation in ADLs and IADLs without restrictions related to L  knee.     PLAN     Progress SL strength as able. Running program tomorrow at [facility].     Manjit Lazcano, PT, DPT

## 2023-02-24 ENCOUNTER — DOCUMENTATION ONLY (OUTPATIENT)
Dept: REHABILITATION | Facility: HOSPITAL | Age: 28
End: 2023-02-24
Payer: COMMERCIAL

## 2023-02-24 NOTE — PROGRESS NOTES
Billy performed step 4 of stage 2 of the return to sprinting program without issue. Form looking much better with good speed. Deceleration was also improved with increased use of L LE to slow himself, but mild compensations remain. Denies any instability, pain, clicking, and/or buckling. Felt good. Discussed importance of sleep and good diet as both are lacking for him now. He is headed to [place] today for a weekend trip. Full ROM noted.

## 2023-02-27 ENCOUNTER — CLINICAL SUPPORT (OUTPATIENT)
Dept: REHABILITATION | Facility: HOSPITAL | Age: 28
End: 2023-02-27
Payer: COMMERCIAL

## 2023-02-27 DIAGNOSIS — M25.562 ACUTE PAIN OF LEFT KNEE: Primary | ICD-10-CM

## 2023-02-27 DIAGNOSIS — M25.662 DECREASED RANGE OF MOTION OF LEFT KNEE: ICD-10-CM

## 2023-02-27 DIAGNOSIS — M62.81 QUADRICEPS WEAKNESS: ICD-10-CM

## 2023-02-27 DIAGNOSIS — R26.2 DIFFICULTY WALKING: ICD-10-CM

## 2023-02-27 PROCEDURE — 97140 MANUAL THERAPY 1/> REGIONS: CPT

## 2023-02-27 PROCEDURE — 97110 THERAPEUTIC EXERCISES: CPT

## 2023-02-27 PROCEDURE — 97530 THERAPEUTIC ACTIVITIES: CPT

## 2023-02-27 NOTE — PROGRESS NOTES
[facility]   Physical Therapy Treatment Note     Name: Billy Holbrook  [facility] Number: [id number]    Therapy Diagnosis:   Encounter Diagnoses   Name Primary?    Acute pain of left knee Yes    Decreased range of motion of left knee     Quadriceps weakness     Difficulty walking      Physician: PEACE Dempsey MD    Visit Date: 2/27/2023    Physician Orders: PT Eval and Treat   Medical Diagnosis from Referral: M25.562 (ICD-10-CM) - Acute pain of left knee  Evaluation Date: 9/7/2022  Authorization Period Expiration: 9/6/2023  Plan of Care Expiration: 12/31/2022  Visit # / Visits authorized: 111     Precautions: Standard     Time In: 10:00 am  Time Out: 11:30  Total Billable Time: 90 minutes    Procedure: 9/20/2022  1. Left Knee Arthroscopy, anterior cruciate ligament reconstruction 72344  2.  Left Knee Arthroscopy, with meniscectomy (medial OR lateral) 45456  3.  Left Knee Arthroscopy, debridement/shaving of articular cartilage (chondroplasty) 40876  4.  Left Knee  platelet rich plasma injection to the bone-patellar tendon-bone harvest site    Post-Op Plan:  ACL reconstruction with a bone-patellar tendon-bone autograft    SUBJECTIVE     Pt reports: he feels awesome today. Weekend went well. Did a lot of walking [place].     He was compliant with home exercise program.  Response to previous treatment: no issues; mild soreness  Functional change: no issues running: sprinting more    Pain: 0/10  Location: left knee      OBJECTIVE     5 mo 7 days post-op    Wt. 332 lbs    Observation:   no effusion noted L knee with sweep test  Well healed scar  Atrophy in calf/quad on L     Range of Motion (extension/neutral/flexion):     Right Left   Knee ROM: 8-0-125/130 deg P:8-0-130 deg  A:7-0-125 deg      Strength: (flex/ext measured at 60 deg isometric on Biodex machine)    Right Left Comment   Knee Extension: 248.9 lbs 261 lbs    4.8% deficit on R compared to L   Knee Flexion: 184.0 lbs 167.9  lbs     8.8% deficit on L compared to R   Hip Abduction: 4+/5 4-/5       Anterior reach Y balance R= 58 cm  L= 54 cm  Running wide base, slightly asymmetrical with decreased knee flexion at impact on L  L hip ER limited 25% compared to R_ he states chronic issue    Girth measures needed    Treatment     Billy received the treatments listed below:      therapeutic exercises to develop strength, endurance, ROM, flexibility, posture, and core stabilization for 19 minutes including:  Education/assessment  Heel prop 2 x 3 min with 10 lbs above/below  LAQ 5 x 8 40->80 lbs  Manual stretching     manual therapy techniques: Joint mobilizations and Soft tissue Mobilization were applied for  08 minutes, including:  PFJ gr 3-4 glides all planes  Tibial AP glide gr 3-4 with and without rotation bias on and off heel prop  Knee hyperextension gr 3-4  Fat pad mobility  Scar mobilization    neuromuscular re-education activities to improve: Coordination, Kinesthetic, Sense, and Proprioception for 3 minutes. The following activities were included:  Lateral sled pulls 4 x 30 sec 45 lbs    Therapeutic activities to improve functional performance for 60 minutes, including:  SKTC, quad pull, HS scoop, HS march, high knee march, walking butt kicks, lunge, lateral lunge, band walks, monster walks, jogging x 2 laps each  6 inch TRX heel tap 3 x 15 linus: almost weaned from UE assistance  6 inch heel tap 2 x 10 linus no UE assist  Flat bench press 4 x 10->3 reps: ->365 lbs  Lateral lunge 4 x 10 linus 53 lb KB      Patient Education and Home Exercises     Home Exercises Provided and Patient Education Provided     Education provided:   - HEP update, precautions, activity pacing, goals, timeframes    Written Home Exercises Provided: yes. Exercises were reviewed and Billy was able to demonstrate them prior to the end of the session.  Billy demonstrated good  understanding of the education provided. See EMR under Patient Instructions for  exercises provided during therapy sessions.     ASSESSMENT     Billy did well with strength focused session. No issues with weekend trip and increased activity level. Progressing depth on SL heel tap with good form. Pt reported/demonstrated no adverse response or exacerbation of symptoms during today's session.      Billy Is progressing well towards his goals.   Pt prognosis is Good.     Pt will continue to benefit from skilled outpatient physical therapy to address the deficits listed in the problem list box on initial evaluation, provide pt/family education and to maximize pt's level of independence in the home and community environment.     Pt's spiritual, cultural and educational needs considered and pt agreeable to plan of care and goals.     Anticipated barriers to physical therapy: none    Goals:  Short Term Goals: 8-10 weeks   1. Pt will be compliant with HEP 50% of prescribed amount. MET  2. The pt to demo improvement in left knee ROM to equal right knee ROM pain free MET  3.  The pt to demo good quad set with proper hyperextension moment of the Left knee MET  4. The pt to demo ambualting with elast restricitve AD witout major compensatory pattern left knee for at least 20 feet MET     Long Term Goals: 56 weeks (progressing, not met)   Pt will be compliant with % of prescribed amount.   The pt to demo pain free and uncompensated running mechanics x10 min on an indoor treadmill  The pt to demo tolerance to a squat at or bellow parallel with uncompensated mechanics x10   The pt to demo strength of L LE within 10% of R LE as demo'd on the biodex machine   The pt to demo a deficit of 10% or less on a triple hop, single leg broad jump and crossover hop compared to non operative LE.   The pt will report full participation in ADLs and IADLs without restrictions related to L  knee.     PLAN     Progress strength as able; maintain full ROM. Running program 2x/wk at [facility].     Manjit Lazcano, PT,  DPT

## 2023-02-28 ENCOUNTER — CLINICAL SUPPORT (OUTPATIENT)
Dept: REHABILITATION | Facility: HOSPITAL | Age: 28
End: 2023-02-28
Payer: COMMERCIAL

## 2023-02-28 DIAGNOSIS — M62.81 QUADRICEPS WEAKNESS: ICD-10-CM

## 2023-02-28 DIAGNOSIS — M25.562 ACUTE PAIN OF LEFT KNEE: Primary | ICD-10-CM

## 2023-02-28 DIAGNOSIS — R26.2 DIFFICULTY WALKING: ICD-10-CM

## 2023-02-28 DIAGNOSIS — M25.662 DECREASED RANGE OF MOTION OF LEFT KNEE: ICD-10-CM

## 2023-02-28 PROCEDURE — 97112 NEUROMUSCULAR REEDUCATION: CPT

## 2023-02-28 PROCEDURE — 97110 THERAPEUTIC EXERCISES: CPT

## 2023-02-28 PROCEDURE — 97140 MANUAL THERAPY 1/> REGIONS: CPT

## 2023-02-28 PROCEDURE — 97530 THERAPEUTIC ACTIVITIES: CPT

## 2023-02-28 NOTE — PROGRESS NOTES
[facility]   Physical Therapy Treatment Note     Name: Billy Holbrook  [facility] Number: [id number]    Therapy Diagnosis:   Encounter Diagnoses   Name Primary?    Acute pain of left knee Yes    Decreased range of motion of left knee     Quadriceps weakness     Difficulty walking      Physician: PEACE Dempsey MD    Visit Date: 2/28/2023    Physician Orders: PT Eval and Treat   Medical Diagnosis from Referral: M25.562 (ICD-10-CM) - Acute pain of left knee  Evaluation Date: 9/7/2022  Authorization Period Expiration: 9/6/2023  Plan of Care Expiration: 12/31/2022  Visit # / Visits authorized: 112     Precautions: Standard     Time In: 10:28 am  Time Out: 12:06  Total Billable Time: 98 minutes    Procedure: 9/20/2022  1. Left Knee Arthroscopy, anterior cruciate ligament reconstruction 88376  2.  Left Knee Arthroscopy, with meniscectomy (medial OR lateral) 28339  3.  Left Knee Arthroscopy, debridement/shaving of articular cartilage (chondroplasty) 29296  4.  Left Knee  platelet rich plasma injection to the bone-patellar tendon-bone harvest site    Post-Op Plan:  ACL reconstruction with a bone-patellar tendon-bone autograft    SUBJECTIVE     Pt reports: he feels good. Tired today- did not sleep well.     He was compliant with home exercise program.  Response to previous treatment: no issues; mild soreness  Functional change: no issues running: sprinting more    Pain: 0/10  Location: left knee      OBJECTIVE     5 mo 8 days post-op    Wt. 332 lbs    Observation:   no effusion noted L knee with sweep test  Well healed scar  Atrophy in calf/quad on L     Range of Motion (extension/neutral/flexion):     Right Left   Knee ROM: 8-0-125/130 deg P:8-0-130 deg  A:7-0-125 deg      Strength: (flex/ext measured at 60 deg isometric on Biodex machine)    Right Left Comment   Knee Extension: 248.9 lbs 261 lbs    4.8% deficit on R compared to L   Knee Flexion: 184.0 lbs 167.9 lbs     8.8% deficit on L  compared to R   Hip Abduction: 4+/5 4-/5       Anterior reach Y balance R= 58 cm  L= 54 cm  Running wide base, slightly asymmetrical with decreased knee flexion at impact on L  L hip ER limited 25% compared to R_ he states chronic issue    Girth measures needed    Treatment     Billy received the treatments listed below:      therapeutic exercises to develop strength, endurance, ROM, flexibility, posture, and core stabilization for 13 minutes including:  Education/assessment  Heel prop 2 x 3 min with 10 lbs above/below  Manual stretching     manual therapy techniques: Joint mobilizations and Soft tissue Mobilization were applied for  08 minutes, including:  PFJ gr 3-4 glides all planes  Tibial AP glide gr 3-4 with and without rotation bias on and off heel prop  Knee hyperextension gr 3-4  Fat pad mobility  Scar mobilization    neuromuscular re-education activities to improve: Coordination, Kinesthetic, Sense, and Proprioception for 12 minutes. The following activities were included:  Med ball OH slams into squat 40 lbs 3 x 30 sec  Med ball PNF in iso lunge 25 lbs 3 x 6 linus    Therapeutic activities to improve functional performance for 65 minutes, including:  SKTC, quad pull, HS scoop, HS march, high knee march, walking butt kicks, lunge, lateral lunge, band walks, monster walks, jogging x 2 laps each  6 inch TRX heel tap 2 x 10 linus  6 inch heel tap 2 x 10 linus no UE assist  12 inch step ups 5 x 6 SB 90-> 130 lbs  SL RDL landmine 4 x 10->6 linus 70-> 80 lbs  Unilateral landmine row 4 x 10->6 linus staggered stance 70-> 80 lbs    Patient Education and Home Exercises     Home Exercises Provided and Patient Education Provided     Education provided:   - HEP update, precautions, activity pacing, goals, timeframes    Written Home Exercises Provided: yes. Exercises were reviewed and Billy was able to demonstrate them prior to the end of the session.  Billy demonstrated good  understanding of the education provided. See EMR  under Patient Instructions for exercises provided during therapy sessions.     ASSESSMENT     Billy did well with strength focused session. SL heel tap and step up improved with resistance and mechanics. Required cuing for control at knee with fatigue; but good correction noted. Pt reported/demonstrated no adverse response or exacerbation of symptoms during today's session.      Billy Is progressing well towards his goals.   Pt prognosis is Good.     Pt will continue to benefit from skilled outpatient physical therapy to address the deficits listed in the problem list box on initial evaluation, provide pt/family education and to maximize pt's level of independence in the home and community environment.     Pt's spiritual, cultural and educational needs considered and pt agreeable to plan of care and goals.     Anticipated barriers to physical therapy: none    Goals:  Short Term Goals: 8-10 weeks   1. Pt will be compliant with HEP 50% of prescribed amount. MET  2. The pt to demo improvement in left knee ROM to equal right knee ROM pain free MET  3.  The pt to demo good quad set with proper hyperextension moment of the Left knee MET  4. The pt to demo ambualting with elast restricitve AD witout major compensatory pattern left knee for at least 20 feet MET     Long Term Goals: 56 weeks (progressing, not met)   Pt will be compliant with % of prescribed amount.   The pt to demo pain free and uncompensated running mechanics x10 min on an indoor treadmill  The pt to demo tolerance to a squat at or bellow parallel with uncompensated mechanics x10   The pt to demo strength of L LE within 10% of R LE as demo'd on the biodex machine   The pt to demo a deficit of 10% or less on a triple hop, single leg broad jump and crossover hop compared to non operative LE.   The pt will report full participation in ADLs and IADLs without restrictions related to L  knee.     PLAN     Progress strength as able; maintain full ROM.  Running program 2x/wk at [facility]. Last run at 75% effort tomorrow; plan for 100% on Friday.     Manjit Lazcano, PT, DPT

## 2023-03-01 ENCOUNTER — DOCUMENTATION ONLY (OUTPATIENT)
Dept: REHABILITATION | Facility: HOSPITAL | Age: 28
End: 2023-03-01

## 2023-03-01 NOTE — PROGRESS NOTES
Billy performed step 4 of stage 2 of the return to sprinting program without issue. Performing dynamic warm up/activations prior to. Form looking much better with good speed. Deceleration was also improved with increased use of L LE to slow himself, but mild compensations remain. Denies any instability, pain, clicking, and/or buckling. Felt good. Full ROM noted. Next step is up to % intensity step 1 of stage 3.

## 2023-03-02 ENCOUNTER — CLINICAL SUPPORT (OUTPATIENT)
Dept: REHABILITATION | Facility: HOSPITAL | Age: 28
End: 2023-03-02
Payer: COMMERCIAL

## 2023-03-02 DIAGNOSIS — R26.2 DIFFICULTY WALKING: ICD-10-CM

## 2023-03-02 DIAGNOSIS — M25.562 ACUTE PAIN OF LEFT KNEE: Primary | ICD-10-CM

## 2023-03-02 DIAGNOSIS — M25.662 DECREASED RANGE OF MOTION OF LEFT KNEE: ICD-10-CM

## 2023-03-02 DIAGNOSIS — M62.81 QUADRICEPS WEAKNESS: ICD-10-CM

## 2023-03-02 PROCEDURE — 97530 THERAPEUTIC ACTIVITIES: CPT

## 2023-03-02 PROCEDURE — 97110 THERAPEUTIC EXERCISES: CPT

## 2023-03-02 PROCEDURE — 97140 MANUAL THERAPY 1/> REGIONS: CPT

## 2023-03-02 NOTE — PROGRESS NOTES
[facility]   Physical Therapy Treatment Note     Name: Billy Holbrook  [facility] Number: [id number]    Therapy Diagnosis:   Encounter Diagnoses   Name Primary?    Acute pain of left knee Yes    Decreased range of motion of left knee     Quadriceps weakness     Difficulty walking      Physician: PEACE Dempsey MD    Visit Date: 3/2/2023    Physician Orders: PT Eval and Treat   Medical Diagnosis from Referral: M25.562 (ICD-10-CM) - Acute pain of left knee  Evaluation Date: 9/7/2022  Authorization Period Expiration: 9/6/2023  Plan of Care Expiration: 12/31/2022  Visit # / Visits authorized: 113     Precautions: Standard     Time In: 11:00 am  Time Out: 12:25  Total Billable Time: 85 minutes    Procedure: 9/20/2022  1. Left Knee Arthroscopy, anterior cruciate ligament reconstruction [id number]  2.  Left Knee Arthroscopy, with meniscectomy (medial OR lateral) 61826  3.  Left Knee Arthroscopy, debridement/shaving of articular cartilage (chondroplasty) 84445  4.  Left Knee  platelet rich plasma injection to the bone-patellar tendon-bone harvest site    Post-Op Plan:  ACL reconstruction with a bone-patellar tendon-bone autograft    SUBJECTIVE     Pt reports: he feels great. Ready to exercise. Noted some very mild anterior knee pain with standing hyperextension- at PT region. Some tenderness medial knee. Low back has been tight as well- declined dry needling/manual stretching today; maybe tomorrow. Longest he has gone without needling right now at 2-3 months.     He was compliant with home exercise program.  Response to previous treatment: no issues; mild soreness  Functional change: no issues running: sprinting more    Pain: 0/10 to 2/10  Location: left knee      OBJECTIVE     5 mo 9 days post-op    Wt. 332 lbs    Observation:   no effusion noted L knee with sweep test  Well healed scar  Atrophy in calf/quad on L     Range of Motion (extension/neutral/flexion):     Right Left   Knee ROM: 8-0-125/130  deg P:8-0-130 deg  A:7-0-125 deg      Strength: (flex/ext measured at 60 deg isometric on Biodex machine)    Right Left Comment   Knee Extension: 248.9 lbs 261 lbs    4.8% deficit on R compared to L   Knee Flexion: 184.0 lbs 167.9 lbs     8.8% deficit on L compared to R   Hip Abduction: 4+/5 4-/5       Anterior reach Y balance R= 58 cm  L= 54 cm  Running wide base, slightly asymmetrical with decreased knee flexion at impact on L  L hip ER limited 25% compared to R_ he states chronic issue    Girth measures needed    Treatment     Billy received the treatments listed below:      therapeutic exercises to develop strength, endurance, ROM, flexibility, posture, and core stabilization for 25 minutes including:  Education/assessment  Heel prop 2 x 3 min with 10 lbs above/below  LAQ 3 x 10 linus: R= 55->75 lbs: L= 45->65 lbs: unilateral  Standing HS curl 3 x 10 linus 45-55 lbs: unilateral  Manual stretching     manual therapy techniques: Joint mobilizations and Soft tissue Mobilization were applied for  10 minutes, including:  PFJ gr 3-4 glides all planes  Tibial AP glide gr 3-4 with and without rotation bias on and off heel prop  Knee hyperextension gr 3-4  Fat pad mobility  Scar mobilization    neuromuscular re-education activities to improve: Coordination, Kinesthetic, Sense, and Proprioception for 5 minutes. The following activities were included:  Lateral sled pulls 4 x 30 sec 45 lbs    Therapeutic activities to improve functional performance for 45 minutes, including:  SKTC, quad pull, HS scoop, HS march, high knee march, walking butt kicks, lunge, lateral lunge, band walks, monster walks, jogging x 2 laps each  6 inch TRX heel tap 2 x 10 linus  6 inch heel tap 2 x 10 linus no UE assist  Sled push/pull 4 rds each 30 yds 90->225 lbs  OH press BB 4 x 8 135->175 lbs: seated      Patient Education and Home Exercises     Home Exercises Provided and Patient Education Provided     Education provided:   - HEP update, precautions,  activity pacing, goals, timeframes    Written Home Exercises Provided: yes. Exercises were reviewed and Billy was able to demonstrate them prior to the end of the session.  Billy demonstrated good  understanding of the education provided. See EMR under Patient Instructions for exercises provided during therapy sessions.     ASSESSMENT     Billy noted some anterior PT region pain today with LAQ, very mild and non-residual. Noting some non-painful medial knee crepitus with motion(unloaded LAQ- went away with load)) as well and TTP at medial knee. Some TTP at medial femoral and tibial condyles. No instability with MCL testing. Pt reported/demonstrated no adverse response or exacerbation of symptoms during today's session.      Billy Is progressing well towards his goals.   Pt prognosis is Good.     Pt will continue to benefit from skilled outpatient physical therapy to address the deficits listed in the problem list box on initial evaluation, provide pt/family education and to maximize pt's level of independence in the home and community environment.     Pt's spiritual, cultural and educational needs considered and pt agreeable to plan of care and goals.     Anticipated barriers to physical therapy: none    Goals:  Short Term Goals: 8-10 weeks   1. Pt will be compliant with HEP 50% of prescribed amount. MET  2. The pt to demo improvement in left knee ROM to equal right knee ROM pain free MET  3.  The pt to demo good quad set with proper hyperextension moment of the Left knee MET  4. The pt to demo ambualting with elast restricitve AD witout major compensatory pattern left knee for at least 20 feet MET     Long Term Goals: 56 weeks (progressing, not met)   Pt will be compliant with % of prescribed amount.   The pt to demo pain free and uncompensated running mechanics x10 min on an indoor treadmill  The pt to demo tolerance to a squat at or bellow parallel with uncompensated mechanics x10   The pt to demo  strength of L LE within 10% of R LE as demo'd on the biodex machine   The pt to demo a deficit of 10% or less on a triple hop, single leg broad jump and crossover hop compared to non operative LE.   The pt will report full participation in ADLs and IADLs without restrictions related to L  knee.     PLAN     Progress strength as able; maintain full ROM. Running program 2x/wk at [facility].    % effort runs tomorrow    Manjit Lazcano, PT, DPT

## 2023-03-06 ENCOUNTER — CLINICAL SUPPORT (OUTPATIENT)
Dept: REHABILITATION | Facility: HOSPITAL | Age: 28
End: 2023-03-06
Payer: COMMERCIAL

## 2023-03-06 DIAGNOSIS — M25.662 DECREASED RANGE OF MOTION OF LEFT KNEE: ICD-10-CM

## 2023-03-06 DIAGNOSIS — M25.562 ACUTE PAIN OF LEFT KNEE: Primary | ICD-10-CM

## 2023-03-06 DIAGNOSIS — R26.2 DIFFICULTY WALKING: ICD-10-CM

## 2023-03-06 DIAGNOSIS — M62.81 QUADRICEPS WEAKNESS: ICD-10-CM

## 2023-03-06 PROCEDURE — 97112 NEUROMUSCULAR REEDUCATION: CPT

## 2023-03-06 PROCEDURE — 97530 THERAPEUTIC ACTIVITIES: CPT

## 2023-03-06 PROCEDURE — 97140 MANUAL THERAPY 1/> REGIONS: CPT

## 2023-03-06 PROCEDURE — 97110 THERAPEUTIC EXERCISES: CPT

## 2023-03-06 NOTE — PROGRESS NOTES
[facility]   Physical Therapy Treatment Note     Name: Billy Holbrook  [facility] Number: [id number]    Therapy Diagnosis:   Encounter Diagnoses   Name Primary?    Acute pain of left knee Yes    Decreased range of motion of left knee     Quadriceps weakness     Difficulty walking      Physician: PEACE Dempsey MD    Visit Date: 3/6/2023    Physician Orders: PT Eval and Treat   Medical Diagnosis from Referral: M25.562 (ICD-10-CM) - Acute pain of left knee  Evaluation Date: 9/7/2022  Authorization Period Expiration: 9/6/2023  Plan of Care Expiration: 12/31/2022  Visit # / Visits authorized: 114     Precautions: Standard     Time In: 11:00 am  Time Out: 12:36  Total Billable Time: 96 minutes    Procedure: 9/20/2022  1. Left Knee Arthroscopy, anterior cruciate ligament reconstruction 96855  2.  Left Knee Arthroscopy, with meniscectomy (medial OR lateral) 74941  3.  Left Knee Arthroscopy, debridement/shaving of articular cartilage (chondroplasty) 29834  4.  Left Knee  platelet rich plasma injection to the bone-patellar tendon-bone harvest site    Post-Op Plan:  ACL reconstruction with a bone-patellar tendon-bone autograft    SUBJECTIVE     Pt reports: he is feeling good. Had a good weekend. Feels much better kneeling on his knee now.     He was compliant with home exercise program.  Response to previous treatment: no issues; mild soreness  Functional change: able to sprint straight line    Pain: 0/10  Location: left knee     OBJECTIVE     5 mo 14 days post-op    Wt. 332 lbs    Observation:   no effusion noted L knee with sweep test  Well healed scar  Atrophy in calf/quad on L     Range of Motion (extension/neutral/flexion):     Right Left   Knee ROM: 8-0-125/130 deg P:8-0-130 deg  A:7-0-125 deg      Strength: (flex/ext measured at 60 deg isometric on [employer] machine)    Right Left Comment   Knee Extension: 248.9 lbs 261 lbs    4.8% deficit on R compared to L   Knee Flexion: 184.0 lbs 167.9  lbs     8.8% deficit on L compared to R   Hip Abduction: 4+/5 4-/5       Anterior reach Y balance R= 58 cm  L= 54 cm  Running wide base, slightly asymmetrical with decreased knee flexion at impact on L  L hip ER limited 25% compared to R_ he states chronic issue    Mid quad girth: 18 cm proximal Patella  - R= 77 cm  - L= 72.5 cm    Treatment     Billy received the treatments listed below:      therapeutic exercises to develop strength, endurance, ROM, flexibility, posture, and core stabilization for 28 minutes including:  Education/assessment  Heel prop 2 x 3 min with 10 lbs above/below  Knee ext isometric 8 x 30 sec on/30 sec off  Prone HS curl 4 x 10 linus purple sport band  Manual stretching     manual therapy techniques: Joint mobilizations and Soft tissue Mobilization were applied for  10 minutes, including:  PFJ gr 3-4 glides all planes  Tibial AP glide gr 3-4 with and without rotation bias on and off heel prop  Knee hyperextension gr 3-4  Fat pad mobility  Scar mobilization    neuromuscular re-education activities to improve: Coordination, Kinesthetic, Sense, and Proprioception for 8 minutes. The following activities were included:  High plank pull through 4 x 30 sec 35 lbs    Therapeutic activities to improve functional performance for 50 minutes, including:  SKTC, quad pull, HS scoop, HS march, high knee march, walking butt kicks, lunge, lateral lunge, band walks, monster walks, jogging x 2 laps each  6 inch TRX heel tap 2 x 10 linus  6 inch heel tap 2 x 10 linus no UE assist  Landmine lunge to OH press 4 x 8 linus  45-> 80 added lbs  Trap bar DL squat 6 x 6 185-> 325 lbs    Patient Education and Home Exercises     Home Exercises Provided and Patient Education Provided     Education provided:   - HEP update, precautions, activity pacing, goals, timeframes    Written Home Exercises Provided: yes. Exercises were reviewed and Billy was able to demonstrate them prior to the end of the session.  Billy demonstrated  good  understanding of the education provided. See EMR under Patient Instructions for exercises provided during therapy sessions.     ASSESSMENT     Billy did well with strength based session. Squat mechanics looking better at increased weight. Quad girth at a 4.5 cm deficit today. Pt reported/demonstrated no adverse response or exacerbation of symptoms during today's session.      Billy Is progressing well towards his goals.   Pt prognosis is Good.     Pt will continue to benefit from skilled outpatient physical therapy to address the deficits listed in the problem list box on initial evaluation, provide pt/family education and to maximize pt's level of independence in the home and community environment.     Pt's spiritual, cultural and educational needs considered and pt agreeable to plan of care and goals.     Anticipated barriers to physical therapy: none    Goals:  Short Term Goals: 8-10 weeks   1. Pt will be compliant with HEP 50% of prescribed amount. MET  2. The pt to demo improvement in left knee ROM to equal right knee ROM pain free MET  3.  The pt to demo good quad set with proper hyperextension moment of the Left knee MET  4. The pt to demo ambualting with elast restricitve AD witout major compensatory pattern left knee for at least 20 feet MET     Long Term Goals: 56 weeks (progressing, not met)   Pt will be compliant with % of prescribed amount.   The pt to demo pain free and uncompensated running mechanics x10 min on an indoor treadmill  The pt to demo tolerance to a squat at or bellow parallel with uncompensated mechanics x10 MET  The pt to demo strength of L LE within 10% of R LE as demo'd on the biodex machine   The pt to demo a deficit of 10% or less on a triple hop, single leg broad jump and crossover hop compared to non operative LE.   The pt will report full participation in ADLs and IADLs without restrictions related to L  knee.     PLAN     Progress strength as able; maintain full  ROM. Running program 2x/wk at [facility].    Manjit Lazcano, PT, DPT

## 2023-03-07 ENCOUNTER — CLINICAL SUPPORT (OUTPATIENT)
Dept: REHABILITATION | Facility: HOSPITAL | Age: 28
End: 2023-03-07
Payer: COMMERCIAL

## 2023-03-07 DIAGNOSIS — R26.2 DIFFICULTY WALKING: ICD-10-CM

## 2023-03-07 DIAGNOSIS — M25.562 ACUTE PAIN OF LEFT KNEE: Primary | ICD-10-CM

## 2023-03-07 DIAGNOSIS — M62.81 QUADRICEPS WEAKNESS: ICD-10-CM

## 2023-03-07 DIAGNOSIS — M25.662 DECREASED RANGE OF MOTION OF LEFT KNEE: ICD-10-CM

## 2023-03-07 PROCEDURE — 97110 THERAPEUTIC EXERCISES: CPT

## 2023-03-07 PROCEDURE — 97530 THERAPEUTIC ACTIVITIES: CPT

## 2023-03-07 PROCEDURE — 97140 MANUAL THERAPY 1/> REGIONS: CPT

## 2023-03-07 NOTE — PROGRESS NOTES
[facility]   Physical Therapy Treatment Note     Name: Billy Holbrook  [facility] Number: [id number]    Therapy Diagnosis:   Encounter Diagnoses   Name Primary?    Acute pain of left knee Yes    Decreased range of motion of left knee     Quadriceps weakness     Difficulty walking        Physician: PEACE Dempsey MD    Visit Date: 3/7/2023    Physician Orders: PT Eval and Treat   Medical Diagnosis from Referral: M25.562 (ICD-10-CM) - Acute pain of left knee  Evaluation Date: 9/7/2022  Authorization Period Expiration: 9/6/2023  Plan of Care Expiration: 12/31/2022  Visit # / Visits authorized: 115     Precautions: Standard     Time In: 1:30 pm  Time Out: 3:03  Total Billable Time: 93 minutes    Procedure: 9/20/2022  1. Left Knee Arthroscopy, anterior cruciate ligament reconstruction [id number]  2.  Left Knee Arthroscopy, with meniscectomy (medial OR lateral) 56315  3.  Left Knee Arthroscopy, debridement/shaving of articular cartilage (chondroplasty) 26166  4.  Left Knee  platelet rich plasma injection to the bone-patellar tendon-bone harvest site    Post-Op Plan:  ACL reconstruction with a bone-patellar tendon-bone autograft    SUBJECTIVE     Pt reports: he is feeling good. Ready to exercise. He states [employer] wants him to see Dr. 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     He was compliant with home exercise program.  Response to previous treatment: no issues; mild soreness  Functional change: able to sprint straight line    Pain: 0/10  Location: left knee     OBJECTIVE     5 mo 15 days post-op    Wt. 332 lbs    Observation:   no effusion noted L knee with sweep test  Well healed scar  Atrophy in calf/quad on L     Range of Motion (extension/neutral/flexion):     Right Left   Knee ROM: 8-0-125/130 deg P:8-0-130 deg  A:7-0-125 deg      Strength: (flex/ext measured at 60 deg isometric on Biodex machine)    Right Left Comment   Knee Extension: 248.9 lbs  261 lbs    4.8% deficit on R compared to L   Knee Flexion: 184.0 lbs 167.9 lbs     8.8% deficit on L compared to R   Hip Abduction: 4+/5 4-/5       Anterior reach Y balance R= 58 cm  L= 54 cm  Running mechanics: normalized with exception of decreased knee flexion moment upon deceleration  L hip ER limited 25% compared to R_ he states chronic issue    Mid quad girth: 18 cm proximal Patella  - R= 77 cm  - L= 72.5 cm    Treatment     Billy received the treatments listed below:      therapeutic exercises to develop strength, endurance, ROM, flexibility, posture, and core stabilization for 18 minutes including:  Education/assessment  Heel prop 2 x 3 min with 10 lbs above/below  Knee ext isometric 8 x 30 sec on/30 sec off  Standing calf rasie SL off incline board 4 x 15+  Manual stretching     manual therapy techniques: Joint mobilizations and Soft tissue Mobilization were applied for  10 minutes, including:  PFJ gr 3-4 glides all planes  Tibial AP glide gr 3-4 with and without rotation bias on and off heel prop  Knee hyperextension gr 3-4  Fat pad mobility  Scar mobilization    neuromuscular re-education activities to improve: Coordination, Kinesthetic, Sense, and Proprioception for 5 minutes. The following activities were included:  Standing landmine rotations 4 x 10->15 25->35 lbs added    Therapeutic activities to improve functional performance for 60 minutes, including:  SKTC, quad pull, HS scoop, HS march, high knee march, walking butt kicks, lunge, lateral lunge, band walks, monster walks, jogging x 2 laps each  6 inch TRX heel tap 2 x 10 linus  6 inch heel tap 2 x 10 linus no UE assist  Lateral lunge 53->70 lb KB 4 x 6 linus  BB flat bench 4 x 10->4 225-> 405  Crow hop rotational power throws 14 lb med ball x 8 linus  DL plyometrics in next few session    Patient Education and Home Exercises     Home Exercises Provided and Patient Education Provided     Education provided:   - HEP update, precautions, activity pacing,  goals, timeframes    Written Home Exercises Provided: yes. Exercises were reviewed and Billy was able to demonstrate them prior to the end of the session.  Billy demonstrated good  understanding of the education provided. See EMR under Patient Instructions for exercises provided during therapy sessions.     ASSESSMENT     Billy did well with lateral and rotational strength/power without irritability.  UE, LE, and core work going well. Pt reported/demonstrated no adverse response or exacerbation of symptoms during today's session.      Billy Is progressing well towards his goals.   Pt prognosis is Good.     Pt will continue to benefit from skilled outpatient physical therapy to address the deficits listed in the problem list box on initial evaluation, provide pt/family education and to maximize pt's level of independence in the home and community environment.     Pt's spiritual, cultural and educational needs considered and pt agreeable to plan of care and goals.     Anticipated barriers to physical therapy: none    Goals:  Short Term Goals: 8-10 weeks   1. Pt will be compliant with HEP 50% of prescribed amount. MET  2. The pt to demo improvement in left knee ROM to equal right knee ROM pain free MET  3.  The pt to demo good quad set with proper hyperextension moment of the Left knee MET  4. The pt to demo ambualting with elast restricitve AD witout major compensatory pattern left knee for at least 20 feet MET     Long Term Goals: 56 weeks (progressing, not met)   Pt will be compliant with % of prescribed amount.   The pt to demo pain free and uncompensated running mechanics x10 min on an indoor treadmill  The pt to demo tolerance to a squat at or bellow parallel with uncompensated mechanics x10 MET  The pt to demo strength of L LE within 10% of R LE as demo'd on the biodex machine   The pt to demo a deficit of 10% or less on a triple hop, single leg broad jump and crossover hop compared to non operative  LE.   The pt will report full participation in ADLs and IADLs without restrictions related to L  knee.     PLAN     Progress strength as able; maintain full ROM. Running program 2x/wk at [facility].    Agility next week; Biodex in 2-3 weeks.     Manjit Lazcano, PT, DPT

## 2023-03-08 ENCOUNTER — DOCUMENTATION ONLY (OUTPATIENT)
Dept: REHABILITATION | Facility: HOSPITAL | Age: 28
End: 2023-03-08
Payer: COMMERCIAL

## 2023-03-08 NOTE — PROGRESS NOTES
Billy did well with step 1 of stage 3 sprinting progression at % effort. No noted swelling and denies any symptoms. Running form looks good with acceleration to steady state. Deceleration is slightly asymmetrical with reduced knee flexion moment of L compared to R.

## 2023-03-09 ENCOUNTER — CLINICAL SUPPORT (OUTPATIENT)
Dept: REHABILITATION | Facility: HOSPITAL | Age: 28
End: 2023-03-09
Payer: COMMERCIAL

## 2023-03-09 DIAGNOSIS — R26.2 DIFFICULTY WALKING: ICD-10-CM

## 2023-03-09 DIAGNOSIS — M25.662 DECREASED RANGE OF MOTION OF LEFT KNEE: ICD-10-CM

## 2023-03-09 DIAGNOSIS — M25.562 ACUTE PAIN OF LEFT KNEE: Primary | ICD-10-CM

## 2023-03-09 DIAGNOSIS — M62.81 QUADRICEPS WEAKNESS: ICD-10-CM

## 2023-03-09 PROCEDURE — 97110 THERAPEUTIC EXERCISES: CPT

## 2023-03-09 PROCEDURE — 97530 THERAPEUTIC ACTIVITIES: CPT

## 2023-03-09 PROCEDURE — 97140 MANUAL THERAPY 1/> REGIONS: CPT

## 2023-03-09 PROCEDURE — 97112 NEUROMUSCULAR REEDUCATION: CPT

## 2023-03-10 NOTE — PROGRESS NOTES
[facility]   Physical Therapy Treatment Note     Name: Billy Holbrook  [facility] Number: [id number]    Therapy Diagnosis:   Encounter Diagnoses   Name Primary?    Acute pain of left knee Yes    Decreased range of motion of left knee     Quadriceps weakness     Difficulty walking        Physician: No ref. provider found    Visit Date: 3/9/2023    Physician Orders: PT Eval and Treat   Medical Diagnosis from Referral: M25.562 (ICD-10-CM) - Acute pain of left knee  Evaluation Date: 9/7/2022  Authorization Period Expiration: 9/6/2023  Plan of Care Expiration: 12/31/2022  Visit # / Visits authorized: 116     Precautions: Standard     Time In: 4:00 pm  Time Out: 5:30  Total Billable Time: 90 minutes    Procedure: 9/20/2022  1. Left Knee Arthroscopy, anterior cruciate ligament reconstruction 30457  2.  Left Knee Arthroscopy, with meniscectomy (medial OR lateral) 26385  3.  Left Knee Arthroscopy, debridement/shaving of articular cartilage (chondroplasty) 67391  4.  Left Knee  platelet rich plasma injection to the bone-patellar tendon-bone harvest site    Post-Op Plan:  ACL reconstruction with a bone-patellar tendon-bone autograft    SUBJECTIVE     Pt reports: he wasn't feeling good this morning, so he elected to come in this afternoon. Knee is feeling good and ready to exercise. Gaining confidence with tasks in clinic.     He was compliant with home exercise program.  Response to previous treatment: no issues; mild soreness  Functional change: able to sprint straight line    Pain: 0/10  Location: left knee     OBJECTIVE     5 mo 17 days post-op    Wt. 332 lbs    Observation:   no effusion noted L knee with sweep test  Well healed scar  Atrophy in calf/quad on L     Range of Motion (extension/neutral/flexion):     Right Left   Knee ROM: 8-0-125/130 deg P:8-0-130 deg  A:7-0-125 deg      Strength: (flex/ext measured at 60 deg isometric on Biodex machine)    Right Left Comment   Knee Extension: 248.9  lbs 261 lbs    4.8% deficit on R compared to L   Knee Flexion: 184.0 lbs 167.9 lbs     8.8% deficit on L compared to R   Hip Abduction: 4+/5 4-/5       Anterior reach Y balance R= 58 cm  L= 54 cm  Running mechanics: normalized with exception of decreased knee flexion moment upon deceleration  L hip ER limited 25% compared to R: he states chronic issue    Mid quad girth: 18 cm proximal Patella  - R= 77 cm  - L= 72.5 cm    Treatment     Billy received the treatments listed below:      therapeutic exercises to develop strength, endurance, ROM, flexibility, posture, and core stabilization for 18 minutes including:  Education/assessment  Heel prop 2 x 3 min with 10 lbs above/below  Knee ext isometric 8 x 30 sec on/30 sec off  Manual stretching     manual therapy techniques: Joint mobilizations and Soft tissue Mobilization were applied for  10 minutes, including:  PFJ gr 3-4 glides all planes  Tibial AP glide gr 3-4 with and without rotation bias on and off heel prop  Knee hyperextension gr 3-4  Fat pad mobility  Scar mobilization    neuromuscular re-education activities to improve: Coordination, Kinesthetic, Sense, and Proprioception for 12 minutes. The following activities were included:  25 lb plate alternating reverse curl up and UE lower 4 x 10 each  Isometric RESS on 20 inch step 3 x 10 med ball slams medial and lateral 10 lbs    Therapeutic activities to improve functional performance for 50 minutes, including:  SKTC, quad pull, HS scoop, HS march, high knee march, walking butt kicks, lunge, lateral lunge, band walks, monster walks, jogging x 2 laps each  6 inch TRX heel tap 2 x 10 linus  6 inch heel tap 2 x 10 linus no UE assist  Fwd, and side lunge with x 2 black sport bands 4 x 5 each linus: deceleration focus-> power  DL plyometrics in next few session    Patient Education and Home Exercises     Home Exercises Provided and Patient Education Provided     Education provided:   - HEP update, precautions, activity  pacing, goals, timeframes    Written Home Exercises Provided: yes. Exercises were reviewed and Billy was able to demonstrate them prior to the end of the session.  Billy demonstrated good  understanding of the education provided. See EMR under Patient Instructions for exercises provided during therapy sessions.     ASSESSMENT     Billy continues to do well. Focusing on frontal and sagittal plane deceleration/power. He struggled with lateral lunge position to load L LE, improved with cuing/demo. Pt reported/demonstrated no adverse response or exacerbation of symptoms during today's session.      Billy Is progressing well towards his goals.   Pt prognosis is Good.     Pt will continue to benefit from skilled outpatient physical therapy to address the deficits listed in the problem list box on initial evaluation, provide pt/family education and to maximize pt's level of independence in the home and community environment.     Pt's spiritual, cultural and educational needs considered and pt agreeable to plan of care and goals.     Anticipated barriers to physical therapy: none    Goals:  Short Term Goals: 8-10 weeks   1. Pt will be compliant with HEP 50% of prescribed amount. MET  2. The pt to demo improvement in left knee ROM to equal right knee ROM pain free MET  3.  The pt to demo good quad set with proper hyperextension moment of the Left knee MET  4. The pt to demo ambualting with elast restricitve AD witout major compensatory pattern left knee for at least 20 feet MET     Long Term Goals: 56 weeks (progressing, not met)   Pt will be compliant with % of prescribed amount.   The pt to demo pain free and uncompensated running mechanics x10 min on an indoor treadmill  The pt to demo tolerance to a squat at or bellow parallel with uncompensated mechanics x10 MET  The pt to demo strength of L LE within 10% of R LE as demo'd on the biodex machine   The pt to demo a deficit of 10% or less on a triple hop, single  leg broad jump and crossover hop compared to non operative LE.   The pt will report full participation in ADLs and IADLs without restrictions related to L  knee.     PLAN     Progress strength as able; maintain full ROM. Running program 2x/wk at [facility].    Agility next week; Biodex in 2 weeks.     Manjit Lazcano, PT, DPT

## 2023-03-13 ENCOUNTER — TELEPHONE (OUTPATIENT)
Dept: SPORTS MEDICINE | Facility: CLINIC | Age: 28
End: 2023-03-13
Payer: COMMERCIAL

## 2023-03-13 ENCOUNTER — DOCUMENTATION ONLY (OUTPATIENT)
Dept: REHABILITATION | Facility: HOSPITAL | Age: 28
End: 2023-03-13
Payer: COMMERCIAL

## 2023-03-13 DIAGNOSIS — M62.81 QUADRICEPS WEAKNESS: ICD-10-CM

## 2023-03-13 DIAGNOSIS — S83.207A TEARS OF MENISCUS AND ACL OF LEFT KNEE, INITIAL ENCOUNTER: ICD-10-CM

## 2023-03-13 DIAGNOSIS — M25.662 DECREASED RANGE OF MOTION OF LEFT KNEE: ICD-10-CM

## 2023-03-13 DIAGNOSIS — S83.512A TEARS OF MENISCUS AND ACL OF LEFT KNEE, INITIAL ENCOUNTER: ICD-10-CM

## 2023-03-13 DIAGNOSIS — M25.562 ACUTE PAIN OF LEFT KNEE: Primary | ICD-10-CM

## 2023-03-14 ENCOUNTER — CLINICAL SUPPORT (OUTPATIENT)
Dept: REHABILITATION | Facility: HOSPITAL | Age: 28
End: 2023-03-14
Payer: COMMERCIAL

## 2023-03-14 DIAGNOSIS — R26.2 DIFFICULTY WALKING: ICD-10-CM

## 2023-03-14 DIAGNOSIS — M25.562 ACUTE PAIN OF LEFT KNEE: Primary | ICD-10-CM

## 2023-03-14 DIAGNOSIS — M25.662 DECREASED RANGE OF MOTION OF LEFT KNEE: ICD-10-CM

## 2023-03-14 DIAGNOSIS — M62.81 QUADRICEPS WEAKNESS: ICD-10-CM

## 2023-03-14 PROCEDURE — 97530 THERAPEUTIC ACTIVITIES: CPT

## 2023-03-14 PROCEDURE — 97140 MANUAL THERAPY 1/> REGIONS: CPT

## 2023-03-14 PROCEDURE — 97110 THERAPEUTIC EXERCISES: CPT

## 2023-03-14 NOTE — PROGRESS NOTES
[facility]   Physical Therapy Treatment Note     Name: Billy Holbrook  [facility] Number: [id number]    Therapy Diagnosis:   Encounter Diagnoses   Name Primary?    Acute pain of left knee Yes    Decreased range of motion of left knee     Quadriceps weakness     Difficulty walking        Physician: PEACE Dempsey MD    Visit Date: 3/14/2023    Physician Orders: PT Eval and Treat   Medical Diagnosis from Referral: M25.562 (ICD-10-CM) - Acute pain of left knee  Evaluation Date: 9/7/2022  Authorization Period Expiration: 9/6/2023  Plan of Care Expiration: 12/31/2022  Visit # / Visits authorized: [id number]     Precautions: Standard     Time In: 10:30 am  Time Out: 12:00  Total Billable Time: 90 minutes    Procedure: 9/20/2022  1. Left Knee Arthroscopy, anterior cruciate ligament reconstruction 01722  2.  Left Knee Arthroscopy, with meniscectomy (medial OR lateral) 47829  3.  Left Knee Arthroscopy, debridement/shaving of articular cartilage (chondroplasty) 45968  4.  Left Knee  platelet rich plasma injection to the bone-patellar tendon-bone harvest site    Post-Op Plan:  ACL reconstruction with a bone-patellar tendon-bone autograft    SUBJECTIVE     Pt reports: he wa sick over weekend into Monday missing appt. Feeling better today. Knee feels great. No mechanical symptoms. Some todd-medial knee discomfort with RESS on a couple reps/     He was compliant with home exercise program.  Response to previous treatment: no issues; mild soreness  Functional change: able to sprint straight line    Pain: 0/10 to 1-2/10  Location: left knee     OBJECTIVE     5 mo 22 days post-op    Wt. 332 lbs    Observation:   no effusion noted L knee with sweep test  Well healed scar  Atrophy in calf/quad on L     Range of Motion (extension/neutral/flexion):     Right Left   Knee ROM: 8-0-125/130 deg P:8-0-130 deg  A:7-0-125 deg      Strength: (flex/ext measured at 60 deg isometric on B[employer] machine)    Right Left Comment    Knee Extension: 248.9 lbs 261 lbs    4.8% deficit on R compared to L   Knee Flexion: 184.0 lbs 167.9 lbs     8.8% deficit on L compared to R   Hip Abduction: 4+/5 4-/5       Anterior reach Y balance R= 58 cm  L= 54 cm  Running mechanics: normalized with exception of decreased knee flexion moment upon deceleration  L hip ER limited 25% compared to R: he states chronic issue    Mid quad girth: 18 cm proximal Patella  - R= 77 cm  - L= 72.5 cm    Treatment     Billy received the treatments listed below:      therapeutic exercises to develop strength, endurance, ROM, flexibility, posture, and core stabilization for 20 minutes including:  Education/assessment  Heel prop 2 x 3 min with 10 lbs above/below  Knee ext isometric 8 x 30 sec on/30 sec off  Manual stretching     manual therapy techniques: Joint mobilizations and Soft tissue Mobilization were applied for  10 minutes, including:  PFJ gr 3-4 glides all planes  Tibial AP glide gr 3-4 with and without rotation bias on and off heel prop  Knee hyperextension gr 3-4  Fat pad mobility  Scar mobilization    neuromuscular re-education activities to improve: Coordination, Kinesthetic, Sense, and Proprioception for 0 minutes. The following activities were included:  NP    Therapeutic activities to improve functional performance for 60 minutes, including:  SKTC, quad pull, HS scoop, HS march, high knee march, walking butt kicks, lunge, lateral lunge, band walks, monster walks, jogging x 2 laps each  6 inch TRX heel tap 2 x 10 linus  6 inch heel tap 2 x 10 linus no UE assist  RESS 40->70 lb DB 4 x 5 linus  Lateral lunge 50->70 lb KB 4 x 6 linus  Incline bench BB 4 x 10->5 135->275 lbs  Agility ladder drills x 15 min: light/speed low impact    Patient Education and Home Exercises     Home Exercises Provided and Patient Education Provided     Education provided:   - HEP update, precautions, activity pacing, goals, timeframes    Written Home Exercises Provided: yes. Exercises were  reviewed and Billy was able to demonstrate them prior to the end of the session.  Billy demonstrated good  understanding of the education provided. See EMR under Patient Instructions for exercises provided during therapy sessions.     ASSESSMENT     Billy did well today. Moving into agility ladder drills and heavier unilateral loading without irritability. Noted some very mild non-residual anterior/medial knee discomfort on a couple reps when fatigued. Pt reported/demonstrated no adverse response or exacerbation of symptoms during today's session.      Billy Is progressing well towards his goals.   Pt prognosis is Good.     Pt will continue to benefit from skilled outpatient physical therapy to address the deficits listed in the problem list box on initial evaluation, provide pt/family education and to maximize pt's level of independence in the home and community environment.     Pt's spiritual, cultural and educational needs considered and pt agreeable to plan of care and goals.     Anticipated barriers to physical therapy: none    Goals:  Short Term Goals: 8-10 weeks   1. Pt will be compliant with HEP 50% of prescribed amount. MET  2. The pt to demo improvement in left knee ROM to equal right knee ROM pain free MET  3.  The pt to demo good quad set with proper hyperextension moment of the Left knee MET  4. The pt to demo ambualting with elast restricitve AD witout major compensatory pattern left knee for at least 20 feet MET     Long Term Goals: 56 weeks (progressing, not met)   Pt will be compliant with % of prescribed amount.   The pt to demo pain free and uncompensated running mechanics x10 min on an indoor treadmill  The pt to demo tolerance to a squat at or bellow parallel with uncompensated mechanics x10 MET  The pt to demo strength of L LE within 10% of R LE as demo'd on the biodex machine   The pt to demo a deficit of 10% or less on a triple hop, single leg broad jump and crossover hop compared  to non operative LE.   The pt will report full participation in ADLs and IADLs without restrictions related to L  knee.     PLAN     Progress strength as able; maintain full ROM. Running program 2x/wk at [facility].  Biodex next week.      Manjit Lazcano, PT, DPT

## 2023-03-15 ENCOUNTER — CLINICAL SUPPORT (OUTPATIENT)
Dept: REHABILITATION | Facility: HOSPITAL | Age: 28
End: 2023-03-15
Payer: COMMERCIAL

## 2023-03-15 DIAGNOSIS — M25.662 DECREASED RANGE OF MOTION OF LEFT KNEE: ICD-10-CM

## 2023-03-15 DIAGNOSIS — M25.562 ACUTE PAIN OF LEFT KNEE: Primary | ICD-10-CM

## 2023-03-15 DIAGNOSIS — R26.2 DIFFICULTY WALKING: ICD-10-CM

## 2023-03-15 DIAGNOSIS — M62.81 QUADRICEPS WEAKNESS: ICD-10-CM

## 2023-03-15 PROCEDURE — 97140 MANUAL THERAPY 1/> REGIONS: CPT

## 2023-03-15 PROCEDURE — 97110 THERAPEUTIC EXERCISES: CPT

## 2023-03-15 PROCEDURE — 97014 ELECTRIC STIMULATION THERAPY: CPT

## 2023-03-15 NOTE — PROGRESS NOTES
[facility]   Physical Therapy Treatment Note     Name: Billy Holbrook  [facility] Number: [id number]    Therapy Diagnosis:   Encounter Diagnoses   Name Primary?    Acute pain of left knee Yes    Decreased range of motion of left knee     Quadriceps weakness     Difficulty walking        Physician: PEACE Dempsey MD    Visit Date: 3/15/2023    Physician Orders: PT Eval and Treat   Medical Diagnosis from Referral: M25.562 (ICD-10-CM) - Acute pain of left knee  Evaluation Date: 9/7/2022  Authorization Period Expiration: 9/6/2023  Plan of Care Expiration: 12/31/2022  Visit # / Visits authorized: 118     Precautions: Standard     Time In: 12:40 am  Time Out: 1:30  Total Billable Time: 50 minutes    Procedure: 9/20/2022  1. Left Knee Arthroscopy, anterior cruciate ligament reconstruction 19097  2.  Left Knee Arthroscopy, with meniscectomy (medial OR lateral) 52501  3.  Left Knee Arthroscopy, debridement/shaving of articular cartilage (chondroplasty) 27072  4.  Left Knee  platelet rich plasma injection to the bone-patellar tendon-bone harvest site    Post-Op Plan:  ACL reconstruction with a bone-patellar tendon-bone autograft    SUBJECTIVE     Pt reports: he noted a distal lateral hamstring pull with return to sprint program today after the 9th run of stage 3 step 3 today. Denzel some light stretching and isometrics this did not resolve so we came back to clinic for treatment. He tried the runs from his stance position today for first time as he requested it and felt ready. He reports the symptoms as very mild, no pop or pull, just mild tension noted and tenderness. He did sit prolonged in small seat at [employer] game last night as well. States he feels hydrated from GI illness Sunday into Monday.     He was compliant with home exercise program.  Response to previous treatment: no issues; mild soreness  Functional change: able to sprint straight line    Pain: 0/10 to 1-2/10- tension in HS; no knee  pain  Location: left knee     OBJECTIVE     5 mo 23 days post-op    Wt. 332 lbs    Observation:   no effusion noted L knee with sweep test  Well healed scar  Atrophy in calf/quad on L     Range of Motion (extension/neutral/flexion):     Right Left   Knee ROM: 8-0-125/130 deg P:8-0-130 deg  A:7-0-125 deg      Strength: (flex/ext measured at 60 deg isometric on Biodex machine)    Right Left Comment   Knee Extension: 248.9 lbs 261 lbs    4.8% deficit on R compared to L   Knee Flexion: 184.0 lbs 167.9 lbs     8.8% deficit on L compared to R   Hip Abduction: 4+/5 4-/5       Anterior reach Y balance R= 58 cm  L= 54 cm  Running mechanics: normalized with exception of decreased knee flexion moment upon deceleration  L hip ER limited 25% compared to R: he states chronic issue    Mid quad girth: 18 cm proximal Patella  - R= 77 cm  - L= 72.5 cm    Treatment     Billy received the treatments listed below:      therapeutic exercises to develop strength, endurance, ROM, flexibility, posture, and core stabilization for 20 minutes including:  Education/assessment  Manual HS isometrics 90/90 and knee ext 5 x 30 sec each  Manual stretching     manual therapy techniques: Joint mobilizations and Soft tissue Mobilization were applied for  30 minutes, including:  STM to L lateral distal mid belly HS with and without movement- gentle  Dry needling to lumbar PVM, glutes- assessment and set up with re-assessment billed; not placement of needles  TDN to L HS surrounding tender area with NMES 2-10Hz visible muscle twitch in IFC fashion.     neuromuscular re-education activities to improve: Coordination, Kinesthetic, Sense, and Proprioception for 0 minutes. The following activities were included:  NP    Therapeutic activities to improve functional performance for 0 minutes, including:  NP    Patient Education and Home Exercises     Home Exercises Provided and Patient Education Provided     Education provided:   - HEP update, precautions,  activity pacing, goals, timeframes    Written Home Exercises Provided: yes. Exercises were reviewed and Billy was able to demonstrate them prior to the end of the session.  Billy demonstrated good  understanding of the education provided. See EMR under Patient Instructions for exercises provided during therapy sessions.     ASSESSMENT     Billy reported a mild HS strain to L lateral distal HS today with return to sprint program. He did try starting in football stance today. Unable to jog lightly without tension felt after on field tx. Came back to clinic for formal intervention. No palpable issues noted, mild tenderness (approx 2 x 2 inch region), no discoloration, good strength with mild tension felt, no flexibility deficit. He reported no symptoms at exit. Discussed HEP of isometrics, light STM, light stretching until tomorrow when we re-assess. Pt reported/demonstrated no adverse response or exacerbation of symptoms during today's session.      Billy Is progressing well towards his goals.   Pt prognosis is Good.     Pt will continue to benefit from skilled outpatient physical therapy to address the deficits listed in the problem list box on initial evaluation, provide pt/family education and to maximize pt's level of independence in the home and community environment.     Pt's spiritual, cultural and educational needs considered and pt agreeable to plan of care and goals.     Anticipated barriers to physical therapy: none    Goals:  Short Term Goals: 8-10 weeks   1. Pt will be compliant with HEP 50% of prescribed amount. MET  2. The pt to demo improvement in left knee ROM to equal right knee ROM pain free MET  3.  The pt to demo good quad set with proper hyperextension moment of the Left knee MET  4. The pt to demo ambualting with elast restricitve AD witout major compensatory pattern left knee for at least 20 feet MET     Long Term Goals: 56 weeks (progressing, not met)   Pt will be compliant with %  of prescribed amount.   The pt to demo pain free and uncompensated running mechanics x10 min on an indoor treadmill  The pt to demo tolerance to a squat at or bellow parallel with uncompensated mechanics x10 MET  The pt to demo strength of L LE within 10% of R LE as demo'd on the biodex machine   The pt to demo a deficit of 10% or less on a triple hop, single leg broad jump and crossover hop compared to non operative LE.   The pt will report full participation in ADLs and IADLs without restrictions related to L  knee.     PLAN     Progress strength as able; maintain full ROM. Running program 2x/wk at [facility].  Biodex next week.      Manjit Lazcano, PT, DPT

## 2023-03-16 ENCOUNTER — CLINICAL SUPPORT (OUTPATIENT)
Dept: REHABILITATION | Facility: HOSPITAL | Age: 28
End: 2023-03-16
Payer: COMMERCIAL

## 2023-03-16 DIAGNOSIS — R26.2 DIFFICULTY WALKING: ICD-10-CM

## 2023-03-16 DIAGNOSIS — M25.562 ACUTE PAIN OF LEFT KNEE: Primary | ICD-10-CM

## 2023-03-16 DIAGNOSIS — M62.81 QUADRICEPS WEAKNESS: ICD-10-CM

## 2023-03-16 DIAGNOSIS — M25.662 DECREASED RANGE OF MOTION OF LEFT KNEE: ICD-10-CM

## 2023-03-16 PROCEDURE — 97530 THERAPEUTIC ACTIVITIES: CPT

## 2023-03-16 PROCEDURE — 97112 NEUROMUSCULAR REEDUCATION: CPT

## 2023-03-16 PROCEDURE — 97110 THERAPEUTIC EXERCISES: CPT

## 2023-03-16 PROCEDURE — 97140 MANUAL THERAPY 1/> REGIONS: CPT

## 2023-03-16 NOTE — PROGRESS NOTES
[facility]   Physical Therapy Treatment Note     Name: Billy Holbrook  [facility] Number: [id number]    Therapy Diagnosis:   Encounter Diagnoses   Name Primary?    Acute pain of left knee Yes    Decreased range of motion of left knee     Quadriceps weakness     Difficulty walking        Physician: PEACE Dempsey MD    Visit Date: 3/16/2023    Physician Orders: PT Eval and Treat   Medical Diagnosis from Referral: M25.562 (ICD-10-CM) - Acute pain of left knee  Evaluation Date: 9/7/2022  Authorization Period Expiration: 9/6/2023  Plan of Care Expiration: 12/31/2022  Visit # / Visits authorized: 119     Precautions: Standard     Time In: 10:30 am  Time Out: 12:00  Total Billable Time: 90 minutes    Procedure: 9/20/2022  1. Left Knee Arthroscopy, anterior cruciate ligament reconstruction 00864  2.  Left Knee Arthroscopy, with meniscectomy (medial OR lateral) 59395  3.  Left Knee Arthroscopy, debridement/shaving of articular cartilage (chondroplasty) 58520  4.  Left Knee  platelet rich plasma injection to the bone-patellar tendon-bone harvest site    Post-Op Plan:  ACL reconstruction with a bone-patellar tendon-bone autograft    SUBJECTIVE     Pt reports: his HS is feeling good. No pain, just some mild tenderness and tension with stretching/contraction. Knee feeling good.     He was compliant with home exercise program.  Response to previous treatment: no issues; mild soreness  Functional change: able to sprint straight line    Pain: 0/10 to 1-2/10- tension in HS; no knee pain  Location: left knee     OBJECTIVE     5 mo 24 days post-op    Wt. 332 lbs    Observation:   no effusion noted L knee with sweep test  Well healed scar  Atrophy in calf/quad on L     Range of Motion (extension/neutral/flexion):     Right Left   Knee ROM: 8-0-125/130 deg P:8-0-130 deg  A:7-0-125 deg      Strength: (flex/ext measured at 60 deg isometric on Biodex machine)    Right Left Comment   Knee Extension: 248.9 lbs  261 lbs    4.8% deficit on R compared to L   Knee Flexion: 184.0 lbs 167.9 lbs     8.8% deficit on L compared to R   Hip Abduction: 4+/5 4-/5       Anterior reach Y balance R= 58 cm  L= 54 cm  Running mechanics: normalized with exception of decreased knee flexion moment upon deceleration  L hip ER limited 25% compared to R: he states chronic issue    Mid quad girth: 18 cm proximal Patella  - R= 77 cm  - L= 72.5 cm    Treatment     Billy received the treatments listed below:      therapeutic exercises to develop strength, endurance, ROM, flexibility, posture, and core stabilization for 25 minutes including:  Education/assessment  Manual HS isometrics into eccentrics prone 90 deg 6 x 20 sec hold x 8 sec lower  Manual stretching hips/LE's  Self guided HS isometrics 90 deg flx and full ext 5 x 20 sec each; foot on 24 inch box  Heel prop x 5 min 10 lbs above/below  SL press 5 x 8->5 240-360 lbs: linus      manual therapy techniques: Joint mobilizations and Soft tissue Mobilization were applied for  20 minutes, including:  STM to L lateral distal mid belly HS with and without movement- gentle  PFJ gr 3-4 glides all planes  Tibial AP glide gr 3-4 with and without rotation bias on and off heel prop  Knee hyperextension gr 3-4  Fat pad mobility  Scar mobilization  Hip LAD gr 3-4 linus    neuromuscular re-education activities to improve: Coordination, Kinesthetic, Sense, and Proprioception for 15 minutes. The following activities were included:  Quad set into hyperextension x 20  HS slider standing into extension and 1/2 kneeling 90/90 2 x 20 each    Therapeutic activities to improve functional performance for 30 minutes, including:  SKTC, quad pull, HS scoop, HS march, high knee march, walking butt kicks, lunge, lateral lunge, band walks, monster walks, jogging x 2 laps each  6 inch TRX heel tap 2 x 10 linus  6 inch heel tap 2 x 10 linus no UE assist    Patient Education and Home Exercises     Home Exercises Provided and Patient  Education Provided     Education provided:   - HEP update, precautions, activity pacing, goals, timeframes    Written Home Exercises Provided: yes. Exercises were reviewed and Billy was able to demonstrate them prior to the end of the session.  Billy demonstrated good  understanding of the education provided. See EMR under Patient Instructions for exercises provided during therapy sessions.     ASSESSMENT     Billy reports improved symptoms from HS strain yesterday sprinting. No issues in session. Tension and tenderness remain at L distal lateral HS. SL strength in quad progressing. Pt reported/demonstrated no adverse response or exacerbation of symptoms during today's session.      Billy Is progressing well towards his goals.   Pt prognosis is Good.     Pt will continue to benefit from skilled outpatient physical therapy to address the deficits listed in the problem list box on initial evaluation, provide pt/family education and to maximize pt's level of independence in the home and community environment.     Pt's spiritual, cultural and educational needs considered and pt agreeable to plan of care and goals.     Anticipated barriers to physical therapy: none    Goals:  Short Term Goals: 8-10 weeks   1. Pt will be compliant with HEP 50% of prescribed amount. MET  2. The pt to demo improvement in left knee ROM to equal right knee ROM pain free MET  3.  The pt to demo good quad set with proper hyperextension moment of the Left knee MET  4. The pt to demo ambualting with elast restricitve AD witout major compensatory pattern left knee for at least 20 feet MET     Long Term Goals: 56 weeks (progressing, not met)   Pt will be compliant with % of prescribed amount.   The pt to demo pain free and uncompensated running mechanics x10 min on an indoor treadmill  The pt to demo tolerance to a squat at or bellow parallel with uncompensated mechanics x10 MET  The pt to demo strength of L LE within 10% of R LE as  demo'd on the biodex machine   The pt to demo a deficit of 10% or less on a triple hop, single leg broad jump and crossover hop compared to non operative LE.   The pt will report full participation in ADLs and IADLs without restrictions related to L  knee.     PLAN     Progress strength as able.   Biodex once HS asymptomatic.      Manjit Lazcano, PT, DPT

## 2023-03-17 ENCOUNTER — CLINICAL SUPPORT (OUTPATIENT)
Dept: REHABILITATION | Facility: HOSPITAL | Age: 28
End: 2023-03-17
Payer: COMMERCIAL

## 2023-03-17 DIAGNOSIS — R26.2 DIFFICULTY WALKING: ICD-10-CM

## 2023-03-17 DIAGNOSIS — M25.662 DECREASED RANGE OF MOTION OF LEFT KNEE: ICD-10-CM

## 2023-03-17 DIAGNOSIS — M25.562 ACUTE PAIN OF LEFT KNEE: Primary | ICD-10-CM

## 2023-03-17 DIAGNOSIS — M62.81 QUADRICEPS WEAKNESS: ICD-10-CM

## 2023-03-17 PROCEDURE — 97140 MANUAL THERAPY 1/> REGIONS: CPT

## 2023-03-17 PROCEDURE — 97112 NEUROMUSCULAR REEDUCATION: CPT

## 2023-03-17 PROCEDURE — 97110 THERAPEUTIC EXERCISES: CPT

## 2023-03-17 PROCEDURE — 97530 THERAPEUTIC ACTIVITIES: CPT

## 2023-03-17 NOTE — PROGRESS NOTES
[facility]   Physical Therapy Treatment Note     Name: Billy Holbrook  [facility] Number: [id number]    Therapy Diagnosis:   Encounter Diagnoses   Name Primary?    Acute pain of left knee Yes    Decreased range of motion of left knee     Quadriceps weakness     Difficulty walking        Physician: PEACE Dempsey MD    Visit Date: 3/17/2023    Physician Orders: PT Eval and Treat   Medical Diagnosis from Referral: M25.562 (ICD-10-CM) - Acute pain of left knee  Evaluation Date: 9/7/2022  Authorization Period Expiration: 9/6/2023  Plan of Care Expiration: 12/31/2022  Visit # / Visits authorized: 120     Precautions: Standard     Time In: 6:08 am  Time Out: 7:40  Total Billable Time: 92 minutes    Procedure: 9/20/2022  1. Left Knee Arthroscopy, anterior cruciate ligament reconstruction 30090  2.  Left Knee Arthroscopy, with meniscectomy (medial OR lateral) 03067  3.  Left Knee Arthroscopy, debridement/shaving of articular cartilage (chondroplasty) 87709  4.  Left Knee  platelet rich plasma injection to the bone-patellar tendon-bone harvest site    Post-Op Plan:  ACL reconstruction with a bone-patellar tendon-bone autograft    SUBJECTIVE     Pt reports: he is feeling good. HS not symptomatic today. Felt good jogging in clinic today. Knee is feeling good as well, denies any instability or mechanical symptoms as well.     He was compliant with home exercise program.  Response to previous treatment: no issues; mild soreness  Functional change: able to sprint straight line    Pain: 0/10   Location: left knee     OBJECTIVE     5 mo 25 days post-op    Wt. 327 lbs    Observation:   no effusion noted L knee with sweep test  Well healed scar  Atrophy in calf/quad on L     Range of Motion (extension/neutral/flexion):     Right Left   Knee ROM: 8-0-125/130 deg P:8-0-130 deg  A:7-0-125 deg      Strength: (flex/ext measured at 60 deg isometric on Biodex machine)    Right Left Comment   Knee Extension:  248.9 lbs 261 lbs    4.8% deficit on R compared to L   Knee Flexion: 184.0 lbs 167.9 lbs     8.8% deficit on L compared to R   Hip Abduction: 4+/5 4-/5       Anterior reach Y balance R= 58 cm  L= 54 cm  Running mechanics: normalized with exception of decreased knee flexion moment upon deceleration  L hip ER limited 25% compared to R: he states chronic issue    Mid quad girth: 18 cm proximal Patella  - R= 77 cm  - L= 72.5 cm    Treatment     Billy received the treatments listed below:      therapeutic exercises to develop strength, endurance, ROM, flexibility, posture, and core stabilization for 25 minutes including:  Education/assessment  LAQ isometrics 8 x 30 sec at 60 deg  Manual stretching hips/LE's  Self guided HS isometrics 90 deg flx and full ext 5 x 20 sec each; foot on 24 inch box  Heel prop x 5 min 10 lbs above/below  HS eccentrics on curl machine DL concentric to SL eccentric 3 x 10 x 5 sec hold then 5 sec lower 35-45 lbs      manual therapy techniques: Joint mobilizations and Soft tissue Mobilization were applied for  10 minutes, including:  STM to L lateral distal mid belly HS with and without movement- gentle  PFJ gr 3-4 glides all planes  Tibial AP glide gr 3-4 with and without rotation bias on and off heel prop  Knee hyperextension gr 3-4  Fat pad mobility  Scar mobilization    neuromuscular re-education activities to improve: Coordination, Kinesthetic, Sense, and Proprioception for 12 minutes. The following activities were included:  Quad set into hyperextension x 20  Standing TKE black sport band 3 x 15 x 2 sec  Lateral sled push/pull 4 x 20-15 sec 45->70 lbs added    Therapeutic activities to improve functional performance for 45 minutes, including:  SKTC, quad pull, HS scoop, HS march, high knee march, walking butt kicks, lunge, lateral lunge, band walks, monster walks, jogging x 2 laps each  6->9 inch TRX heel tap 2 x 10 linus each  6->9 inch heel tap 3 x 10 linus each no UE assist  Agility ladder  drills low impact with speed  RESS 4 x 6 linus 50-70 lb DB: 20 inch step      Patient Education and Home Exercises     Home Exercises Provided and Patient Education Provided     Education provided:   - HEP update, precautions, activity pacing, goals, timeframes    Written Home Exercises Provided: yes. Exercises were reviewed and Billy was able to demonstrate them prior to the end of the session.  Billy demonstrated good  understanding of the education provided. See EMR under Patient Instructions for exercises provided during therapy sessions.     ASSESSMENT     Billy reports improvement in recent HS symptoms. No issues jogging or with agility today. Better tolerance to HS loading. Progressing SL strength and mechanics of movement. Pt reported/demonstrated no adverse response or exacerbation of symptoms during today's session.      Billy Is progressing well towards his goals.   Pt prognosis is Good.     Pt will continue to benefit from skilled outpatient physical therapy to address the deficits listed in the problem list box on initial evaluation, provide pt/family education and to maximize pt's level of independence in the home and community environment.     Pt's spiritual, cultural and educational needs considered and pt agreeable to plan of care and goals.     Anticipated barriers to physical therapy: none    Goals:  Short Term Goals: 8-10 weeks   1. Pt will be compliant with HEP 50% of prescribed amount. MET  2. The pt to demo improvement in left knee ROM to equal right knee ROM pain free MET  3.  The pt to demo good quad set with proper hyperextension moment of the Left knee MET  4. The pt to demo ambualting with elast restricitve AD witout major compensatory pattern left knee for at least 20 feet MET     Long Term Goals: 56 weeks (progressing, not met)   Pt will be compliant with % of prescribed amount.   The pt to demo pain free and uncompensated running mechanics x10 min on an indoor treadmill  The  pt to demo tolerance to a squat at or bellow parallel with uncompensated mechanics x10 MET  The pt to demo strength of L LE within 10% of R LE as demo'd on the biodex machine   The pt to demo a deficit of 10% or less on a triple hop, single leg broad jump and crossover hop compared to non operative LE.   The pt will report full participation in ADLs and IADLs without restrictions related to L  knee.     PLAN     Progress strength as able. Get back into more intense running and into cutting next week as HS calms.   Biodex once HS asymptomatic.      Manjit Lazcano, PT, DPT

## 2023-03-20 ENCOUNTER — CLINICAL SUPPORT (OUTPATIENT)
Dept: REHABILITATION | Facility: HOSPITAL | Age: 28
End: 2023-03-20
Payer: COMMERCIAL

## 2023-03-20 DIAGNOSIS — M25.562 ACUTE PAIN OF LEFT KNEE: Primary | ICD-10-CM

## 2023-03-20 DIAGNOSIS — M62.81 QUADRICEPS WEAKNESS: ICD-10-CM

## 2023-03-20 DIAGNOSIS — M25.662 DECREASED RANGE OF MOTION OF LEFT KNEE: ICD-10-CM

## 2023-03-20 DIAGNOSIS — R26.2 DIFFICULTY WALKING: ICD-10-CM

## 2023-03-20 PROCEDURE — 97140 MANUAL THERAPY 1/> REGIONS: CPT

## 2023-03-20 PROCEDURE — 97112 NEUROMUSCULAR REEDUCATION: CPT

## 2023-03-20 PROCEDURE — 97530 THERAPEUTIC ACTIVITIES: CPT

## 2023-03-20 PROCEDURE — 97110 THERAPEUTIC EXERCISES: CPT

## 2023-03-20 NOTE — PROGRESS NOTES
[facility]   Physical Therapy Treatment Note     Name: Billy Holbrook  [facility] Number: [id number]    Therapy Diagnosis:   Encounter Diagnoses   Name Primary?    Acute pain of left knee Yes    Decreased range of motion of left knee     Quadriceps weakness     Difficulty walking        Physician: PEACE Dempsey MD    Visit Date: 3/20/2023    Physician Orders: PT Eval and Treat   Medical Diagnosis from Referral: M25.562 (ICD-10-CM) - Acute pain of left knee  Evaluation Date: 9/7/2022  Authorization Period Expiration: 3/12/2024  Plan of Care Expiration: 12/31/2023  Visit # / Visits authorized: 121 total 18/40     Precautions: Standard     Time In: 10:00 am  Time Out: 11:30  Total Billable Time: 90 minutes    Procedure: 9/20/2022  1. Left Knee Arthroscopy, anterior cruciate ligament reconstruction 89584  2.  Left Knee Arthroscopy, with meniscectomy (medial OR lateral) 23718  3.  Left Knee Arthroscopy, debridement/shaving of articular cartilage (chondroplasty) 85496  4.  Left Knee  platelet rich plasma injection to the bone-patellar tendon-bone harvest site    Post-Op Plan:  ACL reconstruction with a bone-patellar tendon-bone autograft    SUBJECTIVE     Pt reports: he is feeling good. HS not bothersome at all. L leg just feels weak with heavier single leg tasks compared to R.     He was compliant with home exercise program.  Response to previous treatment: no issues; mild soreness  Functional change: able to sprint straight line    Pain: 0/10   Location: left knee     OBJECTIVE     6 mo post-op    Wt. 331 lbs    Observation:   no effusion noted L knee with sweep test  Well healed scar  Atrophy in calf/quad on L     Range of Motion (extension/neutral/flexion):     Right Left   Knee ROM: 8-0-125/130 deg P:8-0-130 deg  A:7-0-125 deg      Strength: (flex/ext measured at 60 deg isometric on Biodex machine)    Right Left Comment   Knee Extension: 248.9 lbs 261 lbs    4.8% deficit on R compared to  L   Knee Flexion: 184.0 lbs 167.9 lbs     8.8% deficit on L compared to R   Hip Abduction: 4+/5 4-/5       Anterior reach Y balance R= 58 cm  L= 54 cm  Running mechanics: normalized with exception of decreased knee flexion moment upon deceleration  L hip ER limited 25% compared to R: he states chronic issue    Mid quad girth: 18 cm proximal Patella  - R= 77 cm  - L= 72.5 cm    Treatment     Billy received the treatments listed below:      therapeutic exercises to develop strength, endurance, ROM, flexibility, posture, and core stabilization for 20 minutes including:  Education/assessment  Manual stretching hips/LE's  Heel prop x 5 min 10 lbs above/below  HS eccentrics on curl machine DL concentric to SL eccentric 3 x 10 x 5 sec hold then 5 sec lower 35-45 lbs      manual therapy techniques: Joint mobilizations and Soft tissue Mobilization were applied for  10 minutes, including:  STM to L lateral distal mid belly HS with and without movement- gentle  PFJ gr 3-4 glides all planes  Tibial AP glide gr 3-4 with and without rotation bias on and off heel prop  Knee hyperextension gr 3-4  Fat pad mobility  Scar mobilization    neuromuscular re-education activities to improve: Coordination, Kinesthetic, Sense, and Proprioception for 10 minutes. The following activities were included:  Quad set into hyperextension x 20  Side plank clams 3 x 15 BTB linus    Therapeutic activities to improve functional performance for 50 minutes, including:  SKTC, quad pull, HS scoop, HS march, high knee march, walking butt kicks, lunge, lateral lunge, band walks, monster walks, jogging x 4 laps  Safety bar split squat front foot elevated 2 inch 4 x 7->5 linus 0 to 50 lbs added  Sled push 6 x 25 yds 5 plates added  Curtsy squat landmine 3 x 8 linus 0->25 lbs added    Patient Education and Home Exercises     Home Exercises Provided and Patient Education Provided     Education provided:   - HEP update, precautions, activity pacing, goals,  timeframes    Written Home Exercises Provided: yes. Exercises were reviewed and Billy was able to demonstrate them prior to the end of the session.  Billy demonstrated good  understanding of the education provided. See EMR under Patient Instructions for exercises provided during therapy sessions.     ASSESSMENT     Billy reports improvement in recent HS symptoms. No issues jogging or with agility today. Better tolerance to HS loading. Progressing SL strength and mechanics of movement. Pt reported/demonstrated no adverse response or exacerbation of symptoms during today's session.      Billy Is progressing well towards his goals.   Pt prognosis is Good.     Pt will continue to benefit from skilled outpatient physical therapy to address the deficits listed in the problem list box on initial evaluation, provide pt/family education and to maximize pt's level of independence in the home and community environment.     Pt's spiritual, cultural and educational needs considered and pt agreeable to plan of care and goals.     Anticipated barriers to physical therapy: none    Goals:  Short Term Goals: 8-10 weeks   1. Pt will be compliant with HEP 50% of prescribed amount. MET  2. The pt to demo improvement in left knee ROM to equal right knee ROM pain free MET  3.  The pt to demo good quad set with proper hyperextension moment of the Left knee MET  4. The pt to demo ambualting with elast restricitve AD witout major compensatory pattern left knee for at least 20 feet MET     Long Term Goals: 56 weeks (progressing, not met)   Pt will be compliant with % of prescribed amount.   The pt to demo pain free and uncompensated running mechanics x10 min on an indoor treadmill  The pt to demo tolerance to a squat at or bellow parallel with uncompensated mechanics x10 MET  The pt to demo strength of L LE within 10% of R LE as demo'd on the biodex machine   The pt to demo a deficit of 10% or less on a triple hop, single leg broad  jump and crossover hop compared to non operative LE.   The pt will report full participation in ADLs and IADLs without restrictions related to L  knee.     PLAN     Progress strength as able. Get back into more intense running and into cutting next week as HS calms.   Biodex once HS asymptomatic.      Manjit Lazcano, PT, DPT

## 2023-03-21 ENCOUNTER — CLINICAL SUPPORT (OUTPATIENT)
Dept: REHABILITATION | Facility: HOSPITAL | Age: 28
End: 2023-03-21
Payer: COMMERCIAL

## 2023-03-21 DIAGNOSIS — R26.2 DIFFICULTY WALKING: ICD-10-CM

## 2023-03-21 DIAGNOSIS — M62.81 QUADRICEPS WEAKNESS: ICD-10-CM

## 2023-03-21 DIAGNOSIS — M25.662 DECREASED RANGE OF MOTION OF LEFT KNEE: ICD-10-CM

## 2023-03-21 DIAGNOSIS — M25.562 ACUTE PAIN OF LEFT KNEE: Primary | ICD-10-CM

## 2023-03-21 PROCEDURE — 97140 MANUAL THERAPY 1/> REGIONS: CPT

## 2023-03-21 PROCEDURE — 97110 THERAPEUTIC EXERCISES: CPT

## 2023-03-21 PROCEDURE — 97112 NEUROMUSCULAR REEDUCATION: CPT

## 2023-03-21 PROCEDURE — 97530 THERAPEUTIC ACTIVITIES: CPT

## 2023-03-21 NOTE — PROGRESS NOTES
[facility]   Physical Therapy Treatment Note     Name: Billy Holbrook  [facility] Number: [id number]    Therapy Diagnosis:   Encounter Diagnoses   Name Primary?    Acute pain of left knee Yes    Decreased range of motion of left knee     Quadriceps weakness     Difficulty walking        Physician: PEACE Dempsey MD    Visit Date: 3/21/2023    Physician Orders: PT Eval and Treat   Medical Diagnosis from Referral: M25.562 (ICD-10-CM) - Acute pain of left knee  Evaluation Date: 9/7/2022  Authorization Period Expiration: 3/12/2024  Plan of Care Expiration: 12/31/2023  Visit # / Visits authorized: 122 total 19/40     Precautions: Standard     Time In: 10:00 am  Time Out: 11:30  Total Billable Time: 90 minutes    Procedure: 9/20/2022  1. Left Knee Arthroscopy, anterior cruciate ligament reconstruction 07989  2.  Left Knee Arthroscopy, with meniscectomy (medial OR lateral) 72042  3.  Left Knee Arthroscopy, debridement/shaving of articular cartilage (chondroplasty) 87887  4.  Left Knee  platelet rich plasma injection to the bone-patellar tendon-bone harvest site    Post-Op Plan:  ACL reconstruction with a bone-patellar tendon-bone autograft    SUBJECTIVE     Pt reports: knee feels great. HS doing well, just tender locally. Mild tension max effort HS testing; no pain.     He was compliant with home exercise program.  Response to previous treatment: no issues; mild soreness  Functional change: able to sprint straight line    Pain: 0/10   Location: left knee     OBJECTIVE     6 mo 1 day post-op    Wt. 331 lbs    Observation:   no effusion noted L knee with sweep test  Well healed scar  Atrophy in calf/quad on L     Range of Motion (extension/neutral/flexion):     Right Left   Knee ROM: 8-0-125/130 deg P:8-0-130 deg  A:7-0-125 deg      Strength: (flex/ext measured at 60 deg isometric on Biodex machine)    Right Left Comment   Knee Extension: 248.9 lbs 261 lbs    4.8% deficit on R compared to L    Knee Flexion: 184.0 lbs 167.9 lbs     8.8% deficit on L compared to R   Hip Abduction: 4+/5 4-/5       Anterior reach Y balance R= 58 cm  L= 54 cm  Running mechanics: normalized with exception of decreased knee flexion moment upon deceleration  L hip ER limited 25% compared to R: he states chronic issue    Mid quad girth: 18 cm proximal Patella  - R= 77 cm  - L= 72.5 cm    Treatment     Billy received the treatments listed below:      therapeutic exercises to develop strength, endurance, ROM, flexibility, posture, and core stabilization for 20 minutes including:  Education/assessment  Manual stretching hips/LE's  Heel prop x 5 min 10 lbs above/below  LAQ 4 x 10 linus 45->60 lbs    manual therapy techniques: Joint mobilizations and Soft tissue Mobilization were applied for  10 minutes, including:  STM to L lateral distal mid belly HS with and without movement- gentle  PFJ gr 3-4 glides all planes  Tibial AP glide gr 3-4 with and without rotation bias on and off heel prop  Knee hyperextension gr 3-4  Fat pad mobility  Scar mobilization    neuromuscular re-education activities to improve: Coordination, Kinesthetic, Sense, and Proprioception for 10 minutes. The following activities were included:  Quad set into hyperextension x 20  Standing obliques 60 lbs 4 x 15 linus    Therapeutic activities to improve functional performance for 50 minutes, including:  SKTC, quad pull, HS scoop, HS march, high knee march, walking butt kicks, lunge, lateral lunge, band walks, monster walks, jogging x 4 laps  Agility ladder drills x 10 minutes  Safety bar split squat front foot elevated 2 inch 4 x 7->5 linus 0 to 50 lbs added  12 inch heel tap 2 x 8 linus with TRX then 2 x 5 linus no UE support  RDL 60-70 lbs eccentric 4 x 10 x 5 sec  Flat BB bench 185-> 405 lbs 4 x 10->3 reps      Patient Education and Home Exercises     Home Exercises Provided and Patient Education Provided     Education provided:   - HEP update, precautions, activity  pacing, goals, timeframes    Written Home Exercises Provided: yes. Exercises were reviewed and Billy was able to demonstrate them prior to the end of the session.  Billy demonstrated good  understanding of the education provided. See EMR under Patient Instructions for exercises provided during therapy sessions.     ASSESSMENT     Billy presents with a much smaller area of tenderness to L distal/lateral HS muscle belly and mild non-residual tension to HS testing. No issues with progressive loading. Plan to run tomorrow, building effort to assess HS. Pt reported/demonstrated no adverse response or exacerbation of symptoms during today's session.      Billy Is progressing well towards his goals.   Pt prognosis is Good.     Pt will continue to benefit from skilled outpatient physical therapy to address the deficits listed in the problem list box on initial evaluation, provide pt/family education and to maximize pt's level of independence in the home and community environment.     Pt's spiritual, cultural and educational needs considered and pt agreeable to plan of care and goals.     Anticipated barriers to physical therapy: none    Goals:  Short Term Goals: 8-10 weeks   1. Pt will be compliant with HEP 50% of prescribed amount. MET  2. The pt to demo improvement in left knee ROM to equal right knee ROM pain free MET  3.  The pt to demo good quad set with proper hyperextension moment of the Left knee MET  4. The pt to demo ambualting with elast restricitve AD witout major compensatory pattern left knee for at least 20 feet MET     Long Term Goals: 56 weeks (progressing, not met)   Pt will be compliant with % of prescribed amount.   The pt to demo pain free and uncompensated running mechanics x10 min on an indoor treadmill  The pt to demo tolerance to a squat at or bellow parallel with uncompensated mechanics x10 MET  The pt to demo strength of L LE within 10% of R LE as demo'd on the biodex machine   The pt  to demo a deficit of 10% or less on a triple hop, single leg broad jump and crossover hop compared to non operative LE.   The pt will report full participation in ADLs and IADLs without restrictions related to L  knee.     PLAN     Progress strength as able. Get back into more intense running and into cutting next week as HS calms.   Biodex once HS asymptomatic.      Manjit Lazcano, PT, DPT

## 2023-03-23 ENCOUNTER — CLINICAL SUPPORT (OUTPATIENT)
Dept: REHABILITATION | Facility: HOSPITAL | Age: 28
End: 2023-03-23
Payer: COMMERCIAL

## 2023-03-23 ENCOUNTER — DOCUMENTATION ONLY (OUTPATIENT)
Dept: REHABILITATION | Facility: HOSPITAL | Age: 28
End: 2023-03-23
Payer: COMMERCIAL

## 2023-03-23 DIAGNOSIS — M25.562 ACUTE PAIN OF LEFT KNEE: Primary | ICD-10-CM

## 2023-03-23 DIAGNOSIS — M25.662 DECREASED RANGE OF MOTION OF LEFT KNEE: ICD-10-CM

## 2023-03-23 DIAGNOSIS — M62.81 QUADRICEPS WEAKNESS: ICD-10-CM

## 2023-03-23 DIAGNOSIS — R26.2 DIFFICULTY WALKING: ICD-10-CM

## 2023-03-23 PROCEDURE — 97530 THERAPEUTIC ACTIVITIES: CPT

## 2023-03-23 PROCEDURE — 97140 MANUAL THERAPY 1/> REGIONS: CPT

## 2023-03-23 PROCEDURE — 97112 NEUROMUSCULAR REEDUCATION: CPT

## 2023-03-23 PROCEDURE — 97110 THERAPEUTIC EXERCISES: CPT

## 2023-03-23 NOTE — PROGRESS NOTES
Billy did not show for his running at [place] on 3/22/2023. He texted me 3-4 hours after the fact than he did not set the alarm right. He did not respond to attempts to reach him at time of scheduled running to 45 min after the fact. He stated he went and ran up to 75% on his own after a dynamic warm up. He was fearful of going 100% as I was not present. He denied and HS discomfort and felt good.

## 2023-03-23 NOTE — PROGRESS NOTES
[facility]   Physical Therapy Treatment Note     Name: Billy Holbrook  [facility] Number: [id number]    Therapy Diagnosis:   Encounter Diagnoses   Name Primary?    Acute pain of left knee Yes    Decreased range of motion of left knee     Quadriceps weakness     Difficulty walking        Physician: PEACE Dempsey MD    Visit Date: 3/23/2023    Physician Orders: PT Eval and Treat   Medical Diagnosis from Referral: M25.562 (ICD-10-CM) - Acute pain of left knee  Evaluation Date: 9/7/2022  Authorization Period Expiration: 3/12/2024  Plan of Care Expiration: 12/31/2023  Visit # / Visits authorized: 123 total 20/40     Precautions: Standard     Time In: 10:00 am  Time Out: 11:30  Total Billable Time: 90 minutes    Procedure: 9/20/2022  1. Left Knee Arthroscopy, anterior cruciate ligament reconstruction 78606  2.  Left Knee Arthroscopy, with meniscectomy (medial OR lateral) 12131  3.  Left Knee Arthroscopy, debridement/shaving of articular cartilage (chondroplasty) 36228  4.  Left Knee  platelet rich plasma injection to the bone-patellar tendon-bone harvest site    Post-Op Plan:  ACL reconstruction with a bone-patellar tendon-bone autograft    SUBJECTIVE     Pt reports: knee feels great. No tenderness to HS.     He was compliant with home exercise program.  Response to previous treatment: no issues; mild soreness  Functional change: able to sprint straight line    Pain: 0/10   Location: left knee     OBJECTIVE     6 mo 2 day post-op    Wt. 331 lbs    Observation:   no effusion noted L knee with sweep test  Well healed scar  Atrophy in calf/quad on L     Range of Motion (extension/neutral/flexion):     Right Left   Knee ROM: 8-0-125/130 deg P:8-0-130 deg  A:7-0-125 deg      Strength: (flex/ext measured at 60 deg isometric on Biodex machine)    Right Left Comment   Knee Extension: 248.9 lbs 261 lbs    4.8% deficit on R compared to L   Knee Flexion: 184.0 lbs 167.9 lbs     8.8% deficit on L  compared to R   Hip Abduction: 4+/5 4-/5       Anterior reach Y balance R= 58 cm  L= 54 cm  Running mechanics: normalized with exception of decreased knee flexion moment upon deceleration  L hip ER limited 25% compared to R: he states chronic issue    Mid quad girth: 18 cm proximal Patella  - R= 77 cm  - L= 72.5 cm    Treatment     iBlly received the treatments listed below:      therapeutic exercises to develop strength, endurance, ROM, flexibility, posture, and core stabilization for 15 minutes including:  Education/assessment  Manual stretching hips/LE's  Heel prop x 5 min 10 lbs above/below  LAQ 4 x 10 linus 45->60 lbs-NP    manual therapy techniques: Joint mobilizations and Soft tissue Mobilization were applied for  10 minutes, including:  STM to L lateral distal mid belly HS with and without movement- gentle  PFJ gr 3-4 glides all planes  Tibial AP glide gr 3-4 with and without rotation bias on and off heel prop  Knee hyperextension gr 3-4  Fat pad mobility  Scar mobilization    neuromuscular re-education activities to improve: Coordination, Kinesthetic, Sense, and Proprioception for 10 minutes. The following activities were included:  Quad set into hyperextension x 20  Landmine core rotations 4 x 8 linus 25 lbs added    Therapeutic activities to improve functional performance for 55 minutes, including:  SKTC, quad pull, HS scoop, HS march, high knee march, walking butt kicks, lunge, lateral lunge, band walks, monster walks, jogging x 4 laps  Agility ladder drills x 10 minutes  Safety bar split squat front foot elevated 2 inch 4 x 8->5 linus 0 to 70 lbs added  Landmine RDL 4 x 8 linus 25-> 75 lbs added  Landmine unilateral row in iso squat 4 x 8 linus 25- 75 lbs    Patient Education and Home Exercises     Home Exercises Provided and Patient Education Provided     Education provided:   - HEP update, precautions, activity pacing, goals, timeframes    Written Home Exercises Provided: yes. Exercises were reviewed and  Billy was able to demonstrate them prior to the end of the session.  Billy demonstrated good  understanding of the education provided. See EMR under Patient Instructions for exercises provided during therapy sessions.     ASSESSMENT     Billy did well with progressive loading.  Form with exercise is looking better requiring less cuing. Pt reports trace tenderness in L HS today and no tension with HS muscle testing.  Pt reported/demonstrated no adverse response or exacerbation of symptoms during today's session.      Billy Is progressing well towards his goals.   Pt prognosis is Good.     Pt will continue to benefit from skilled outpatient physical therapy to address the deficits listed in the problem list box on initial evaluation, provide pt/family education and to maximize pt's level of independence in the home and community environment.     Pt's spiritual, cultural and educational needs considered and pt agreeable to plan of care and goals.     Anticipated barriers to physical therapy: none    Goals:  Short Term Goals: 8-10 weeks   1. Pt will be compliant with HEP 50% of prescribed amount. MET  2. The pt to demo improvement in left knee ROM to equal right knee ROM pain free MET  3.  The pt to demo good quad set with proper hyperextension moment of the Left knee MET  4. The pt to demo ambualting with elast restricitve AD witout major compensatory pattern left knee for at least 20 feet MET     Long Term Goals: 56 weeks (progressing, not met)   Pt will be compliant with % of prescribed amount.   The pt to demo pain free and uncompensated running mechanics x10 min on an indoor treadmill  The pt to demo tolerance to a squat at or bellow parallel with uncompensated mechanics x10 MET  The pt to demo strength of L LE within 10% of R LE as demo'd on the biodex machine   The pt to demo a deficit of 10% or less on a triple hop, single leg broad jump and crossover hop compared to non operative LE.   The pt will  report full participation in ADLs and IADLs without restrictions related to L  knee.     PLAN     Progress strength as able. Get back into more intense running and into cutting next week as HS calms.   Biodex once HS asymptomatic.      Plyometrics next week. Advised on plan for return to football related tasks with a  starting in next 3-4 weeks. May be here or is [place].     Manjit Lazcano, PT, DPT

## 2023-03-24 ENCOUNTER — DOCUMENTATION ONLY (OUTPATIENT)
Dept: REHABILITATION | Facility: HOSPITAL | Age: 28
End: 2023-03-24
Payer: COMMERCIAL

## 2023-03-24 NOTE — PROGRESS NOTES
Billy did not show to 7am field workout today. He got in touch about 1 hour afterwards, not answering my calls until them. He stated he did return to 100% effort 20-30 yd sprints in his yard today. He denies any HS or knee issues/symptoms. I discussed this with his Agent today as he has now missed 3-4 appts at varying times of the day. He is close to being ready for football related tasks, but not has accountability and effort issues. Will ensure this is managed going forward.

## 2023-03-27 ENCOUNTER — CLINICAL SUPPORT (OUTPATIENT)
Dept: REHABILITATION | Facility: HOSPITAL | Age: 28
End: 2023-03-27
Payer: COMMERCIAL

## 2023-03-27 DIAGNOSIS — M62.81 QUADRICEPS WEAKNESS: ICD-10-CM

## 2023-03-27 DIAGNOSIS — M25.562 ACUTE PAIN OF LEFT KNEE: Primary | ICD-10-CM

## 2023-03-27 DIAGNOSIS — R26.2 DIFFICULTY WALKING: ICD-10-CM

## 2023-03-27 DIAGNOSIS — M25.662 DECREASED RANGE OF MOTION OF LEFT KNEE: ICD-10-CM

## 2023-03-27 PROCEDURE — 97110 THERAPEUTIC EXERCISES: CPT

## 2023-03-27 PROCEDURE — 97530 THERAPEUTIC ACTIVITIES: CPT

## 2023-03-27 PROCEDURE — 97140 MANUAL THERAPY 1/> REGIONS: CPT

## 2023-03-27 NOTE — PROGRESS NOTES
[facility]   Physical Therapy Treatment Note     Name: Billy Holbrook  [facility] Number: [id number]    Therapy Diagnosis:   Encounter Diagnoses   Name Primary?    Acute pain of left knee Yes    Decreased range of motion of left knee     Quadriceps weakness     Difficulty walking        Physician: PEACE Dempsey MD    Visit Date: 3/27/2023    Physician Orders: PT Eval and Treat   Medical Diagnosis from Referral: M25.562 (ICD-10-CM) - Acute pain of left knee  Evaluation Date: 9/7/2022  Authorization Period Expiration: 3/12/2024  Plan of Care Expiration: 12/31/2023  Visit # / Visits authorized: 124 total 21/40     Precautions: Standard     Time In: 10:00 am  Time Out: 11:40  Total Billable Time: 100 minutes    Procedure: 9/20/2022  1. Left Knee Arthroscopy, anterior cruciate ligament reconstruction 05004  2.  Left Knee Arthroscopy, with meniscectomy (medial OR lateral) 06584  3.  Left Knee Arthroscopy, debridement/shaving of articular cartilage (chondroplasty) 37364  4.  Left Knee  platelet rich plasma injection to the bone-patellar tendon-bone harvest site    Post-Op Plan:  ACL reconstruction with a bone-patellar tendon-bone autograft    SUBJECTIVE     Pt reports: knee feels great. No tenderness to HS. Just strain in muscles as expected in session. 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     He was compliant with home exercise program.  Response to previous treatment: no issues; mild soreness  Functional change: able to sprint straight line    Pain: 0/10   Location: left knee     OBJECTIVE     6 mo 7 day post-op    Wt. 327 lbs    Observation:   no effusion noted L knee with sweep test  Well healed scar  Atrophy in calf/quad on L     Range of Motion (extension/neutral/flexion):     Right Left   Knee ROM: 8-0-125/130 deg P:8-0-130 deg  A:7-0-125 deg      Strength: (flex/ext measured at 60 deg isometric on Biodex machine)    Right Left Comment   Knee Extension: 248.9 lbs 261 lbs    4.8% deficit on  R compared to L   Knee Flexion: 184.0 lbs 167.9 lbs     8.8% deficit on L compared to R   Hip Abduction: 4+/5 4-/5       Anterior reach Y balance R= 58 cm  L= 54 cm  Running mechanics: normalized with exception of decreased knee flexion moment upon deceleration  L hip ER limited 25% compared to R: he states chronic issue    Mid quad girth: 18 cm proximal Patella  - R= 77 cm  - L= 72.5 cm    Treatment     Billy received the treatments listed below:      therapeutic exercises to develop strength, endurance, ROM, flexibility, posture, and core stabilization for 40 minutes including:  Education/assessment  Manual stretching hips/LE's  Heel prop x 5 min 10 lbs above/below  LAQ 5 x 8 linus unilateral 65->125 lbs  HS curl seated 5 x 8 linus unilateral 55->145 lbs  Leg press 5 x 8->5 unilateral 240->340 lbs 0-90 deg    manual therapy techniques: Joint mobilizations and Soft tissue Mobilization were applied for  12 minutes, including:  STM to L lateral distal mid belly HS with and without movement- gentle  PFJ gr 3-4 glides all planes  Tibial AP glide gr 3-4 with and without rotation bias on and off heel prop  Knee hyperextension gr 3-4  Fat pad mobility  Scar mobilization  Mid to high T spine supine gr 4->5    neuromuscular re-education activities to improve: Coordination, Kinesthetic, Sense, and Proprioception for 3 minutes. The following activities were included:  Quad set into hyperextension x 20    Therapeutic activities to improve functional performance for 45 minutes, including:  SKTC, quad pull, HS scoop, HS march, high knee march, walking butt kicks, lunge, lateral lunge, band walks, monster walks, jogging x 4 laps  Agility ladder drills x 10 minutes  DL broad jumps 2 x 15  12 inch heel tap 3 x 8 linus      Patient Education and Home Exercises     Home Exercises Provided and Patient Education Provided     Education provided:   - HEP update, precautions, activity pacing, goals, timeframes    Written Home Exercises  Provided: yes. Exercises were reviewed and Billy was able to demonstrate them prior to the end of the session.  Billy demonstrated good  understanding of the education provided. See EMR under Patient Instructions for exercises provided during therapy sessions.     ASSESSMENT     Billy is doing well. Discussed him not showing on running days recently with patient and agent. He states he will do better. His ROM is doing well. Progressing loading with good tolerance. HS seems to be resolved at this time. Working DL plyometrics focusing on soft/equal Wbing landing. Pt reported/demonstrated no adverse response or exacerbation of symptoms during today's session.      Billy Is progressing well towards his goals.   Pt prognosis is Good.     Pt will continue to benefit from skilled outpatient physical therapy to address the deficits listed in the problem list box on initial evaluation, provide pt/family education and to maximize pt's level of independence in the home and community environment.     Pt's spiritual, cultural and educational needs considered and pt agreeable to plan of care and goals.     Anticipated barriers to physical therapy: none    Goals:  Short Term Goals: 8-10 weeks   1. Pt will be compliant with HEP 50% of prescribed amount. MET  2. The pt to demo improvement in left knee ROM to equal right knee ROM pain free MET  3.  The pt to demo good quad set with proper hyperextension moment of the Left knee MET  4. The pt to demo ambualting with elast restricitve AD witout major compensatory pattern left knee for at least 20 feet MET     Long Term Goals: 56 weeks (progressing, not met)   Pt will be compliant with % of prescribed amount.   The pt to demo pain free and uncompensated running mechanics x10 min on an indoor treadmill  The pt to demo tolerance to a squat at or bellow parallel with uncompensated mechanics x10 MET  The pt to demo strength of L LE within 10% of R LE as demo'd on the biod[employer]Wit  machine   The pt to demo a deficit of 10% or less on a triple hop, single leg broad jump and crossover hop compared to non operative LE.   The pt will report full participation in ADLs and IADLs without restrictions related to L  knee.     PLAN     Progress strength as able. Get back into more intense running and into cutting this week.   Biodex later this week. Work into plyometrics.       Manjit Lazcano, PT, DPT

## 2023-03-28 ENCOUNTER — CLINICAL SUPPORT (OUTPATIENT)
Dept: REHABILITATION | Facility: HOSPITAL | Age: 28
End: 2023-03-28
Payer: COMMERCIAL

## 2023-03-28 DIAGNOSIS — R26.2 DIFFICULTY WALKING: ICD-10-CM

## 2023-03-28 DIAGNOSIS — M25.662 DECREASED RANGE OF MOTION OF LEFT KNEE: ICD-10-CM

## 2023-03-28 DIAGNOSIS — M62.81 QUADRICEPS WEAKNESS: ICD-10-CM

## 2023-03-28 DIAGNOSIS — M25.562 ACUTE PAIN OF LEFT KNEE: Primary | ICD-10-CM

## 2023-03-28 PROCEDURE — 97110 THERAPEUTIC EXERCISES: CPT

## 2023-03-28 PROCEDURE — 97140 MANUAL THERAPY 1/> REGIONS: CPT

## 2023-03-28 PROCEDURE — 97112 NEUROMUSCULAR REEDUCATION: CPT

## 2023-03-28 PROCEDURE — 97530 THERAPEUTIC ACTIVITIES: CPT

## 2023-03-28 NOTE — PROGRESS NOTES
[facility]   Physical Therapy Treatment Note     Name: Billy Holbrook  [facility] Number: [id number]    Therapy Diagnosis:   Encounter Diagnoses   Name Primary?    Acute pain of left knee Yes    Decreased range of motion of left knee     Quadriceps weakness     Difficulty walking        Physician: PEACE Dempsey MD    Visit Date: 3/28/2023    Physician Orders: PT Eval and Treat   Medical Diagnosis from Referral: M25.562 (ICD-10-CM) - Acute pain of left knee  Evaluation Date: 9/7/2022  Authorization Period Expiration: 3/12/2024  Plan of Care Expiration: 12/31/2023  Visit # / Visits authorized: 125 total 22/40     Precautions: Standard     Time In: 10:00 am  Time Out: 11:30  Total Billable Time: 90 minutes    Procedure: 9/20/2022  1. Left Knee Arthroscopy, anterior cruciate ligament reconstruction 67119  2.  Left Knee Arthroscopy, with meniscectomy (medial OR lateral) 06741  3.  Left Knee Arthroscopy, debridement/shaving of articular cartilage (chondroplasty) 83982  4.  Left Knee  platelet rich plasma injection to the bone-patellar tendon-bone harvest site    Post-Op Plan:  ACL reconstruction with a bone-patellar tendon-bone autograft    SUBJECTIVE     Pt reports: knee feels great. No HS issues.     He was compliant with home exercise program.  Response to previous treatment: no issues; mild soreness  Functional change: able to sprint straight line    Pain: 0/10   Location: left knee     OBJECTIVE     6 mo 8 day post-op    Wt. 327 lbs    Observation:   no effusion noted L knee with sweep test  Well healed scar  Atrophy in calf/quad on L     Range of Motion (extension/neutral/flexion):     Right Left   Knee ROM: 8-0-125/130 deg P:8-0-130 deg  A:7-0-125 deg      Strength: (flex/ext measured at 60 deg isometric on Biodex machine)    Right Left Comment   Knee Extension: 248.9 lbs 261 lbs    4.8% deficit on R compared to L   Knee Flexion: 184.0 lbs 167.9 lbs     8.8% deficit on L compared to R    Hip Abduction: 4+/5 4-/5       Anterior reach Y balance R= 58 cm  L= 54 cm  Running mechanics: normalized with exception of decreased knee flexion moment upon deceleration  L hip ER limited 25% compared to R: he states chronic issue    Mid quad girth: 18 cm proximal Patella  - R= 77 cm  - L= 72.5 cm    Treatment     Billy received the treatments listed below:      therapeutic exercises to develop strength, endurance, ROM, flexibility, posture, and core stabilization for 30 minutes including:  Education/assessment  Manual stretching hips/LE's  Heel prop x 5 min 10 lbs above/below  LAQ iso at 60 deg 8 x 30 sec on/ 30 sec off  HS eccentric 20 x 5 sec lower 125 lbs    manual therapy techniques: Joint mobilizations and Soft tissue Mobilization were applied for  12 minutes, including:  STM to L lateral distal mid belly HS with and without movement- gentle  PFJ gr 3-4 glides all planes  Tibial AP glide gr 3-4 with and without rotation bias on and off heel prop  Knee hyperextension gr 3-4  Fat pad mobility  Scar mobilization    neuromuscular re-education activities to improve: Coordination, Kinesthetic, Sense, and Proprioception for 3 minutes. The following activities were included:  Quad set into hyperextension x 20  Lateral sled push/pull 4 x 20 sec 45->60 lbs added    Therapeutic activities to improve functional performance for 45 minutes, including:  SKTC, quad pull, HS scoop, HS march, high knee march, walking butt kicks, lunge, lateral lunge, band walks, monster walks, jogging x 4 laps  12 inch heel tap 3 x 10 linus  Trap bar squat 4 x 10 135->225 lbs tempo  [place] landmine 4 x 8 linus 25 lbs added  Lunge to [place] press landmine 4 x 8 linus 25 lbs added      Patient Education and Home Exercises     Home Exercises Provided and Patient Education Provided     Education provided:   - HEP update, precautions, activity pacing, goals, timeframes    Written Home Exercises Provided: yes. Exercises were reviewed and Billy was able  to demonstrate them prior to the end of the session.  Billy demonstrated good  understanding of the education provided. See EMR under Patient Instructions for exercises provided during therapy sessions.     ASSESSMENT     Billy continues to report and demonstrate no HS irritation and progressing leg strength. Pt reported/demonstrated no adverse response or exacerbation of symptoms during today's session.      Billy Is progressing well towards his goals.   Pt prognosis is Good.     Pt will continue to benefit from skilled outpatient physical therapy to address the deficits listed in the problem list box on initial evaluation, provide pt/family education and to maximize pt's level of independence in the home and community environment.     Pt's spiritual, cultural and educational needs considered and pt agreeable to plan of care and goals.     Anticipated barriers to physical therapy: none    Goals:  Short Term Goals: 8-10 weeks   1. Pt will be compliant with HEP 50% of prescribed amount. MET  2. The pt to demo improvement in left knee ROM to equal right knee ROM pain free MET  3.  The pt to demo good quad set with proper hyperextension moment of the Left knee MET  4. The pt to demo ambualting with elast restricitve AD witout major compensatory pattern left knee for at least 20 feet MET     Long Term Goals: 56 weeks (progressing, not met)   Pt will be compliant with % of prescribed amount.   The pt to demo pain free and uncompensated running mechanics x10 min on an indoor treadmill MET  The pt to demo tolerance to a squat at or bellow parallel with uncompensated mechanics x10 MET  The pt to demo strength of L LE within 10% of R LE as demo'd on the biodex machine   The pt to demo a deficit of 10% or less on a triple hop, single leg broad jump and crossover hop compared to non operative LE.   The pt will report full participation in ADLs and IADLs without restrictions related to L  knee.     PLAN     Run  tomorrow with some directional changes. Biodex Thursday if feeling good and no soreness from running.       Manjit Lazcano, PT, DPT

## 2023-03-29 ENCOUNTER — DOCUMENTATION ONLY (OUTPATIENT)
Dept: REHABILITATION | Facility: HOSPITAL | Age: 28
End: 2023-03-29
Payer: COMMERCIAL

## 2023-03-29 NOTE — PROGRESS NOTES
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 We did a dynamic warm up, ladder agility, step 3 of stage 3 return to sprint program, x 3 L drill (8.6 sec), x 3 T drill (11.8 sec). He denies any s/sx. No obvious compensations noted other than some mild fear/confidence issues on first reps of change of direction tasks.

## 2023-03-30 ENCOUNTER — CLINICAL SUPPORT (OUTPATIENT)
Dept: REHABILITATION | Facility: HOSPITAL | Age: 28
End: 2023-03-30
Payer: COMMERCIAL

## 2023-03-30 DIAGNOSIS — M25.562 ACUTE PAIN OF LEFT KNEE: Primary | ICD-10-CM

## 2023-03-30 DIAGNOSIS — R26.2 DIFFICULTY WALKING: ICD-10-CM

## 2023-03-30 DIAGNOSIS — M62.81 QUADRICEPS WEAKNESS: ICD-10-CM

## 2023-03-30 DIAGNOSIS — M25.662 DECREASED RANGE OF MOTION OF LEFT KNEE: ICD-10-CM

## 2023-03-30 PROCEDURE — 97140 MANUAL THERAPY 1/> REGIONS: CPT

## 2023-03-30 PROCEDURE — 97110 THERAPEUTIC EXERCISES: CPT

## 2023-03-30 PROCEDURE — 97530 THERAPEUTIC ACTIVITIES: CPT

## 2023-03-30 NOTE — PROGRESS NOTES
[facility]   Physical Therapy Treatment Note     Name: Billy Holbrook  [facility] Number: [id number]    Therapy Diagnosis:   Encounter Diagnoses   Name Primary?    Acute pain of left knee Yes    Decreased range of motion of left knee     Quadriceps weakness     Difficulty walking        Physician: PEACE Dempsey MD    Visit Date: 3/30/2023    Physician Orders: PT Eval and Treat   Medical Diagnosis from Referral: M25.562 (ICD-10-CM) - Acute pain of left knee  Evaluation Date: 9/7/2022  Authorization Period Expiration: 3/12/2024  Plan of Care Expiration: 12/31/2023  Visit # / Visits authorized: 126 total 23/40     Precautions: Standard     Time In: 11:00 am  Time Out: 12:40  Total Billable Time: 100 minutes    Procedure: 9/20/2022  1. Left Knee Arthroscopy, anterior cruciate ligament reconstruction 99397  2.  Left Knee Arthroscopy, with meniscectomy (medial OR lateral) 28989  3.  Left Knee Arthroscopy, debridement/shaving of articular cartilage (chondroplasty) 35725  4.  Left Knee  platelet rich plasma injection to the bone-patellar tendon-bone harvest site    Post-Op Plan:  ACL reconstruction with a bone-patellar tendon-bone autograft    SUBJECTIVE     Pt reports: feeling good today. Ankles a little sore from agility yesterday. Ready to test today.     He was compliant with home exercise program.  Response to previous treatment: no issues; mild soreness  Functional change: able to sprint straight line    Pain: 0/10   Location: left knee     OBJECTIVE     6 mo 9 day post-op    Wt. 327 lbs    Observation:   no effusion noted L knee with sweep test  Well healed scar  Atrophy in calf/quad on L    No TTP L HS     Range of Motion (extension/neutral/flexion):     Right Left   Knee ROM: 8-0-125/130 deg P:8-0-130 deg  A:8-0-125 deg      Strength: (Biodex machine)    Right Left Comment   Knee Extension: 309.9 lbs (pre-surgical) 259.1 lbs    16.3% deficit    Knee Flexion: 192.8 lbs 169.6 lbs     12%  deficit    Hip Abduction: 4+/5 4-/5       Running mechanics normalized at sprint and mild compensations with deceleration if quick  L hip ER limited 25% compared to R: he states chronic issue    Mid quad girth: 18 cm proximal Patella  - R= 77 cm  - L= 72.5 cm    Treatment     Billy received the treatments listed below:      therapeutic exercises to develop strength, endurance, ROM, flexibility, posture, and core stabilization for 57 minutes including:  Education/assessment  Manual stretching hips/LE's  Heel prop x 5 min 10 lbs above/below  LAQ seated 3 x 6 125 lbs  Seated HS curl 125 lbs 3 x 6  Biodex testing    manual therapy techniques: Joint mobilizations and Soft tissue Mobilization were applied for  10 minutes, including:  STM to L lateral distal mid belly HS with and without movement- gentle  PFJ gr 3-4 glides all planes  Tibial AP glide gr 3-4 with and without rotation bias on and off heel prop  Knee hyperextension gr 3-4  Fat pad mobility  Scar mobilization    neuromuscular re-education activities to improve: Coordination, Kinesthetic, Sense, and Proprioception for 3 minutes. The following activities were included:  Quad set into hyperextension x 20    Therapeutic activities to improve functional performance for 30 minutes, including:  SKTC, quad pull, HS scoop, HS march, high knee march, walking butt kicks, lunge, lateral lunge, band walks, monster walks, jogging x 4 laps  Upright bike x 12 min lv 5->10      Patient Education and Home Exercises     Home Exercises Provided and Patient Education Provided     Education provided:   - HEP update, precautions, activity pacing, goals, timeframes    Written Home Exercises Provided: yes. Exercises were reviewed and Billy was able to demonstrate them prior to the end of the session.  Billy demonstrated good  understanding of the education provided. See EMR under Patient Instructions for exercises provided during therapy sessions.     ASSESSMENT     Billy reports  no issues after sprinting and agility yesterday other than mild ankle soreness. Biodex testing went well today. He displayed a 7.2% deficit today LSI; however compared to pre-op uninvolved strength he is at a 16.3% deficit in knee extension strength.  Pt reported/demonstrated no adverse response or exacerbation of symptoms during today's session.      Billy Is progressing well towards his goals.   Pt prognosis is Good.     Pt will continue to benefit from skilled outpatient physical therapy to address the deficits listed in the problem list box on initial evaluation, provide pt/family education and to maximize pt's level of independence in the home and community environment.     Pt's spiritual, cultural and educational needs considered and pt agreeable to plan of care and goals.     Anticipated barriers to physical therapy: none    Goals:  Short Term Goals: 8-10 weeks   1. Pt will be compliant with HEP 50% of prescribed amount. MET  2. The pt to demo improvement in left knee ROM to equal right knee ROM pain free MET  3.  The pt to demo good quad set with proper hyperextension moment of the Left knee MET  4. The pt to demo ambualting with elast restricitve AD witout major compensatory pattern left knee for at least 20 feet MET     Long Term Goals: 56 weeks (progressing, not met)   Pt will be compliant with % of prescribed amount.   The pt to demo pain free and uncompensated running mechanics x10 min on an indoor treadmill MET  The pt to demo tolerance to a squat at or bellow parallel with uncompensated mechanics x10 MET  The pt to demo strength of L LE within 10% of R LE as demo'd on the biodex machine   The pt to demo a deficit of 10% or less on a triple hop, single leg broad jump and crossover hop compared to non operative LE.   The pt will report full participation in ADLs and IADLs without restrictions related to L  knee.     PLAN     Run tomorrow with some directional changes. Continue strengthening next  week.     Manjit Lazcano, PT, DPT

## 2023-03-31 ENCOUNTER — DOCUMENTATION ONLY (OUTPATIENT)
Dept: REHABILITATION | Facility: HOSPITAL | Age: 28
End: 2023-03-31
Payer: COMMERCIAL

## 2023-03-31 NOTE — PROGRESS NOTES
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 We did step 4 of stage 3 sprinting program after dynamic warm up and ladder drills. We then went into z drills for cutting and directional changes without issues or reported symptoms.

## 2023-04-03 ENCOUNTER — CLINICAL SUPPORT (OUTPATIENT)
Dept: REHABILITATION | Facility: HOSPITAL | Age: 28
End: 2023-04-03
Payer: COMMERCIAL

## 2023-04-03 DIAGNOSIS — M25.662 DECREASED RANGE OF MOTION OF LEFT KNEE: ICD-10-CM

## 2023-04-03 DIAGNOSIS — M25.562 ACUTE PAIN OF LEFT KNEE: Primary | ICD-10-CM

## 2023-04-03 DIAGNOSIS — R26.2 DIFFICULTY WALKING: ICD-10-CM

## 2023-04-03 DIAGNOSIS — M62.81 QUADRICEPS WEAKNESS: ICD-10-CM

## 2023-04-03 PROCEDURE — 97112 NEUROMUSCULAR REEDUCATION: CPT

## 2023-04-03 PROCEDURE — 97530 THERAPEUTIC ACTIVITIES: CPT

## 2023-04-03 PROCEDURE — 97110 THERAPEUTIC EXERCISES: CPT

## 2023-04-03 PROCEDURE — 97140 MANUAL THERAPY 1/> REGIONS: CPT

## 2023-04-03 NOTE — PROGRESS NOTES
[facility]   Physical Therapy Treatment Note     Name: Billy Holbrook  [facility] Number: [id number]    Therapy Diagnosis:   Encounter Diagnoses   Name Primary?    Acute pain of left knee Yes    Decreased range of motion of left knee     Quadriceps weakness     Difficulty walking        Physician: PEACE Dempsey MD    Visit Date: 4/3/2023    Physician Orders: PT Eval and Treat   Medical Diagnosis from Referral: M25.562 (ICD-10-CM) - Acute pain of left knee  Evaluation Date: 9/7/2022  Authorization Period Expiration: 3/12/2024  Plan of Care Expiration: 12/31/2023  Visit # / Visits authorized: 127 total 24/40     Precautions: Standard     Time In: 11:00 am  Time Out: 12:30  Total Billable Time: 90 minutes    Procedure: 9/20/2022  1. Left Knee Arthroscopy, anterior cruciate ligament reconstruction [id number]  2.  Left Knee Arthroscopy, with meniscectomy (medial OR lateral) 05511  3.  Left Knee Arthroscopy, debridement/shaving of articular cartilage (chondroplasty) 28218  4.  Left Knee  platelet rich plasma injection to the bone-patellar tendon-bone harvest site    Post-Op Plan:  ACL reconstruction with a bone-patellar tendon-bone autograft    SUBJECTIVE     Pt reports: Knee feels great. No symptoms.     He was compliant with home exercise program.  Response to previous treatment: no issues; mild soreness  Functional change: able to sprint straight line    Pain: 0/10   Location: left knee     OBJECTIVE     6 mo 14 day post-op    Wt. 327 lbs    Observation:   no effusion noted L knee with sweep test  Well healed scar  Atrophy in calf/quad on L    No TTP L HS     Range of Motion (extension/neutral/flexion):     Right Left   Knee ROM: 8-0-125/130 deg P:8-0-130 deg  A:8-0-125 deg      Strength: (Biodex machine)    Right Left Comment   Knee Extension: 309.9 lbs (pre-surgical) 259.1 lbs    16.3% deficit    Knee Flexion: 192.8 lbs 169.6 lbs     12% deficit    Hip Abduction: 4+/5 4-/5       Running  mechanics normalized at sprint and mild compensations with deceleration if quick  L hip ER limited 25% compared to R: he states chronic issue    Mid quad girth: 18 cm proximal Patella  - R= 77 cm  - L= 72.5 cm    Treatment     Billy received the treatments listed below:      therapeutic exercises to develop strength, endurance, ROM, flexibility, posture, and core stabilization for 30 minutes including:  Education/assessment  Manual stretching hips/LE's  Heel prop x 5 min 10 lbs above/below  Eccentric HS from SL glute bridge 3 x 6 x 5 sec  Standing TKE heavy black sport band 3 x 20    manual therapy techniques: Joint mobilizations and Soft tissue Mobilization were applied for  10 minutes, including:  STM to L lateral distal mid belly HS with and without movement- gentle  PFJ gr 3-4 glides all planes  Tibial AP glide gr 3-4 with and without rotation bias on and off heel prop  Knee hyperextension gr 3-4  Fat pad mobility  Scar mobilization    neuromuscular re-education activities to improve: Coordination, Kinesthetic, Sense, and Proprioception for 10 minutes. The following activities were included:  Quad set into hyperextension x 20  Kneeling around the [place] 38 lb KB 3 x 40 sec alternating directions    Therapeutic activities to improve functional performance for 40 minutes, including:  SKTC, quad pull, HS scoop, HS march, high knee march, walking butt kicks, lunge, lateral lunge, band walks, monster walks, jogging x 4 laps  Agility ladder drills  DL 20 inch box jumps 4 x 8 reps  Split squat front foot elevated 2 inch 5 x 5 linus 0-90 lbs added safety bar  Inclined bench 5 x 8->3 135->245 lbs BB      Patient Education and Home Exercises     Home Exercises Provided and Patient Education Provided     Education provided:   - HEP update, precautions, activity pacing, goals, timeframes    Written Home Exercises Provided: yes. Exercises were reviewed and Billy was able to demonstrate them prior to the end of the session.   Billy demonstrated good  understanding of the education provided. See EMR under Patient Instructions for exercises provided during therapy sessions.     ASSESSMENT     Billy is doing well overall. Dr. Castaneda checked his knee in session today and discussed his POC. He is receiving interest from other teams and may be signed/would leave for that location to continue rehab. He will let us know if that is the case. Progressing DL power plyometrics today; focused on landing mechanics. Pt reported/demonstrated no adverse response or exacerbation of symptoms during today's session.      Billy Is progressing well towards his goals.   Pt prognosis is Good.     Pt will continue to benefit from skilled outpatient physical therapy to address the deficits listed in the problem list box on initial evaluation, provide pt/family education and to maximize pt's level of independence in the home and community environment.     Pt's spiritual, cultural and educational needs considered and pt agreeable to plan of care and goals.     Anticipated barriers to physical therapy: none    Goals:  Short Term Goals: 8-10 weeks   1. Pt will be compliant with HEP 50% of prescribed amount. MET  2. The pt to demo improvement in left knee ROM to equal right knee ROM pain free MET  3.  The pt to demo good quad set with proper hyperextension moment of the Left knee MET  4. The pt to demo ambualting with elast restricitve AD witout major compensatory pattern left knee for at least 20 feet MET     Long Term Goals: 56 weeks (progressing, not met)   Pt will be compliant with % of prescribed amount.   The pt to demo pain free and uncompensated running mechanics x10 min on an indoor treadmill MET  The pt to demo tolerance to a squat at or bellow parallel with uncompensated mechanics x10 MET  The pt to demo strength of L LE within 10% of R LE as demo'd on the biodex machine   The pt to demo a deficit of 10% or less on a triple hop, single leg broad  jump and crossover hop compared to non operative LE.   The pt will report full participation in ADLs and IADLs without restrictions related to L  knee.     PLAN     Run tomorrow with agility/conditioning. Continue strengthening.     Manjit Lazcano, PT, DPT

## 2023-04-04 ENCOUNTER — CLINICAL SUPPORT (OUTPATIENT)
Dept: REHABILITATION | Facility: HOSPITAL | Age: 28
End: 2023-04-04
Payer: COMMERCIAL

## 2023-04-04 DIAGNOSIS — M25.662 DECREASED RANGE OF MOTION OF LEFT KNEE: ICD-10-CM

## 2023-04-04 DIAGNOSIS — R26.2 DIFFICULTY WALKING: ICD-10-CM

## 2023-04-04 DIAGNOSIS — M62.81 QUADRICEPS WEAKNESS: ICD-10-CM

## 2023-04-04 DIAGNOSIS — M25.562 ACUTE PAIN OF LEFT KNEE: Primary | ICD-10-CM

## 2023-04-04 PROCEDURE — 97530 THERAPEUTIC ACTIVITIES: CPT

## 2023-04-04 PROCEDURE — 97140 MANUAL THERAPY 1/> REGIONS: CPT

## 2023-04-04 PROCEDURE — 97110 THERAPEUTIC EXERCISES: CPT

## 2023-04-04 NOTE — PROGRESS NOTES
[facility]   Physical Therapy Treatment Note     Name: Billy Holbrook  [facility] Number: [id number]    Therapy Diagnosis:   Encounter Diagnoses   Name Primary?    Acute pain of left knee Yes    Decreased range of motion of left knee     Quadriceps weakness     Difficulty walking        Physician: PEACE Dempsey MD    Visit Date: 4/4/2023    Physician Orders: PT Eval and Treat   Medical Diagnosis from Referral: M25.562 (ICD-10-CM) - Acute pain of left knee  Evaluation Date: 9/7/2022  Authorization Period Expiration: 3/12/2024  Plan of Care Expiration: 12/31/2023  Visit # / Visits authorized: 128 total 25/40     Precautions: Standard     Time In: 11:00 am  Time Out: 12:30  Total Billable Time: 90 minutes    Procedure: 9/20/2022  1. Left Knee Arthroscopy, anterior cruciate ligament reconstruction 69779  2.  Left Knee Arthroscopy, with meniscectomy (medial OR lateral) 31411  3.  Left Knee Arthroscopy, debridement/shaving of articular cartilage (chondroplasty) 35123  4.  Left Knee  platelet rich plasma injection to the bone-patellar tendon-bone harvest site    Post-Op Plan:  ACL reconstruction with a bone-patellar tendon-bone autograft    SUBJECTIVE     Pt reports: Knee feels great. No symptoms.     He was compliant with home exercise program.  Response to previous treatment: no issues; mild soreness  Functional change: jumping is improved    Pain: 0/10   Location: left knee     OBJECTIVE     6 mo 15 day post-op    Wt. 327 lbs    Observation:   no effusion noted L knee with sweep test  Well healed scar  Atrophy in calf/quad on L    No TTP L HS     Range of Motion (extension/neutral/flexion):     Right Left   Knee ROM: 8-0-125/130 deg P:8-0-130 deg  A:8-0-125 deg      Strength: (Biodex machine)    Right Left Comment   Knee Extension: 309.9 lbs (pre-surgical) 259.1 lbs    16.3% deficit    Knee Flexion: 192.8 lbs 169.6 lbs     12% deficit    Hip Abduction: 4+/5 4-/5       Running mechanics  normalized at sprint and mild compensations with deceleration if quick  L hip ER limited 25% compared to R: he states chronic issue    Mid quad girth: 18 cm proximal Patella  - R= 77 cm  - L= 72.5 cm    Treatment     Billy received the treatments listed below:      therapeutic exercises to develop strength, endurance, ROM, flexibility, posture, and core stabilization for 25 minutes including:  Education/assessment  Manual stretching hips/LE's  Heel prop x 5 min 10 lbs above/below  Stretching adductors, glutes, calf's, HS's, quads  SL leg press 5 x 8>5 240->360 lbs linus    manual therapy techniques: Joint mobilizations and Soft tissue Mobilization were applied for  10 minutes, including:  STM to L lateral distal mid belly HS with and without movement- gentle  PFJ gr 3-4 glides all planes  Tibial AP glide gr 3-4 with and without rotation bias on and off heel prop  Knee hyperextension gr 3-4  Fat pad mobility  Scar mobilization    neuromuscular re-education activities to improve: Coordination, Kinesthetic, Sense, and Proprioception for 2 minutes. The following activities were included:  Quad set into hyperextension x 20    Therapeutic activities to improve functional performance for 43 minutes, including:  SKTC, quad pull, HS scoop, HS march, high knee march, walking butt kicks, lunge, lateral lunge, band walks, monster walks, jogging x 4 laps  DL broad jumps x 15 reps: landing on R>L; approx 7.5-8 ft distance  Landmine RDL single leg 4 x 8 linus 25->75 lbs  Sled push 4 x 40 yds 90->225 lbs added  Jerome ropes 4 x 20 sec in isometric squat      Patient Education and Home Exercises     Home Exercises Provided and Patient Education Provided     Education provided:   - HEP update, precautions, activity pacing, goals, timeframes    Written Home Exercises Provided: yes. Exercises were reviewed and Billy was able to demonstrate them prior to the end of the session.  Billy demonstrated good  understanding of the education  provided. See EMR under Patient Instructions for exercises provided during therapy sessions.     ASSESSMENT     Billy did well with session focused on plyometrics and strength. Noted a painless click without instability with landing from DL broad jump on 2 occasions. ROM is full. Working on evening out landing and mechanics for plyometrics. Pt reported/demonstrated no adverse response or exacerbation of symptoms during today's session.      Billy Is progressing well towards his goals.   Pt prognosis is Good.     Pt will continue to benefit from skilled outpatient physical therapy to address the deficits listed in the problem list box on initial evaluation, provide pt/family education and to maximize pt's level of independence in the home and community environment.     Pt's spiritual, cultural and educational needs considered and pt agreeable to plan of care and goals.     Anticipated barriers to physical therapy: none    Goals:  Short Term Goals: 8-10 weeks   1. Pt will be compliant with HEP 50% of prescribed amount. MET  2. The pt to demo improvement in left knee ROM to equal right knee ROM pain free MET  3.  The pt to demo good quad set with proper hyperextension moment of the Left knee MET  4. The pt to demo ambualting with elast restricitve AD witout major compensatory pattern left knee for at least 20 feet MET     Long Term Goals: 56 weeks (progressing, not met)   Pt will be compliant with % of prescribed amount.   The pt to demo pain free and uncompensated running mechanics x10 min on an indoor treadmill MET  The pt to demo tolerance to a squat at or bellow parallel with uncompensated mechanics x10 MET  The pt to demo strength of L LE within 10% of R LE as demo'd on the biodex machine   The pt to demo a deficit of 10% or less on a triple hop, single leg broad jump and crossover hop compared to non operative LE.   The pt will report full participation in ADLs and IADLs without restrictions related to L   knee.     PLAN     Run tomorrow with agility/conditioning. Continue strengthening.     Manjit Lazcano, PT, DPT

## 2023-04-05 ENCOUNTER — DOCUMENTATION ONLY (OUTPATIENT)
Dept: REHABILITATION | Facility: HOSPITAL | Age: 28
End: 2023-04-05
Payer: COMMERCIAL

## 2023-04-05 NOTE — PROGRESS NOTES
Billy did well with step 4 of stage 3 return to sprint program after a dynamic warm up, ladder drills, and self stretching. We did 4 T test drills after running. He reported significant fatigue today. It was high humidity today.

## 2023-04-06 ENCOUNTER — CLINICAL SUPPORT (OUTPATIENT)
Dept: REHABILITATION | Facility: HOSPITAL | Age: 28
End: 2023-04-06
Payer: COMMERCIAL

## 2023-04-06 DIAGNOSIS — R26.2 DIFFICULTY WALKING: ICD-10-CM

## 2023-04-06 DIAGNOSIS — M25.662 DECREASED RANGE OF MOTION OF LEFT KNEE: ICD-10-CM

## 2023-04-06 DIAGNOSIS — M62.81 QUADRICEPS WEAKNESS: ICD-10-CM

## 2023-04-06 DIAGNOSIS — M25.562 ACUTE PAIN OF LEFT KNEE: Primary | ICD-10-CM

## 2023-04-06 PROCEDURE — 97530 THERAPEUTIC ACTIVITIES: CPT

## 2023-04-06 PROCEDURE — 97140 MANUAL THERAPY 1/> REGIONS: CPT

## 2023-04-06 PROCEDURE — 97112 NEUROMUSCULAR REEDUCATION: CPT

## 2023-04-06 PROCEDURE — 97110 THERAPEUTIC EXERCISES: CPT

## 2023-04-06 NOTE — PROGRESS NOTES
[facility]   Physical Therapy Treatment Note     Name: Billy Holbrook  [facility] Number: [id number]    Therapy Diagnosis:   Encounter Diagnoses   Name Primary?    Acute pain of left knee Yes    Decreased range of motion of left knee     Quadriceps weakness     Difficulty walking        Physician: PEACE Dempsey MD    Visit Date: 4/6/2023    Physician Orders: PT Eval and Treat   Medical Diagnosis from Referral: M25.562 (ICD-10-CM) - Acute pain of left knee  Evaluation Date: 9/7/2022  Authorization Period Expiration: 3/12/2024  Plan of Care Expiration: 12/31/2023  Visit # / Visits authorized: 129 total 26/40     Precautions: Standard     Time In: 11:20 am  Time Out: 12:50  Total Billable Time: 90 minutes    Procedure: 9/20/2022  1. Left Knee Arthroscopy, anterior cruciate ligament reconstruction 64061  2.  Left Knee Arthroscopy, with meniscectomy (medial OR lateral) 59617  3.  Left Knee Arthroscopy, debridement/shaving of articular cartilage (chondroplasty) 24189  4.  Left Knee  platelet rich plasma injection to the bone-patellar tendon-bone harvest site    Post-Op Plan:  ACL reconstruction with a bone-patellar tendon-bone autograft    SUBJECTIVE     Pt reports: Knee feels sore today and a little stiff. Yesterday wore him out running. He wishes to keep today somewhat light. Felt good and even better at exit.     He was compliant with home exercise program.  Response to previous treatment: no issues; mild soreness  Functional change: jumping is improved    Pain: 0/10   Location: left knee     OBJECTIVE     6 mo 17 day post-op    Wt. 327 lbs    Observation:   no effusion noted L knee with sweep test  Well healed scar  Atrophy in calf/quad on L    No TTP L HS     Range of Motion (extension/neutral/flexion):     Right Left   Knee ROM: 8-0-125/130 deg P:8-0-130 deg  A:8-0-125 deg      Strength: (Biodex machine)    Right Left Comment   Knee Extension: 309.9 lbs (pre-surgical) 259.1 lbs    16.3%  deficit    Knee Flexion: 192.8 lbs 169.6 lbs     12% deficit    Hip Abduction: 4+/5 4-/5       Running mechanics normalized at sprint and mild compensations with deceleration if quick  L hip ER limited 25% compared to R: he states chronic issue    Mid quad girth: 18 cm proximal Patella  - R= 77 cm  - L= 72.5 cm    Treatment     Billy received the treatments listed below:      therapeutic exercises to develop strength, endurance, ROM, flexibility, posture, and core stabilization for 30 minutes including:  Education/assessment  Manual stretching hips/LE's  Heel prop x 5 min 10 lbs above/below  Stretching adductors, glutes, calf's, HS's, quads  Knee extension isometrics 8 x 30 sec at 60 deg    manual therapy techniques: Joint mobilizations and Soft tissue Mobilization were applied for  12 minutes, including:  STM to L lateral distal mid belly HS with and without movement- gentle  PFJ gr 3-4 glides all planes  Tibial AP glide gr 3-4 with and without rotation bias on and off heel prop  Knee hyperextension gr 3-4  Fat pad mobility  Scar mobilization    neuromuscular re-education activities to improve: Coordination, Kinesthetic, Sense, and Proprioception for 8 minutes. The following activities were included:  Quad set into hyperextension x 20  14 lb med ball sit up throws 3 x 15    Therapeutic activities to improve functional performance for 40 minutes, including:  SKTC, quad pull, HS scoop, HS march, high knee march, walking butt kicks, lunge, lateral lunge, band walks, monster walks, jogging x 4 laps  RESS hold at 45 deg with med ball slams 3 x 20 10 lbs  Lateral lunge 62lb KB 3 x 8 linus  12 inch lateral heel taps x 10 linus TRX strap; then 3 x 8 linus      Patient Education and Home Exercises     Home Exercises Provided and Patient Education Provided     Education provided:   - HEP update, precautions, activity pacing, goals, timeframes    Written Home Exercises Provided: yes. Exercises were reviewed and Billy was able to  demonstrate them prior to the end of the session.  Billy demonstrated good  understanding of the education provided. See EMR under Patient Instructions for exercises provided during therapy sessions.     ASSESSMENT     Billy arrived feeling sore from yesterdays running and agility. Kept session lighter and he felt better at exit. Pt reported/demonstrated no adverse response or exacerbation of symptoms during today's session.      Billy Is progressing well towards his goals.   Pt prognosis is Good.     Pt will continue to benefit from skilled outpatient physical therapy to address the deficits listed in the problem list box on initial evaluation, provide pt/family education and to maximize pt's level of independence in the home and community environment.     Pt's spiritual, cultural and educational needs considered and pt agreeable to plan of care and goals.     Anticipated barriers to physical therapy: none    Goals:  Short Term Goals: 8-10 weeks   1. Pt will be compliant with HEP 50% of prescribed amount. MET  2. The pt to demo improvement in left knee ROM to equal right knee ROM pain free MET  3.  The pt to demo good quad set with proper hyperextension moment of the Left knee MET  4. The pt to demo ambualting with elast restricitve AD witout major compensatory pattern left knee for at least 20 feet MET     Long Term Goals: 56 weeks (progressing, not met)   Pt will be compliant with % of prescribed amount.   The pt to demo pain free and uncompensated running mechanics x10 min on an indoor treadmill MET  The pt to demo tolerance to a squat at or bellow parallel with uncompensated mechanics x10 MET  The pt to demo strength of L LE within 10% of R LE as demo'd on the biodex machine   The pt to demo a deficit of 10% or less on a triple hop, single leg broad jump and crossover hop compared to non operative LE.   The pt will report full participation in ADLs and IADLs without restrictions related to L  knee.      PLAN     Run tomorrow with agility/conditioning. Continue strengthening next week.     Manjit Lazcano, PT, DPT

## 2023-04-07 ENCOUNTER — DOCUMENTATION ONLY (OUTPATIENT)
Dept: REHABILITATION | Facility: HOSPITAL | Age: 28
End: 2023-04-07
Payer: COMMERCIAL

## 2023-04-07 NOTE — PROGRESS NOTES
Billy came in for agility work today. He went trough dynamic warm up including skips, agility ladder, 20 yd runs x 5 min 10 sec rest between reps (approx 3 sec runs). L drill x 3 each way. Koi drills x 5 each side. Manual stretching for knee/hip. No issues reported or noted. Confidence still improving.

## 2023-04-11 ENCOUNTER — CLINICAL SUPPORT (OUTPATIENT)
Dept: REHABILITATION | Facility: HOSPITAL | Age: 28
End: 2023-04-11
Payer: COMMERCIAL

## 2023-04-11 DIAGNOSIS — M25.662 DECREASED RANGE OF MOTION OF LEFT KNEE: ICD-10-CM

## 2023-04-11 DIAGNOSIS — M62.81 QUADRICEPS WEAKNESS: ICD-10-CM

## 2023-04-11 DIAGNOSIS — R26.2 DIFFICULTY WALKING: ICD-10-CM

## 2023-04-11 DIAGNOSIS — M25.562 ACUTE PAIN OF LEFT KNEE: Primary | ICD-10-CM

## 2023-04-11 PROCEDURE — 97110 THERAPEUTIC EXERCISES: CPT

## 2023-04-11 PROCEDURE — 97140 MANUAL THERAPY 1/> REGIONS: CPT

## 2023-04-11 PROCEDURE — 97112 NEUROMUSCULAR REEDUCATION: CPT

## 2023-04-11 PROCEDURE — 97530 THERAPEUTIC ACTIVITIES: CPT

## 2023-04-12 ENCOUNTER — CLINICAL SUPPORT (OUTPATIENT)
Dept: REHABILITATION | Facility: HOSPITAL | Age: 28
End: 2023-04-12
Payer: COMMERCIAL

## 2023-04-12 DIAGNOSIS — M25.662 DECREASED RANGE OF MOTION OF LEFT KNEE: ICD-10-CM

## 2023-04-12 DIAGNOSIS — M62.81 QUADRICEPS WEAKNESS: ICD-10-CM

## 2023-04-12 DIAGNOSIS — M25.562 ACUTE PAIN OF LEFT KNEE: Primary | ICD-10-CM

## 2023-04-12 DIAGNOSIS — R26.2 DIFFICULTY WALKING: ICD-10-CM

## 2023-04-12 PROCEDURE — 97110 THERAPEUTIC EXERCISES: CPT

## 2023-04-12 PROCEDURE — 97530 THERAPEUTIC ACTIVITIES: CPT

## 2023-04-12 PROCEDURE — 97140 MANUAL THERAPY 1/> REGIONS: CPT

## 2023-04-12 NOTE — PROGRESS NOTES
[facility]   Physical Therapy Treatment Note     Name: Billy Holbrook  [facility] Number: [id number]    Therapy Diagnosis:   Encounter Diagnoses   Name Primary?    Acute pain of left knee Yes    Decreased range of motion of left knee     Quadriceps weakness     Difficulty walking        Physician: No ref. provider found    Visit Date: 4/12/2023    Physician Orders: PT Eval and Treat   Medical Diagnosis from Referral: M25.562 (ICD-10-CM) - Acute pain of left knee  Evaluation Date: 9/7/2022  Authorization Period Expiration: 3/12/2024  Plan of Care Expiration: 12/31/2023  Visit # / Visits authorized: 130 total 27/40     Precautions: Standard     Time In: 7:00 am  Time Out: 8:15 am  Total Billable Time: 75 minutes    Procedure: 9/20/2022  1. Left Knee Arthroscopy, anterior cruciate ligament reconstruction 00277  2.  Left Knee Arthroscopy, with meniscectomy (medial OR lateral) 43572  3.  Left Knee Arthroscopy, debridement/shaving of articular cartilage (chondroplasty) 84562  4.  Left Knee  platelet rich plasma injection to the bone-patellar tendon-bone harvest site    Post-Op Plan:  ACL reconstruction with a bone-patellar tendon-bone autograft    SUBJECTIVE     Pt reports: Knee feels good. Ready to perform TM interval training.     He was compliant with home exercise program.  Response to previous treatment: no issues; mild soreness  Functional change: jumping is improved    Pain: 0/10   Location: left knee     OBJECTIVE     6 mo 23 day post-op     Wt. 320 lbs    Observation:   no effusion noted L knee with sweep test  Well healed scar  Atrophy in calf/quad on L    No TTP L HS     Range of Motion (extension/neutral/flexion):     Right Left   Knee ROM: 8-0-125/130 deg P:8-0-130 deg  A:8-0-125 deg      Strength: (Biodex machine)    Right Left Comment   Knee Extension: 309.9 lbs (pre-surgical) 259.1 lbs    16.3% deficit    Knee Flexion: 192.8 lbs 169.6 lbs     12% deficit    Hip Abduction: 4+/5 4-/5        Running mechanics normalized at sprint and mild compensations with deceleration if quick  L hip ER limited 25% compared to R: he states chronic issue    Mid quad girth: 18 cm proximal Patella  - R= 77 cm  - L= 72.5 cm    Treatment     Billy received the treatments listed below:      therapeutic exercises to develop strength, endurance, ROM, flexibility, posture, and core stabilization for 18 minutes including:  Education/assessment  Manual stretching hips/LE's  Heel prop x 5 min at start / end 10 lbs above/below  Stretching adductors, glutes, calf's, HS's, quads    NT  Knee extension 6 x 5 reps 95->155 lbs unilateral    manual therapy techniques: Joint mobilizations and Soft tissue Mobilization were applied for 12 minutes, including:    PFJ gr 3-4 glides all planes  Tibial AP glide gr 3-4 with and without rotation bias on and off heel prop  Knee hyperextension gr 3-4  Fat pad mobility      neuromuscular re-education activities to improve: Coordination, Kinesthetic, Sense, and Proprioception for 00 minutes. The following activities were included:    NT  Quad set into hyperextension x 20  Marching suitcase carry 50 lb KB 3 x 10 yds 5 sec pause high knee    Therapeutic activities to improve functional performance for 45 minutes, including:  SKTC, quad pull, HS scoop, HS march, high knee march, walking butt kicks, lunge, lateral lunge, band walks, monster walks, carioca > carioca w/ high knees, jogging x 4 laps  T-drills 6 rounds (c[place] x 3 / side shuffle x 3)  L drill x 3 (+1 warm up) (8.52s top time)  TM intervals (15s intervals w/ 45s rest) (up to 9mph on TM)  Turf intervals (15s intervals w/ 45s rest)    NT  Ladder drills- agility  Split squat FF elevated 2 inch safety bar 70->210 lbs 5 x 5->3 linus      Patient Education and Home Exercises     Home Exercises Provided and Patient Education Provided     Education provided:   - HEP update, precautions, activity pacing, goals, timeframes    Written Home  Exercises Provided: yes. Exercises were reviewed and Billy was able to demonstrate them prior to the end of the session.  Billy demonstrated good  understanding of the education provided. See EMR under Patient Instructions for exercises provided during therapy sessions.     ASSESSMENT     Billy did very well today with agility drills and interval sprint training. Pt required minimal cueing to stay low during lateral change of direction drills. No increase of sx were noted t/o session.      Billy Is progressing well towards his goals.   Pt prognosis is Good.     Pt will continue to benefit from skilled outpatient physical therapy to address the deficits listed in the problem list box on initial evaluation, provide pt/family education and to maximize pt's level of independence in the home and community environment.     Pt's spiritual, cultural and educational needs considered and pt agreeable to plan of care and goals.     Anticipated barriers to physical therapy: none    Goals:  Short Term Goals: 8-10 weeks   1. Pt will be compliant with HEP 50% of prescribed amount. MET  2. The pt to demo improvement in left knee ROM to equal right knee ROM pain free MET  3.  The pt to demo good quad set with proper hyperextension moment of the Left knee MET  4. The pt to demo ambualting with elast restricitve AD witout major compensatory pattern left knee for at least 20 feet MET     Long Term Goals: 56 weeks (progressing, not met)   Pt will be compliant with % of prescribed amount.   The pt to demo pain free and uncompensated running mechanics x10 min on an indoor treadmill MET  The pt to demo tolerance to a squat at or bellow parallel with uncompensated mechanics x10 MET  The pt to demo strength of L LE within 10% of R LE as demo'd on the biodex machine   The pt to demo a deficit of 10% or less on a triple hop, single leg broad jump and crossover hop compared to non operative LE.   The pt will report full participation  in ADLs and IADLs without restrictions related to L  knee.     PLAN     Run tomorrow with agility/conditioning. Continue strengthening next week.     Chase Castaneda, PT, DPT    Manjit Lazcano, PT, DPT

## 2023-04-12 NOTE — PROGRESS NOTES
[facility]   Physical Therapy Treatment Note     Name: Billy Holbrook  [facility] Number: [id number]    Therapy Diagnosis:   Encounter Diagnoses   Name Primary?    Acute pain of left knee Yes    Decreased range of motion of left knee     Quadriceps weakness     Difficulty walking        Physician: PEACE Dempsey MD    Visit Date: 4/11/2023    Physician Orders: PT Eval and Treat   Medical Diagnosis from Referral: M25.562 (ICD-10-CM) - Acute pain of left knee  Evaluation Date: 9/7/2022  Authorization Period Expiration: 3/12/2024  Plan of Care Expiration: 12/31/2023  Visit # / Visits authorized: 130 total 27/40     Precautions: Standard     Time In: 1:10 pm  Time Out: 2:25  Total Billable Time: 75 minutes    Procedure: 9/20/2022  1. Left Knee Arthroscopy, anterior cruciate ligament reconstruction 39717  2.  Left Knee Arthroscopy, with meniscectomy (medial OR lateral) 22306  3.  Left Knee Arthroscopy, debridement/shaving of articular cartilage (chondroplasty) 96436  4.  Left Knee  platelet rich plasma injection to the bone-patellar tendon-bone harvest site    Post-Op Plan:  ACL reconstruction with a bone-patellar tendon-bone autograft    SUBJECTIVE     Pt reports: Knee feels good. Ready to exercise to tolerance. Asking if he will be ready for on field drills by Mid May. He is starting meal prep and being more diligent with his diet.     He was compliant with home exercise program.  Response to previous treatment: no issues; mild soreness  Functional change: jumping is improved    Pain: 0/10   Location: left knee     OBJECTIVE     6 mo 23 day post-op     Wt. 320 lbs    Observation:   no effusion noted L knee with sweep test  Well healed scar  Atrophy in calf/quad on L    No TTP L HS     Range of Motion (extension/neutral/flexion):     Right Left   Knee ROM: 8-0-125/130 deg P:8-0-130 deg  A:8-0-125 deg      Strength: (Biodex machine)    Right Left Comment   Knee Extension: 309.9 lbs  (pre-surgical) 259.1 lbs    16.3% deficit    Knee Flexion: 192.8 lbs 169.6 lbs     12% deficit    Hip Abduction: 4+/5 4-/5       Running mechanics normalized at sprint and mild compensations with deceleration if quick  L hip ER limited 25% compared to R: he states chronic issue    Mid quad girth: 18 cm proximal Patella  - R= 77 cm  - L= 72.5 cm    Treatment     Billy received the treatments listed below:      therapeutic exercises to develop strength, endurance, ROM, flexibility, posture, and core stabilization for 23 minutes including:  Education/assessment  Manual stretching hips/LE's  Heel prop x 5 min 10 lbs above/below  Stretching adductors, glutes, calf's, HS's, quads  Knee extension 6 x 5 reps 95->155 lbs unilateral    manual therapy techniques: Joint mobilizations and Soft tissue Mobilization were applied for 12 minutes, including:  STM to L lateral distal mid belly HS with and without movement- gentle  PFJ gr 3-4 glides all planes  Tibial AP glide gr 3-4 with and without rotation bias on and off heel prop  Knee hyperextension gr 3-4  Fat pad mobility  Scar mobilization    neuromuscular re-education activities to improve: Coordination, Kinesthetic, Sense, and Proprioception for 10 minutes. The following activities were included:  Quad set into hyperextension x 20  Marching suitcase carry 50 lb KB 3 x 10 yds 5 sec pause high knee    Therapeutic activities to improve functional performance for 30 minutes, including:  SKTC, quad pull, HS scoop, HS march, high knee march, walking butt kicks, lunge, lateral lunge, band walks, monster walks, jogging x 4 laps  Ladder drills- agility  Split squat FF elevated 2 inch safety bar 70->210 lbs 5 x 5->3 linus      Patient Education and Home Exercises     Home Exercises Provided and Patient Education Provided     Education provided:   - HEP update, precautions, activity pacing, goals, timeframes    Written Home Exercises Provided: yes. Exercises were reviewed and Billy was  able to demonstrate them prior to the end of the session.  Billy demonstrated good  understanding of the education provided. See EMR under Patient Instructions for exercises provided during therapy sessions.     ASSESSMENT     Billy did very well today advancing load for strength with unilateral exercises. No issues noted. Cuing required for depth of split squat and control of hip/knee position. Pt reported/demonstrated no adverse response or exacerbation of symptoms during today's session.      Billy Is progressing well towards his goals.   Pt prognosis is Good.     Pt will continue to benefit from skilled outpatient physical therapy to address the deficits listed in the problem list box on initial evaluation, provide pt/family education and to maximize pt's level of independence in the home and community environment.     Pt's spiritual, cultural and educational needs considered and pt agreeable to plan of care and goals.     Anticipated barriers to physical therapy: none    Goals:  Short Term Goals: 8-10 weeks   1. Pt will be compliant with HEP 50% of prescribed amount. MET  2. The pt to demo improvement in left knee ROM to equal right knee ROM pain free MET  3.  The pt to demo good quad set with proper hyperextension moment of the Left knee MET  4. The pt to demo ambualting with elast restricitve AD witout major compensatory pattern left knee for at least 20 feet MET     Long Term Goals: 56 weeks (progressing, not met)   Pt will be compliant with % of prescribed amount.   The pt to demo pain free and uncompensated running mechanics x10 min on an indoor treadmill MET  The pt to demo tolerance to a squat at or bellow parallel with uncompensated mechanics x10 MET  The pt to demo strength of L LE within 10% of R LE as demo'd on the biodex machine   The pt to demo a deficit of 10% or less on a triple hop, single leg broad jump and crossover hop compared to non operative LE.   The pt will report full  participation in ADLs and IADLs without restrictions related to L  knee.     PLAN     Run tomorrow with agility/conditioning. Continue strengthening next week.     Manjit Lazcano, PT, DPT

## 2023-04-13 ENCOUNTER — CLINICAL SUPPORT (OUTPATIENT)
Dept: REHABILITATION | Facility: HOSPITAL | Age: 28
End: 2023-04-13
Payer: COMMERCIAL

## 2023-04-13 DIAGNOSIS — M25.662 DECREASED RANGE OF MOTION OF LEFT KNEE: ICD-10-CM

## 2023-04-13 DIAGNOSIS — R26.2 DIFFICULTY WALKING: ICD-10-CM

## 2023-04-13 DIAGNOSIS — M62.81 QUADRICEPS WEAKNESS: ICD-10-CM

## 2023-04-13 DIAGNOSIS — M25.562 ACUTE PAIN OF LEFT KNEE: Primary | ICD-10-CM

## 2023-04-13 PROCEDURE — 97530 THERAPEUTIC ACTIVITIES: CPT

## 2023-04-13 PROCEDURE — 97110 THERAPEUTIC EXERCISES: CPT

## 2023-04-13 PROCEDURE — 97140 MANUAL THERAPY 1/> REGIONS: CPT

## 2023-04-13 PROCEDURE — 97112 NEUROMUSCULAR REEDUCATION: CPT

## 2023-04-13 NOTE — PROGRESS NOTES
[facility]   Physical Therapy Treatment Note     Name: Billy Holbrook  [facility] Number: [id number]    Therapy Diagnosis:   Encounter Diagnoses   Name Primary?    Acute pain of left knee Yes    Decreased range of motion of left knee     Quadriceps weakness     Difficulty walking        Physician: PEACE Dempsey MD    Visit Date: 4/13/2023    Physician Orders: PT Eval and Treat   Medical Diagnosis from Referral: M25.562 (ICD-10-CM) - Acute pain of left knee  Evaluation Date: 9/7/2022  Authorization Period Expiration: 3/12/2024  Plan of Care Expiration: 12/31/2023  Visit # / Visits authorized: 132 total 28/40     Precautions: Standard     Time In: 11:00 am  Time Out: 12:18  Total Billable Time: 78 minutes    Procedure: 9/20/2022  1. Left Knee Arthroscopy, anterior cruciate ligament reconstruction 13692  2.  Left Knee Arthroscopy, with meniscectomy (medial OR lateral) 97360  3.  Left Knee Arthroscopy, debridement/shaving of articular cartilage (chondroplasty) 57897  4.  Left Knee  platelet rich plasma injection to the bone-patellar tendon-bone harvest site    Post-Op Plan:  ACL reconstruction with a bone-patellar tendon-bone autograft    SUBJECTIVE     Pt reports: Knee feels good. Jumping is feeling better and more natural. Losing weight with meal prepping still, feels good.     He was compliant with home exercise program.  Response to previous treatment: no issues; mild soreness  Functional change: jumping is improved    Pain: 0/10   Location: left knee     OBJECTIVE     6 mo 24 day post-op     Wt. 320 lbs    Observation:   no effusion noted L knee with sweep test  Well healed scar  Atrophy in calf/quad on L    No TTP L HS     Range of Motion (extension/neutral/flexion):     Right Left   Knee ROM: 8-0-125/130 deg P:8-0-130 deg  A:8-0-125 deg      Strength: (Biodex machine)    Right Left Comment   Knee Extension: 309.9 lbs (pre-surgical) 259.1 lbs    16.3% deficit    Knee Flexion: 192.8  lbs 169.6 lbs     12% deficit    Hip Abduction: 4+/5 4-/5       Running mechanics normalized at sprint and mild compensations with deceleration if quick  L hip ER limited 25% compared to R: he states chronic issue    Mid quad girth: 18 cm proximal Patella  - R= 77 cm  - L= 72.5 cm    Treatment     Billy received the treatments listed below:      therapeutic exercises to develop strength, endurance, ROM, flexibility, posture, and core stabilization for 17 minutes including:  Education/assessment  Heel prop x 5 min at start / end 10 lbs above/below  Stretching adductors, glutes, calf's, HS's, quads  Knee extension 6 x 5 reps 95->155 lbs unilateral-NP  Shuttle SL press 4 x 10 linus 287.5 lbs    manual therapy techniques: Joint mobilizations and Soft tissue Mobilization were applied for 8 minutes, including:  PFJ gr 3-4 glides all planes  Tibial AP glide gr 3-4 with and without rotation bias on and off heel prop  Knee hyperextension gr 3-4  Fat pad mobility      neuromuscular re-education activities to improve: Coordination, Kinesthetic, Sense, and Proprioception for 8 minutes. The following activities were included:  Quad set into hyperextension x 20  Landmine rotations in standing 4 x 20 25->35 lbs added    Therapeutic activities to improve functional performance for 45 minutes, including:  SKTC, quad pull, HS scoop, HS march, high knee march, jogging butt kicks, jogging high knees, lunge, lateral lunge, band walks, monster walks, carioca > carioca w/ high knees, A skips, B skips, jogging x 4 laps  BB flat bench 225->375 lbs 4 x 10->4 reps  Lateral heel taps 12 inch box 5 x 8 linus: 1 rd with TRX  Lunge press landmine 4 x 8 linus 25-35 lbs added  20 inch box jump x 15: cuing on landing mechanis  DL broad jumps x 15: cuing on landing mechanics      Patient Education and Home Exercises     Home Exercises Provided and Patient Education Provided     Education provided:   - HEP update, precautions, activity pacing, goals,  timeframes    Written Home Exercises Provided: yes. Exercises were reviewed and Billy was able to demonstrate them prior to the end of the session.  Billy demonstrated good  understanding of the education provided. See EMR under Patient Instructions for exercises provided during therapy sessions.     ASSESSMENT     Billy improved his jumping for both height and distance DL today. His landing mechanics improved dramatically with video feedback and cuing/demo. Strength training is progressing well. Pt reported/demonstrated no adverse response or exacerbation of symptoms during today's session.      Billy Is progressing well towards his goals.   Pt prognosis is Good.     Pt will continue to benefit from skilled outpatient physical therapy to address the deficits listed in the problem list box on initial evaluation, provide pt/family education and to maximize pt's level of independence in the home and community environment.     Pt's spiritual, cultural and educational needs considered and pt agreeable to plan of care and goals.     Anticipated barriers to physical therapy: none    Goals:  Short Term Goals: 8-10 weeks   1. Pt will be compliant with HEP 50% of prescribed amount. MET  2. The pt to demo improvement in left knee ROM to equal right knee ROM pain free MET  3.  The pt to demo good quad set with proper hyperextension moment of the Left knee MET  4. The pt to demo ambualting with elast restricitve AD witout major compensatory pattern left knee for at least 20 feet MET     Long Term Goals: 56 weeks (progressing, not met)   Pt will be compliant with % of prescribed amount.   The pt to demo pain free and uncompensated running mechanics x10 min on an indoor treadmill MET  The pt to demo tolerance to a squat at or bellow parallel with uncompensated mechanics x10 MET  The pt to demo strength of L LE within 10% of R LE as demo'd on the biodex machine   The pt to demo a deficit of 10% or less on a triple hop,  single leg broad jump and crossover hop compared to non operative LE.   The pt will report full participation in ADLs and IADLs without restrictions related to L  knee.     PLAN     Run tomorrow with agility/conditioning. Continue strengthening next week.     Manjit Lazcano, PT, DPT

## 2023-04-17 ENCOUNTER — CLINICAL SUPPORT (OUTPATIENT)
Dept: REHABILITATION | Facility: HOSPITAL | Age: 28
End: 2023-04-17
Payer: COMMERCIAL

## 2023-04-17 DIAGNOSIS — R26.2 DIFFICULTY WALKING: ICD-10-CM

## 2023-04-17 DIAGNOSIS — M62.81 QUADRICEPS WEAKNESS: ICD-10-CM

## 2023-04-17 DIAGNOSIS — M25.562 ACUTE PAIN OF LEFT KNEE: Primary | ICD-10-CM

## 2023-04-17 DIAGNOSIS — M25.662 DECREASED RANGE OF MOTION OF LEFT KNEE: ICD-10-CM

## 2023-04-17 PROCEDURE — 97530 THERAPEUTIC ACTIVITIES: CPT

## 2023-04-17 PROCEDURE — 97140 MANUAL THERAPY 1/> REGIONS: CPT

## 2023-04-17 PROCEDURE — 97110 THERAPEUTIC EXERCISES: CPT

## 2023-04-17 NOTE — PROGRESS NOTES
[facility]   Physical Therapy Treatment Note     Name: Billy Holbrook  [facility] Number: [id number]    Therapy Diagnosis:   No diagnosis found.      Physician: PEACE Dempsey MD    Visit Date: 4/17/2023    Physician Orders: PT Eval and Treat   Medical Diagnosis from Referral: M25.562 (ICD-10-CM) - Acute pain of left knee  Evaluation Date: 9/7/2022  Authorization Period Expiration: 3/12/2024  Plan of Care Expiration: 12/31/2023  Visit # / Visits authorized: 133 total 29/40     Precautions: Standard     Time In: 10:00 am  Time Out: 11:30  Total Billable Time: 78 minutes    Procedure: 9/20/2022  1. Left Knee Arthroscopy, anterior cruciate ligament reconstruction 40886  2.  Left Knee Arthroscopy, with meniscectomy (medial OR lateral) 56861  3.  Left Knee Arthroscopy, debridement/shaving of articular cartilage (chondroplasty) 20606  4.  Left Knee  platelet rich plasma injection to the bone-patellar tendon-bone harvest site    Post-Op Plan:  ACL reconstruction with a bone-patellar tendon-bone autograft    SUBJECTIVE     Pt reports: 4/17 - no pain at all, no swelling at all, no difficulty in the knee with agility and field work.    He was compliant with home exercise program.  Response to previous treatment: no issues; mild soreness  Functional change: jumping is improved    Pain: 0/10   Location: left knee     OBJECTIVE     7 Months post op    Wt. 315 lbs    Observation:   no effusion noted L knee with sweep test  Well healed scar  Atrophy in calf/quad on L    No TTP L HS     Range of Motion (extension/neutral/flexion):     Right Left   Knee ROM: 8-0-125/130 deg P:8-0-130 deg  A:8-0-125 deg      Strength: (Biodex machine)    Right Left Comment   Knee Extension: 309.9 lbs (pre-surgical) 259.1 lbs    16.3% deficit    Knee Flexion: 192.8 lbs 169.6 lbs     12% deficit    Hip Abduction: 4+/5 4-/5       Running mechanics normalized at sprint and mild compensations with deceleration if quick  L hip  "ER limited 25% compared to R: he states chronic issue    Mid quad girth: 18 cm proximal Patella  - R= 77 cm  - L= 72.5 cm    Treatment     Billy received the treatments listed below:      therapeutic exercises to develop strength, endurance, ROM, flexibility, posture, and core stabilization for 17 minutes includin/17 -     Education/assessment  Heel prop x 5 min at start / end 10 lbs above/below  Stretching adductors, glutes, calf's, HS's, quads      Knee extension 6 x 5 reps 95->155 lbs unilateral-NP  Shuttle SL press 4 x 10 linus 287.5 lbs-NP    manual therapy techniques: Joint mobilizations and Soft tissue Mobilization were applied for 8 minutes, includin/17 -   PFJ gr 3-4 glides all planes  Tibial AP glide gr 3-4 with and without rotation bias on and off heel prop  Knee hyperextension gr 3-4  Fat pad mobility      neuromuscular re-education activities to improve: Coordination, Kinesthetic, Sense, and Proprioception for 8 minutes. The following activities were included:      Therapeutic activities to improve functional performance for 60 minutes, includin/17 -     SKTC, quad pull, HS scoop, HS march, high knee march, jogging butt kicks, jogging high knees, lunge, lateral lunge, band walks, monster walks, carioca > carioca w/ high knees, A skips, B skips, jogging x 4 laps  2. TRX squats double -> Single 2/10  3. Step ups @14" -? 3x6 with KB 35lbs on last set  4. Walking lunges, large stride - 4 x 15 yards 26lbs  5. DB bench alternating arms on ball with bridge - 3 x 8 50/50/70 lbs  6. Single leg broad jump double leg land - 3 x 10 yards  7. Box lands - 20" - 15 reps -- cues for landing, soft knees,   8. Ham curls on ball - 3 x 10  9. LM rotations in lunge - 4 x 10       Patient Education and Home Exercises     Home Exercises Provided and Patient Education Provided     Education provided:   - HEP update, precautions, activity pacing, goals, timeframes    Written Home Exercises Provided: yes. " Exercises were reviewed and Billy was able to demonstrate them prior to the end of the session.  Billy demonstrated good  understanding of the education provided. See EMR under Patient Instructions for exercises provided during therapy sessions.     ASSESSMENT     4/17 -     Tolerated new exercises well today.  Cues needed for anterior tibia translation.  Landing with weight shifted to R leg, needs cues but able to correct some.  Still able to see on slo-motion video. Patient still needs to work on single leg transition with balance and movement pattern before we can start single leg progressive loading.    Billy Is progressing well towards his goals.   Pt prognosis is Good.     Pt will continue to benefit from skilled outpatient physical therapy to address the deficits listed in the problem list box on initial evaluation, provide pt/family education and to maximize pt's level of independence in the home and community environment.     Pt's spiritual, cultural and educational needs considered and pt agreeable to plan of care and goals.     Anticipated barriers to physical therapy: none    Goals:  Short Term Goals: 8-10 weeks   1. Pt will be compliant with HEP 50% of prescribed amount. MET  2. The pt to demo improvement in left knee ROM to equal right knee ROM pain free MET  3.  The pt to demo good quad set with proper hyperextension moment of the Left knee MET  4. The pt to demo ambualting with elast restricitve AD witout major compensatory pattern left knee for at least 20 feet MET     Long Term Goals: 56 weeks (progressing, not met)   Pt will be compliant with % of prescribed amount.   The pt to demo pain free and uncompensated running mechanics x10 min on an indoor treadmill MET  The pt to demo tolerance to a squat at or bellow parallel with uncompensated mechanics x10 MET  The pt to demo strength of L LE within 10% of R LE as demo'd on the biodex machine   The pt to demo a deficit of 10% or less on a  triple hop, single leg broad jump and crossover hop compared to non operative LE.   The pt will report full participation in ADLs and IADLs without restrictions related to L  knee.     PLAN     Add single leg training and progress into single leg jumping.  Run tomorrow with agility/conditioning. Continue strengthening next week.     Andres Jeffrey, PT, DPT

## 2023-04-20 ENCOUNTER — CLINICAL SUPPORT (OUTPATIENT)
Dept: REHABILITATION | Facility: HOSPITAL | Age: 28
End: 2023-04-20
Payer: COMMERCIAL

## 2023-04-20 DIAGNOSIS — R26.2 DIFFICULTY WALKING: ICD-10-CM

## 2023-04-20 DIAGNOSIS — M62.81 QUADRICEPS WEAKNESS: ICD-10-CM

## 2023-04-20 DIAGNOSIS — M25.662 DECREASED RANGE OF MOTION OF LEFT KNEE: ICD-10-CM

## 2023-04-20 DIAGNOSIS — M25.562 ACUTE PAIN OF LEFT KNEE: Primary | ICD-10-CM

## 2023-04-20 PROCEDURE — 97530 THERAPEUTIC ACTIVITIES: CPT

## 2023-04-20 PROCEDURE — 97110 THERAPEUTIC EXERCISES: CPT

## 2023-04-20 PROCEDURE — 97140 MANUAL THERAPY 1/> REGIONS: CPT

## 2023-04-24 ENCOUNTER — CLINICAL SUPPORT (OUTPATIENT)
Dept: REHABILITATION | Facility: HOSPITAL | Age: 28
End: 2023-04-24
Payer: COMMERCIAL

## 2023-04-24 DIAGNOSIS — M25.662 DECREASED RANGE OF MOTION OF LEFT KNEE: ICD-10-CM

## 2023-04-24 DIAGNOSIS — M25.562 ACUTE PAIN OF LEFT KNEE: Primary | ICD-10-CM

## 2023-04-24 DIAGNOSIS — M62.81 QUADRICEPS WEAKNESS: ICD-10-CM

## 2023-04-24 DIAGNOSIS — R26.2 DIFFICULTY WALKING: ICD-10-CM

## 2023-04-24 PROCEDURE — 97140 MANUAL THERAPY 1/> REGIONS: CPT

## 2023-04-24 PROCEDURE — 97530 THERAPEUTIC ACTIVITIES: CPT

## 2023-04-24 PROCEDURE — 97110 THERAPEUTIC EXERCISES: CPT

## 2023-04-24 NOTE — PROGRESS NOTES
[facility]   Physical Therapy Treatment Note     Name: Billy Holbrook  [facility] Number: [id number]    Therapy Diagnosis:   Encounter Diagnoses   Name Primary?    Acute pain of left knee Yes    Decreased range of motion of left knee     Quadriceps weakness     Difficulty walking          Physician: PEACE Dempsey MD    Visit Date: 4/24/2023    Physician Orders: PT Eval and Treat   Medical Diagnosis from Referral: M25.562 (ICD-10-CM) - Acute pain of left knee  Evaluation Date: 9/7/2022  Authorization Period Expiration: 3/12/2024  Plan of Care Expiration: 12/31/2023  Visit # / Visits authorized: 134 total 30/40     Precautions: Standard     Time In: 10:00 am  Time Out: 11:30  Total Billable Time: 78 minutes    Procedure: 9/20/2022  1. Left Knee Arthroscopy, anterior cruciate ligament reconstruction [id number]  2.  Left Knee Arthroscopy, with meniscectomy (medial OR lateral) 34417  3.  Left Knee Arthroscopy, debridement/shaving of articular cartilage (chondroplasty) 10677  4.  Left Knee  platelet rich plasma injection to the bone-patellar tendon-bone harvest site    Post-Op Plan:  ACL reconstruction with a bone-patellar tendon-bone autograft    SUBJECTIVE     Pt reports: 4/24 - patient is feeling good in the knee and has no swelling.  Had some soreness last week with the increase in the exercises.  He felt soreness in the muscles but not in the joint line.    He was compliant with home exercise program.  Response to previous treatment: no issues; mild soreness  Functional change: jumping is improved    Pain: 0/10   Location: left knee     OBJECTIVE     7 Months post op    Wt. 315 lbs    Observation:   no effusion noted L knee with sweep test  Well healed scar  Atrophy in calf/quad on L    No TTP L HS     Range of Motion (extension/neutral/flexion):     Right Left   Knee ROM: 8-0-125/130 deg P:8-0-130 deg  A:8-0-125 deg      Strength: ([employer] machine)    Right Left Comment   Knee Extension:  "309.9 lbs (pre-surgical) 259.1 lbs    16.3% deficit    Knee Flexion: 192.8 lbs 169.6 lbs     12% deficit    Hip Abduction: 4+/5 4-/5       Running mechanics normalized at sprint and mild compensations with deceleration if quick  L hip ER limited 25% compared to R: he states chronic issue    Mid quad girth: 18 cm proximal Patella  - R= 77 cm  - L= 72.5 cm    Treatment     Billy received the treatments listed below:      therapeutic exercises to develop strength, endurance, ROM, flexibility, posture, and core stabilization for 17 minutes includin/24  Education/assessment  Heel prop x 5 min at start / end 10 lbs above/below  Single (B) Knee extension 10 - 8 - 6 - 6 - 6   L - 95   R - 95    manual therapy techniques: Joint mobilizations and Soft tissue Mobilization were applied for 8 minutes, includin/24 -   PFJ gr 3-4 glides all planes  Tibial AP glide gr 3-4 with and without rotation bias on and off heel prop  Knee hyperextension gr 3-4  Fat pad mobility      neuromuscular re-education activities to improve: Coordination, Kinesthetic, Sense, and Proprioception for 8 minutes. The following activities were included:      Therapeutic activities to improve functional performance for 60 minutes, includin/24 -     SKTC, quad pull, HS scoop, HS march, high knee march, jogging butt kicks, jogging high knees, lunge, lateral lunge, band walks, monster walks, carioca > carioca w/ high knees, A skips, B skips, jogging x 4 laps  2. TRX squats double -> Single 2/10  3. B[place]udian 3x8 35, 53, 62  4. Nordic HS curl 3x8  5. SLS with MB twist throw - B - 3x5 each  6. Single leg box lands 12"  7. Single leg vertical jumps with double land - R = 13" ; L = 11"  8. Single leg hoping - 10 yards x 4 - L leg 7, 6, 6, 6, hops ; R leg 7, 6, 5, 5,   9. Single leg MB chest squat throw.    Patient Education and Home Exercises     Home Exercises Provided and Patient Education Provided     Education provided:   - HEP update, " precautions, activity pacing, goals, timeframes    Written Home Exercises Provided: yes. Exercises were reviewed and Billy was able to demonstrate them prior to the end of the session.  Billy demonstrated good  understanding of the education provided. See EMR under Patient Instructions for exercises provided during therapy sessions.     ASSESSMENT     4/17 -     Tolerated new exercises well today.  Cues needed for anterior tibia translation.  Landing with weight shifted to R leg, needs cues but able to correct some.  Still able to see on slo-motion video. Patient still needs to work on single leg transition with balance and movement pattern before we can start single leg progressive loading.    Billy Is progressing well towards his goals.   Pt prognosis is Good.     Pt will continue to benefit from skilled outpatient physical therapy to address the deficits listed in the problem list box on initial evaluation, provide pt/family education and to maximize pt's level of independence in the home and community environment.     Pt's spiritual, cultural and educational needs considered and pt agreeable to plan of care and goals.     Anticipated barriers to physical therapy: none    Goals:  Short Term Goals: 8-10 weeks   1. Pt will be compliant with HEP 50% of prescribed amount. MET  2. The pt to demo improvement in left knee ROM to equal right knee ROM pain free MET  3.  The pt to demo good quad set with proper hyperextension moment of the Left knee MET  4. The pt to demo ambualting with elast restricitve AD witout major compensatory pattern left knee for at least 20 feet MET     Long Term Goals: 56 weeks (progressing, not met)   Pt will be compliant with % of prescribed amount.   The pt to demo pain free and uncompensated running mechanics x10 min on an indoor treadmill MET  The pt to demo tolerance to a squat at or bellow parallel with uncompensated mechanics x10 MET  The pt to demo strength of L LE within 10%  of R LE as demo'd on the biodex machine   The pt to demo a deficit of 10% or less on a triple hop, single leg broad jump and crossover hop compared to non operative LE.   The pt will report full participation in ADLs and IADLs without restrictions related to L  knee.     PLAN     Add single leg training and progress into single leg jumping.  Run tomorrow with agility/conditioning. Continue strengthening next week.     Andres Jeffrey, PT, DPT

## 2023-04-25 ENCOUNTER — CLINICAL SUPPORT (OUTPATIENT)
Dept: REHABILITATION | Facility: HOSPITAL | Age: 28
End: 2023-04-25
Payer: COMMERCIAL

## 2023-04-25 DIAGNOSIS — M25.662 DECREASED RANGE OF MOTION OF LEFT KNEE: ICD-10-CM

## 2023-04-25 DIAGNOSIS — M25.562 ACUTE PAIN OF LEFT KNEE: Primary | ICD-10-CM

## 2023-04-25 DIAGNOSIS — M62.81 QUADRICEPS WEAKNESS: ICD-10-CM

## 2023-04-25 DIAGNOSIS — R26.2 DIFFICULTY WALKING: ICD-10-CM

## 2023-04-25 PROCEDURE — 97140 MANUAL THERAPY 1/> REGIONS: CPT

## 2023-04-25 PROCEDURE — 97530 THERAPEUTIC ACTIVITIES: CPT

## 2023-04-25 PROCEDURE — 97110 THERAPEUTIC EXERCISES: CPT

## 2023-04-25 NOTE — PROGRESS NOTES
[facility]   Physical Therapy Treatment Note     Name: Billy Holbrook  [facility] Number: [id number]    Therapy Diagnosis:   Encounter Diagnoses   Name Primary?    Acute pain of left knee Yes    Decreased range of motion of left knee     Quadriceps weakness     Difficulty walking          Physician: PEACE Dempsey MD    Visit Date: 4/25/2023    Physician Orders: PT Eval and Treat   Medical Diagnosis from Referral: M25.562 (ICD-10-CM) - Acute pain of left knee  Evaluation Date: 9/7/2022  Authorization Period Expiration: 3/12/2024  Plan of Care Expiration: 12/31/2023  Visit # / Visits authorized: 134 total 30/40     Precautions: Standard     Time In: 10:00 am  Time Out: 11:30  Total Billable Time: 78 minutes    Procedure: 9/20/2022  1. Left Knee Arthroscopy, anterior cruciate ligament reconstruction 74188  2.  Left Knee Arthroscopy, with meniscectomy (medial OR lateral) 32354  3.  Left Knee Arthroscopy, debridement/shaving of articular cartilage (chondroplasty) 98138  4.  Left Knee  platelet rich plasma injection to the bone-patellar tendon-bone harvest site    Post-Op Plan:  ACL reconstruction with a bone-patellar tendon-bone autograft    SUBJECTIVE     Pt reports: 4/25 - feels like the knee is doing well.  Was not too sore form workout yesterday.        He was compliant with home exercise program.  Response to previous treatment: no issues; mild soreness  Functional change: jumping is improved    Pain: 0/10   Location: left knee     OBJECTIVE     7 Months post op    Wt. 315 lbs    Observation:   no effusion noted L knee with sweep test  Well healed scar  Atrophy in calf/quad on L    No TTP L HS     Range of Motion (extension/neutral/flexion):     Right Left   Knee ROM: 8-0-125/130 deg P:8-0-130 deg  A:8-0-125 deg      Strength: (Biodex machine)    Right Left Comment   Knee Extension: 309.9 lbs (pre-surgical) 259.1 lbs    16.3% deficit    Knee Flexion: 192.8 lbs 169.6 lbs     12% deficit     Hip Abduction: 4+/5 4-/5       Running mechanics normalized at sprint and mild compensations with deceleration if quick  L hip ER limited 25% compared to R: he states chronic issue    Mid quad girth: 18 cm proximal Patella  - R= 77 cm  - L= 72.5 cm    Treatment     Billy received the treatments listed below:      therapeutic exercises to develop strength, endurance, ROM, flexibility, posture, and core stabilization for 17 minutes includin/24  Education/assessment  Heel prop x 5 min at start / end 10 lbs above/below  Single (B) Knee extension 10 - 8 - 6 - 6 - 6   L - 95   R - 95    manual therapy techniques: Joint mobilizations and Soft tissue Mobilization were applied for 8 minutes, includin/24 -   PFJ gr 3-4 glides all planes  Tibial AP glide gr 3-4 with and without rotation bias on and off heel prop  Knee hyperextension gr 3-4  Fat pad mobility      neuromuscular re-education activities to improve: Coordination, Kinesthetic, Sense, and Proprioception for 8 minutes. The following activities were included:      Therapeutic activities to improve functional performance for 60 minutes, includin/25 -     SKTC, quad pull, HS scoop, HS march, high knee march, jogging butt kicks, jogging high knees, lunge, lateral lunge, band walks, monster walks, carioca > carioca w/ high knees, A skips, B skips, jogging x 4 laps  2. 25 lb DB squat, RDL, March, DL  3. Walking lunges - 6 x 15 yards - 25lbs Dbs; 40 lbs dbs  4. Lateral lunges - stationary - 35 lb KB - 3x8  5. Box jumps 3x6  6. LM rotation in lunge position  7. Side shuffle walking with MB toss and reactive to blue vs. White cone.  4 x 30s with 6:1 work rest ratio  Patient Education and Home Exercises     Home Exercises Provided and Patient Education Provided     Education provided:   - HEP update, precautions, activity pacing, goals, timeframes    Written Home Exercises Provided: yes. Exercises were reviewed and Billy was able to demonstrate them  prior to the end of the session.  Billy demonstrated good  understanding of the education provided. See EMR under Patient Instructions for exercises provided during therapy sessions.     ASSESSMENT     4/25 -     Tolerated new exercises well, used half roll to give fb for knee on walking lunges - able to self correct on last set.  Stil lacks reactive balance on the L leg when challenged outside of midline.      Billy Is progressing well towards his goals.   Pt prognosis is Good.     Pt will continue to benefit from skilled outpatient physical therapy to address the deficits listed in the problem list box on initial evaluation, provide pt/family education and to maximize pt's level of independence in the home and community environment.     Pt's spiritual, cultural and educational needs considered and pt agreeable to plan of care and goals.     Anticipated barriers to physical therapy: none    Goals:  Short Term Goals: 8-10 weeks   1. Pt will be compliant with HEP 50% of prescribed amount. MET  2. The pt to demo improvement in left knee ROM to equal right knee ROM pain free MET  3.  The pt to demo good quad set with proper hyperextension moment of the Left knee MET  4. The pt to demo ambualting with elast restricitve AD witout major compensatory pattern left knee for at least 20 feet MET     Long Term Goals: 56 weeks (progressing, not met)   Pt will be compliant with % of prescribed amount.   The pt to demo pain free and uncompensated running mechanics x10 min on an indoor treadmill MET  The pt to demo tolerance to a squat at or bellow parallel with uncompensated mechanics x10 MET  The pt to demo strength of L LE within 10% of R LE as demo'd on the biodex machine   The pt to demo a deficit of 10% or less on a triple hop, single leg broad jump and crossover hop compared to non operative LE.   The pt will report full participation in ADLs and IADLs without restrictions related to L  knee.     PLAN     Add  single leg training and progress into single leg jumping.  Run tomorrow with agility/conditioning. Continue strengthening next week.  Continue with progressive strengthening and reactive NM retraining.    Andres Jeffrey, PT, DPT

## 2023-04-26 NOTE — PROGRESS NOTES
[facility]   Physical Therapy Treatment Note     Name: Billy Holbrook  [facility] Number: [id number]    Therapy Diagnosis:   Encounter Diagnoses   Name Primary?    Acute pain of left knee Yes    Decreased range of motion of left knee     Quadriceps weakness     Difficulty walking          Physician: PEACE Dempsey MD    Visit Date: 4/20/2023    Physician Orders: PT Eval and Treat   Medical Diagnosis from Referral: M25.562 (ICD-10-CM) - Acute pain of left knee  Evaluation Date: 9/7/2022  Authorization Period Expiration: 3/12/2024  Plan of Care Expiration: 12/31/2023  Visit # / Visits authorized: 133 total 31/40     Precautions: Standard     Time In: 11:00 am  Time Out: 12:30 PM   Total Billable Time: 90 minutes    Procedure: 9/20/2022  1. Left Knee Arthroscopy, anterior cruciate ligament reconstruction 21914  2.  Left Knee Arthroscopy, with meniscectomy (medial OR lateral) 37110  3.  Left Knee Arthroscopy, debridement/shaving of articular cartilage (chondroplasty) 53694  4.  Left Knee  platelet rich plasma injection to the bone-patellar tendon-bone harvest site    Post-Op Plan:  ACL reconstruction with a bone-patellar tendon-bone autograft    SUBJECTIVE     Pt reports: feels somewhat stiff and sore this morning, little bit in knee, mostly in quads and hamstrings.     He was compliant with home exercise program.  Response to previous treatment: no issues; mild soreness  Functional change: jumping is improved    Pain: 0/10   Location: left knee     OBJECTIVE     7 Months post op    Wt. 315 lbs    Observation:   no effusion noted L knee with sweep test  Well healed scar  Atrophy in calf/quad on L    No TTP L HS     Range of Motion (extension/neutral/flexion):     Right Left   Knee ROM: 8-0-125/130 deg P:8-0-130 deg  A:8-0-125 deg      Strength: (Biodex machine)    Right Left Comment   Knee Extension: 309.9 lbs (pre-surgical) 259.1 lbs    16.3% deficit    Knee Flexion: 192.8 lbs 169.6 lbs      "12% deficit    Hip Abduction: 4+/5 4-/5       Running mechanics normalized at sprint and mild compensations with deceleration if quick  L hip ER limited 25% compared to R: he states chronic issue    Mid quad girth: 18 cm proximal Patella  - R= 77 cm  - L= 72.5 cm    Treatment     Billy received the treatments listed below:      therapeutic exercises to develop strength, endurance, ROM, flexibility, posture, and core stabilization for 18 minutes including:    Education/assessment  Heel prop x 5 min at start / end 10 lbs above/below    Single Leg Knee Extension Drop Sets: 105-95-85, max reps, 1:00 rest between sets     manual therapy techniques: Joint mobilizations and Soft tissue Mobilization were applied for 12 minutes, including:    Stretching adductors, glutes, calf's, HS's, quads  PFJ gr 3-4 glides all planes  Tibial AP glide gr 3-4 with and without rotation bias on and off heel prop  Knee hyperextension gr 3-4  Fat pad mobility      neuromuscular re-education activities to improve: Coordination, Kinesthetic, Sense, and Proprioception for 0 minutes. The following activities were included:      Therapeutic activities to improve functional performance for 60 minutes, including:    RFESS 3 x 12 @ 35#   Double Leg Landings from 20" box x 15   Single Leg Drop Landings from 12" box x 30   Step Ups 20# box + 35# KB 3 x 8   Sport Cord Lateral Bounding 3 x 8 all directions   SKTC, quad pull, HS scoop, HS march, high knee march, jogging butt kicks, jogging high knees, lunge, lateral lunge, band walks, monster walks, carioca > carioca w/ high knees, A skips, B skips, jogging x 4 laps        Patient Education and Home Exercises     Home Exercises Provided and Patient Education Provided     Education provided:   - HEP update, precautions, activity pacing, goals, timeframes    Written Home Exercises Provided: yes. Exercises were reviewed and Billy was able to demonstrate them prior to the end of the session.  Billy " demonstrated good  understanding of the education provided. See EMR under Patient Instructions for exercises provided during therapy sessions.     ASSESSMENT     Good tolerance of addition of heavier single leg loading on RFESS and step ups with subjective reports of quad fatigue. Mild offloading noted during double leg landing and some stiffness during single leg landing that improved with verbal cues.     Billy Is progressing well towards his goals.   Pt prognosis is Good.     Pt will continue to benefit from skilled outpatient physical therapy to address the deficits listed in the problem list box on initial evaluation, provide pt/family education and to maximize pt's level of independence in the home and community environment.     Pt's spiritual, cultural and educational needs considered and pt agreeable to plan of care and goals.     Anticipated barriers to physical therapy: none    Goals:  Short Term Goals: 8-10 weeks   1. Pt will be compliant with HEP 50% of prescribed amount. MET  2. The pt to demo improvement in left knee ROM to equal right knee ROM pain free MET  3.  The pt to demo good quad set with proper hyperextension moment of the Left knee MET  4. The pt to demo ambualting with elast restricitve AD witout major compensatory pattern left knee for at least 20 feet MET     Long Term Goals: 56 weeks (progressing, not met)   Pt will be compliant with % of prescribed amount.   The pt to demo pain free and uncompensated running mechanics x10 min on an indoor treadmill MET  The pt to demo tolerance to a squat at or bellow parallel with uncompensated mechanics x10 MET  The pt to demo strength of L LE within 10% of R LE as demo'd on the biodex machine   The pt to demo a deficit of 10% or less on a triple hop, single leg broad jump and crossover hop compared to non operative LE.   The pt will report full participation in ADLs and IADLs without restrictions related to L  knee.     PLAN     Add single leg  training and progress into single leg jumping.  Run tomorrow with agility/conditioning. Continue strengthening next week.     Nishant Quach, PT, DPT

## 2023-04-27 ENCOUNTER — CLINICAL SUPPORT (OUTPATIENT)
Dept: REHABILITATION | Facility: HOSPITAL | Age: 28
End: 2023-04-27
Payer: COMMERCIAL

## 2023-04-27 DIAGNOSIS — M25.662 DECREASED RANGE OF MOTION OF LEFT KNEE: ICD-10-CM

## 2023-04-27 DIAGNOSIS — R26.2 DIFFICULTY WALKING: ICD-10-CM

## 2023-04-27 DIAGNOSIS — M25.562 ACUTE PAIN OF LEFT KNEE: Primary | ICD-10-CM

## 2023-04-27 DIAGNOSIS — M62.81 QUADRICEPS WEAKNESS: ICD-10-CM

## 2023-04-27 PROCEDURE — 97140 MANUAL THERAPY 1/> REGIONS: CPT

## 2023-04-27 PROCEDURE — 97110 THERAPEUTIC EXERCISES: CPT

## 2023-04-27 PROCEDURE — 97530 THERAPEUTIC ACTIVITIES: CPT

## 2023-04-27 NOTE — PROGRESS NOTES
[facility]   Physical Therapy Treatment Note     Name: Billy Holbrook  [facility] Number: [id number]    Therapy Diagnosis:   Encounter Diagnoses   Name Primary?    Acute pain of left knee Yes    Decreased range of motion of left knee     Quadriceps weakness     Difficulty walking      Physician: PEACE Dempsey MD    Visit Date: 4/27/2023    Physician Orders: PT Eval and Treat   Medical Diagnosis from Referral: M25.562 (ICD-10-CM) - Acute pain of left knee  Evaluation Date: 9/7/2022  Authorization Period Expiration: 3/12/2024  Plan of Care Expiration: 12/31/2023  Visit # / Visits authorized: 137 total 32/40     Precautions: Standard     Time In: 11:15 am  Time Out: 12:28  Total Billable Time: 73 minutes    Procedure: 9/20/2022  1. Left Knee Arthroscopy, anterior cruciate ligament reconstruction 64819  2.  Left Knee Arthroscopy, with meniscectomy (medial OR lateral) 70354  3.  Left Knee Arthroscopy, debridement/shaving of articular cartilage (chondroplasty) 60444  4.  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 Denies any knee pain. Needs a recovery day.       He was compliant with home exercise program.  Response to previous treatment: no issues; mild soreness  Functional change: jumping is improved    Pain: 0/10   Location: left knee     OBJECTIVE     7 Months 7 days post op    Wt. 315 lbs    Observation:   no effusion noted L knee with sweep test  Well healed scar  Atrophy in calf/quad on L    No TTP L HS     Range of Motion (extension/neutral/flexion):     Right Left   Knee ROM: 8-0-125/130 deg P:8-0-130 deg  A:8-0-125 deg      Strength: (Biodex machine)    Right Left Comment   Knee Extension: 309.9 lbs (pre-surgical) 259.1 lbs    16.3% deficit    Knee Flexion: 192.8 lbs 169.6 lbs      12% deficit    Hip Abduction: 4+/5 4-/5       Running mechanics normalized at sprint and mild compensations with deceleration if quick  L hip ER limited 25% compared to R: he states chronic issue    Mid quad girth: 18 cm proximal Patella  - R= 77 cm  - L= 72.5 cm    Treatment     Billy received the treatments listed below:      therapeutic exercises to develop strength, endurance, ROM, flexibility, posture, and core stabilization for 20 minutes including:  Education/assessment  Heel prop x 5 min at start / end 10 lbs above/below  SL shuttle press 5 sets to burn to maxing machine last 3 sets      manual therapy techniques: Joint mobilizations and Soft tissue Mobilization were applied for 8 minutes, including:  PFJ gr 3-4 glides all planes  Tibial AP glide gr 3-4 with and without rotation bias on and off heel prop  Knee hyperextension gr 3-4  Fat pad mobility      neuromuscular re-education activities to improve: Coordination, Kinesthetic, Sense, and Proprioception for 0 minutes. The following activities were included:  NP    Therapeutic activities to improve functional performance for 45 minutes, including:  SKTC, quad pull, HS scoop, HS march, high knee march, jogging butt kicks, jogging high knees, lunge, lateral lunge, band walks, monster walks, carioca > carioca w/ high knees, A skips, B skips, jogging x 4 laps  12 inch lateral heel tap 2 x TRX 2 x no support 10-15 reps  DL vertical x 6  SL vertical: R= 13 inch; L= 9 inch: x 8 linus  DL drop jumps into max vertical x 10  DL drop jump to SL landing x 8 linus  DL to SL broad jumps    Patient Education and Home Exercises     Home Exercises Provided and Patient Education Provided     Education provided:   - HEP update, precautions, activity pacing, goals, timeframes    Written Home Exercises Provided: yes. Exercises were reviewed and Billy was able to demonstrate them prior to the end of the session.  Billy demonstrated good  understanding of the education provided.  See EMR under Patient Instructions for exercises provided during therapy sessions.     ASSESSMENT     Billy arrived sore from prior session. Focusing on LSI with DL and SL plyometrics with focus on mechanics of landing and loading using vert . He was a little stiff at entry due to loading yesterday, but able to gain back frull ROM/mobility with manual therapy and stretching fairly easily. Pt reported/demonstrated no adverse response or exacerbation of symptoms during today's session.      Billy Is progressing well towards his goals.   Pt prognosis is Good.     Pt will continue to benefit from skilled outpatient physical therapy to address the deficits listed in the problem list box on initial evaluation, provide pt/family education and to maximize pt's level of independence in the home and community environment.     Pt's spiritual, cultural and educational needs considered and pt agreeable to plan of care and goals.     Anticipated barriers to physical therapy: none    Goals:  Short Term Goals: 8-10 weeks   1. Pt will be compliant with HEP 50% of prescribed amount. MET  2. The pt to demo improvement in left knee ROM to equal right knee ROM pain free MET  3.  The pt to demo good quad set with proper hyperextension moment of the Left knee MET  4. The pt to demo ambualting with least restricitve AD without major compensatory pattern left knee for at least 20 feet MET     Long Term Goals: 56 weeks (progressing, not met)   Pt will be compliant with % of prescribed amount.   The pt to demo pain free and uncompensated running mechanics x10 min on an indoor treadmill MET  The pt to demo tolerance to a squat at or bellow parallel with uncompensated mechanics x10 MET  The pt to demo strength of L LE within 10% of R LE as demo'd on the biodex machine   The pt to demo a deficit of 10% or less on a triple hop, single leg broad jump and crossover hop compared to non operative LE.   The pt will report full  participation in ADLs and IADLs without restrictions related to L  knee.     PLAN     Add single leg training and progress into single leg jumping.  Run tomorrow with agility/conditioning. Continue strengthening next week.  Continue with progressive strengthening and reactive NM retraining.    Manjit Lazcano, PT, DPT

## 2023-04-28 ENCOUNTER — DOCUMENTATION ONLY (OUTPATIENT)
Dept: REHABILITATION | Facility: HOSPITAL | Age: 28
End: 2023-04-28

## 2023-04-28 NOTE — PROGRESS NOTES
Billy completed a dynamic arm up, ladder drills, bounding, skipping drills, sprints 10, 20, 30, 40 yd's, reactive drills to cones- multi-directional. No issues noted or reported. Encouraging cutting, plant, and deceleration off L LE.

## 2023-05-01 ENCOUNTER — CLINICAL SUPPORT (OUTPATIENT)
Dept: REHABILITATION | Facility: HOSPITAL | Age: 28
End: 2023-05-01
Payer: COMMERCIAL

## 2023-05-01 DIAGNOSIS — M25.662 DECREASED RANGE OF MOTION OF LEFT KNEE: ICD-10-CM

## 2023-05-01 DIAGNOSIS — M25.562 ACUTE PAIN OF LEFT KNEE: Primary | ICD-10-CM

## 2023-05-01 DIAGNOSIS — R26.2 DIFFICULTY WALKING: ICD-10-CM

## 2023-05-01 DIAGNOSIS — M62.81 QUADRICEPS WEAKNESS: ICD-10-CM

## 2023-05-01 PROCEDURE — 97530 THERAPEUTIC ACTIVITIES: CPT

## 2023-05-01 PROCEDURE — 97140 MANUAL THERAPY 1/> REGIONS: CPT

## 2023-05-01 PROCEDURE — 97110 THERAPEUTIC EXERCISES: CPT

## 2023-05-01 NOTE — PROGRESS NOTES
[facility]   Physical Therapy Treatment Note     Name: Billy Holbrook  [facility] Number: [id number]    Therapy Diagnosis:   Encounter Diagnoses   Name Primary?    Acute pain of left knee Yes    Decreased range of motion of left knee     Quadriceps weakness     Difficulty walking      Physician: PEACE Dempsey MD    Visit Date: 5/1/2023    Physician Orders: PT Eval and Treat   Medical Diagnosis from Referral: M25.562 (ICD-10-CM) - Acute pain of left knee  Evaluation Date: 9/7/2022  Authorization Period Expiration: 3/12/2024  Plan of Care Expiration: 12/31/2023  Visit # / Visits authorized: 138 total 33/40     Precautions: Standard     Time In: 12:45 pm  Time Out: 2:30  Total Billable Time: 98 minutes    Procedure: 9/20/2022  1. Left Knee Arthroscopy, anterior cruciate ligament reconstruction 17614  2.  Left Knee Arthroscopy, with meniscectomy (medial OR lateral) 66710  3.  Left Knee Arthroscopy, debridement/shaving of articular cartilage (chondroplasty) 20897  4.  Left Knee  platelet rich plasma injection to the bone-patellar tendon-bone harvest site    Post-Op Plan:  ACL reconstruction with a bone-patellar tendon-bone autograft    SUBJECTIVE     Pt reports: he is feeling a bit hungover, but knee is good. 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     He was compliant with home exercise program.  Response to previous treatment: no issues; mild soreness  Functional change: jumping is improved    Pain: 0/10   Location: left knee     OBJECTIVE     7 Months 11 days post op    Wt. 320 lbs    Observation:   no effusion noted L knee with sweep test  Well healed scar  Atrophy in calf/quad on L    No TTP L HS     Range of Motion (extension/neutral/flexion):     Right Left   Knee ROM: 8-0-125/130 deg P:8-0-130 deg  A:8-0-125 deg      Strength: (Biodex machine)    Right Left Comment   Knee  Extension: 309.9 lbs (pre-surgical) 259.1 lbs    16.3% deficit    Knee Flexion: 192.8 lbs 169.6 lbs     12% deficit    Hip Abduction: 4+/5 4-/5       Running mechanics normalized at sprint and mild compensations with deceleration if quick  L hip ER limited 25% compared to R: he states chronic issue    Mid quad girth: 18 cm proximal Patella  - R= 77 cm  - L= 72.5 cm    Treatment     Billy received the treatments listed below:      therapeutic exercises to develop strength, endurance, ROM, flexibility, posture, and core stabilization for 30 minutes including:  Education/assessment- prolonged education and phone call with Agent/Billy  Heel prop x 5 min at start / end 10 lbs above/below      manual therapy techniques: Joint mobilizations and Soft tissue Mobilization were applied for 8 minutes, including:  PFJ gr 3-4 glides all planes  Tibial AP glide gr 3-4 with and without rotation bias on and off heel prop  Knee hyperextension gr 3-4  Fat pad mobility    neuromuscular re-education activities to improve: Coordination, Kinesthetic, Sense, and Proprioception for 0 minutes. The following activities were included:  NP    Therapeutic activities to improve functional performance for 60 minutes, including:  SKTC, quad pull, HS scoop, HS march, high knee march, jogging butt kicks, jogging high knees, lunge, lateral lunge, band walks, monster walks, carioca > carioca w/ high knees, A skips, B skips, jogging x 4 laps  12 inch lateral heel tap 3 x 8 linus no US support  20 lb med ball thrust from stance to sprint x 16 reps of 20 yds  Lateral reactive shuffles with variable 12 lb med ball passes 8 x 20 sec    Patient Education and Home Exercises     Home Exercises Provided and Patient Education Provided     Education provided:   - HEP update, precautions, activity pacing, goals, timeframes    Written Home Exercises Provided: yes. Exercises were reviewed and Billy was able to demonstrate them prior to the end of the session.   Billy demonstrated good  understanding of the education provided. See EMR under Patient Instructions for exercises provided during therapy sessions.     ASSESSMENT     Billy arrived stating he was hungover from last night, but ready to workout. He agreed to starting performance training at this time after a month of convincing. Spoke with Agent on phone in detail about process and current status. No problems with agility and power tasks today. Pt reported/demonstrated no adverse response or exacerbation of symptoms during today's session.      Billy Is progressing well towards his goals.   Pt prognosis is Good.     Pt will continue to benefit from skilled outpatient physical therapy to address the deficits listed in the problem list box on initial evaluation, provide pt/family education and to maximize pt's level of independence in the home and community environment.     Pt's spiritual, cultural and educational needs considered and pt agreeable to plan of care and goals.     Anticipated barriers to physical therapy: none    Goals:  Short Term Goals: 8-10 weeks   1. Pt will be compliant with HEP 50% of prescribed amount. MET  2. The pt to demo improvement in left knee ROM to equal right knee ROM pain free MET  3.  The pt to demo good quad set with proper hyperextension moment of the Left knee MET  4. The pt to demo ambualting with least restricitve AD without major compensatory pattern left knee for at least 20 feet MET     Long Term Goals: 56 weeks (progressing, not met)   Pt will be compliant with % of prescribed amount.   The pt to demo pain free and uncompensated running mechanics x10 min on an indoor treadmill MET  The pt to demo tolerance to a squat at or bellow parallel with uncompensated mechanics x10 MET  The pt to demo strength of L LE within 10% of R LE as demo'd on the biodex machine   The pt to demo a deficit of 10% or less on a triple hop, single leg broad jump and crossover hop compared to  non operative LE.   The pt will report full participation in ADLs and IADLs without restrictions related to L  knee.     PLAN     Transition to formal PT 2x/wk and performance training 3x/week.     Manjit Lazcano, PT, DPT

## 2023-05-02 ENCOUNTER — CLINICAL SUPPORT (OUTPATIENT)
Dept: REHABILITATION | Facility: HOSPITAL | Age: 28
End: 2023-05-02
Payer: COMMERCIAL

## 2023-05-02 DIAGNOSIS — M25.662 DECREASED RANGE OF MOTION OF LEFT KNEE: ICD-10-CM

## 2023-05-02 DIAGNOSIS — R26.2 DIFFICULTY WALKING: ICD-10-CM

## 2023-05-02 DIAGNOSIS — M25.562 ACUTE PAIN OF LEFT KNEE: Primary | ICD-10-CM

## 2023-05-02 DIAGNOSIS — M62.81 QUADRICEPS WEAKNESS: ICD-10-CM

## 2023-05-02 PROCEDURE — 97140 MANUAL THERAPY 1/> REGIONS: CPT

## 2023-05-02 PROCEDURE — 97112 NEUROMUSCULAR REEDUCATION: CPT

## 2023-05-02 PROCEDURE — 97530 THERAPEUTIC ACTIVITIES: CPT

## 2023-05-02 PROCEDURE — 97110 THERAPEUTIC EXERCISES: CPT

## 2023-05-02 NOTE — PROGRESS NOTES
[facility]   Physical Therapy Treatment Note     Name: Billy Holbrook  [facility] Number: [id number]    Therapy Diagnosis:   Encounter Diagnoses   Name Primary?    Acute pain of left knee Yes    Decreased range of motion of left knee     Quadriceps weakness     Difficulty walking      Physician: PEACE Dempsey MD    Visit Date: 5/2/2023    Physician Orders: PT Eval and Treat   Medical Diagnosis from Referral: M25.562 (ICD-10-CM) - Acute pain of left knee  Evaluation Date: 9/7/2022  Authorization Period Expiration: 3/12/2024  Plan of Care Expiration: 12/31/2023  Visit # / Visits authorized: 138 total 33/40     Precautions: Standard     Time In: 11:00 am  Time Out: 12:45  Total Billable Time:  minutes    Procedure: 9/20/2022  1. Left Knee Arthroscopy, anterior cruciate ligament reconstruction 19615  2.  Left Knee Arthroscopy, with meniscectomy (medial OR lateral) 15992  3.  Left Knee Arthroscopy, debridement/shaving of articular cartilage (chondroplasty) 53829  4.  Left Knee  platelet rich plasma injection to the bone-patellar tendon-bone harvest site    Post-Op Plan:  ACL reconstruction with a bone-patellar tendon-bone autograft    SUBJECTIVE     Pt reports: his knee feels great and loose despite doing a lot yesterday. Plan to start performance training this week.     He was compliant with home exercise program.  Response to previous treatment: no issues; mild soreness  Functional change: jumping is improved    Pain: 0/10   Location: left knee     OBJECTIVE     7 Months 12 days post op    Wt. 320 lbs    Observation:   no effusion noted L knee with sweep test  Well healed scar  Atrophy in calf/quad on L    No TTP L HS     Range of Motion (extension/neutral/flexion):     Right Left   Knee ROM: 8-0-125/130 deg P:8-0-130 deg  A:8-0-125 deg      Strength: (Biodex machine)    Right Left Comment   Knee Extension: 309.9 lbs (pre-surgical) 259.1 lbs    16.3% deficit    Knee Flexion: 192.8 lbs  169.6 lbs     12% deficit    Hip Abduction: 4+/5 4-/5       Running mechanics normalized at sprint and mild compensations with deceleration if quick  L hip ER limited 25% compared to R: he states chronic issue    Mid quad girth: 18 cm proximal Patella  - R= 77 cm  - L= 72.5 cm    Treatment     Billy received the treatments listed below:      therapeutic exercises to develop strength, endurance, ROM, flexibility, posture, and core stabilization for 35 minutes including:  Education/assessment- prolonged education and phone call with Agent/Billy  Heel prop x 5 min at start / end 10 lbs above/below  LAQ 5 x 6 105->165 lbs on L and 175 on R: unilateral  HS curl 5 x 8-> 5 75->2015 lbs unilateral    manual therapy techniques: Joint mobilizations and Soft tissue Mobilization were applied for 8 minutes, including:  PFJ gr 3-4 glides all planes  Tibial AP glide gr 3-4 with and without rotation bias on and off heel prop  Knee hyperextension gr 3-4  Fat pad mobility    neuromuscular re-education activities to improve: Coordination, Kinesthetic, Sense, and Proprioception for 8 minutes. The following activities were included:  Sled lateral push/pull 5 x 20 sec 45->70 lbs added    Therapeutic activities to improve functional performance for 54 minutes, including:  SKTC, quad pull, HS scoop, HS march, high knee march, jogging butt kicks, jogging high knees, lunge, lateral lunge, band walks, monster walks, carioca > carioca w/ high knees, A skips, B skips, jogging x 4 laps  12 inch lateral heel tap 3 x 8 linus no UE support  Split squat; FF elevated 2 inch safety bar 5x 6->5 reps linus: 90->210 lbs  BB flat bench 5 x 8->5 reps 185-> 365 lbs  Trap bar squat 5 x 8->5 reps 225-> 315 lbs    Patient Education and Home Exercises     Home Exercises Provided and Patient Education Provided     Education provided:   - HEP update, precautions, activity pacing, goals, timeframes    Written Home Exercises Provided: yes. Exercises were reviewed and  Billy was able to demonstrate them prior to the end of the session.  Billy demonstrated good  understanding of the education provided. See EMR under Patient Instructions for exercises provided during therapy sessions.     ASSESSMENT     Billy did well with strength focused session. Loading progressing with reps/sets. Less cuing required for mechanics, some cuing for speed of eccentric portion of lifts needed with good integration. He is maintaining knee extension much better and easier pre and post workouts with more intense loading. Pt reported/demonstrated no adverse response or exacerbation of symptoms during today's session.      Billy Is progressing well towards his goals.   Pt prognosis is Good.     Pt will continue to benefit from skilled outpatient physical therapy to address the deficits listed in the problem list box on initial evaluation, provide pt/family education and to maximize pt's level of independence in the home and community environment.     Pt's spiritual, cultural and educational needs considered and pt agreeable to plan of care and goals.     Anticipated barriers to physical therapy: none    Goals:  Short Term Goals: 8-10 weeks   1. Pt will be compliant with HEP 50% of prescribed amount. MET  2. The pt to demo improvement in left knee ROM to equal right knee ROM pain free MET  3.  The pt to demo good quad set with proper hyperextension moment of the Left knee MET  4. The pt to demo ambualting with least restricitve AD without major compensatory pattern left knee for at least 20 feet MET     Long Term Goals: 56 weeks (progressing, not met)   Pt will be compliant with % of prescribed amount.   The pt to demo pain free and uncompensated running mechanics x10 min on an indoor treadmill MET  The pt to demo tolerance to a squat at or bellow parallel with uncompensated mechanics x10 MET  The pt to demo strength of L LE within 10% of R LE as demo'd on the [employer] machine   The pt to demo a  deficit of 10% or less on a triple hop, single leg broad jump and crossover hop compared to non operative LE.   The pt will report full participation in ADLs and IADLs without restrictions related to L  knee.     PLAN     Transition to formal PT 2x/wk and performance training 3x/week starting next week.     Manjit Lazcano, PT, DPT

## 2023-05-03 ENCOUNTER — DOCUMENTATION ONLY (OUTPATIENT)
Dept: REHABILITATION | Facility: HOSPITAL | Age: 28
End: 2023-05-03

## 2023-05-03 NOTE — PROGRESS NOTES
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 We did a dynamic warm up, ladder drills to 5 yd sprints, x 2 30 yd x 4 20 yd and x4 10 yd sprints from stance, followed by stance drills to R and L with reactive directional change to tap clinician.  No noted issues other than due to allergy that improved with exercise.

## 2023-05-04 ENCOUNTER — CLINICAL SUPPORT (OUTPATIENT)
Dept: REHABILITATION | Facility: HOSPITAL | Age: 28
End: 2023-05-04
Payer: COMMERCIAL

## 2023-05-04 DIAGNOSIS — M25.662 DECREASED RANGE OF MOTION OF LEFT KNEE: ICD-10-CM

## 2023-05-04 DIAGNOSIS — R26.2 DIFFICULTY WALKING: ICD-10-CM

## 2023-05-04 DIAGNOSIS — M62.81 QUADRICEPS WEAKNESS: ICD-10-CM

## 2023-05-04 DIAGNOSIS — M25.562 ACUTE PAIN OF LEFT KNEE: Primary | ICD-10-CM

## 2023-05-04 PROCEDURE — 97140 MANUAL THERAPY 1/> REGIONS: CPT

## 2023-05-04 PROCEDURE — 97530 THERAPEUTIC ACTIVITIES: CPT

## 2023-05-05 ENCOUNTER — CLINICAL SUPPORT (OUTPATIENT)
Dept: REHABILITATION | Facility: HOSPITAL | Age: 28
End: 2023-05-05
Payer: COMMERCIAL

## 2023-05-05 DIAGNOSIS — R26.2 DIFFICULTY WALKING: ICD-10-CM

## 2023-05-05 DIAGNOSIS — M25.562 ACUTE PAIN OF LEFT KNEE: Primary | ICD-10-CM

## 2023-05-05 DIAGNOSIS — M62.81 QUADRICEPS WEAKNESS: ICD-10-CM

## 2023-05-05 DIAGNOSIS — M25.662 DECREASED RANGE OF MOTION OF LEFT KNEE: ICD-10-CM

## 2023-05-05 PROCEDURE — 97530 THERAPEUTIC ACTIVITIES: CPT

## 2023-05-05 PROCEDURE — 97112 NEUROMUSCULAR REEDUCATION: CPT

## 2023-05-05 PROCEDURE — 97110 THERAPEUTIC EXERCISES: CPT

## 2023-05-05 PROCEDURE — 97140 MANUAL THERAPY 1/> REGIONS: CPT

## 2023-05-05 NOTE — PROGRESS NOTES
[facility]   Physical Therapy Treatment Note     Name: Billy Holbrook  [facility] Number: [id number]    Therapy Diagnosis:   Encounter Diagnoses   Name Primary?    Acute pain of left knee Yes    Decreased range of motion of left knee     Quadriceps weakness     Difficulty walking      Physician: PEACE Dempsey MD    Visit Date: 5/4/2023    Physician Orders: PT Eval and Treat   Medical Diagnosis from Referral: M25.562 (ICD-10-CM) - Acute pain of left knee  Evaluation Date: 9/7/2022  Authorization Period Expiration: 3/12/2024  Plan of Care Expiration: 12/31/2023  Visit # / Visits authorized: 140 total 36/40     Precautions: Standard     Time In: 11:40 am  Time Out: 1:30  Total Billable Time:  85 minutes    Procedure: 9/20/2022  1. Left Knee Arthroscopy, anterior cruciate ligament reconstruction 27005  2.  Left Knee Arthroscopy, with meniscectomy (medial OR lateral) 66711  3.  Left Knee Arthroscopy, debridement/shaving of articular cartilage (chondroplasty) 60652  4.  Left Knee  platelet rich plasma injection to the bone-patellar tendon-bone harvest site    Post-Op Plan:  ACL reconstruction with a bone-patellar tendon-bone autograft    SUBJECTIVE     Pt reports: he is feeling good. Can tell his jumping is improving.     He was compliant with home exercise program.  Response to previous treatment: no issues; mild soreness  Functional change: jumping is improved    Pain: 0/10   Location: left knee     OBJECTIVE     7 Months 14 days post op    Wt. 320 lbs    Observation:   no effusion noted L knee with sweep test  Well healed scar  Atrophy in calf/quad on L    No TTP L HS     Range of Motion (extension/neutral/flexion):     Right Left   Knee ROM: 8-0-125/130 deg P:8-0-130 deg  A:8-0-125 deg      Strength: (Biodex machine)    Right Left Comment   Knee Extension: 309.9 lbs (pre-surgical) 259.1 lbs    16.3% deficit    Knee Flexion: 192.8 lbs 169.6 lbs     12% deficit    Hip Abduction: 4+/5 4-/5        Running mechanics normalized at sprint and mild compensations with deceleration if quick  L hip ER limited 25% compared to R: he states chronic issue    Mid quad girth: 18 cm proximal Patella  - R= 77 cm  - L= 74.5 cm    Treatment     Billy received the treatments listed below:      therapeutic exercises to develop strength, endurance, ROM, flexibility, posture, and core stabilization for 20 minutes including:  Education/assessment  Heel prop x 5 min at start / end 10 lbs above/below  LAQ 5 x 6 105->165 lbs on L and 175 on R: unilateral-NP  HS curl 5 x 8-> 5 75->2015 lbs unilateral-NP    manual therapy techniques: Joint mobilizations and Soft tissue Mobilization were applied for 10 minutes, including:  PFJ gr 3-4 glides all planes  Tibial AP glide gr 3-4 with and without rotation bias on and off heel prop  Knee hyperextension gr 3-4  Fat pad mobility    neuromuscular re-education activities to improve: Coordination, Kinesthetic, Sense, and Proprioception for 5 minutes. The following activities were included:  RE standing clams 3 x 15 BTB linus    Therapeutic activities to improve functional performance for 75 minutes, including:  SKTC, quad pull, HS scoop, HS march, high knee march, jogging butt kicks, jogging high knees, lunge, lateral lunge, band walks, monster walks, carioca > carioca w/ high knees, butt kicks, A skips, B skips, jogging x 4 laps  12 inch lateral heel tap 3 x 8 linus no UE support  Ladder drills into sprint 5 yds  DL broad jump x 20 working on landing mechanics  SL broad jumps x 15 linus: approx 80% LSI grossly  DL vertical x 15 18-20.5 inch  SL vertical x 15 linus R= 11.5 inch, L= 8.5-9 inch  Sled push/pull x 2 laps 20 yds 180 lbs added    NP:  Split squat; FF elevated 2 inch safety bar 5x 6->5 reps linus: 90->210 lbs  BB flat bench 5 x 8->5 reps 185-> 365 lbs  Trap bar squat 5 x 8->5 reps 225-> 315 lbs    Patient Education and Home Exercises     Home Exercises Provided and Patient Education Provided      Education provided:   - HEP update, precautions, activity pacing, goals, timeframes    Written Home Exercises Provided: yes. Exercises were reviewed and Billy was able to demonstrate them prior to the end of the session.  Billy demonstrated good  understanding of the education provided. See EMR under Patient Instructions for exercises provided during therapy sessions.     ASSESSMENT     Billy demonstrates improved quad girth and DL to SL plyometric quality of movement. Power improving with cuing significantly in session as well as confidence in task. Pt reported/demonstrated no adverse response or exacerbation of symptoms during today's session.      Billy Is progressing well towards his goals.   Pt prognosis is Good.     Pt will continue to benefit from skilled outpatient physical therapy to address the deficits listed in the problem list box on initial evaluation, provide pt/family education and to maximize pt's level of independence in the home and community environment.     Pt's spiritual, cultural and educational needs considered and pt agreeable to plan of care and goals.     Anticipated barriers to physical therapy: none    Goals:  Short Term Goals: 8-10 weeks   1. Pt will be compliant with HEP 50% of prescribed amount. MET  2. The pt to demo improvement in left knee ROM to equal right knee ROM pain free MET  3.  The pt to demo good quad set with proper hyperextension moment of the Left knee MET  4. The pt to demo ambualting with least restricitve AD without major compensatory pattern left knee for at least 20 feet MET     Long Term Goals: 56 weeks (progressing, not met)   Pt will be compliant with % of prescribed amount.   The pt to demo pain free and uncompensated running mechanics x10 min on an indoor treadmill MET  The pt to demo tolerance to a squat at or bellow parallel with uncompensated mechanics x10 MET  The pt to demo strength of L LE within 10% of R LE as demo'd on the [employer] machine    The pt to demo a deficit of 10% or less on a triple hop, single leg broad jump and crossover hop compared to non operative LE.   The pt will report full participation in ADLs and IADLs without restrictions related to L  knee.     PLAN     Transition to formal PT 2x/wk and performance training 3x/week starting next week.     Biodex next week.     Manjit Lazcano, PT, DPT

## 2023-05-05 NOTE — PROGRESS NOTES
[facility]   Physical Therapy Treatment Note     Name: Billy Holbrook  [facility] Number: [id number]    Therapy Diagnosis:   Encounter Diagnoses   Name Primary?    Acute pain of left knee Yes    Decreased range of motion of left knee     Quadriceps weakness     Difficulty walking      Physician: PEACE Dempsey MD    Visit Date: 5/5/2023    Physician Orders: PT Eval and Treat   Medical Diagnosis from Referral: M25.562 (ICD-10-CM) - Acute pain of left knee  Evaluation Date: 9/7/2022  Authorization Period Expiration: 3/12/2024  Plan of Care Expiration: 12/31/2023  Visit # / Visits authorized: 141 total 37/40     Precautions: Standard     Time In: 7:15 am  Time Out: 8:45  Total Billable Time:  90 minutes    Procedure: 9/20/2022  1. Left Knee Arthroscopy, anterior cruciate ligament reconstruction 89904  2.  Left Knee Arthroscopy, with meniscectomy (medial OR lateral) 18373  3.  Left Knee Arthroscopy, debridement/shaving of articular cartilage (chondroplasty) 00681  4.  Left Knee  platelet rich plasma injection to the bone-patellar tendon-bone harvest site    Post-Op Plan:  ACL reconstruction with a bone-patellar tendon-bone autograft    SUBJECTIVE     Pt reports: he is feeling good. No issues from yesterday, ready to exercise as tolerated.     He was compliant with home exercise program.  Response to previous treatment: no issues; mild soreness  Functional change: jumping is improved    Pain: 0/10   Location: left knee     OBJECTIVE     7 Months 15 days post op    Wt. 320 lbs    Observation:   no effusion noted L knee with sweep test  Well healed scar  Atrophy in calf/quad on L    No TTP L HS     Range of Motion (extension/neutral/flexion):     Right Left   Knee ROM: 8-0-125/130 deg P:8-0-130 deg  A:8-0-125 deg      Strength: (Biodex machine)    Right Left Comment   Knee Extension: 309.9 lbs (pre-surgical) 259.1 lbs    16.3% deficit    Knee Flexion: 192.8 lbs 169.6 lbs     12% deficit    Hip  Abduction: 4+/5 4-/5       Running mechanics normalized at sprint and mild compensations with deceleration if quick  L hip ER limited 25% compared to R: he states chronic issue    Mid quad girth: 18 cm proximal Patella  - R= 77 cm  - L= 74.5 cm    Treatment     Bilyl received the treatments listed below:      therapeutic exercises to develop strength, endurance, ROM, flexibility, posture, and core stabilization for 20 minutes including:  Education/assessment  Heel prop x 5 min at start / end 10 lbs above/below  LAQ 5 x 6 105->175: unilateral  HS curl 5 x 6 115->195 lbs unilateral    manual therapy techniques: Joint mobilizations and Soft tissue Mobilization were applied for 10 minutes, including:  PFJ gr 3-4 glides all planes  Tibial AP glide gr 3-4 with and without rotation bias on and off heel prop  Knee hyperextension gr 3-4  Fat pad mobility    neuromuscular re-education activities to improve: Coordination, Kinesthetic, Sense, and Proprioception for 5 minutes. The following activities were included:  RESS with med/lateral med ball slams 10 lbs    Therapeutic activities to improve functional performance for 55 minutes, including:  SKTC, quad pull, HS scoop, HS march, high knee march, jogging butt kicks, jogging high knees, lunge, lateral lunge, band walks, monster walks, carioca > carioca w/ high knees, butt kicks, A skips, B skips, jogging x 4 laps  12 inch lateral heel tap 3 x 8 linus no UE support  Sprints 4 x 10 and 4 x 20 from stance with deceleration within 5 yds  Sled runs x 4 20 yds no stance then x 4 out of stance with 45 lbs added  Lateral lunge 70 lb KB 4 x 5 linus    Patient Education and Home Exercises     Home Exercises Provided and Patient Education Provided     Education provided:   - HEP update, precautions, activity pacing, goals, timeframes    Written Home Exercises Provided: yes. Exercises were reviewed and Billy was able to demonstrate them prior to the end of the session.  Billy demonstrated  good  understanding of the education provided. See EMR under Patient Instructions for exercises provided during therapy sessions.     ASSESSMENT     Billy did well with session focused on sprinting, power, and strength. He is ready for performance training starting next week. Pt reported/demonstrated no adverse response or exacerbation of symptoms during today's session.      Billy Is progressing well towards his goals.   Pt prognosis is Good.     Pt will continue to benefit from skilled outpatient physical therapy to address the deficits listed in the problem list box on initial evaluation, provide pt/family education and to maximize pt's level of independence in the home and community environment.     Pt's spiritual, cultural and educational needs considered and pt agreeable to plan of care and goals.     Anticipated barriers to physical therapy: none    Goals:  Short Term Goals: 8-10 weeks   1. Pt will be compliant with HEP 50% of prescribed amount. MET  2. The pt to demo improvement in left knee ROM to equal right knee ROM pain free MET  3.  The pt to demo good quad set with proper hyperextension moment of the Left knee MET  4. The pt to demo ambualting with least restricitve AD without major compensatory pattern left knee for at least 20 feet MET     Long Term Goals: 56 weeks (progressing, not met)   Pt will be compliant with % of prescribed amount.   The pt to demo pain free and uncompensated running mechanics x10 min on an indoor treadmill MET  The pt to demo tolerance to a squat at or bellow parallel with uncompensated mechanics x10 MET  The pt to demo strength of L LE within 10% of R LE as demo'd on the biodex machine   The pt to demo a deficit of 10% or less on a triple hop, single leg broad jump and crossover hop compared to non operative LE.   The pt will report full participation in ADLs and IADLs without restrictions related to L  knee.     PLAN     Transition to formal PT 2x/wk and  performance training 3x/week starting next week.     Biodex next week.     Manjit Lazcano, PT, DPT

## 2023-05-09 ENCOUNTER — CLINICAL SUPPORT (OUTPATIENT)
Dept: REHABILITATION | Facility: HOSPITAL | Age: 28
End: 2023-05-09
Payer: COMMERCIAL

## 2023-05-09 DIAGNOSIS — M25.562 ACUTE PAIN OF LEFT KNEE: Primary | ICD-10-CM

## 2023-05-09 DIAGNOSIS — M62.81 QUADRICEPS WEAKNESS: ICD-10-CM

## 2023-05-09 DIAGNOSIS — R26.2 DIFFICULTY WALKING: ICD-10-CM

## 2023-05-09 DIAGNOSIS — M25.662 DECREASED RANGE OF MOTION OF LEFT KNEE: ICD-10-CM

## 2023-05-09 PROCEDURE — 97112 NEUROMUSCULAR REEDUCATION: CPT

## 2023-05-09 PROCEDURE — 97110 THERAPEUTIC EXERCISES: CPT

## 2023-05-09 PROCEDURE — 97140 MANUAL THERAPY 1/> REGIONS: CPT

## 2023-05-09 PROCEDURE — 97530 THERAPEUTIC ACTIVITIES: CPT

## 2023-05-09 NOTE — PROGRESS NOTES
[facility]   Physical Therapy Treatment Note     Name: Billy Holbrook  [facility] Number: [id number]    Therapy Diagnosis:   Encounter Diagnoses   Name Primary?    Acute pain of left knee Yes    Decreased range of motion of left knee     Quadriceps weakness     Difficulty walking      Physician: PEACE Dempsey MD    Visit Date: 5/9/2023    Physician Orders: PT Eval and Treat   Medical Diagnosis from Referral: M25.562 (ICD-10-CM) - Acute pain of left knee  Evaluation Date: 9/7/2022  Authorization Period Expiration: 3/12/2024  Plan of Care Expiration: 12/31/2023  Visit # / Visits authorized: 142 total 38/40     Precautions: Standard     Time In: 10:00 am  Time Out: 11:45  Total Billable Time:  105 minutes    Procedure: 9/20/2022  1. Left Knee Arthroscopy, anterior cruciate ligament reconstruction 20151  2.  Left Knee Arthroscopy, with meniscectomy (medial OR lateral) 29150  3.  Left Knee Arthroscopy, debridement/shaving of articular cartilage (chondroplasty) 95714  4.  Left Knee  platelet rich plasma injection to the bone-patellar tendon-bone harvest site    Post-Op Plan:  ACL reconstruction with a bone-patellar tendon-bone autograft    SUBJECTIVE     Pt reports: he is feeling good. No issues from OPT yesterday. Just feeling global soreness, some increased knee stiffness noted. SL hops felt anterior knee pain.     He was compliant with home exercise program.  Response to previous treatment: no issues; mild soreness  Functional change: jumping is improved    Pain: 0/10   Location: left knee     OBJECTIVE     7 Months 15 days post op    Wt. 320 lbs    Observation:   no effusion noted L knee with sweep test  Well healed scar  Atrophy in calf/quad on L    No TTP L HS     Range of Motion (extension/neutral/flexion):     Right Left   Knee ROM: 8-0-125/130 deg P:8-0-130 deg  A:8-0-125 deg      Strength: (Biodex machine)    Right Left Comment   Knee Extension: 309.9 lbs (pre-surgical) 259.1 lbs     16.3% deficit    Knee Flexion: 192.8 lbs 169.6 lbs     12% deficit    Hip Abduction: 4+/5 4-/5       Running mechanics normalized at sprint and mild compensations with deceleration if quick  L hip ER limited 25% compared to R: he states chronic issue    SL hop distance limited; landing from jump limited with R weight shift and decreased loading on L LE    Mid quad girth: 18 cm proximal Patella  - R= 77 cm  - L= 74.5 cm    Treatment     Billy received the treatments listed below:      therapeutic exercises to develop strength, endurance, ROM, flexibility, posture, and core stabilization for 40 minutes including:  Education/assessment  Heel prop x 5 min at start / end 10 lbs above/below  Knee extension isometric 8 x 30 sec at 60 deg  Manual stretching linus LE and lumbar spine    manual therapy techniques: Joint mobilizations and Soft tissue Mobilization were applied for 15 minutes, including:  PFJ gr 3-4 glides all planes  Tibial AP glide gr 3-4 with and without rotation bias on and off heel prop  Knee hyperextension gr 3-4  Fat pad mobility    neuromuscular re-education activities to improve: Coordination, Kinesthetic, Sense, and Proprioception for 12 minutes. The following activities were included:  Side plank clams BTB 4 x 10 x 2 sec  14 lb MD passes 3 x 15 from long sitting to sit up    Therapeutic activities to improve functional performance for 38 minutes, including:  SKTC, quad pull, HS scoop, HS march, high knee march, jogging butt kicks, jogging high knees, lunge, lateral lunge, band walks, monster walks, carioca > carioca w/ high knees, butt kicks, A skips, B skips, jogging x 4 laps  12 inch lateral heel tap 3 x 10 linus no UE support      Patient Education and Home Exercises     Home Exercises Provided and Patient Education Provided     Education provided:   - HEP update, precautions, activity pacing, goals, timeframes    Written Home Exercises Provided: yes. Exercises were reviewed and Billy was able to  demonstrate them prior to the end of the session.  Billy demonstrated good  understanding of the education provided. See EMR under Patient Instructions for exercises provided during therapy sessions.     ASSESSMENT     Billy did well with OPT yesterday. Today was recovery with focus on restoring knee mobility and reducing soreness. Unable to SL jump today due to anterior knee discomfort and soreness. Anterior knee pain reduced with isometrics: PT in origin. Global stretching helped to reduce soreness. He declined needling today.  Able to resolve knee stiffness and restore full mobility in session today. Pt reported/demonstrated no adverse response or exacerbation of symptoms during today's session.      Billy Is progressing well towards his goals.   Pt prognosis is Good.     Pt will continue to benefit from skilled outpatient physical therapy to address the deficits listed in the problem list box on initial evaluation, provide pt/family education and to maximize pt's level of independence in the home and community environment.     Pt's spiritual, cultural and educational needs considered and pt agreeable to plan of care and goals.     Anticipated barriers to physical therapy: none    Goals:  Short Term Goals: 8-10 weeks   1. Pt will be compliant with HEP 50% of prescribed amount. MET  2. The pt to demo improvement in left knee ROM to equal right knee ROM pain free MET  3.  The pt to demo good quad set with proper hyperextension moment of the Left knee MET  4. The pt to demo ambualting with least restricitve AD without major compensatory pattern left knee for at least 20 feet MET     Long Term Goals: 56 weeks (progressing, not met)   Pt will be compliant with % of prescribed amount.   The pt to demo pain free and uncompensated running mechanics x10 min on an indoor treadmill MET  The pt to demo tolerance to a squat at or bellow parallel with uncompensated mechanics x10 MET  The pt to demo strength of L LE  within 10% of R LE as demo'd on the biodex machine   The pt to demo a deficit of 10% or less on a triple hop, single leg broad jump and crossover hop compared to non operative LE.   The pt will report full participation in ADLs and IADLs without restrictions related to L  knee.     PLAN     Transition to formal PT 2x/wk and performance training 3x/week starting next week.     Biodex in next 1-2 weeks. MD f/u this week.     Manjit Lazcano, PT, DPT

## 2023-05-10 ENCOUNTER — CLINICAL SUPPORT (OUTPATIENT)
Dept: REHABILITATION | Facility: HOSPITAL | Age: 28
End: 2023-05-10
Payer: COMMERCIAL

## 2023-05-10 ENCOUNTER — OFFICE VISIT (OUTPATIENT)
Dept: SPORTS MEDICINE | Facility: CLINIC | Age: 28
End: 2023-05-10
Payer: COMMERCIAL

## 2023-05-10 DIAGNOSIS — R26.2 DIFFICULTY WALKING: ICD-10-CM

## 2023-05-10 DIAGNOSIS — M25.562 ACUTE PAIN OF LEFT KNEE: Primary | ICD-10-CM

## 2023-05-10 DIAGNOSIS — Z98.890 S/P ACL RECONSTRUCTION: Primary | ICD-10-CM

## 2023-05-10 DIAGNOSIS — M25.662 DECREASED RANGE OF MOTION OF LEFT KNEE: ICD-10-CM

## 2023-05-10 DIAGNOSIS — M62.81 QUADRICEPS WEAKNESS: ICD-10-CM

## 2023-05-10 PROCEDURE — 97530 THERAPEUTIC ACTIVITIES: CPT

## 2023-05-10 PROCEDURE — 99213 OFFICE O/P EST LOW 20 MIN: CPT | Mod: S$GLB,,, | Performed by: ORTHOPAEDIC SURGERY

## 2023-05-10 PROCEDURE — 99213 PR OFFICE/OUTPT VISIT, EST, LEVL III, 20-29 MIN: ICD-10-PCS | Mod: S$GLB,,, | Performed by: ORTHOPAEDIC SURGERY

## 2023-05-10 PROCEDURE — 97140 MANUAL THERAPY 1/> REGIONS: CPT

## 2023-05-10 PROCEDURE — 97112 NEUROMUSCULAR REEDUCATION: CPT

## 2023-05-10 PROCEDURE — 97110 THERAPEUTIC EXERCISES: CPT

## 2023-05-10 NOTE — PROGRESS NOTES
[facility]   Physical Therapy Treatment Note     Name: Billy Holbrook  [facility] Number: [id number]    Therapy Diagnosis:   Encounter Diagnoses   Name Primary?    Acute pain of left knee Yes    Decreased range of motion of left knee     Quadriceps weakness     Difficulty walking      Physician: PEACE Dempsey MD    Visit Date: 5/10/2023    Physician Orders: PT Eval and Treat   Medical Diagnosis from Referral: M25.562 (ICD-10-CM) - Acute pain of left knee  Evaluation Date: 9/7/2022  Authorization Period Expiration: 3/12/2024  Plan of Care Expiration: 12/31/2023  Visit # / Visits authorized: 143 total 39/40     Precautions: Standard     Time In: 12:00 pm  Time Out: 2:00  Total Billable Time:  120 minutes    Procedure: 9/20/2022  1. Left Knee Arthroscopy, anterior cruciate ligament reconstruction 52050  2.  Left Knee Arthroscopy, with meniscectomy (medial OR lateral) 92413  3.  Left Knee Arthroscopy, debridement/shaving of articular cartilage (chondroplasty) 15416  4.  Left Knee  platelet rich plasma injection to the bone-patellar tendon-bone harvest site    Post-Op Plan:  ACL reconstruction with a bone-patellar tendon-bone autograft    SUBJECTIVE     Pt reports: he is still feeling a little sore. Notes occasional anterior knee pain with forceful extension. OPT tomorrow. MD f/u went well today- discussed progress and clearance once testing shows the goals.     He was compliant with home exercise program.  Response to previous treatment: no issues; mild soreness  Functional change: jumping is improved    Pain: 0/10   Location: left knee     OBJECTIVE     7 Months 21 days post op    Wt. 320 lbs    Observation:   no effusion noted L knee with sweep test  Well healed scar  Atrophy in calf/quad on L    No TTP L HS     Range of Motion (extension/neutral/flexion):     Right Left   Knee ROM: 8-0-125/130 deg P:8-0-130 deg  A:8-0-125 deg      Strength: ([employer] machine)    Right Left Comment   Knee  Extension: 309.9 lbs (pre-surgical) 259.1 lbs    16.3% deficit    Knee Flexion: 192.8 lbs 169.6 lbs     12% deficit    Hip Abduction: 4+/5 4-/5       Running mechanics normalized at sprint and mild compensations with deceleration if quick  L hip ER limited 25% compared to R: he states chronic issue    SL hop distance limited; landing from jump limited with R weight shift and decreased loading on L LE    Mid quad girth: 18 cm proximal Patella  - R= 77 cm  - L= 74.5 cm    Treatment     Billy received the treatments listed below:      therapeutic exercises to develop strength, endurance, ROM, flexibility, posture, and core stabilization for 35 minutes including:  Education/assessment  Heel prop x 5 min at start / end 10 lbs above/below  Knee extension isometric 8 x 30 sec at 60 deg  Manual stretching linus LE and lumbar spine  SL leg press 4 x 5 linus R= 400 lbs: L= 340-360 lbs with good control/depth    manual therapy techniques: Joint mobilizations and Soft tissue Mobilization were applied for 15 minutes, including:  PFJ gr 3-4 glides all planes  Tibial AP glide gr 3-4 with and without rotation bias on and off heel prop  Knee hyperextension gr 3-4  Fat pad mobility    neuromuscular re-education activities to improve: Coordination, Kinesthetic, Sense, and Proprioception for 15 minutes. The following activities were included:  Landmine rotations 25-45 lbs 4 x 8 linus  Knee hyperextensions x 20 x 3 sec hold    Therapeutic activities to improve functional performance for 45 minutes, including:  SKTC, quad pull, HS scoop, HS march, high knee march, jogging butt kicks, jogging high knees, lunge, lateral lunge, band walks, monster walks, carioca > carioca w/ high knees, butt kicks, A skips, B skips, jogging x 4 laps  Landmine SL RDL to rotational lunge with OH press 4 x 5 linus 25->70 lbs added  RESS iso hold with medial/lateral med ball slams 10->20 lbs 4 x 10 linus        Patient Education and Home Exercises     Home Exercises  Provided and Patient Education Provided     Education provided:   - HEP update, precautions, activity pacing, goals, timeframes    Written Home Exercises Provided: yes. Exercises were reviewed and Billy was able to demonstrate them prior to the end of the session.  Billy demonstrated good  understanding of the education provided. See EMR under Patient Instructions for exercises provided during therapy sessions.     ASSESSMENT     Billy is doing well. Managing some mild anterior knee pad fat pad v PT. Mobilizations for full knee extension and isometrics help significantly. His knee stiffness some with increased loading, needs 3-5 minutes to regain full mobility. SL strength progressing steadily. Pt reported/demonstrated no adverse response or exacerbation of symptoms during today's session.      Billy Is progressing well towards his goals.   Pt prognosis is Good.     Pt will continue to benefit from skilled outpatient physical therapy to address the deficits listed in the problem list box on initial evaluation, provide pt/family education and to maximize pt's level of independence in the home and community environment.     Pt's spiritual, cultural and educational needs considered and pt agreeable to plan of care and goals.     Anticipated barriers to physical therapy: none    Goals:  Short Term Goals: 8-10 weeks   1. Pt will be compliant with HEP 50% of prescribed amount. MET  2. The pt to demo improvement in left knee ROM to equal right knee ROM pain free MET  3.  The pt to demo good quad set with proper hyperextension moment of the Left knee MET  4. The pt to demo ambualting with least restricitve AD without major compensatory pattern left knee for at least 20 feet MET     Long Term Goals: 56 weeks (progressing, not met)   Pt will be compliant with % of prescribed amount.   The pt to demo pain free and uncompensated running mechanics x10 min on an indoor treadmill MET  The pt to demo tolerance to a squat  at or bellow parallel with uncompensated mechanics x10 MET  The pt to demo strength of L LE within 10% of R LE as demo'd on the biodex machine   The pt to demo a deficit of 10% or less on a triple hop, single leg broad jump and crossover hop compared to non operative LE.   The pt will report full participation in ADLs and IADLs without restrictions related to L  knee.     PLAN     Transition to formal PT 3x/wk and performance training 2x/week starting next week.     Biodex in next week.    Manjit Lazcano, PT, DPT

## 2023-05-10 NOTE — PROGRESS NOTES
POST-OPERATIVE EXAMINATION    27 y.o. Male who returns for follow after surgery. He is 7.5 months s/p    Procedure:  1. Left Knee Arthroscopy, anterior cruciate ligament reconstruction 81414  2.  Left Knee Arthroscopy, with meniscectomy (medial OR lateral) 71348  3.  Left Knee Arthroscopy, debridement/shaving of articular cartilage (chondroplasty) 71694  4.  Left Knee  platelet rich plasma injection to the bone-patellar tendon-bone harvest site     He is doing well without any issues.       PHYSICAL EXAMINATION:  There were no vitals taken for this visit.  General: Well-developed well-nourished 27 y.o. malein no acute distress   Cardiovascular: Regular rhythm   Lungs: No labored breathing or wheezing appreciated   Neuro: Alert and oriented ×3   Psychiatric: well oriented to person, place and time, demonstrates normal mood and affect   Skin: No rashes, lesions or ulcers, normal temperature, turgor, and texture on involved extremity    ORTHOPEDIC EXAM:  Normal post-operative swelling  Normal post-operative scarring  Strength: WNL  ROM: 0-140  Tests: Negative Lachman's, Negative anterior drawer, Negative pivot shift, Negative Dav's    ASSESSMENT:      ICD-10-CM ICD-9-CM   1. S/P ACL reconstruction  Z98.890 V45.89       PLAN:       Continue physical therapy and strength training  Biodex testing pending for his clearance  I will see him back around his 9 month appointment

## 2023-05-11 ENCOUNTER — CLINICAL SUPPORT (OUTPATIENT)
Dept: REHABILITATION | Facility: HOSPITAL | Age: 28
End: 2023-05-11
Payer: COMMERCIAL

## 2023-05-11 DIAGNOSIS — M62.81 QUADRICEPS WEAKNESS: ICD-10-CM

## 2023-05-11 DIAGNOSIS — M25.662 DECREASED RANGE OF MOTION OF LEFT KNEE: ICD-10-CM

## 2023-05-11 DIAGNOSIS — M25.562 ACUTE PAIN OF LEFT KNEE: Primary | ICD-10-CM

## 2023-05-11 DIAGNOSIS — R26.2 DIFFICULTY WALKING: ICD-10-CM

## 2023-05-11 PROCEDURE — 97112 NEUROMUSCULAR REEDUCATION: CPT

## 2023-05-11 PROCEDURE — 97110 THERAPEUTIC EXERCISES: CPT

## 2023-05-11 PROCEDURE — 97140 MANUAL THERAPY 1/> REGIONS: CPT

## 2023-05-11 PROCEDURE — 97530 THERAPEUTIC ACTIVITIES: CPT

## 2023-05-12 ENCOUNTER — CLINICAL SUPPORT (OUTPATIENT)
Dept: REHABILITATION | Facility: HOSPITAL | Age: 28
End: 2023-05-12
Payer: COMMERCIAL

## 2023-05-12 DIAGNOSIS — R26.2 DIFFICULTY WALKING: ICD-10-CM

## 2023-05-12 DIAGNOSIS — M62.81 QUADRICEPS WEAKNESS: ICD-10-CM

## 2023-05-12 DIAGNOSIS — M25.662 DECREASED RANGE OF MOTION OF LEFT KNEE: ICD-10-CM

## 2023-05-12 DIAGNOSIS — M25.562 ACUTE PAIN OF LEFT KNEE: Primary | ICD-10-CM

## 2023-05-12 PROCEDURE — 97530 THERAPEUTIC ACTIVITIES: CPT

## 2023-05-12 PROCEDURE — 97140 MANUAL THERAPY 1/> REGIONS: CPT

## 2023-05-12 PROCEDURE — 97112 NEUROMUSCULAR REEDUCATION: CPT

## 2023-05-12 PROCEDURE — 97110 THERAPEUTIC EXERCISES: CPT

## 2023-05-12 NOTE — PROGRESS NOTES
[facility]   Physical Therapy Treatment Note     Name: Billy Holbrook  [facility] Number: [id number]    Therapy Diagnosis:   Encounter Diagnoses   Name Primary?    Acute pain of left knee Yes    Decreased range of motion of left knee     Quadriceps weakness     Difficulty walking        Physician: PEACE Dempsey MD    Visit Date: 5/12/2023    Physician Orders: PT Eval and Treat   Medical Diagnosis from Referral: M25.562 (ICD-10-CM) - Acute pain of left knee  Evaluation Date: 9/7/2022  Authorization Period Expiration: 3/12/2024  Plan of Care Expiration: 12/31/2023  Visit # / Visits authorized: 145 total 40/40     Precautions: Standard     Time In: 7:00 am  Time Out: 8:45  Total Billable Time:  105 minutes    Procedure: 9/20/2022  1. Left Knee Arthroscopy, anterior cruciate ligament reconstruction 32109  2.  Left Knee Arthroscopy, with meniscectomy (medial OR lateral) 49268  3.  Left Knee Arthroscopy, debridement/shaving of articular cartilage (chondroplasty) 70076  4.  Left Knee  platelet rich plasma injection to the bone-patellar tendon-bone harvest site    Post-Op Plan:  ACL reconstruction with a bone-patellar tendon-bone autograft    SUBJECTIVE     Pt reports: he is feeling good. Ready to exercise. Some mild anterior knee pain.     He was compliant with home exercise program.  Response to previous treatment: no issues; mild soreness  Functional change: jumping is improved    Pain: 0/10   Location: left knee     OBJECTIVE     7 Months 23 days post op    Wt. 320 lbs    Observation:   no effusion noted L knee with sweep test  Well healed scar  Atrophy in calf/quad on L    No TTP L HS, mild at PT/fat pad     Range of Motion (extension/neutral/flexion):     Right Left   Knee ROM: 8-0-125/130 deg P:8-0-130 deg  A:8-0-125 deg      Strength: (Biodex machine)    Right Left Comment   Knee Extension: 342.1 lbs (today) 266.6 lbs    22.1% deficit    Knee Flexion: 192.8 lbs  (Last test) 191.8 lbs      .05% deficit    Hip Abduction: 5/5 4+/5       SL hop distance: approx 85% LSI  Triple hop distance: approx 75% LSI  SL vertical: R= 12 in; L= 9.5 in  DL vertical 22 in  Y balance reach (ant, post med, postlat)  -R 66, 119, 112 cm  -L 59, 113, 106 cm    Mid quad girth: 18 cm proximal Patella  - R= 77 cm  - L= 74.5 cm    Treatment     Billy received the treatments listed below:      therapeutic exercises to develop strength, endurance, ROM, flexibility, posture, and core stabilization for 20 minutes including:  Education/assessment  Heel prop x 5 min at start / end 10 lbs above/below  LAQ 4 x 8 linus 115->145 lbs  SL leg press 4 x 5 linus R= 400 lbs: L= 340-360 lbs with good control/depth-NP    manual therapy techniques: Joint mobilizations and Soft tissue Mobilization were applied for 10 minutes, including:  PFJ gr 3-4 glides all planes  Tibial AP glide gr 3-4 with and without rotation bias on and off heel prop  Knee hyperextension gr 3-4  Fat pad mobility    neuromuscular re-education activities to improve: Coordination, Kinesthetic, Sense, and Proprioception for 25 minutes. The following activities were included:  Knee hyperextensions x 20 x 3 sec hold  Standing clamshell x 30 linus BTB at knees  SL sit to stands from 24->20 inch box x 30 linus  Y balance reach 3 planes linus    Therapeutic activities to improve functional performance for 60 minutes, including:  SKTC, quad pull, HS scoop, HS march, high knee march, jogging butt kicks, jogging high knees, lunge, lateral lunge, band walks, monster walks, carioca > carioca w/ high knees, butt kicks, A skips, B skips, jogging x 4 laps  X 6 10 yd sprints: reactive to ball snap  12 inch heel taps 3 x 10 linus  Ladder drills-NP  Box jumps 24->30 inch 2 x 10: DL  12 inch box jump SL take off to DL landing 2 x 10  DL and SL jumping distance and vertical x 15 each  Triple hop linus x 8 rds linus  Retro sled pull for TKE 3 x 30 yds with 180 lbs added    Patient Education and Home Exercises      Home Exercises Provided and Patient Education Provided     Education provided:   - HEP update, precautions, activity pacing, goals, timeframes    Written Home Exercises Provided: yes. Exercises were reviewed and Billy was able to demonstrate them prior to the end of the session.  Billy demonstrated good  understanding of the education provided. See EMR under Patient Instructions for exercises provided during therapy sessions.     ASSESSMENT     Billy was able to progress his SL hoping and DL plyometrics today. His depth of SL squat remains limited on L versus R with some uncontrolled valgum. The valgum moment improved with banded ER standing. Y balance shows deficits in reach especially anterior into SL squat. Pt reported/demonstrated no adverse response or exacerbation of symptoms during today's session.      Billy Is progressing well towards his goals.   Pt prognosis is Good.     Pt will continue to benefit from skilled outpatient physical therapy to address the deficits listed in the problem list box on initial evaluation, provide pt/family education and to maximize pt's level of independence in the home and community environment.     Pt's spiritual, cultural and educational needs considered and pt agreeable to plan of care and goals.     Anticipated barriers to physical therapy: none    Goals:  Short Term Goals: 8-10 weeks   1. Pt will be compliant with HEP 50% of prescribed amount. MET  2. The pt to demo improvement in left knee ROM to equal right knee ROM pain free MET  3.  The pt to demo good quad set with proper hyperextension moment of the Left knee MET  4. The pt to demo ambualting with least restricitve AD without major compensatory pattern left knee for at least 20 feet MET     Long Term Goals: 56 weeks (progressing, not met)   Pt will be compliant with % of prescribed amount.   The pt to demo pain free and uncompensated running mechanics x10 min on an indoor treadmill MET  The pt to demo  tolerance to a squat at or bellow parallel with uncompensated mechanics x10 MET  The pt to demo strength of L LE within 10% of R LE as demo'd on the biodex machine   The pt to demo a deficit of 10% or less on a triple hop, single leg broad jump and crossover hop compared to non operative LE.   The pt will report full participation in ADLs and IADLs without restrictions related to L  knee.     PLAN     Continue formal PT 3x/wk and performance training 2x/week starting next week.     Biodex in 4 wks.     Manjit Lazcano, PT, DPT

## 2023-05-16 ENCOUNTER — CLINICAL SUPPORT (OUTPATIENT)
Dept: REHABILITATION | Facility: HOSPITAL | Age: 28
End: 2023-05-16
Payer: COMMERCIAL

## 2023-05-16 DIAGNOSIS — R26.2 DIFFICULTY WALKING: ICD-10-CM

## 2023-05-16 DIAGNOSIS — M62.81 QUADRICEPS WEAKNESS: ICD-10-CM

## 2023-05-16 DIAGNOSIS — M25.662 DECREASED RANGE OF MOTION OF LEFT KNEE: ICD-10-CM

## 2023-05-16 DIAGNOSIS — M25.562 ACUTE PAIN OF LEFT KNEE: Primary | ICD-10-CM

## 2023-05-16 PROCEDURE — 97140 MANUAL THERAPY 1/> REGIONS: CPT

## 2023-05-16 PROCEDURE — 97530 THERAPEUTIC ACTIVITIES: CPT

## 2023-05-16 PROCEDURE — 97112 NEUROMUSCULAR REEDUCATION: CPT

## 2023-05-16 PROCEDURE — 97110 THERAPEUTIC EXERCISES: CPT

## 2023-05-17 ENCOUNTER — DOCUMENTATION ONLY (OUTPATIENT)
Dept: REHABILITATION | Facility: HOSPITAL | Age: 28
End: 2023-05-17
Payer: COMMERCIAL

## 2023-05-17 NOTE — PROGRESS NOTES
[facility]   Physical Therapy Treatment Note     Name: Billy Holbrook  [facility] Number: [id number]    Therapy Diagnosis:   Encounter Diagnoses   Name Primary?    Acute pain of left knee Yes    Decreased range of motion of left knee     Quadriceps weakness     Difficulty walking        Physician: No ref. provider found    Visit Date: 5/16/2023    Physician Orders: PT Eval and Treat   Medical Diagnosis from Referral: M25.562 (ICD-10-CM) - Acute pain of left knee  Evaluation Date: 9/7/2022  Authorization Period Expiration: 3/12/2024  Plan of Care Expiration: 12/31/2023  Visit # / Visits authorized: 146 total 41/40     Precautions: Standard     Time In: 3:00 pm  Time Out: 4:47  Total Billable Time:  107 minutes    Procedure: 9/20/2022  1. Left Knee Arthroscopy, anterior cruciate ligament reconstruction 97327  2.  Left Knee Arthroscopy, with meniscectomy (medial OR lateral) 28109  3.  Left Knee Arthroscopy, debridement/shaving of articular cartilage (chondroplasty) 04255  4.  Left Knee  platelet rich plasma injection to the bone-patellar tendon-bone harvest site    Post-Op Plan:  ACL reconstruction with a bone-patellar tendon-bone autograft    SUBJECTIVE     Pt reports: he is feeling good. Yesterday was a good workout. Not sore.     He was compliant with home exercise program.  Response to previous treatment: no issues; mild soreness  Functional change: jumping is improved    Pain: 0/10   Location: left knee     OBJECTIVE     7 Months 27 days post op    Wt. 320 lbs    Observation:   no effusion noted L knee with sweep test  Well healed scar  Atrophy in calf/quad on L    No TTP L HS, mild at PT/fat pad     Range of Motion (extension/neutral/flexion):     Right Left   Knee ROM: 8-0-125/130 deg P:8-0-130 deg  A:8-0-125 deg      Strength: (Biodex machine)    Right Left Comment   Knee Extension: 342.1 lbs (today) 266.6 lbs    22.1% deficit    Knee Flexion: 192.8 lbs  (Last test) 191.8 lbs     .05%  deficit    Hip Abduction: 5/5 4+/5       SL hop distance: approx 85% LSI  Triple hop distance: approx 75% LSI  SL vertical: R= 12 in; L= 9.5 in  DL vertical 22 in  Y balance reach (ant, post med, postlat)  -R 66, 119, 112 cm  -L 59, 113, 106 cm    Mid quad girth: 18 cm proximal Patella  - R= 77 cm  - L= 74.5 cm    Treatment     Billy received the treatments listed below:      therapeutic exercises to develop strength, endurance, ROM, flexibility, posture, and core stabilization for 15 minutes including:  Education/assessment  Heel prop x 5 min at start / end 10 lbs above/below  SL leg press 3 x 10 linus R= 300 lbs: L= 260 lbs with good control/depth    manual therapy techniques: Joint mobilizations and Soft tissue Mobilization were applied for 10 minutes, including:  PFJ gr 3-4 glides all planes  Tibial AP glide gr 3-4 with and without rotation bias on and off heel prop  Knee hyperextension gr 3-4  Fat pad mobility    neuromuscular re-education activities to improve: Coordination, Kinesthetic, Sense, and Proprioception for 12 minutes. The following activities were included:  Knee hyperextensions x 20 x 3 sec hold  Y balance reach 3 planes linus    Therapeutic activities to improve functional performance for 70 minutes, including:  SKTC, quad pull, HS scoop, HS march, high knee march, jogging butt kicks, jogging high knees, lunge, lateral lunge, band walks, monster walks, carioca > carioca w/ high knees, butt kicks, A skips, B skips, jogging x 4 laps  20 inch speed step up/down 3 x 10 linus  Triple hop distance x 10 linus  12->20 inch SL box jump to DL landing x 15 linus  Fwd lunge: power focus concentric with eccentric control 3 x 10 linus    Patient Education and Home Exercises     Home Exercises Provided and Patient Education Provided     Education provided:   - HEP update, precautions, activity pacing, goals, timeframes    Written Home Exercises Provided: yes. Exercises were reviewed and Billy was able to demonstrate them  prior to the end of the session.  Billy demonstrated good  understanding of the education provided. See EMR under Patient Instructions for exercises provided during therapy sessions.     ASSESSMENT     Billy did well today. Focusing on plyometrics and strength/control in deeper knee angles today in SL. He has difficulty generating force and speed from deeper angles as well as controlling dynamic valgum at that depth. His emchanics and form improved in session. Pt reported/demonstrated no adverse response or exacerbation of symptoms during today's session.      Billy Is progressing well towards his goals.   Pt prognosis is Good.     Pt will continue to benefit from skilled outpatient physical therapy to address the deficits listed in the problem list box on initial evaluation, provide pt/family education and to maximize pt's level of independence in the home and community environment.     Pt's spiritual, cultural and educational needs considered and pt agreeable to plan of care and goals.     Anticipated barriers to physical therapy: none    Goals:  Short Term Goals: 8-10 weeks   1. Pt will be compliant with HEP 50% of prescribed amount. MET  2. The pt to demo improvement in left knee ROM to equal right knee ROM pain free MET  3.  The pt to demo good quad set with proper hyperextension moment of the Left knee MET  4. The pt to demo ambualting with least restricitve AD without major compensatory pattern left knee for at least 20 feet MET     Long Term Goals: 56 weeks (progressing, not met)   Pt will be compliant with % of prescribed amount.   The pt to demo pain free and uncompensated running mechanics x10 min on an indoor treadmill MET  The pt to demo tolerance to a squat at or bellow parallel with uncompensated mechanics x10 MET  The pt to demo strength of L LE within 10% of R LE as demo'd on the biodex machine   The pt to demo a deficit of 10% or less on a triple hop, single leg broad jump and crossover  hop compared to non operative LE.   The pt will report full participation in ADLs and IADLs without restrictions related to L  knee.     PLAN     Continue formal PT 2x/wk and performance training 3x/week starting next week.     Biodex in 3 wks.     Manjit Lazcano, PT, DPT

## 2023-05-17 NOTE — PROGRESS NOTES
Billy did not show for his 11 am appt today. He did not respond to myself until almost 1pm, 2 hours later. I offered him an appt later in the day which he declined. [place] 9[place].

## 2023-05-23 ENCOUNTER — CLINICAL SUPPORT (OUTPATIENT)
Dept: REHABILITATION | Facility: HOSPITAL | Age: 28
End: 2023-05-23
Payer: COMMERCIAL

## 2023-05-23 DIAGNOSIS — M62.81 QUADRICEPS WEAKNESS: ICD-10-CM

## 2023-05-23 DIAGNOSIS — M25.662 DECREASED RANGE OF MOTION OF LEFT KNEE: ICD-10-CM

## 2023-05-23 DIAGNOSIS — R26.2 DIFFICULTY WALKING: ICD-10-CM

## 2023-05-23 DIAGNOSIS — M25.562 ACUTE PAIN OF LEFT KNEE: Primary | ICD-10-CM

## 2023-05-23 PROCEDURE — 97112 NEUROMUSCULAR REEDUCATION: CPT

## 2023-05-23 PROCEDURE — 97110 THERAPEUTIC EXERCISES: CPT

## 2023-05-23 PROCEDURE — 97530 THERAPEUTIC ACTIVITIES: CPT

## 2023-05-23 PROCEDURE — 97140 MANUAL THERAPY 1/> REGIONS: CPT

## 2023-05-23 NOTE — PROGRESS NOTES
[facility]   Physical Therapy Treatment Note     Name: Billy Holbrook  [facility] Number: [id number]    Therapy Diagnosis:   Encounter Diagnoses   Name Primary?    Acute pain of left knee Yes    Decreased range of motion of left knee     Quadriceps weakness     Difficulty walking      Physician: No ref. provider found    Visit Date: 5/23/2023    Physician Orders: PT Eval and Treat   Medical Diagnosis from Referral: M25.562 (ICD-10-CM) - Acute pain of left knee  Evaluation Date: 9/7/2022  Authorization Period Expiration: 3/12/2024  Plan of Care Expiration: 12/31/2023  Visit # / Visits authorized: 147 total      Precautions: Standard     Time In: 11:15 pm  Time Out: 12:30  Total Billable Time:  75 minutes    Procedure: 9/20/2022  1. Left Knee Arthroscopy, anterior cruciate ligament reconstruction 86250  2.  Left Knee Arthroscopy, with meniscectomy (medial OR lateral) 22098  3.  Left Knee Arthroscopy, debridement/shaving of articular cartilage (chondroplasty) 05283  4.  Left Knee  platelet rich plasma injection to the bone-patellar tendon-bone harvest site    Post-Op Plan:  ACL reconstruction with a bone-patellar tendon-bone autograft    SUBJECTIVE     Pt reports: he is feeling good. [place]. Gaining confidence in his knee and getting lower with directional changes.     He was compliant with home exercise program.  Response to previous treatment: no issues; mild soreness  Functional change: jumping is improved    Pain: 0/10   Location: left knee     OBJECTIVE     8 Months 3 days post op    Wt. 320 lbs    Observation:   no effusion noted L knee with sweep test  Well healed scar  Atrophy in calf/quad on L    No TTP L HS, mild at PT/fat pad     Range of Motion (extension/neutral/flexion):     Right Left   Knee ROM: 8-0-125/130 deg P:8-0-130 deg  A:8-0-125 deg      Strength: (Biodex machine)    Right Left Comment   Knee Extension: 342.1 lbs (today) 266.6 lbs    22.1%  deficit    Knee Flexion: 192.8 lbs  (Last test) 191.8 lbs     .05% deficit    Hip Abduction: 5/5 4+/5       SL hop distance: approx 85% LSI  Triple hop distance: approx 75% LSI  SL vertical: R= 12 in; L= 9.5 in  DL vertical 22 in  Y balance reach (ant, post med, postlat)  -R 56, 119, 112 cm  -L 52, 113, 106 cm    Mid quad girth: 18 cm proximal Patella  - R= 77 cm  - L= 74.5 cm    Treatment     Billy received the treatments listed below:      therapeutic exercises to develop strength, endurance, ROM, flexibility, posture, and core stabilization for 35 minutes including:  Education/assessment  Heel prop x 5 min at start / end 10 lbs above/below  SL leg press 3 x 10 linus R= 300 lbs: L= 260 lbs with good control/depth-NP  Biodex 60 and 180 deg training 5 sets each speed on L: up to 305 lbs at 60 deg per second    manual therapy techniques: Joint mobilizations and Soft tissue Mobilization were applied for 10 minutes, including:  PFJ gr 3-4 glides all planes  Tibial AP glide gr 3-4 with and without rotation bias on and off heel prop  Knee hyperextension gr 3-4  Fat pad mobility    neuromuscular re-education activities to improve: Coordination, Kinesthetic, Sense, and Proprioception for 10 minutes. The following activities were included:  Knee hyperextensions x 20 x 3 sec hold  Y balance anterior reach x 15 linus; within 4 cm once controlled for heel lift and balance    Therapeutic activities to improve functional performance for 20 minutes, including:  SKTC, quad pull, HS scoop, HS march, high knee march, jogging butt kicks, jogging high knees, lunge, lateral lunge, band walks, monster walks, carioca > carioca w/ high knees, butt kicks, A skips, B skips, jogging x 4 laps    Patient Education and Home Exercises     Home Exercises Provided and Patient Education Provided     Education provided:   - HEP update, precautions, activity pacing, goals, timeframes    Written Home Exercises Provided: yes. Exercises were reviewed and  Billy was able to demonstrate them prior to the end of the session.  Billy demonstrated good  understanding of the education provided. See EMR under Patient Instructions for exercises provided during therapy sessions.     ASSESSMENT     Billy reports good progress in performance training 3x/week for last 2 weeks. Biodex exercise revealed an increase in his knee extension peak torque each of the 5 sets ending at 305 lbs which puts him at <11% deficit to R from last test.  Pt reported/demonstrated no adverse response or exacerbation of symptoms during today's session.      Billy Is progressing well towards his goals.   Pt prognosis is Good.     Pt will continue to benefit from skilled outpatient physical therapy to address the deficits listed in the problem list box on initial evaluation, provide pt/family education and to maximize pt's level of independence in the home and community environment.     Pt's spiritual, cultural and educational needs considered and pt agreeable to plan of care and goals.     Anticipated barriers to physical therapy: none    Goals:  Short Term Goals: 8-10 weeks   1. Pt will be compliant with HEP 50% of prescribed amount. MET  2. The pt to demo improvement in left knee ROM to equal right knee ROM pain free MET  3.  The pt to demo good quad set with proper hyperextension moment of the Left knee MET  4. The pt to demo ambualting with least restricitve AD without major compensatory pattern left knee for at least 20 feet MET     Long Term Goals: 56 weeks (progressing, not met)   Pt will be compliant with % of prescribed amount.   The pt to demo pain free and uncompensated running mechanics x10 min on an indoor treadmill MET  The pt to demo tolerance to a squat at or bellow parallel with uncompensated mechanics x10 MET  The pt to demo strength of L LE within 10% of R LE as demo'd on the biodex machine   The pt to demo a deficit of 10% or less on a triple hop, single leg broad jump and  crossover hop compared to non operative LE.   The pt will report full participation in ADLs and IADLs without restrictions related to L  knee.     PLAN     Continue formal PT 2x/wk and performance training 3x/week starting next week.     Biodex in 3 wks.     Manjit Lazcano, PT, DPT

## 2023-05-26 ENCOUNTER — CLINICAL SUPPORT (OUTPATIENT)
Dept: REHABILITATION | Facility: HOSPITAL | Age: 28
End: 2023-05-26
Payer: COMMERCIAL

## 2023-05-26 DIAGNOSIS — M25.562 ACUTE PAIN OF LEFT KNEE: Primary | ICD-10-CM

## 2023-05-26 DIAGNOSIS — R26.2 DIFFICULTY WALKING: ICD-10-CM

## 2023-05-26 DIAGNOSIS — M62.81 QUADRICEPS WEAKNESS: ICD-10-CM

## 2023-05-26 DIAGNOSIS — M25.662 DECREASED RANGE OF MOTION OF LEFT KNEE: ICD-10-CM

## 2023-05-26 PROCEDURE — 97016 VASOPNEUMATIC DEVICE THERAPY: CPT

## 2023-05-26 PROCEDURE — 97140 MANUAL THERAPY 1/> REGIONS: CPT

## 2023-05-26 PROCEDURE — 97110 THERAPEUTIC EXERCISES: CPT

## 2023-05-26 PROCEDURE — 97530 THERAPEUTIC ACTIVITIES: CPT

## 2023-05-26 PROCEDURE — 97112 NEUROMUSCULAR REEDUCATION: CPT

## 2023-05-26 NOTE — PROGRESS NOTES
[facility]   Physical Therapy Treatment Note     Name: Billy Holbrook  [facility] Number: [id number]    Therapy Diagnosis:   Encounter Diagnoses   Name Primary?    Acute pain of left knee Yes    Decreased range of motion of left knee     Quadriceps weakness     Difficulty walking      Physician: PEACE Dempsey MD    Visit Date: 5/26/2023    Physician Orders: PT Eval and Treat   Medical Diagnosis from Referral: M25.562 (ICD-10-CM) - Acute pain of left knee  Evaluation Date: 9/7/2022  Authorization Period Expiration: 3/12/2024  Plan of Care Expiration: 12/31/2023  Visit # / Visits authorized: 148 total      Precautions: Standard     Time In: 8:00 am  Time Out: 9:30  Total Billable Time:  90 minutes    Procedure: 9/20/2022  1. Left Knee Arthroscopy, anterior cruciate ligament reconstruction 69874  2.  Left Knee Arthroscopy, with meniscectomy (medial OR lateral) 70993  3.  Left Knee Arthroscopy, debridement/shaving of articular cartilage (chondroplasty) 99524  4.  Left Knee  platelet rich plasma injection to the bone-patellar tendon-bone harvest site    Post-Op Plan:  ACL reconstruction with a bone-patellar tendon-bone autograft    SUBJECTIVE     Pt reports: he is sore today from 2 days in a row at [employer]. Knee feels good though. Still gaining confidence. Will try to use [employer] for sled next week with [employer].     He was compliant with home exercise program.  Response to previous treatment: no issues; mild soreness  Functional change: jumping is improved    Pain: 0/10   Location: left knee     OBJECTIVE     8 Months 6 days post op    Wt. 320 lbs    Observation:   no effusion noted L knee with sweep test  Well healed scar  Atrophy in calf/quad on L    No TTP L HS, mild at PT/fat pad     Range of Motion (extension/neutral/flexion):     Right Left   Knee ROM: 8-0-125/130 deg P:8-0-130 deg  A:8-0-125 deg      Strength: (Biodex machine)    Right Left Comment   Knee Extension: 342.1 lbs  (today) 266.6 lbs    22.1% deficit    Knee Flexion: 192.8 lbs  (Last test) 191.8 lbs     .05% deficit    Hip Abduction: 5/5 4+/5       SL hop distance: approx 85% LSI  Triple hop distance: approx 75% LSI  SL vertical: R= 12 in; L= 9.5 in  DL vertical 22 in  Y balance reach (ant, post med, postlat)  -R 56, 119, 112 cm  -L 52, 113, 106 cm    Mid quad girth: 18 cm proximal Patella  - R= 77 cm  - L= 74.5 cm    Treatment     Billy received the treatments listed below:      therapeutic exercises to develop strength, endurance, ROM, flexibility, posture, and core stabilization for 50 minutes including:  Education/assessment  Heel prop x 5 min at start / end 10 lbs above/below  SL leg press 3 x 10 linus R= 300 lbs: L= 260 lbs with good control/depth-NP  Knee extension isometric at 90 deg 8 x 30 sec on/30 sec off  BFR IPC program x 25 min linus LE's  Manual LE stretching linus      manual therapy techniques: Joint mobilizations and Soft tissue Mobilization were applied for 10 minutes, including:  PFJ gr 3-4 glides all planes  Tibial AP glide gr 3-4 with and without rotation bias on and off heel prop  Knee hyperextension gr 3-4  Fat pad mobility    neuromuscular re-education activities to improve: Coordination, Kinesthetic, Sense, and Proprioception for 10 minutes. The following activities were included:  Knee hyperextensions x 20 x 3 sec hold  Y balance anterior reach x 15 linus; within 4 cm once controlled for heel lift and balance    Therapeutic activities to improve functional performance for 20 minutes, including:  SKTC, quad pull, HS scoop, HS march, high knee march, jogging butt kicks, jogging high knees, lunge, lateral lunge, band walks, monster walks, carioca > carioca w/ high knees, butt kicks, A skips, B skips, jogging x 4 laps    Patient Education and Home Exercises     Home Exercises Provided and Patient Education Provided     Education provided:   - HEP update, precautions, activity pacing, goals, timeframes    Written  Home Exercises Provided: yes. Exercises were reviewed and Billy was able to demonstrate them prior to the end of the session.  Billy demonstrated good  understanding of the education provided. See EMR under Patient Instructions for exercises provided during therapy sessions.     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 Spent the day focusing on active recovery, stretching, and IPC with BFR.  He is progressing well. Attempting to find a sled at [facility] [employer] that he can work at with [employer] and [employer]; this seems to be last piece to work on prior to ramping up intensity preparing for workouts with teams. Pt reported/demonstrated no adverse response or exacerbation of symptoms during today's session.      Billy Is progressing well towards his goals.   Pt prognosis is Good.     Pt will continue to benefit from skilled outpatient physical therapy to address the deficits listed in the problem list box on initial evaluation, provide pt/family education and to maximize pt's level of independence in the home and community environment.     Pt's spiritual, cultural and educational needs considered and pt agreeable to plan of care and goals.     Anticipated barriers to physical therapy: none    Goals:  Short Term Goals: 8-10 weeks   1. Pt will be compliant with HEP 50% of prescribed amount. MET  2. The pt to demo improvement in left knee ROM to equal right knee ROM pain free MET  3.  The pt to demo good quad set with proper hyperextension moment of the Left knee MET  4. The pt to demo ambualting with least restricitve AD without major compensatory pattern left knee for at least 20 feet MET     Long Term Goals: 56 weeks (progressing, not met)   Pt will be compliant with % of prescribed amount.   The pt to demo pain free and uncompensated running mechanics x10 min on an indoor treadmill MET  The pt to demo tolerance to a squat at or bellow parallel with uncompensated  mechanics x10 MET  The pt to demo strength of L LE within 10% of R LE as demo'd on the biodex machine   The pt to demo a deficit of 10% or less on a triple hop, single leg broad jump and crossover hop compared to non operative LE.   The pt will report full participation in ADLs and IADLs without restrictions related to L  knee.     PLAN     Continue formal PT 2x/wk and performance training 3x/week starting next week.     Biodex in 2 wks.     Manjit Lazcano, PT, DPT

## 2023-06-01 ENCOUNTER — CLINICAL SUPPORT (OUTPATIENT)
Dept: REHABILITATION | Facility: HOSPITAL | Age: 28
End: 2023-06-01
Payer: COMMERCIAL

## 2023-06-01 DIAGNOSIS — M25.662 DECREASED RANGE OF MOTION OF LEFT KNEE: ICD-10-CM

## 2023-06-01 DIAGNOSIS — M62.81 QUADRICEPS WEAKNESS: ICD-10-CM

## 2023-06-01 DIAGNOSIS — R26.2 DIFFICULTY WALKING: ICD-10-CM

## 2023-06-01 DIAGNOSIS — M25.562 ACUTE PAIN OF LEFT KNEE: Primary | ICD-10-CM

## 2023-06-01 PROCEDURE — 97112 NEUROMUSCULAR REEDUCATION: CPT

## 2023-06-01 PROCEDURE — 97140 MANUAL THERAPY 1/> REGIONS: CPT

## 2023-06-01 PROCEDURE — 97110 THERAPEUTIC EXERCISES: CPT

## 2023-06-01 PROCEDURE — 97530 THERAPEUTIC ACTIVITIES: CPT

## 2023-06-01 NOTE — PROGRESS NOTES
[facility]   Physical Therapy Treatment Note     Name: Billy Holbrook  [facility] Number: [id number]    Therapy Diagnosis:   Encounter Diagnoses   Name Primary?    Acute pain of left knee Yes    Decreased range of motion of left knee     Quadriceps weakness     Difficulty walking        Physician: No ref. provider found    Visit Date: 6/1/2023    Physician Orders: PT Eval and Treat   Medical Diagnosis from Referral: M25.562 (ICD-10-CM) - Acute pain of left knee  Evaluation Date: 9/7/2022  Authorization Period Expiration: 3/12/2024  Plan of Care Expiration: 12/31/2023  Visit # / Visits authorized: 149 total      Precautions: Standard     Time In: 10:00 am  Time Out: 11:30  Total Billable Time:  90 minutes    Procedure: 9/20/2022  1. Left Knee Arthroscopy, anterior cruciate ligament reconstruction 70777  2.  Left Knee Arthroscopy, with meniscectomy (medial OR lateral) 65421  3.  Left Knee Arthroscopy, debridement/shaving of articular cartilage (chondroplasty) 14985  4.  Left Knee  platelet rich plasma injection to the bone-patellar tendon-bone harvest site    Post-Op Plan:  ACL reconstruction with a bone-patellar tendon-bone autograft    SUBJECTIVE     Pt reports: he is sore today from 2 days in a row at [employer]. Knee feels good though. Still gaining confidence. Will try to use [employer] for sled next week with [employer]. May have visit with a team from 12th-15th.     He was compliant with home exercise program.  Response to previous treatment: no issues; mild soreness  Functional change: jumping is improved    Pain: 0/10   Location: left knee     OBJECTIVE     8 Months 12 days post op    Wt. 320 lbs    Observation:   no effusion noted L knee with sweep test  Well healed scar  Atrophy in calf/quad on L    No TTP L HS, mild at PT/fat pad     Range of Motion (extension/neutral/flexion):     Right Left   Knee ROM: 8-0-125/130 deg P:8-0-130 deg  A:8-0-125 deg      Strength: ([employer] machine)     Right Left Comment   Knee Extension: 342.1 lbs 266.6 lbs    22.1% deficit    Knee Flexion: 192.8 lbs 191.8 lbs     .05% deficit    Hip Abduction: 5/5 4+/5       SL hop distance: R= 116.84, 167.64, 175.26 cm, L= 165.1, 167.64 cm (8% deficit)  Triple hop distance: R= 492.76, 523.24 cm, L= 469.9, 482.6 cm (6.75% deficit)  SL vertical: R= 12 in; L= 9.5 in (22% deficit)  DL vertical 22 in  Y balance reach (ant, post med, postlat)  -R 56, 119, 112 cm  -L 52, 113, 106 cm    Mid quad girth: 18 cm proximal Patella  - R= 77 cm  - L= 74.5 cm    Treatment     Billy received the treatments listed below:      therapeutic exercises to develop strength, endurance, ROM, flexibility, posture, and core stabilization for 45 minutes including:  Education/assessment  Heel prop x 5 min at start / end 10 lbs above/below  SL leg press 3 x 10 linus R= 300 lbs: L= 260 lbs with good control/depth-NP  Biodex training 5 x 5 each 60 and 180 deg/sec (up to 302.1 lbs)  Manual LE stretching linus      manual therapy techniques: Joint mobilizations and Soft tissue Mobilization were applied for 10 minutes, including:  PFJ gr 3-4 glides all planes  Tibial AP glide gr 3-4 with and without rotation bias on and off heel prop  Knee hyperextension gr 3-4  Fat pad mobility    neuromuscular re-education activities to improve: Coordination, Kinesthetic, Sense, and Proprioception for 10 minutes. The following activities were included:  Knee hyperextensions x 20 x 3 sec hold  Y balance anterior reach x 15 linus; within 4 cm once controlled for heel lift and balance    Therapeutic activities to improve functional performance for 25 minutes, including:  SKTC, quad pull, HS scoop, HS march, high knee march, jogging butt kicks, jogging high knees, lunge, lateral lunge, band walks, monster walks, carioca > carioca w/ high knees, butt kicks, A skips, B skips, jogging x 4 laps  Hop testing    Patient Education and Home Exercises     Home Exercises Provided and Patient  Education Provided     Education provided:   - HEP update, precautions, activity pacing, goals, timeframes    Written Home Exercises Provided: yes. Exercises were reviewed and Billy was able to demonstrate them prior to the end of the session.  Billy demonstrated good  understanding of the education provided. See EMR under Patient Instructions for exercises provided during therapy sessions.     ASSESSMENT     Billy is doing well. Hop testing was within 10% LSI today, however a reduced knee flexion moment noted on L versus R as well as a neuromuscular control deficit. His strength is progressing vis biodex to 302.1 lbs today after hop testing fatigue. 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 Dr. Castaneda is not ready to fully clear him yet based on timeframes and most recent data. Pt reported/demonstrated no adverse response or exacerbation of symptoms during today's session.      Billy Is progressing well towards his goals.   Pt prognosis is Good.     Pt will continue to benefit from skilled outpatient physical therapy to address the deficits listed in the problem list box on initial evaluation, provide pt/family education and to maximize pt's level of independence in the home and community environment.     Pt's spiritual, cultural and educational needs considered and pt agreeable to plan of care and goals.     Anticipated barriers to physical therapy: none    Goals:  Short Term Goals: 8-10 weeks   1. Pt will be compliant with HEP 50% of prescribed amount. MET  2. The pt to demo improvement in left knee ROM to equal right knee ROM pain free MET  3.  The pt to demo good quad set with proper hyperextension moment of the Left knee MET  4. The pt to demo ambualting with least restricitve AD without major compensatory pattern left knee for at least 20 feet MET     Long Term Goals: 56 weeks (progressing, not met)   Pt will be compliant with % of prescribed  amount.   The pt to demo pain free and uncompensated running mechanics x10 min on an indoor treadmill MET  The pt to demo tolerance to a squat at or bellow parallel with uncompensated mechanics x10 MET  The pt to demo strength of L LE within 10% of R LE as demo'd on the biodex machine   The pt to demo a deficit of 10% or less on a triple hop, single leg broad jump and crossover hop compared to non operative LE.   The pt will report full participation in ADLs and IADLs without restrictions related to L  knee.     PLAN     Continue formal PT 2x/wk and performance training 3x/week starting next week.     Biodex in 2 wks.     Manjit Lazcano, PT, DPT

## 2023-06-06 ENCOUNTER — TELEPHONE (OUTPATIENT)
Dept: SPORTS MEDICINE | Facility: CLINIC | Age: 28
End: 2023-06-06
Payer: COMMERCIAL

## 2023-06-06 ENCOUNTER — CLINICAL SUPPORT (OUTPATIENT)
Dept: REHABILITATION | Facility: HOSPITAL | Age: 28
End: 2023-06-06
Payer: COMMERCIAL

## 2023-06-06 DIAGNOSIS — M25.562 ACUTE PAIN OF LEFT KNEE: Primary | ICD-10-CM

## 2023-06-06 DIAGNOSIS — Z98.890 S/P ACL RECONSTRUCTION: Primary | ICD-10-CM

## 2023-06-06 DIAGNOSIS — Z98.890 S/P ACL RECONSTRUCTION: ICD-10-CM

## 2023-06-06 DIAGNOSIS — M25.562 ACUTE PAIN OF LEFT KNEE: ICD-10-CM

## 2023-06-06 DIAGNOSIS — R26.2 DIFFICULTY WALKING: ICD-10-CM

## 2023-06-06 DIAGNOSIS — M62.81 QUADRICEPS WEAKNESS: ICD-10-CM

## 2023-06-06 DIAGNOSIS — M25.662 DECREASED RANGE OF MOTION OF LEFT KNEE: ICD-10-CM

## 2023-06-06 PROCEDURE — 97110 THERAPEUTIC EXERCISES: CPT

## 2023-06-06 PROCEDURE — 97530 THERAPEUTIC ACTIVITIES: CPT

## 2023-06-06 PROCEDURE — 97140 MANUAL THERAPY 1/> REGIONS: CPT

## 2023-06-06 NOTE — PROGRESS NOTES
[facility]   Physical Therapy Treatment Note     Name: Billy Holbrook  [facility] Number: [id number]    Therapy Diagnosis:   Encounter Diagnoses   Name Primary?    Acute pain of left knee Yes    Decreased range of motion of left knee     Quadriceps weakness     Difficulty walking      Physician: No ref. provider found    Visit Date: 6/6/2023    Physician Orders: PT Eval and Treat   Medical Diagnosis from Referral: M25.562 (ICD-10-CM) - Acute pain of left knee  Evaluation Date: 9/7/2022  Authorization Period Expiration: 3/12/2024  Plan of Care Expiration: 12/31/2023  Visit # / Visits authorized: 150 total      Precautions: Standard     Time In: 11:00 am  Time Out: 12:30  Total Billable Time:  90 minutes    Procedure: 9/20/2022  1. Left Knee Arthroscopy, anterior cruciate ligament reconstruction [id number]  2.  Left Knee Arthroscopy, with meniscectomy (medial OR lateral) 03985  3.  Left Knee Arthroscopy, debridement/shaving of articular cartilage (chondroplasty) 93721  4.  Left Knee  platelet rich plasma injection to the bone-patellar tendon-bone harvest site    Post-Op Plan:  ACL reconstruction with a bone-patellar tendon-bone autograft    SUBJECTIVE     Pt reports: he feels good. Has flight booked for trip to [facility] on 11-15th. Not planning to workout. Needs to set up call with Dr. Castaneda and [employer].     He was compliant with home exercise program.  Response to previous treatment: no issues; mild soreness  Functional change: jumping is improved    Pain: 0/10   Location: left knee     OBJECTIVE     8 Months 17 days post op    Wt. 320 lbs    Observation:   no effusion noted L knee with sweep test  Well healed scar  Atrophy in calf/quad on L    No TTP L HS, mild at PT/fat pad     Range of Motion (extension/neutral/flexion):     Right Left   Knee ROM: 8-0-125/130 deg P:8-0-130 deg  A:8-0-125 deg      Strength: (Biodex machine)    Right Left Comment   Knee Extension: 342.1 lbs 302.1 lbs    12% deficit     Knee Flexion: 192.8 lbs 191.8 lbs     .05% deficit    Hip Abduction: 5/5 4+/5       SL hop distance: R= 116.84, 167.64, 175.26 cm, L= 165.1, 167.64 cm (8% deficit)  Triple hop distance: R= 492.76, 523.24 cm, L= 469.9, 482.6 cm (6.75% deficit)  SL vertical: R= 13.5 in; L= 11.4 in (16% deficit)  DL vertical max 22 in  -slight dynamic valgum moment noted into take off/landing SL on L v R    Y balance reach (ant, post med, postlat)  -R 56, 119, 112 cm  -L 52, 113, 106 cm    Mid quad girth: 18 cm proximal Patella  - R= 77 cm  - L= 74.5 cm    Agility tests:  - L drill: soon  -pro agility: soon  -40 yd dash: soon    Treatment     Billy received the treatments listed below:      therapeutic exercises to develop strength, endurance, ROM, flexibility, posture, and core stabilization for 48 minutes including:  Education/assessment  Heel prop x 5 min at start / end 10 lbs above/below  SL leg press 6 x 5 linus 240-360 lbs with good control/depth  LAQ 5 x 5 linus 115->165 lbs on L and up to 185 lbs on R: 2 RM R= 205 lbs; L= 195 lbs  HS curl seated 5 x 5 135-> 205 lbs  Manual LE stretching linus    manual therapy techniques: Joint mobilizations and Soft tissue Mobilization were applied for 10 minutes, including:  PFJ gr 3-4 glides all planes  Tibial AP glide gr 3-4 with and without rotation bias on and off heel prop  Knee hyperextension gr 3-4  Fat pad mobility    neuromuscular re-education activities to improve: Coordination, Kinesthetic, Sense, and Proprioception for 2 minutes. The following activities were included:  Knee hyperextensions x 20 x 3 sec hold    Therapeutic activities to improve functional performance for 30 minutes, including:  SKTC, quad pull, HS scoop, HS march, high knee march, jogging butt kicks, jogging high knees, lunge, lateral lunge, band walks, monster walks, carioca > carioca w/ high knees, butt kicks, A skips, B skips, jogging x 4 laps  Vertical testing/assessment    Patient Education and Home Exercises      Home Exercises Provided and Patient Education Provided     Education provided:   - HEP update, precautions, activity pacing, goals, timeframes    Written Home Exercises Provided: yes. Exercises were reviewed and Billy was able to demonstrate them prior to the end of the session.  Billy demonstrated good  understanding of the education provided. See EMR under Patient Instructions for exercises provided during therapy sessions.     ASSESSMENT     Billy continues to advance well. Working on full ROM . He continues to require 3-5 minutes of stretching to gain full ROM pre and post workouts. His SL vertical remains most deficient at 16% with some valgum noted at take off and landing. Advised again to have 3 way callw ith agent and Dr. Castaneda prior to going to [facility]. Pt reported/demonstrated no adverse response or exacerbation of symptoms during today's session.      Billy Is progressing well towards his goals.   Pt prognosis is Good.     Pt will continue to benefit from skilled outpatient physical therapy to address the deficits listed in the problem list box on initial evaluation, provide pt/family education and to maximize pt's level of independence in the home and community environment.     Pt's spiritual, cultural and educational needs considered and pt agreeable to plan of care and goals.     Anticipated barriers to physical therapy: none    Goals:  Short Term Goals: 8-10 weeks   1. Pt will be compliant with HEP 50% of prescribed amount. MET  2. The pt to demo improvement in left knee ROM to equal right knee ROM pain free MET  3.  The pt to demo good quad set with proper hyperextension moment of the Left knee MET  4. The pt to demo ambualting with least restricitve AD without major compensatory pattern left knee for at least 20 feet MET     Long Term Goals: 56 weeks (progressing, not met)   Pt will be compliant with % of prescribed amount.   The pt to demo pain free and uncompensated running mechanics  x10 min on an indoor treadmill MET  The pt to demo tolerance to a squat at or bellow parallel with uncompensated mechanics x10 MET  The pt to demo strength of L LE within 10% of R LE as demo'd on the biodex machine   The pt to demo a deficit of 10% or less on a triple hop, single leg broad jump and crossover hop compared to non operative LE.   The pt will report full participation in ADLs and IADLs without restrictions related to L  knee.     PLAN     Continue formal PT 2x/wk and performance training 3x/week. Get some agility times from betty at [employer] for data.     Biodex test in 2 wks.     Manjit Lazcano, PT, DPT

## 2023-06-09 ENCOUNTER — DOCUMENTATION ONLY (OUTPATIENT)
Dept: REHABILITATION | Facility: HOSPITAL | Age: 28
End: 2023-06-09
Payer: COMMERCIAL

## 2023-06-09 NOTE — PROGRESS NOTES
Billy was sore today at entry. Noting tightness in L HS with stretching/dynamic warm up. Focused on SL tasks with heel taps 12 inch box with SLS on BOSU with multi-directional cone taps 4 rds each 12 reps and 6 each direction. BFR HS slider 60% LOP in supine; no resistance. Some manual stretching and STM, manual therapy for knee extension ROM into active hyper quad sets. He spoke with Dr. Castaneda who advised not to workout in [facility] for the team as he is not cleared. Billy is okay with this. He is leaving Sunday for 5 days with [facility] team. [employer] has given him workouts to do and I supplemented with mobility work daily. Bike for 15 minutes today RPM >80 lv 5->8. He has been doing well with performance training, just expected soreness. Hit sled yesterday and felt good.

## 2023-06-20 ENCOUNTER — CLINICAL SUPPORT (OUTPATIENT)
Dept: REHABILITATION | Facility: HOSPITAL | Age: 28
End: 2023-06-20
Payer: OTHER MISCELLANEOUS

## 2023-06-20 DIAGNOSIS — R26.2 DIFFICULTY WALKING: ICD-10-CM

## 2023-06-20 DIAGNOSIS — M25.662 DECREASED RANGE OF MOTION OF LEFT KNEE: ICD-10-CM

## 2023-06-20 DIAGNOSIS — M25.562 ACUTE PAIN OF LEFT KNEE: Primary | ICD-10-CM

## 2023-06-20 DIAGNOSIS — M62.81 QUADRICEPS WEAKNESS: ICD-10-CM

## 2023-06-20 PROCEDURE — 97530 THERAPEUTIC ACTIVITIES: CPT

## 2023-06-20 PROCEDURE — 97112 NEUROMUSCULAR REEDUCATION: CPT

## 2023-06-20 PROCEDURE — 97140 MANUAL THERAPY 1/> REGIONS: CPT

## 2023-06-20 PROCEDURE — 97110 THERAPEUTIC EXERCISES: CPT

## 2023-06-21 NOTE — PROGRESS NOTES
[facility]   Physical Therapy Treatment Note     Name: Billy Holbrook  [facility] Number: [id number]    Therapy Diagnosis:   Encounter Diagnoses   Name Primary?    Acute pain of left knee Yes    Decreased range of motion of left knee     Quadriceps weakness     Difficulty walking      Physician: Priyanka Castaneda MD    Visit Date: 6/20/2023    Physician Orders: PT Eval and Treat   Medical Diagnosis from Referral: M25.562 (ICD-10-CM) - Acute pain of left knee  Evaluation Date: 9/7/2022  Authorization Period Expiration: 6/5/2024  Plan of Care Expiration: 12/31/2023  Visit # / Visits authorized: 151 total 6/20     Precautions: Standard     Time In: 2:00 pm  Time Out: 4:00  Total Billable Time:  90 minutes    Procedure: 9/20/2022  1. Left Knee Arthroscopy, anterior cruciate ligament reconstruction 92510  2.  Left Knee Arthroscopy, with meniscectomy (medial OR lateral) 66917  3.  Left Knee Arthroscopy, debridement/shaving of articular cartilage (chondroplasty) 65021  4.  Left Knee  platelet rich plasma injection to the bone-patellar tendon-bone harvest site    Post-Op Plan:  ACL reconstruction with a bone-patellar tendon-bone autograft    SUBJECTIVE     Pt reports: his knee feels great. Workouts going well with [facility]. Did some jumping yesterday with LE lift. Doing upper body tomorrow.     He was compliant with home exercise program.  Response to previous treatment: no issues; mild soreness  Functional change: jumping is improved    Pain: 0/10   Location: left knee     OBJECTIVE     9 Months 0 days post op    Wt. 317 lbs    Observation:   no effusion noted L knee with sweep test  Well healed scar  Atrophy in calf/quad on L    No TTP L HS, mild at PT/fat pad     Range of Motion (extension/neutral/flexion):     Right Left   Knee ROM: 8-0-125/130 deg P:8-0-130 deg  A:8-0-125 deg      Strength: (Biodex machine)    Right Left Comment   Knee Extension: 342.1 lbs 315.2 lbs    7.8% deficit    Knee Flexion:  192.8 lbs 202.1 lbs     104%    Hip Abduction: 5/5 4+/5       SL hop distance: R= 116.84, 167.64, 175.26 cm, L= 165.1, 167.64 cm (8% deficit)  Triple hop distance: R= 492.76, 523.24 cm, L= 469.9, 482.6 cm (6.75% deficit)  SL vertical: R= 13.5 in; L= 11.4 in (16% deficit)  DL vertical max 22 in  -slight dynamic valgum moment noted into take off/landing SL on L v R    Y balance reach (ant, post med, postlat)  -R 56, 119, 112 cm  -L 52, 113, 106 cm    Mid quad girth: 18 cm proximal Patella  - R= 77 cm  - L= 74.5 cm    Agility tests:  - L drill: soon  -pro agility: soon  -40 yd dash: soon    Treatment     Billy received the treatments listed below:      therapeutic exercises to develop strength, endurance, ROM, flexibility, posture, and core stabilization for 68 minutes including:  Education/assessment  Heel prop x 5 min at start / end 10 lbs above/below  SL leg press 6 x 5 linus 240-360 lbs with good control/depth-NP  LAQ 5 x 5 linus 115->165 lbs on L and up to 185 lbs on R: 2 RM R= 205 lbs; L= 195 lbs-NP  HS curl seated 5 x 5 135-> 205 lbs-NP  Biodex exercise 5 x 5  each flx and extension at 60 and 180 deg/sec  Airdyne x 10 min  Manual LE stretching linus    manual therapy techniques: Joint mobilizations and Soft tissue Mobilization were applied for 10 minutes, including:  PFJ gr 3-4 glides all planes  Tibial AP glide gr 3-4 with and without rotation bias on and off heel prop  Knee hyperextension gr 3-4  Fat pad mobility    neuromuscular re-education activities to improve: Coordination, Kinesthetic, Sense, and Proprioception for 12 minutes. The following activities were included:  Knee hyperextensions x 20 x 3 sec hold  Standing clamshells black band 3 x 15 linus    Therapeutic activities to improve functional performance for 30 minutes, including:  SKTC, quad pull, HS scoop, HS march, high knee march, jogging butt kicks, jogging high knees, lunge, lateral lunge, band walks, monster walks, carioca > carioca w/ high knees,  butt kicks, A skips, B skips, jogging x 4 laps  12 inch heel taps 4 x 10 x 5 sec eccentric  Vertical testing/assessment-NP    Patient Education and Home Exercises     Home Exercises Provided and Patient Education Provided     Education provided:   - HEP update, precautions, activity pacing, goals, timeframes    Written Home Exercises Provided: yes. Exercises were reviewed and Billy was able to demonstrate them prior to the end of the session.  Billy demonstrated good  understanding of the education provided. See EMR under Patient Instructions for exercises provided during therapy sessions.     ASSESSMENT     Billy has signed with the [facility] at this time. He will stay with us to rehab through mid July prior to reporting back for camp. His strength via biodex is improving, now at <8% deficit at 9 months. Main deficit at this time continues to be eccentric loading to power tasks on involved LE. Hip strength remains limited compared to R with SL tasks, but improved with cuing and activation. Pt reported/demonstrated no adverse response or exacerbation of symptoms during today's session.      Billy Is progressing well towards his goals.   Pt prognosis is Good.     Pt will continue to benefit from skilled outpatient physical therapy to address the deficits listed in the problem list box on initial evaluation, provide pt/family education and to maximize pt's level of independence in the home and community environment.     Pt's spiritual, cultural and educational needs considered and pt agreeable to plan of care and goals.     Anticipated barriers to physical therapy: none    Goals:  Short Term Goals: 8-10 weeks   1. Pt will be compliant with HEP 50% of prescribed amount. MET  2. The pt to demo improvement in left knee ROM to equal right knee ROM pain free MET  3.  The pt to demo good quad set with proper hyperextension moment of the Left knee MET  4. The pt to demo ambualting with least restricitve AD without major  compensatory pattern left knee for at least 20 feet MET     Long Term Goals: 56 weeks (progressing, not met)   Pt will be compliant with % of prescribed amount.   The pt to demo pain free and uncompensated running mechanics x10 min on an indoor treadmill MET  The pt to demo tolerance to a squat at or bellow parallel with uncompensated mechanics x10 MET  The pt to demo strength of L LE within 10% of R LE as demo'd on the biodex machine   The pt to demo a deficit of 10% or less on a triple hop, single leg broad jump and crossover hop compared to non operative LE.   The pt will report full participation in ADLs and IADLs without restrictions related to L  knee.     PLAN     Continue formal PT 2x/wk and performance training 3x/week. Get some agility times from betty at [employer] for data.     Biodex test next week.     Manjit Lazcano, PT, DPT

## 2023-06-23 ENCOUNTER — CLINICAL SUPPORT (OUTPATIENT)
Dept: REHABILITATION | Facility: HOSPITAL | Age: 28
End: 2023-06-23
Payer: COMMERCIAL

## 2023-06-23 DIAGNOSIS — M25.662 DECREASED RANGE OF MOTION OF LEFT KNEE: ICD-10-CM

## 2023-06-23 DIAGNOSIS — R26.2 DIFFICULTY WALKING: ICD-10-CM

## 2023-06-23 DIAGNOSIS — M25.562 ACUTE PAIN OF LEFT KNEE: Primary | ICD-10-CM

## 2023-06-23 DIAGNOSIS — M62.81 QUADRICEPS WEAKNESS: ICD-10-CM

## 2023-06-23 PROCEDURE — 97110 THERAPEUTIC EXERCISES: CPT

## 2023-06-23 PROCEDURE — 97530 THERAPEUTIC ACTIVITIES: CPT

## 2023-06-23 PROCEDURE — 97140 MANUAL THERAPY 1/> REGIONS: CPT

## 2023-06-26 NOTE — PROGRESS NOTES
[facility]   Physical Therapy Treatment Note     Name: Billy Holbrook  [facility] Number: [id number]    Therapy Diagnosis:   Encounter Diagnoses   Name Primary?    Acute pain of left knee Yes    Decreased range of motion of left knee     Quadriceps weakness     Difficulty walking      Physician: Priyanka Castaneda MD    Visit Date: 6/23/2023    Physician Orders: PT Eval and Treat   Medical Diagnosis from Referral: M25.562 (ICD-10-CM) - Acute pain of left knee  Evaluation Date: 9/7/2022  Authorization Period Expiration: 6/5/2024  Plan of Care Expiration: 12/31/2023  Visit # / Visits authorized: 152 total 7/20     Precautions: Standard     Time In: 7:25 am  Time Out: 9:00 am  Total Billable Time:  95 minutes    Procedure: 9/20/2022  1. Left Knee Arthroscopy, anterior cruciate ligament reconstruction 69401  2.  Left Knee Arthroscopy, with meniscectomy (medial OR lateral) 58755  3.  Left Knee Arthroscopy, debridement/shaving of articular cartilage (chondroplasty) 08337  4.  Left Knee  platelet rich plasma injection to the bone-patellar tendon-bone harvest site    Post-Op Plan:  ACL reconstruction with a bone-patellar tendon-bone autograft    SUBJECTIVE     Pt reports: he feels good. Fatigued from training. Denies any pain or symptoms. Still does not feel explosive off L LE with SL hops at max effort with difficult landings. May play with a brace per talks with [employer] staff. Did pool workout yesterday.     He was compliant with home exercise program.  Response to previous treatment: no issues; mild soreness  Functional change: jumping is improved    Pain: 0/10   Location: left knee     OBJECTIVE     9 Months 3 days post op    Wt. 317 lbs    Observation:   no effusion noted L knee with sweep test  Well healed scar  Atrophy in calf/quad on L    No TTP L HS, mild at PT/fat pad     Range of Motion (extension/neutral/flexion):     Right Left   Knee ROM: 8-0-125/130 deg P:8-0-130 deg  A:8-0-125 deg       Strength: (Biodex machine)    Right Left Comment   Knee Extension: 342.1 lbs 315.2 lbs    7.8% deficit    Knee Flexion: 192.8 lbs 202.1 lbs     104%    Hip Abduction: 5/5 4+/5       SL hop distance: R= 116.84, 167.64, 175.26 cm, L= 165.1, 167.64 cm (8% deficit)  Triple hop distance: R= 492.76, 523.24 cm, L= 469.9, 482.6 cm (6.75% deficit)  SL vertical: R= 13.5 in; L= 11.4 in (16% deficit)  DL vertical max 22 in  -slight dynamic valgum moment noted into take off/landing SL on L v R    Y balance reach (ant, post med, postlat)  -R 56, 119, 112 cm  -L 52, 113, 106 cm    Mid quad girth: 18 cm proximal Patella  - R= 77 cm  - L= 75 cm  At 10 cm proximal patella  - R= 64 cm  - L=57 cm    Agility tests:  - L drill: soon  -pro agility: soon  -40 yd dash: soon    Treatment     Billy received the treatments listed below:      therapeutic exercises to develop strength, endurance, ROM, flexibility, posture, and core stabilization for 20 minutes including:  Education/assessment  Heel prop x 5 min at start / end 10 lbs above/below  SL leg press 6 x 5 linus 240-360 lbs with good control/depth-NP  LAQ 5 x 5 linus 115->165 lbs on L and up to 185 lbs on R: 2 RM R= 205 lbs; L= 195 lbs-NP  HS curl seated 5 x 5 135-> 205 lbs-NP  Biodex exercise 5 x 5  each flx and extension at 60 and 180 deg/sec-NP  Airdyne x 10 min  Manual LE stretching linus    manual therapy techniques: Joint mobilizations and Soft tissue Mobilization were applied for 10 minutes, including:  PFJ gr 3-4 glides all planes  Tibial AP glide gr 3-4 with and without rotation bias on and off heel prop  Knee hyperextension gr 3-4  Fat pad mobility    neuromuscular re-education activities to improve: Coordination, Kinesthetic, Sense, and Proprioception for 3 minutes. The following activities were included:  Knee hyperextensions x 20 x 3 sec hold  Standing clamshells black band 3 x 15 linus-NP    Therapeutic activities to improve functional performance for 60 minutes,  including:  SKTC, quad pull, HS scoop, HS march, high knee march, jogging butt kicks, jogging high knees, lunge, lateral lunge, band walks, monster walks, carioca > carioca w/ high knees, butt kicks, A skips, B skips, jogging x 4 laps  12 inch heel taps 4 x 10 x 5 sec eccentric  20 inch box pogos 3 x 20 sec linus  DL and SL vertical just jump mat x 20 each/linus R=13 v 11 on L  Triple and single broad hops linus x 15: less vertical vector on L noted, but distance similar with stiffer landing on L v R      Patient Education and Home Exercises     Home Exercises Provided and Patient Education Provided     Education provided:   - HEP update, precautions, activity pacing, goals, timeframes    Written Home Exercises Provided: yes. Exercises were reviewed and Billy was able to demonstrate them prior to the end of the session.  Billy demonstrated good  understanding of the education provided. See EMR under Patient Instructions for exercises provided during therapy sessions.     ASSESSMENT     Billy continues to progress with his training level. His ROM continues to do well, requires some manual and stretching to gain full mobility; <5 minutes. He continues to have difficulty with SL power tasks off L LE compared to R as well as landing mechanics.  Pt reported/demonstrated no adverse response or exacerbation of symptoms during today's session.      Billy Is progressing well towards his goals.   Pt prognosis is Good.     Pt will continue to benefit from skilled outpatient physical therapy to address the deficits listed in the problem list box on initial evaluation, provide pt/family education and to maximize pt's level of independence in the home and community environment.     Pt's spiritual, cultural and educational needs considered and pt agreeable to plan of care and goals.     Anticipated barriers to physical therapy: none    Goals:  Short Term Goals: 8-10 weeks   1. Pt will be compliant with HEP 50% of prescribed amount.  MET  2. The pt to demo improvement in left knee ROM to equal right knee ROM pain free MET  3.  The pt to demo good quad set with proper hyperextension moment of the Left knee MET  4. The pt to demo ambualting with least restricitve AD without major compensatory pattern left knee for at least 20 feet MET     Long Term Goals: 56 weeks (progressing, not met)   Pt will be compliant with % of prescribed amount.   The pt to demo pain free and uncompensated running mechanics x10 min on an indoor treadmill MET  The pt to demo tolerance to a squat at or bellow parallel with uncompensated mechanics x10 MET  The pt to demo strength of L LE within 10% of R LE as demo'd on the biodex machine   The pt to demo a deficit of 10% or less on a triple hop, single leg broad jump and crossover hop compared to non operative LE.   The pt will report full participation in ADLs and IADLs without restrictions related to L  knee.     PLAN     Get key data points this week and discuss with Dr. Castaneda for full clearance. Continue [facility] 3x/wk.     Manjit Lazcano, PT, DPT

## 2023-06-30 ENCOUNTER — CLINICAL SUPPORT (OUTPATIENT)
Dept: REHABILITATION | Facility: HOSPITAL | Age: 28
End: 2023-06-30
Payer: OTHER MISCELLANEOUS

## 2023-06-30 DIAGNOSIS — M25.562 ACUTE PAIN OF LEFT KNEE: Primary | ICD-10-CM

## 2023-06-30 DIAGNOSIS — M25.662 DECREASED RANGE OF MOTION OF LEFT KNEE: ICD-10-CM

## 2023-06-30 DIAGNOSIS — M62.81 QUADRICEPS WEAKNESS: ICD-10-CM

## 2023-06-30 DIAGNOSIS — R26.2 DIFFICULTY WALKING: ICD-10-CM

## 2023-06-30 PROCEDURE — 97110 THERAPEUTIC EXERCISES: CPT

## 2023-06-30 PROCEDURE — 97140 MANUAL THERAPY 1/> REGIONS: CPT

## 2023-06-30 PROCEDURE — 97530 THERAPEUTIC ACTIVITIES: CPT

## 2023-06-30 NOTE — PROGRESS NOTES
[facility]   Physical Therapy Treatment Note     Name: Billy Holbrook  [facility] Number: [id number]    Therapy Diagnosis:   Encounter Diagnoses   Name Primary?    Acute pain of left knee Yes    Decreased range of motion of left knee     Quadriceps weakness     Difficulty walking      Physician: Priyanka Castaneda MD    Visit Date: 6/30/2023    Physician Orders: PT Eval and Treat   Medical Diagnosis from Referral: M25.562 (ICD-10-CM) - Acute pain of left knee  Evaluation Date: 9/7/2022  Authorization Period Expiration: 6/5/2024  Plan of Care Expiration: 12/31/2023  Visit # / Visits authorized: 153 total 8/20     Precautions: Standard     Time In: 8:33 am  Time Out: 10:05 am  Total Billable Time:  92 minutes    Procedure: 9/20/2022  1. Left Knee Arthroscopy, anterior cruciate ligament reconstruction 23709  2.  Left Knee Arthroscopy, with meniscectomy (medial OR lateral) 78800  3.  Left Knee Arthroscopy, debridement/shaving of articular cartilage (chondroplasty) 12569  4.  Left Knee  platelet rich plasma injection to the bone-patellar tendon-bone harvest site    Post-Op Plan:  ACL reconstruction with a bone-patellar tendon-bone autograft    SUBJECTIVE     Pt reports: workouts this week going well. Heavy deadlifts to 500 lbs, sprints, and upper body. Feeling tired today, but wants to try biodex test. No pain or symptoms.     He was compliant with home exercise program.  Response to previous treatment: no issues; mild soreness  Functional change: jumping is improved    Pain: 0/10   Location: left knee     OBJECTIVE     9 Months 10 days post op    Wt. 317 lbs    Observation:   no effusion noted L knee with sweep test  Well healed scar  Atrophy in calf/quad on L    No TTP L HS, mild at PT/fat pad     Range of Motion (extension/neutral/flexion):     Right Left   Knee ROM: 8-0-125/130 deg P:8-0-130 deg  A:8-0-125 deg      Strength: (Biodex machine)    Right Left Comment   Knee Extension: 342.1 lbs  315.2 lbs    7.8% deficit    Knee Flexion: 192.8 lbs 202.1 lbs     104%    Hip Abduction: 5/5 4+/5       SL hop distance: R= 116.84, 167.64, 175.26 cm, L= 165.1, 167.64 cm (8% deficit)  Triple hop distance: R= 492.76, 523.24 cm, L= 469.9, 482.6 cm (6.75% deficit)  SL vertical: R= 13.5 in; L= 11.4 in (16% deficit)  DL vertical max 22 in  -slight dynamic valgum moment noted into take off/landing SL on L v R    Y balance reach (ant, post med, postlat)  -R 56, 119, 112 cm  -L 52, 113, 106 cm    Mid quad girth: 18 cm proximal Patella  - R= 77 cm  - L= 75 cm  At 10 cm proximal patella  - R= 64 cm  - L=57 cm    Agility tests:  - L drill: soon  -pro agility: soon  -40 yd dash: soon    Treatment     Billy received the treatments listed below:      therapeutic exercises to develop strength, endurance, ROM, flexibility, posture, and core stabilization for 59 minutes including:  Education/assessment  Heel prop x 5 min at start / end 10 lbs above/below  Biodex  Airdyne x 10 min  Swiss ball assisted nordic HS 3 x 8 x 5 sec  Manual LE stretching linus    manual therapy techniques: Joint mobilizations and Soft tissue Mobilization were applied for 10 minutes, including:  PFJ gr 3-4 glides all planes  Tibial AP glide gr 3-4 with and without rotation bias on and off heel prop  Knee hyperextension gr 3-4  Fat pad mobility    neuromuscular re-education activities to improve: Coordination, Kinesthetic, Sense, and Proprioception for 3 minutes. The following activities were included:  Knee hyperextensions x 20 x 3 sec hold  Standing clamshells black band 3 x 15 ilnus-NP    Therapeutic activities to improve functional performance for 20 minutes, including:  SKTC, quad pull, HS scoop, HS march, high knee march, jogging butt kicks, jogging high knees, lunge, lateral lunge, band walks, monster walks, carioca > carioca w/ high knees, butt kicks, A skips, B skips, jogging x 4 laps  12 inch heel taps 2 x 15 x 5 sec eccentric linus  20 inch box pogos 3  x 20 sec linus-NP  DL and SL vertical just jump mat x 20 each/linus R=13 v 11 on L-NP  Triple and single broad hops linus x 15: less vertical vector on L noted, but distance similar with stiffer landing on L v R-NP      Patient Education and Home Exercises     Home Exercises Provided and Patient Education Provided     Education provided:   - HEP update, precautions, activity pacing, goals, timeframes    Written Home Exercises Provided: yes. Exercises were reviewed and Billy was able to demonstrate them prior to the end of the session.  Billy demonstrated good  understanding of the education provided. See EMR under Patient Instructions for exercises provided during therapy sessions.     ASSESSMENT     Billy is doing well with performance training, progressing load, strength, power, agility, and plyometric ability. Biodex today showed 6.8% deficit at 60 de/sec; though not his best in regards to peak torque. His knee flexion deficit increases with speed indicating he needs to work on some hamstring loading with speed, will relay to [facility]  for implementation.  Pt reported/demonstrated no adverse response or exacerbation of symptoms during today's session.      Billy Is progressing well towards his goals.   Pt prognosis is Good.     Pt will continue to benefit from skilled outpatient physical therapy to address the deficits listed in the problem list box on initial evaluation, provide pt/family education and to maximize pt's level of independence in the home and community environment.     Pt's spiritual, cultural and educational needs considered and pt agreeable to plan of care and goals.     Anticipated barriers to physical therapy: none    Goals:  Short Term Goals: 8-10 weeks   1. Pt will be compliant with HEP 50% of prescribed amount. MET  2. The pt to demo improvement in left knee ROM to equal right knee ROM pain free MET  3.  The pt to demo good quad set with proper hyperextension moment of the Left knee MET  4.  The pt to demo ambualting with least restricitve AD without major compensatory pattern left knee for at least 20 feet MET     Long Term Goals: 56 weeks (progressing, not met)   Pt will be compliant with % of prescribed amount.   The pt to demo pain free and uncompensated running mechanics x10 min on an indoor treadmill MET  The pt to demo tolerance to a squat at or bellow parallel with uncompensated mechanics x10 MET  The pt to demo strength of L LE within 10% of R LE as demo'd on the biodex machine   The pt to demo a deficit of 10% or less on a triple hop, single leg broad jump and crossover hop compared to non operative LE.   The pt will report full participation in ADLs and IADLs without restrictions related to L  knee.     PLAN     Get key data points this week and discuss with Dr. Castaneda for full clearance. Continue [facility] 3x/wk.     Manjit Lazcano, PT, DPT

## 2023-07-07 ENCOUNTER — CLINICAL SUPPORT (OUTPATIENT)
Dept: REHABILITATION | Facility: HOSPITAL | Age: 28
End: 2023-07-07
Payer: COMMERCIAL

## 2023-07-07 DIAGNOSIS — R26.2 DIFFICULTY WALKING: ICD-10-CM

## 2023-07-07 DIAGNOSIS — M62.81 QUADRICEPS WEAKNESS: ICD-10-CM

## 2023-07-07 DIAGNOSIS — M25.562 ACUTE PAIN OF LEFT KNEE: Primary | ICD-10-CM

## 2023-07-07 DIAGNOSIS — M25.662 DECREASED RANGE OF MOTION OF LEFT KNEE: ICD-10-CM

## 2023-07-07 PROCEDURE — 97110 THERAPEUTIC EXERCISES: CPT

## 2023-07-07 PROCEDURE — 97140 MANUAL THERAPY 1/> REGIONS: CPT

## 2023-07-07 PROCEDURE — 97530 THERAPEUTIC ACTIVITIES: CPT

## 2023-07-07 NOTE — PROGRESS NOTES
[facility]   Physical Therapy Treatment Note     Name: Billy Holbrook  [facility] Number: [id number]    Therapy Diagnosis:   Encounter Diagnoses   Name Primary?    Acute pain of left knee Yes    Decreased range of motion of left knee     Quadriceps weakness     Difficulty walking        Physician: Priyanka Castaneda MD    Visit Date: 7/7/2023    Physician Orders: PT Eval and Treat   Medical Diagnosis from Referral: M25.562 (ICD-10-CM) - Acute pain of left knee  Evaluation Date: 9/7/2022  Authorization Period Expiration: 6/5/2024  Plan of Care Expiration: 12/31/2023  Visit # / Visits authorized: 154 total 9/20     Precautions: Standard     Time In: 7:25 am  Time Out: 9:10  Total Billable Time:  105 minutes    Procedure: 9/20/2022  1. Left Knee Arthroscopy, anterior cruciate ligament reconstruction 39853  2.  Left Knee Arthroscopy, with meniscectomy (medial OR lateral) 83383  3.  Left Knee Arthroscopy, debridement/shaving of articular cartilage (chondroplasty) 93107  4.  Left Knee  platelet rich plasma injection to the bone-patellar tendon-bone harvest site    Post-Op Plan:  ACL reconstruction with a bone-patellar tendon-bone autograft    SUBJECTIVE     Pt reports: hamstrings were sore yesterday, better today after stretching last night. Would like to work on lateral jumping today. Feeling good overall, even his low back. Some painless crepitus in knee at times with un weighted LAQ motion, but not with exercise, running, or jumping. Occasional anterior knee pain that eases with stretching.     He was compliant with home exercise program.  Response to previous treatment: no issues; mild soreness  Functional change: jumping is improved    Pain: 0/10   Location: left knee     OBJECTIVE     9 Months 17 days post op    Wt. 317 lbs    Observation:   no effusion noted L knee with sweep test  Well healed scar  Atrophy in calf/quad on L    No TTP L HS, mild at PT/fat pad     Range of Motion  (extension/neutral/flexion):     Right Left   Knee ROM: 8-0-125/130 deg P:8-0-130 deg  A:8-0-125 deg      Strength: (Biodex machine)    Right Left Comment   Knee Extension: 342.1 lbs 315.2 lbs    7.8% deficit    Knee Flexion: 192.8 lbs 202.1 lbs     104%    Hip Abduction: 5/5 4+/5       SL hop distance: R= 116.84, 167.64, 175.26 cm, L= 165.1, 167.64 cm (8% deficit)  Triple hop distance: R= 492.76, 523.24 cm, L= 469.9, 482.6 cm (6.75% deficit)  SL vertical: R= 13.5 in; L= 11.4 in (16% deficit)  DL vertical max 22 in  -slight dynamic valgum moment noted into take off/landing SL on L v R    Y balance reach (ant, post med, postlat)  -R 56, 119, 112 cm  -L 52, 113, 106 cm    Mid quad girth: 18 cm proximal Patella  - R= 77 cm  - L= 75 cm  At 10 cm proximal patella  - R= 64 cm  - L=57 cm    Agility tests:  - L drill: soon  -pro agility: soon  -40 yd dash: soon    Treatment     Billy received the treatments listed below:      therapeutic exercises to develop strength, endurance, ROM, flexibility, posture, and core stabilization for 20 minutes including:  Education/assessment  Heel prop x 5 min at start / end 10 lbs above/below  Manual LE stretching linus    manual therapy techniques: Joint mobilizations and Soft tissue Mobilization were applied for 10 minutes, including:  PFJ gr 3-4 glides all planes  Tibial AP glide gr 3-4 with and without rotation bias on and off heel prop  Knee hyperextension gr 3-4  Fat pad mobility    neuromuscular re-education activities to improve: Coordination, Kinesthetic, Sense, and Proprioception for 3 minutes. The following activities were included:  Knee hyperextensions x 20 x 3 sec hold  Standing clamshells black band 3 x 15 linus-NP    Therapeutic activities to improve functional performance for 73 minutes, including:  SKTC, quad pull, HS scoop, HS march, high knee march, jogging butt kicks, jogging high knees, lunge, lateral lunge, band walks, monster walks, carioca > carioca w/ high knees,  butt kicks, A skips, B skips, jogging x 4 laps  12 inch heel taps 3 x 15 x 5 sec eccentric linus  Lateral bounding for distance/power 2 x 8 linus  X2 sports cord lateral bounding  X2 sports cord 45 deg jump from stance  X2 sports cord 4D bear crawls      Patient Education and Home Exercises     Home Exercises Provided and Patient Education Provided     Education provided:   - HEP update, precautions, activity pacing, goals, timeframes    Written Home Exercises Provided: yes. Exercises were reviewed and Billy was able to demonstrate them prior to the end of the session.  Billy demonstrated good  understanding of the education provided. See EMR under Patient Instructions for exercises provided during therapy sessions.     ASSESSMENT     Billy did well with focus on lateral plyometrics and power today with and without resistance. Knee is doing well. Cuing on reducing trunk fwd lean and more knee flexion with landing. Pt reported/demonstrated no adverse response or exacerbation of symptoms during today's session.      Billy Is progressing well towards his goals.   Pt prognosis is Good.     Pt will continue to benefit from skilled outpatient physical therapy to address the deficits listed in the problem list box on initial evaluation, provide pt/family education and to maximize pt's level of independence in the home and community environment.     Pt's spiritual, cultural and educational needs considered and pt agreeable to plan of care and goals.     Anticipated barriers to physical therapy: none    Goals:  Short Term Goals: 8-10 weeks   1. Pt will be compliant with HEP 50% of prescribed amount. MET  2. The pt to demo improvement in left knee ROM to equal right knee ROM pain free MET  3.  The pt to demo good quad set with proper hyperextension moment of the Left knee MET  4. The pt to demo ambualting with least restricitve AD without major compensatory pattern left knee for at least 20 feet MET     Long Term Goals: 56  weeks (progressing, not met)   Pt will be compliant with % of prescribed amount.   The pt to demo pain free and uncompensated running mechanics x10 min on an indoor treadmill MET  The pt to demo tolerance to a squat at or bellow parallel with uncompensated mechanics x10 MET  The pt to demo strength of L LE within 10% of R LE as demo'd on the biodex machine   The pt to demo a deficit of 10% or less on a triple hop, single leg broad jump and crossover hop compared to non operative LE.   The pt will report full participation in ADLs and IADLs without restrictions related to L  knee.     PLAN     Get key data points Monday and discuss with Dr. Castaneda for full clearance next week prior to leaving for [place] on 7[place]. Continue OPT 3x/wk.     Manjit Lazcano, PT, DPT

## 2023-07-10 ENCOUNTER — CLINICAL SUPPORT (OUTPATIENT)
Dept: REHABILITATION | Facility: HOSPITAL | Age: 28
End: 2023-07-10
Payer: OTHER MISCELLANEOUS

## 2023-07-10 DIAGNOSIS — R26.2 DIFFICULTY WALKING: ICD-10-CM

## 2023-07-10 DIAGNOSIS — M25.562 ACUTE PAIN OF LEFT KNEE: Primary | ICD-10-CM

## 2023-07-10 DIAGNOSIS — M25.662 DECREASED RANGE OF MOTION OF LEFT KNEE: ICD-10-CM

## 2023-07-10 DIAGNOSIS — M62.81 QUADRICEPS WEAKNESS: ICD-10-CM

## 2023-07-10 PROCEDURE — 97530 THERAPEUTIC ACTIVITIES: CPT

## 2023-07-10 PROCEDURE — 97110 THERAPEUTIC EXERCISES: CPT

## 2023-07-10 PROCEDURE — 97140 MANUAL THERAPY 1/> REGIONS: CPT

## 2023-07-10 NOTE — PROGRESS NOTES
[facility]   Physical Therapy Treatment Note     Name: Billy Holbrook  [facility] Number: [id number]    Therapy Diagnosis:   Encounter Diagnoses   Name Primary?    Acute pain of left knee Yes    Decreased range of motion of left knee     Quadriceps weakness     Difficulty walking      Physician: Priyanka Castaneda MD    Visit Date: 7/10/2023    Physician Orders: PT Eval and Treat   Medical Diagnosis from Referral: M25.562 (ICD-10-CM) - Acute pain of left knee  Evaluation Date: 9/7/2022  Authorization Period Expiration: 6/5/2024  Plan of Care Expiration: 12/31/2023  Visit # / Visits authorized: 155 total 10/20     Precautions: Standard     Time In: 10:00 am  Time Out: 11:45  Total Billable Time:  105 minutes    Procedure: 9/20/2022  1. Left Knee Arthroscopy, anterior cruciate ligament reconstruction 92111  2.  Left Knee Arthroscopy, with meniscectomy (medial OR lateral) 12474  3.  Left Knee Arthroscopy, debridement/shaving of articular cartilage (chondroplasty) 57091  4.  Left Knee  platelet rich plasma injection to the bone-patellar tendon-bone harvest site    Post-Op Plan:  ACL reconstruction with a bone-patellar tendon-bone autograft    SUBJECTIVE     Pt reports: he is feeling good today. Ready to test his knee. Has OPT 3x/week still until he leaves for [facility] on the 17th.     He was compliant with home exercise program.  Response to previous treatment: no issues; mild soreness  Functional change: jumping is improved    Pain: 0/10   Location: left knee     OBJECTIVE     9 Months 20 days post op    Wt. 323 lbs    Observation:   no effusion noted L knee with sweep test  Well healed scar     Range of Motion (extension/neutral/flexion):     Right Left   Knee ROM: 8-0-125/130 deg P:8-0-130 deg  A:8-0-125 deg      Strength: (Biodex machine)    Right Left Comment   Knee Extension: 346.7 lbs 335.2 lbs    3.3% deficit    Knee Flexion: 199 lbs 201.9 lbs    1.4% Stronger   Hip Abduction: 110.7 lbs 107 lbs  3.3% deficit  33.1% BW     SL hop distance: R=  6 ft, L= 6 ft (0% deficit): still quality deficits on L v R with dynamic valgum  Triple crossover hop distance: R= 19 ft, L= 16 ft 7 inch (13% deficit)  SL vertical: R= 13.7 in; L= 12.0 in (13% deficit)  DL vertical max 22 in  -slight dynamic valgum moment noted into take off/landing SL on L v R    Y balance reach (ant, post med, postlat)  -R 56, 119, 112 cm  -L 52, 113, 106 cm    Mid quad girth: 18 cm proximal Patella  - R= 77 cm  - L= 75 cm  At 10 cm proximal patella  - R= 64 cm  - L=57 cm    Agility tests:  -pro agility: 5.02 seconds (4.85 self start)  - L drill time from [place] or in clinic      Treatment     Billy received the treatments listed below:      therapeutic exercises to develop strength, endurance, ROM, flexibility, posture, and core stabilization for 40 minutes including:  Education/assessment  Heel prop x 5 min at start / end 10 lbs above/below  Manual LE stretching linus  Biodex testing    manual therapy techniques: Joint mobilizations and Soft tissue Mobilization were applied for 10 minutes, including:  PFJ gr 3-4 glides all planes  Tibial AP glide gr 3-4 with and without rotation bias on and off heel prop  Knee hyperextension gr 3-4  Fat pad mobility    neuromuscular re-education activities to improve: Coordination, Kinesthetic, Sense, and Proprioception for 3 minutes. The following activities were included:  Knee hyperextensions x 20 x 3 sec hold  Standing clamshells black band 3 x 15 linus-NP    Therapeutic activities to improve functional performance for 52 minutes, including:  SKTC, quad pull, HS scoop, HS march, high knee march, jogging butt kicks, jogging high knees, lunge, lateral lunge, band walks, monster walks, carioca > carioca w/ high knees, butt kicks, A skips, B skips, jogging x 4 laps  Hop and agility testing      Patient Education and Home Exercises     Home Exercises Provided and Patient Education Provided     Education provided:   -  HEP update, precautions, activity pacing, goals, timeframes    Written Home Exercises Provided: yes. Exercises were reviewed and Billy was able to demonstrate them prior to the end of the session.  Billy demonstrated good  understanding of the education provided. See EMR under Patient Instructions for exercises provided during therapy sessions.     ASSESSMENT     Billy did very well on biodex and SL broad jump today. His triple crossover and vertical height LSI remain at larger than 10% deficit due to fatigue. His best distance/height has improved overall. His quality of movement with dynamic valgum and stiff knee landings remains a risk factor.  He is aware of deficits and progress overall and voices understanding of prognostic indicators from testing. Pt reported/demonstrated no adverse response or exacerbation of symptoms during today's session.      Billy Is progressing well towards his goals.   Pt prognosis is Good.     Pt will continue to benefit from skilled outpatient physical therapy to address the deficits listed in the problem list box on initial evaluation, provide pt/family education and to maximize pt's level of independence in the home and community environment.     Pt's spiritual, cultural and educational needs considered and pt agreeable to plan of care and goals.     Anticipated barriers to physical therapy: none    Goals:  Short Term Goals: 8-10 weeks   1. Pt will be compliant with HEP 50% of prescribed amount. MET  2. The pt to demo improvement in left knee ROM to equal right knee ROM pain free MET  3.  The pt to demo good quad set with proper hyperextension moment of the Left knee MET  4. The pt to demo ambualting with least restricitve AD without major compensatory pattern left knee for at least 20 feet MET     Long Term Goals: 56 weeks (progressing, not met)   Pt will be compliant with % of prescribed amount.   The pt to demo pain free and uncompensated running mechanics x10 min on  an indoor treadmill MET  The pt to demo tolerance to a squat at or bellow parallel with uncompensated mechanics x10 MET  The pt to demo strength of L LE within 10% of R LE as demo'd on the biodex machine MET  The pt to demo a deficit of 10% or less on a triple hop, single leg broad jump and crossover hop compared to non operative LE. In progress  The pt will report full participation in ADLs and IADLs without restrictions related to L  knee. MET    PLAN     Repeat hop testing later this week. Discuss with MD prior to him leaving.     Manjit Lazcano, PT, DPT

## 2023-07-12 ENCOUNTER — OFFICE VISIT (OUTPATIENT)
Dept: SPORTS MEDICINE | Facility: CLINIC | Age: 28
End: 2023-07-12
Payer: OTHER MISCELLANEOUS

## 2023-07-12 DIAGNOSIS — Z98.890 S/P ACL RECONSTRUCTION: Primary | ICD-10-CM

## 2023-07-12 PROCEDURE — 99213 PR OFFICE/OUTPT VISIT, EST, LEVL III, 20-29 MIN: ICD-10-PCS | Mod: S$GLB,,, | Performed by: ORTHOPAEDIC SURGERY

## 2023-07-12 PROCEDURE — 99213 OFFICE O/P EST LOW 20 MIN: CPT | Mod: S$GLB,,, | Performed by: ORTHOPAEDIC SURGERY

## 2023-07-12 NOTE — PROGRESS NOTES
POST-OPERATIVE EXAMINATION    28 y.o. Male who returns for follow after surgery. He is 10 months s/p    Procedure:  1. Left Knee Arthroscopy, anterior cruciate ligament reconstruction 79742  2.  Left Knee Arthroscopy, with meniscectomy (medial OR lateral) 42162  3.  Left Knee Arthroscopy, debridement/shaving of articular cartilage (chondroplasty) 74819  4.  Left Knee  platelet rich plasma injection to the bone-patellar tendon-bone harvest site     He is doing well without any issues. He has completed Biodex testing and has only a 3.3% deficit as compared to the right side.      PHYSICAL EXAMINATION:  There were no vitals taken for this visit.  General: Well-developed well-nourished 28 y.o. malein no acute distress   Cardiovascular: Regular rhythm   Lungs: No labored breathing or wheezing appreciated   Neuro: Alert and oriented ×3   Psychiatric: well oriented to person, place and time, demonstrates normal mood and affect   Skin: No rashes, lesions or ulcers, normal temperature, turgor, and texture on involved extremity    ORTHOPEDIC EXAM:  Normal post-operative swelling  Normal post-operative scarring  Strength: WNL  ROM: 0-140  Tests: Negative Lachman's, Negative anterior drawer, Negative pivot shift, Negative Dav's    ASSESSMENT:      ICD-10-CM ICD-9-CM   1. S/P ACL reconstruction  Z98.890 V45.89         PLAN:       The patient is cleared for all activities  Return to clinic p.r.n.

## 2023-07-13 ENCOUNTER — CLINICAL SUPPORT (OUTPATIENT)
Dept: REHABILITATION | Facility: HOSPITAL | Age: 28
End: 2023-07-13
Payer: COMMERCIAL

## 2023-07-13 DIAGNOSIS — M62.81 QUADRICEPS WEAKNESS: ICD-10-CM

## 2023-07-13 DIAGNOSIS — R26.2 DIFFICULTY WALKING: ICD-10-CM

## 2023-07-13 DIAGNOSIS — M25.662 DECREASED RANGE OF MOTION OF LEFT KNEE: ICD-10-CM

## 2023-07-13 DIAGNOSIS — M25.562 ACUTE PAIN OF LEFT KNEE: Primary | ICD-10-CM

## 2023-07-13 PROCEDURE — 97530 THERAPEUTIC ACTIVITIES: CPT

## 2023-07-13 PROCEDURE — 97140 MANUAL THERAPY 1/> REGIONS: CPT

## 2023-07-13 PROCEDURE — 97110 THERAPEUTIC EXERCISES: CPT

## 2023-07-14 NOTE — PROGRESS NOTES
[facility]   Physical Therapy Treatment Note     Name: Billy Holbrook  [facility] Number: [id number]    Therapy Diagnosis:   Encounter Diagnoses   Name Primary?    Acute pain of left knee Yes    Decreased range of motion of left knee     Quadriceps weakness     Difficulty walking      Physician: Priyanka Castaneda MD    Visit Date: 7/13/2023    Physician Orders: PT Eval and Treat   Medical Diagnosis from Referral: M25.562 (ICD-10-CM) - Acute pain of left knee  Evaluation Date: 9/7/2022  Authorization Period Expiration: 6/5/2024  Plan of Care Expiration: 12/31/2023  Visit # / Visits authorized: 156 total 11/20     Precautions: Standard     Time In: 11:00 am  Time Out: 11:50  Total Billable Time:  50 minutes    Procedure: 9/20/2022  1. Left Knee Arthroscopy, anterior cruciate ligament reconstruction 39719  2.  Left Knee Arthroscopy, with meniscectomy (medial OR lateral) 58221  3.  Left Knee Arthroscopy, debridement/shaving of articular cartilage (chondroplasty) 00136  4.  Left Knee  platelet rich plasma injection to the bone-patellar tendon-bone harvest site    Post-Op Plan:  ACL reconstruction with a bone-patellar tendon-bone autograft    SUBJECTIVE     Pt reports: he has been feeling good. Notes no deficits other than with his SL hopping at full intensity. No pain or instability. No mechanical symptoms. Today is last date prior to move to [facility]. MD has cleared him at this time.     He was compliant with home exercise program.  Response to previous treatment: no issues; mild soreness  Functional change: jumping is improved    Pain: 0/10   Location: left knee     OBJECTIVE     9 Months 23 days post op    Wt. 318 lbs    Observation:   no effusion noted L knee with sweep test  Well healed scar     Range of Motion (extension/neutral/flexion):     Right Left   Knee ROM: 8-0-125/130 deg P:8-0-130 deg  A:8-0-125 deg      Strength: (Biodex machine)    Right Left Comment   Knee Extension: 346.7 lbs 335.2  lbs    3.3% deficit    Knee Flexion: 199 lbs 201.9 lbs    1.4% Stronger   Hip Abduction: 110.7 lbs 107 lbs 3.3% deficit  33.1% BW     SL hop distance: R=  6 ft, L= 6 ft (0% deficit): still quality deficits on L v R with dynamic valgum  Triple crossover hop distance: R= 19 ft, L= 18 ft 0 inch (6% deficit)  SL vertical: R= 13.7 in; L= 12.0 in (13% deficit)  DL vertical max 22 in  -slight dynamic valgum moment noted into take off/landing SL on L v R    Y balance reach (ant, post med, postlat)  -R 56, 119, 112 cm  -L 52, 113, 106 cm    Mid quad girth: 18 cm proximal Patella  - R= 77 cm  - L= 75 cm  At 10 cm proximal patella  - R= 64 cm  - L=57 cm    Agility tests:  -pro agility: 5.02 seconds (4.85 self start)  - L drill time from [place] or in clinic      Treatment     Billy received the treatments listed below:      therapeutic exercises to develop strength, endurance, ROM, flexibility, posture, and core stabilization for 10 minutes including:  Education/assessment  Heel prop x 5 min at start / end 10 lbs above/below  Manual LE stretching linus    manual therapy techniques: Joint mobilizations and Soft tissue Mobilization were applied for 10 minutes, including:  PFJ gr 3-4 glides all planes  Tibial AP glide gr 3-4 with and without rotation bias on and off heel prop  Knee hyperextension gr 3-4  Fat pad mobility    neuromuscular re-education activities to improve: Coordination, Kinesthetic, Sense, and Proprioception for 3 minutes. The following activities were included:  Knee hyperextensions x 20 x 3 sec hold  Standing clamshells black band 3 x 15 linus-NP    Therapeutic activities to improve functional performance for 27 minutes, including:  SKTC, quad pull, HS scoop, HS march, high knee march, jogging butt kicks, jogging high knees, lunge, lateral lunge, band walks, monster walks, carioca > carioca w/ high knees, butt kicks, A skips, B skips, jogging x 4 laps  Hop testing      Patient Education and Home Exercises     Home  Exercises Provided and Patient Education Provided     Education provided:   - HEP update, precautions, activity pacing, goals, timeframes    Written Home Exercises Provided: yes. Exercises were reviewed and Billy was able to demonstrate them prior to the end of the session.  Billy demonstrated good  understanding of the education provided. See EMR under Patient Instructions for exercises provided during therapy sessions.     ASSESSMENT     Billy did better with triple crossover hop LSI today with improved quality and control noted without fatigue from prior testing. He still has deficits in dynamic valgum with take off and landing of L SL hoping at max effort. No symptoms reported ad he seems to have a slight reduction in confidence with task. 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 Pt reported/demonstrated no adverse response or exacerbation of symptoms during today's session.      Billy Is progressing well towards his goals.   Pt prognosis is Good.     Pt will continue to benefit from skilled outpatient physical therapy to address the deficits listed in the problem list box on initial evaluation, provide pt/family education and to maximize pt's level of independence in the home and community environment.     Pt's spiritual, cultural and educational needs considered and pt agreeable to plan of care and goals.     Anticipated barriers to physical therapy: none    Goals:  Short Term Goals: 8-10 weeks   1. Pt will be compliant with HEP 50% of prescribed amount. MET  2. The pt to demo improvement in left knee ROM to equal right knee ROM pain free MET  3.  The pt to demo good quad set with proper hyperextension moment of the Left knee MET  4. The pt to demo ambualting with least restricitve AD without major compensatory pattern left knee for at least 20 feet MET     Long Term Goals: 56 weeks (progressing, not met)   Pt will be compliant with % of prescribed amount.   The pt to demo pain  free and uncompensated running mechanics x10 min on an indoor treadmill MET  The pt to demo tolerance to a squat at or bellow parallel with uncompensated mechanics x10 MET  The pt to demo strength of L LE within 10% of R LE as demo'd on the biodex machine MET  The pt to demo a deficit of 10% or less on a triple hop, single leg broad jump and crossover hop compared to non operative LE. In progress  The pt will report full participation in ADLs and IADLs without restrictions related to L  knee. MET    PLAN     D/C today.     Manjit Lazcano, PT, DPT

## 2024-12-06 ENCOUNTER — APPOINTMENT (OUTPATIENT)
Dept: RADIOLOGY | Facility: HOSPITAL | Age: 29
End: 2024-12-06
Payer: COMMERCIAL

## 2024-12-06 ENCOUNTER — APPOINTMENT (OUTPATIENT)
Dept: ORTHOPEDIC SURGERY | Facility: HOSPITAL | Age: 29
End: 2024-12-06
Payer: COMMERCIAL

## 2024-12-06 DIAGNOSIS — Z02.5 SPORTS PHYSICAL: ICD-10-CM

## (undated) DEVICE — DRAPE THREE-QTR REINF 53X77IN

## (undated) DEVICE — Device

## (undated) DEVICE — GLOVE PROTEXIS LTX MICRO 8

## (undated) DEVICE — UNDERGLOVES BIOGEL PI SIZE 8.5

## (undated) DEVICE — SOL 9P NACL IRR PIC IL

## (undated) DEVICE — BOWL STERILE LARGE 32OZ

## (undated) DEVICE — UNDERGLOVES BIOGEL PI SZ 7 LF

## (undated) DEVICE — CONTAINER SPECIMEN OR STER 4OZ

## (undated) DEVICE — MARKER SKIN STND TIP BLUE BARR

## (undated) DEVICE — BRACE KNEE T SCOPE PREMIER

## (undated) DEVICE — SYR IRRIGATION BULB STER 60ML

## (undated) DEVICE — COVER LIGHT HANDLE 80/CA

## (undated) DEVICE — SUT PROLENE 3-0 PS-1 BL 18

## (undated) DEVICE — GUIDEWIRE 1.2MM X 18 STER
Type: IMPLANTABLE DEVICE | Site: KNEE | Status: NON-FUNCTIONAL
Removed: 2022-09-20

## (undated) DEVICE — NDL 18GA X1 1/2 REG BEVEL

## (undated) DEVICE — GOWN POLY REINF BRTH SLV LG

## (undated) DEVICE — SUT VICRYL 0 27 CT-2

## (undated) DEVICE — BANDAGE MATRIX HK LOOP 6IN 5YD

## (undated) DEVICE — BANDAGE ESMARK ELASTIC ST 4X9

## (undated) DEVICE — SUT FIBERWIRE

## (undated) DEVICE — DRAPE INCISE IOBAN 2 23X17IN

## (undated) DEVICE — DRESSING N ADH OIL EMUL 3X3

## (undated) DEVICE — BLADE ACL FINE 9.5X25.5X.4MM

## (undated) DEVICE — GAUZE SPONGE 4X4 12PLY

## (undated) DEVICE — SEE L#120831

## (undated) DEVICE — DRAPE TOP 53X102IN

## (undated) DEVICE — NDL SAFETY 22G X 1.5 ECLIPSE

## (undated) DEVICE — COVER PROXIMA MAYO STAND

## (undated) DEVICE — SYR 10CC LUER LOCK

## (undated) DEVICE — ELECTRODE REM PLYHSV RETURN 9

## (undated) DEVICE — UNDERGLOVES BIOGEL PI SIZE 7.5

## (undated) DEVICE — ENDOBUTTON DRILLTIP 2.4MMX15IN
Type: IMPLANTABLE DEVICE | Site: KNEE | Status: NON-FUNCTIONAL
Removed: 2022-09-20

## (undated) DEVICE — GLOVE PROTEXIS LIGHT BROWN 8.5

## (undated) DEVICE — PAD CAST SPECIALIST STRL 6

## (undated) DEVICE — SOL IRR NACL .9% 3000ML

## (undated) DEVICE — NDL SPINAL 18GX3.5 SPINOCAN

## (undated) DEVICE — SUT CTD VICRYL 2.0

## (undated) DEVICE — PAD DERMAPROX THCK 11X15X1IN

## (undated) DEVICE — SUT ETHILON 3-0 PS2 18 BLK

## (undated) DEVICE — BLADE SURG STAINLESS STEEL #15

## (undated) DEVICE — DRAPE T EXTRM SURG 121X128X90

## (undated) DEVICE — DRESSING N ADH OIL EMUL 3X8

## (undated) DEVICE — GOWN POLY REINF X-LONG 2XL

## (undated) DEVICE — COVER CAMERA OPERATING ROOM

## (undated) DEVICE — SUT CTD VICRYL 0 UND BR

## (undated) DEVICE — TOURNIQUET SB QC SP 44X4IN

## (undated) DEVICE — APPLICATOR CHLORAPREP ORN 26ML

## (undated) DEVICE — BLADE GREAT WHITE 4.2 6/BX

## (undated) DEVICE — PADDING WYTEX UNDRCST 6INX4YD

## (undated) DEVICE — PADDING CAST 6IN DELTA ROLL

## (undated) DEVICE — BURR OVAL CUTTING 6 MM

## (undated) DEVICE — DRAPE ORTH SPLIT 77X108IN

## (undated) DEVICE — GLOVE BIOGEL SKINSENSE PI 6.5

## (undated) DEVICE — COVER TABLE REINF 50X90IN

## (undated) DEVICE — TUBE SET INFLOW/OUTFLOW

## (undated) DEVICE — PENCIL ELECTROSURG HOLST W/BLD

## (undated) DEVICE — PROBE ARTHSCP EDGE ENERGY 50

## (undated) DEVICE — CLOSURE SKIN STERI STRIP 1/2X4

## (undated) DEVICE — DRAPE STERI U-SHAPED 47X51IN